# Patient Record
Sex: MALE | Race: WHITE | NOT HISPANIC OR LATINO | Employment: OTHER | URBAN - NONMETROPOLITAN AREA
[De-identification: names, ages, dates, MRNs, and addresses within clinical notes are randomized per-mention and may not be internally consistent; named-entity substitution may affect disease eponyms.]

---

## 2017-01-31 ENCOUNTER — OFFICE VISIT - GICH (OUTPATIENT)
Dept: FAMILY MEDICINE | Facility: OTHER | Age: 47
End: 2017-01-31

## 2017-01-31 DIAGNOSIS — J02.9 ACUTE PHARYNGITIS: ICD-10-CM

## 2017-01-31 LAB
INFLUENZA ANTIGEN - HISTORICAL: NORMAL
STREP A ANTIGEN - HISTORICAL: NEGATIVE

## 2017-02-17 ENCOUNTER — COMMUNICATION - GICH (OUTPATIENT)
Dept: FAMILY MEDICINE | Facility: OTHER | Age: 47
End: 2017-02-17

## 2017-02-17 DIAGNOSIS — M54.16 RADICULOPATHY OF LUMBAR REGION: ICD-10-CM

## 2017-03-22 ENCOUNTER — OFFICE VISIT - GICH (OUTPATIENT)
Dept: FAMILY MEDICINE | Facility: OTHER | Age: 47
End: 2017-03-22

## 2017-03-22 ENCOUNTER — HISTORY (OUTPATIENT)
Dept: FAMILY MEDICINE | Facility: OTHER | Age: 47
End: 2017-03-22

## 2017-03-22 DIAGNOSIS — J02.9 ACUTE PHARYNGITIS: ICD-10-CM

## 2017-03-22 DIAGNOSIS — R68.89 OTHER GENERAL SYMPTOMS AND SIGNS: ICD-10-CM

## 2017-03-22 DIAGNOSIS — R50.9 FEVER: ICD-10-CM

## 2017-03-22 DIAGNOSIS — R69 ILLNESS: ICD-10-CM

## 2017-03-22 LAB — STREP A ANTIGEN - HISTORICAL: NEGATIVE

## 2017-07-17 ENCOUNTER — COMMUNICATION - GICH (OUTPATIENT)
Dept: FAMILY MEDICINE | Facility: OTHER | Age: 47
End: 2017-07-17

## 2017-07-17 DIAGNOSIS — J30.2 OTHER SEASONAL ALLERGIC RHINITIS: ICD-10-CM

## 2017-07-17 DIAGNOSIS — M54.16 RADICULOPATHY OF LUMBAR REGION: ICD-10-CM

## 2017-08-10 ENCOUNTER — COMMUNICATION - GICH (OUTPATIENT)
Dept: FAMILY MEDICINE | Facility: OTHER | Age: 47
End: 2017-08-10

## 2017-08-10 DIAGNOSIS — R06.2 WHEEZING: ICD-10-CM

## 2017-08-21 ENCOUNTER — OFFICE VISIT - GICH (OUTPATIENT)
Dept: FAMILY MEDICINE | Facility: OTHER | Age: 47
End: 2017-08-21

## 2017-08-21 DIAGNOSIS — R06.2 WHEEZING: ICD-10-CM

## 2017-08-21 DIAGNOSIS — J30.2 OTHER SEASONAL ALLERGIC RHINITIS: ICD-10-CM

## 2017-08-21 DIAGNOSIS — M54.16 RADICULOPATHY OF LUMBAR REGION: ICD-10-CM

## 2017-08-21 DIAGNOSIS — M1A.9XX0 CHRONIC GOUT WITHOUT TOPHUS: ICD-10-CM

## 2017-08-21 ASSESSMENT — PATIENT HEALTH QUESTIONNAIRE - PHQ9: SUM OF ALL RESPONSES TO PHQ QUESTIONS 1-9: 7

## 2017-08-25 ENCOUNTER — COMMUNICATION - GICH (OUTPATIENT)
Dept: FAMILY MEDICINE | Facility: OTHER | Age: 47
End: 2017-08-25

## 2017-08-25 DIAGNOSIS — M1A.9XX0 CHRONIC GOUT WITHOUT TOPHUS: ICD-10-CM

## 2017-09-17 ENCOUNTER — COMMUNICATION - GICH (OUTPATIENT)
Dept: FAMILY MEDICINE | Facility: OTHER | Age: 47
End: 2017-09-17

## 2017-09-17 DIAGNOSIS — R06.2 WHEEZING: ICD-10-CM

## 2017-10-18 ENCOUNTER — HISTORY (OUTPATIENT)
Dept: FAMILY MEDICINE | Facility: OTHER | Age: 47
End: 2017-10-18

## 2017-10-18 ENCOUNTER — OFFICE VISIT - GICH (OUTPATIENT)
Dept: FAMILY MEDICINE | Facility: OTHER | Age: 47
End: 2017-10-18

## 2017-10-18 DIAGNOSIS — B97.89 OTHER VIRAL AGENTS AS THE CAUSE OF DISEASES CLASSIFIED ELSEWHERE: ICD-10-CM

## 2017-10-18 DIAGNOSIS — J02.9 ACUTE PHARYNGITIS: ICD-10-CM

## 2017-10-18 DIAGNOSIS — Z01.89 ENCOUNTER FOR OTHER SPECIFIED SPECIAL EXAMINATIONS (CODE): ICD-10-CM

## 2017-10-18 DIAGNOSIS — J06.9 ACUTE UPPER RESPIRATORY INFECTION: ICD-10-CM

## 2017-10-18 LAB — STREP A ANTIGEN - HISTORICAL: NEGATIVE

## 2017-12-27 NOTE — PROGRESS NOTES
Patient Information     Patient Name MRN Sex Christiano Mccarthy [id number] Male 1970      Progress Notes by Gino Frankel MD at 2017  3:15 PM     Author:  Gino Frankel MD Service:  (none) Author Type:  Physician     Filed:  2017  4:01 PM Encounter Date:  2017 Status:  Signed     :  Gino Frankel MD (Physician)            SUBJECTIVE:  Christiano Metcalf is a 47 y.o. male here for follow-up. Patient has a history of gout. He currently is only using indomethacin for flareups per reports of these had numerous flareups of the last few months and he would like to go back on allopurinol. He. She was using 600 mg daily. He also has a history of hernia repair and feels that he has recovered completely. He is albuterol as needed for wheezing and is quite happy with how it is working. He thinks that he uses this 1-2 times a week. Finally, he continues using Zyrtec for seasonal allergies.      Patient Active Problem List      Diagnosis Date Noted     Major depression 2014     Hypomagnesemia      LEARNING DISABILITY 2011     GOUT        Past Medical History:     Diagnosis  Date     Gout      History of torn meniscus of right knee     repair      Hypomagnesemia      Knee pain, left      Thumb injury        Past Surgical History:      Procedure  Laterality Date     HAND FINGER SURGERY      Right thumb ORIF        HERNIA REPAIR Left 7/15/14    LIH with mesh       HERNIA REPAIR Right 5/10/16    RIH with plug/patch       KNEE ARTHROSCOPY      Right knee meniscal repair, arthroscopic ally       KNEE ARTHROSCOPY  2010    Left knee scope          Current Outpatient Prescriptions       Medication  Sig Dispense Refill     albuterol HFA (PROAIR HFA) 90 mcg/actuation inhaler Inhale 2 Puffs by mouth 4 times daily if needed. 1 Inhaler 3     allopurinol (ZYLOPRIM) 300 mg tablet Take 2 tablets by mouth once daily. 180 tablet 3     cetirizine (ZYRTEC) 10 mg tablet Take 1 tablet by mouth  once daily. 90 tablet 3     indomethacin (INDOCIN) 50 mg capsule Take 1 capsule by mouth 3 times daily with meals. 90 capsule 3     naproxen (NAPROSYN) 500 mg tablet Take 1 tablet by mouth 2 times daily with meals. 180 tablet 3     No current facility-administered medications for this visit.      Medications have been reviewed by me and are current to the best of my knowledge and ability.      Allergies:  Allergies      Allergen   Reactions     Other [Unlisted Allergen (Include Detail In Comments)]  Nausea And Vomiting     Peas        Family History       Problem   Relation Age of Onset     Diabetes  Mother      Other  Father      episode of gout.        Cancer-colon  Father      diagnosed in early 60s       Good Health  Son      1997       Good Health  Daughter      2009       Good Health  Son      2011         Social History       Substance Use Topics         Smoking status:   Former Smoker     Types:  Cigarettes     Quit date:  2/17/2010     Smokeless tobacco:   Never Used     Alcohol use   No      Comment: seldom        ROS:    As above otherwise ROS is unremarkable.      OBJECTIVE:  /97  Pulse 69  Wt 98 kg (216 lb)  BMI 32.36 kg/m2    EXAM:  General Appearance: Pleasant, alert, appropriate appearance for age. No acute distress  Head: Normal. Normocephalic, atraumatic.  Eyes: PERRL, EOMI  Ears: Normal TM's bilaterally. Normal auditory canals and external ears.   OroPharynx: Dental hygiene adequate. Normal buccal mucosa. Normal pharynx.  Neck: Supple, no masses or nodes, no lymphadenopathy.  No thyromegaly.  Lungs: Normal chest wall and respirations. Clear to auscultation, no wheezes or crackles.  Cardiovascular: Regular rate and rhythm. S1, S2, no murmurs.  Gastrointestinal: Soft, nontender, no abnormal masses or organomegaly. BS normal.  Musculoskeletal: No edema.  Skin: no concerning or new rashes.  Neurologic Exam: CN 2-12 grossly intact.  Normal gait.  Symmetric DTRs, No focal motor or sensory  deficits. No tremor.  Psychiatric Exam: Alert and oriented, appropriate affect.    ASSESSEMENT AND PLAN:    Christiano was seen today for medication management.    Diagnoses and all orders for this visit:    Wheezing  -     albuterol HFA (PROAIR HFA) 90 mcg/actuation inhaler; Inhale 2 Puffs by mouth 4 times daily if needed.    Lumbar radiculopathy  -     naproxen (NAPROSYN) 500 mg tablet; Take 1 tablet by mouth 2 times daily with meals.    Chronic gout without tophus, unspecified cause, unspecified site  -     indomethacin (INDOCIN) 50 mg capsule; Take 1 capsule by mouth 3 times daily with meals.  -     allopurinol (ZYLOPRIM) 300 mg tablet; Take 2 tablets by mouth once daily.    Seasonal allergic rhinitis, unspecified allergic rhinitis trigger  -     cetirizine (ZYRTEC) 10 mg tablet; Take 1 tablet by mouth once daily.    Overall he seems to be doing well. We'll refill albuterol and he will follow-up if he is using this more than twice a week. We'll restart allopurinol once he has completed his most recent flareup. He can continue to use indomethacin as needed for flareups but he should not mix this with naproxen and this was reviewed once again with him. Finally, continue Zyrtec as needed for seasonal allergies.      Otto Frankel MD

## 2017-12-28 NOTE — TELEPHONE ENCOUNTER
Patient Information     Patient Name MRN Sex Christiano Mccarthy [id number] Male 1970      Telephone Encounter by Suman Gu RN at 2017 10:47 AM     Author:  Suman Gu RN Service:  (none) Author Type:  NURS- Registered Nurse     Filed:  2017 11:14 AM Encounter Date:  2017 Status:  Signed     :  Suman Gu RN (NURS- Registered Nurse)            Nsaids    Office visit in the past 12 months or per provider note.    Last visit with PITER KATE was on: 2016 in [facility]  Next visit with PITER KATE is on: No future appointment listed with this provider  Next visit with Family Practice is on: No future appointment listed in this department    Max refill for 12 months from last office visit or per provider note.    Antihistamines:    Office visit in the past 12 months.    Last visit with PITER KATE was on: 2016 in [facility]  Next visit with PITER KATE is on: No future appointment listed with this provider  Next visit with Family Practice is on: No future appointment listed in this department    Max refills 12 months from last office visit.    Chart review shows that patient was last seen by PCP for diagnosis of lumbar radiculopathy and Seasonal allergies on 16. Zyrtec was renewed at that office visit as per chart review. No changes noted to naproxen in office visit notes on that date. Patient is due for annual office visit to support continued use of both requested rxs. Will refill rxs as requested for a limited supply and send patient a reminder letter.    Prescription refilled per RN Medication Refill Policy.................... Suman Gu RN ....................  2017   11:12 AM

## 2017-12-28 NOTE — TELEPHONE ENCOUNTER
Patient Information     Patient Name MRN Sex Christiano Mccarthy [id number] Male 1970      Telephone Encounter by Suman Gu RN at 2017  3:11 PM     Author:  Suman Gu RN Service:  (none) Author Type:  NURS- Registered Nurse     Filed:  2017  3:17 PM Encounter Date:  2017 Status:  Signed     :  Suman Gu RN (NURS- Registered Nurse)            Redundant Refill Request for Proair refused;    Prescribing Provider: Gino Frankel MD                   Order Date: 2017  Ordered by: GINO FRANKEL  Medication:albuterol HFA (PROAIR HFA) 90 mcg/actuation inhaler    Qty:1 Inhaler   Ref:3  Start:2017   End:              Route:Inhalation            ARACELI:No   Class:eRx    Sig:Inhale 2 Puffs by mouth 4 times daily if needed.    Pharmacy:[facility] [address] [place] [address] AT SEC              OF [facility] [address] & [facility] - [phone]    Unable to complete prescription refill per RN Medication Refill Policy.................... Suman Gu RN ....................  2017   3:16 PM

## 2017-12-28 NOTE — TELEPHONE ENCOUNTER
Patient Information     Patient Name MRN Sex Christiano Mccarthy [id number] Male 1970      Telephone Encounter by Todd Pierson LPN at 2017  2:00 PM     Author:  Todd Pierson LPN Service:  (none) Author Type:  NURS- Licensed Practical Nurse     Filed:  2017  2:01 PM Encounter Date:  2017 Status:  Signed     :  Todd Pierson LPN (NURS- Licensed Practical Nurse)            Please advise.  Todd Pierson LPN ..............2017 2:01 PM

## 2017-12-28 NOTE — TELEPHONE ENCOUNTER
Patient Information     Patient Name MRN Sex Christiano Mccarthy [id number] Male 1970      Telephone Encounter by Gino Frankel MD at 2017  2:28 PM     Author:  Gino Frankel MD Service:  (none) Author Type:  Physician     Filed:  2017  2:34 PM Encounter Date:  2017 Status:  Signed     :  Gino Frankel MD (Physician)            Our chart had 600mg for some reason.  If he was previously only using 300mg then I would recommend starting back at 300mg.  Will update med list.

## 2017-12-28 NOTE — PROGRESS NOTES
Patient Information     Patient Name MRN Sex Christiano Mccarthy [id number] Male 1970      Progress Notes by Jenna Kothari NP at 10/18/2017  4:15 PM     Author:  Jenna Kothari NP Service:  (none) Author Type:  PHYS- Nurse Practitioner     Filed:  10/18/2017  5:19 PM Encounter Date:  10/18/2017 Status:  Signed     :  Jenna Kothari NP (PHYS- Nurse Practitioner)            HPI:    Christiano Metcalf is a 47 y.o. male who presents to clinic today for URI.   Symptoms x 3 days.   Symptoms include cough, chest congestion, runny and stuffy nose, sinus pressure, sore throat, sneezing, chills, sweats, generalized body aches, and headaches.  Cough and sore throat worsening since last night.  Non productive cough.  Some shortness of breath with coughing and exertion.  No documented fevers.  Appetite decreased, fair, painful to swallow.  Throat is burning.  Energy decreased, fair.  Requesting strep testing today, states he had it multiple times last year.  Taking Tylenol sinus and Ibuprofen.  Using Albuterol inhaler about 5 times in the past 3 days.            Past Medical History:     Diagnosis  Date     Gout      History of torn meniscus of right knee     repair      Hypomagnesemia      Knee pain, left      Thumb injury      Past Surgical History:      Procedure  Laterality Date     HAND FINGER SURGERY      Right thumb ORIF        HERNIA REPAIR Left 7/15/14    LIH with mesh       HERNIA REPAIR Right 5/10/16    RIH with plug/patch       KNEE ARTHROSCOPY      Right knee meniscal repair, arthroscopic ally       KNEE ARTHROSCOPY  2010    Left knee scope        Social History       Substance Use Topics         Smoking status:   Former Smoker     Types:  Cigarettes     Quit date:  2010     Smokeless tobacco:   Never Used     Alcohol use   No      Comment: seldom      Current Outpatient Prescriptions       Medication  Sig Dispense Refill     albuterol HFA (PROAIR HFA) 90 mcg/actuation  "inhaler Inhale 2 Puffs by mouth 4 times daily if needed. 1 Inhaler 3     allopurinol (ZYLOPRIM) 300 mg tablet Take 1 tablet by mouth once daily. 180 tablet 3     cetirizine (ZYRTEC) 10 mg tablet Take 1 tablet by mouth once daily. 90 tablet 3     indomethacin (INDOCIN) 50 mg capsule Take 1 capsule by mouth 3 times daily with meals. 90 capsule 3     naproxen (NAPROSYN) 500 mg tablet Take 1 tablet by mouth 2 times daily with meals. 180 tablet 3     No current facility-administered medications for this visit.      Medications have been reviewed by me and are current to the best of my knowledge and ability.    Allergies      Allergen   Reactions     Other [Unlisted Allergen (Include Detail In Comments)]  Nausea And Vomiting     Peas        Past medical history, past surgical history, current medications and allergies reviewed and accurate to the best of my knowledge.        ROS:  Refer to HPI    /80  Pulse 72  Temp 98  F (36.7  C) (Tympanic)   Resp 20  Ht 1.727 m (5' 8\")  Wt 99.8 kg (220 lb)  BMI 33.45 kg/m2    EXAM:  General Appearance: miserable appearing adult male, non toxic appearance, appropriate appearance for age. No acute distress  Head: normocephalic, atraumatic  Ears: Left TM with bony landmarks appreciated, no erythema, no effusion, no bulging, no purulence.  Right TM with bony landmarks appreciated, no erythema, no effusion, no bulging, no purulence.   Left auditory canal clear.  Right auditory canal clear.  Normal external ears, non tender.  Eyes: conjunctivae normal without erythema or irritation, no drainage or crusting, no eyelid swelling, pupils equal   Orophayrnx: moist mucous membranes, posterior pharynx with mild erythema, tonsils without hypertrophy, no erythema, no exudates or petechiae, no post nasal drip seen, no oral lesions.    Neck: supple without adenopathy  Respiratory: normal chest wall and respirations.  Normal effort.  Clear to auscultation bilaterally, no wheezing, crackles " or rhonchi.  No increased work of breathing.  No cough appreciated.  Cardiac: RRR with no murmurs  Musculoskeletal:  Normal gait.  Equal movement of bilateral upper extremities.  Equal movement of bilateral lower extremities.    Dermatological: no rashes noted of exposed skin  Psychological: normal affect, alert and pleasant      Labs:  Results for orders placed or performed in visit on 10/18/17      RAPID STREP WITH REFLEX CULTURE      Result  Value Ref Range    STREP A ANTIGEN           Negative Negative             ASSESSMENT/PLAN:    ICD-10-CM    1. Viral URI with cough J06.9 codeine-guaiFENesin (ROBITUSSIN AC)  mg/5 mL liquid     B97.89    2. Sore throat J02.9 RAPID STREP WITH REFLEX CULTURE      RAPID STREP WITH REFLEX CULTURE   3. Patient request for diagnostic testing Z01.89 RAPID STREP WITH REFLEX CULTURE      RAPID STREP WITH REFLEX CULTURE         Negative rapid strep test  Symptoms and exam consistent with viral illness.  No antibiotics indicated at this time.  Robitussin with codeine 5 ml Q 6 hours PRN  Encouraged fluids  Symptomatic treatment - salt water gargles, honey, elevation, humidifier, sinus rinse/netti pot, lozenges, etc   Tylenol or ibuprofen PRN  Follow up if symptoms persist or worsen or concerns          Patient Instructions   Negative rapid strep test    Symptoms likely due to virus. No antibiotic is needed at this time.       Most coughs are caused by a viral infection.   Usually coughs can last 2 to 3 weeks. Sometimes the cough becomes loose (wet) for a few days, and your child coughs up a lot of phlegm (mucus). This is usually a sign that the end of the illness is near.    Most sore throats are caused by viruses and are part of a cold. About 10% of sore throats are caused by strep bacteria.    Encouraged fluids and rest.    May use symptomatic care with tylenol or ibuprofen.     Using a humidifier works well to break up the congestion.     Elevate the mattress to 15 degrees in  order to help with the congestion.    Frequent swallows of cool liquid.      Oatmeal or honey coats the throat and some patients find it soothes the pain.     Salt water gargles as needed    Return to clinic with change/worsening of symptoms or concerns.

## 2017-12-28 NOTE — TELEPHONE ENCOUNTER
Patient Information     Patient Name MRN Sex Christiano Mccarthy [id number] Male 1970      Telephone Encounter by Lenora Farrell RN at 2017  2:17 PM     Author:  Lenora Farrell RN Service:  (none) Author Type:  NURS- Registered Nurse     Filed:  2017  2:21 PM Encounter Date:  8/10/2017 Status:  Signed     :  Lenora Farrell RN (NURS- Registered Nurse)            Bronchodilator Inhalers     Office visit in the past 12 months.    Last visit with [facility] was on: 2016 in [email or web address]  Next visit with [facility] is on: 2017 in [facility]  Next visit with Family Practice is on: 2017 in [facility]    Max refills 12 months from last office visit.  Limited refill   Prescription refilled per RN Medication Refill Policy.................... LENORA FARRELL, JACOB ....................  2017   2:19 PM

## 2017-12-28 NOTE — TELEPHONE ENCOUNTER
Patient Information     Patient Name MRN Sex Christiano Mccarthy [id number] Male 1970      Telephone Encounter by Katty Montes De Oca at 2017  3:45 PM     Author:  Katty Montes De Oca Service:  (none) Author Type:  (none)     Filed:  2017  3:48 PM Encounter Date:  2017 Status:  Signed     :  Katty Montes De Oca            After last name and birthday was verified, patient was notified of information below.  Katty Montes De Oca LPN ................ 2017 3:46 PM

## 2017-12-29 ENCOUNTER — HISTORY (OUTPATIENT)
Dept: FAMILY MEDICINE | Facility: OTHER | Age: 47
End: 2017-12-29

## 2017-12-29 ENCOUNTER — OFFICE VISIT - GICH (OUTPATIENT)
Dept: FAMILY MEDICINE | Facility: OTHER | Age: 47
End: 2017-12-29

## 2017-12-29 DIAGNOSIS — M1A.9XX0 CHRONIC GOUT WITHOUT TOPHUS: ICD-10-CM

## 2017-12-29 DIAGNOSIS — F32.1 MAJOR DEPRESSIVE DISORDER, SINGLE EPISODE, MODERATE (H): ICD-10-CM

## 2017-12-29 NOTE — PATIENT INSTRUCTIONS
Patient Information     Patient Name MRN Christiano Grimes [id number] Male 1970      Patient Instructions by Jenna Kothari NP at 10/18/2017  4:15 PM     Author:  Jenna Kothari NP Service:  (none) Author Type:  PHYS- Nurse Practitioner     Filed:  10/18/2017  5:11 PM Encounter Date:  10/18/2017 Status:  Signed     :  Jenna Kothari NP (PHYS- Nurse Practitioner)            Negative rapid strep test    Symptoms likely due to virus. No antibiotic is needed at this time.       Most coughs are caused by a viral infection.   Usually coughs can last 2 to 3 weeks. Sometimes the cough becomes loose (wet) for a few days, and your child coughs up a lot of phlegm (mucus). This is usually a sign that the end of the illness is near.    Most sore throats are caused by viruses and are part of a cold. About 10% of sore throats are caused by strep bacteria.    Encouraged fluids and rest.    May use symptomatic care with tylenol or ibuprofen.     Using a humidifier works well to break up the congestion.     Elevate the mattress to 15 degrees in order to help with the congestion.    Frequent swallows of cool liquid.      Oatmeal or honey coats the throat and some patients find it soothes the pain.     Salt water gargles as needed    Return to clinic with change/worsening of symptoms or concerns.

## 2017-12-30 NOTE — NURSING NOTE
Patient Information     Patient Name MRN Sex Christiano Mccarthy [id number] Male 1970      Nursing Note by Jossy Church at 10/18/2017  4:15 PM     Author:  Jossy Church Service:  (none) Author Type:  NURS- Student Practical Nurse     Filed:  10/18/2017  4:48 PM Encounter Date:  10/18/2017 Status:  Signed     :  Jossy Church (NURS- Student Practical Nurse)            Patient presents to the clinic for URI/sinus that started on . S/sx include sinus pressure, sinus headache, extreme cough with sore throat, chest & nasal congestion, loss of voice and generalized muscle aches. Has been taking OTC Tylenol sinus.   Jossy Church LPN............................ 10/18/2017 4:35 PM

## 2017-12-30 NOTE — NURSING NOTE
Patient Information     Patient Name MRN Sex Christiano Mccarthy [id number] Male 1970      Nursing Note by Anali Smith at 2017  3:15 PM     Author:  Anali Smith Service:  (none) Author Type:  (none)     Filed:  2017  3:53 PM Encounter Date:  2017 Status:  Signed     :  Anali Smith            Patient is here for Medication management.   Anali Smith LPN .............2017  3:19 PM

## 2018-01-03 NOTE — PROGRESS NOTES
"Patient Information     Patient Name MRN Christiano Grimes [id number] Male 1970      Progress Notes by Huong Barlow MD at 2017  2:15 PM     Author:  Huong Barlow MD Service:  (none) Author Type:  Physician     Filed:  2017  2:50 PM Encounter Date:  2017 Status:  Signed     :  Huong Barlow MD (Physician)            SUBJECTIVE:  46 y.o. male presents for acute sore throat. 1.5-2 days ago he developed a scratchy throat, then headache, upset stomach, and diarrhea. The diarrhea has improved; no vomiting. No fever but feels chilly. Mild cough. \"Whole body\" aches.   Many people ill at work, doesn't know of any strep or influenza. No sick exposures at home.  Nonsmoker; quit  after smoking 2 ppd for 25 years. Recent Dx of emphysema. No colored sputum or LOU.  No flu shot.      Current Outpatient Prescriptions       Medication  Sig Dispense Refill     albuterol HFA (PROAIR HFA) 90 mcg/actuation inhaler Inhale 2 Puffs by mouth 4 times daily if needed. 1 Inhaler 11     allopurinol (ZYLOPRIM) 300 mg tablet Take 2 tablets by mouth once daily. 180 tablet 3     cetirizine (ZYRTEC) 10 mg tablet Take 1 tablet by mouth once daily. 90 tablet 3     ibuprofen (ADVIL; MOTRIN) 200 mg tablet Take 200 mg by mouth 4 times daily if needed (took 3 pills at a time).       indomethacin (INDOCIN) 50 mg capsule Take 1 capsule by mouth 3 times daily with meals. 30 capsule 3     naproxen (NAPROSYN) 500 mg tablet Take 1 tablet by mouth 2 times daily with meals. 60 tablet 3     polyethylene glycol (MIRALAX) 17 g powder for solution Take 17 g by mouth once daily. 30 Packet 1     polyethylene glycol-electrolyte (NULYTELY) 420 gram solution Take 240 mL by mouth every 10 minutes. 4000 mL 0     No current facility-administered medications for this visit.      Medications have been reviewed by me and are current to the best of my knowledge and ability.      Allergies as of 2017 - [place] as Reviewed " 01/31/2017      Allergen  Reaction Noted     Other [unlisted allergen (include detail in comments)] Nausea And Vomiting 05/10/2016        OBJECTIVE:  Visit Vitals       /80     Pulse 80     Temp 98.2  F (36.8  C) (Temporal)     Wt 95.7 kg (211 lb)     BMI 31.62 kg/m2       General Appearance: No distress - comfortable, no resp distress  Ear Exam: Normal TM's bilaterally. Normal auditory canals.  OroPharynx Exam: minimal posterior erythema without exudate  Neck Exam: Supple, no masses or nodes.  Chest/Respiratory Exam: Normal chest wall and respirations. Clear to auscultation.  Cardiovascular Exam: RRR without murmur.    LAB:  RSV negative  Influenza negative      ASSESSMENT/PLAN:    ICD-10-CM   1. Sore throat J02.9       Labs and clinical picture consistent with viral illness. Reviewed home care and signs/Sx that should prompt re-evaluation. Work note given.

## 2018-01-03 NOTE — TELEPHONE ENCOUNTER
Patient Information     Patient Name MRN Sex Christiano Mccarthy [id number] Male 1970      Telephone Encounter by Dana Trinh RN at 2017 10:02 AM     Author:  Dana Trinh RN Service:  (none) Author Type:  NURS- Registered Nurse     Filed:  2017 10:08 AM Encounter Date:  2017 Status:  Signed     :  Dana Trinh RN (NURS- Registered Nurse)            Nsaids  Office visit in the past 12 months or per provider note.  Last visit with PITER KATE was on: 2016 in [facility]  Next visit with PITER KATE is on: No future appointment listed with this provider  Next visit with Family Practice is on: No future appointment listed in this department  Max refill for 12 months from last office visit or per provider note.  Prescription refilled per RN Medication Refill Policy.................... Dana Trinh RN ....................  2017   10:05 AM

## 2018-01-03 NOTE — PATIENT INSTRUCTIONS
Patient Information     Patient Name MRN Sex Christiano Mccarthy [id number] Male 1970      Patient Instructions by Huong Barlow MD at 2017  2:15 PM     Author:  Huong Barlow MD  Service:  (none) Author Type:  Physician     Filed:  2017  2:50 PM  Encounter Date:  2017 Status:  Addendum     :  Huong Barlow MD (Physician)        Related Notes: Original Note by Huong Barlow MD (Physician) filed at 2017  2:44 PM            You may use your albuterol inhaler for either cough or wheezing.  Use Tylenol or ibuprofen for muscle aches.  Rest, drink fluids.  Follow up if you should develop high fever, increased cough, or dark-colored sputum.

## 2018-01-03 NOTE — NURSING NOTE
Patient Information     Patient Name MRN Sex Christiano Mccarthy [id number] Male 1970      Nursing Note by Judy Faust at 2017  2:15 PM     Author:  Judy Faust Service:  (none) Author Type:  (none)     Filed:  2017  2:22 PM Encounter Date:  2017 Status:  Signed     :  Judy Faust            Patient presents with sore throat, headache, stomach ache, chills and cough.   Judy Faust LPN........................2017  2:09 PM

## 2018-01-04 NOTE — PATIENT INSTRUCTIONS
Patient Information     Patient Name MRN Christiano Grimes [id number] Male 1970      Patient Instructions by Jenna Kothari NP at 3/22/2017  5:03 PM     Author:  Jenna Kothari NP  Service:  (none) Author Type:  PHYS- Nurse Practitioner     Filed:  3/22/2017  5:03 PM  Encounter Date:  3/22/2017 Status:  Addendum     :  Jenna Kothari NP (PHYS- Nurse Practitioner)        Related Notes: Original Note by Jenna Kothari NP (PHYS- Nurse Practitioner) filed at 3/22/2017  5:03 PM            Tamiflu twice daily x 5 days for influenza    Negative strep test         Index Turkish All languages Related topics   Flu (Influenza)   ________________________________________________________________________  KEY POINTS    Flu is caused by a virus, and can be spread by coughing or sneezing, or by touching something with the virus on it.    The flu vaccine is the best way to help prevent the flu. Washing your hands often is also important. Flu can be treated rest, drinking plenty of liquids, and sometimes with medicine.    If you have another medical problem and get the flu, it s best to see your healthcare provider.  ________________________________________________________________________  What is flu?   Influenza, also called the flu, is an infection caused by a virus. The flu affects your whole body, especially your air passages, and causes symptoms that are similar to cold symptoms. Flu symptoms tend to be worse than cold symptoms, but it can sometimes be hard to tell the difference between the flu and a cold unless you get a test.  Infection with the flu virus sometimes leads to other infections, such as ear, sinus, and chest infections. Pneumonia can also occur as a result of the flu. It can be caused by the flu virus itself or by bacteria infecting lung tissues that have been damaged by the virus. Older adults; people whose immune systems are weak; and people with chronic medical problems,  such as heart or lung disease or diabetes, are at risk for more severe symptoms or problems. This is why it s important to try to prevent flu by getting flu shots every year.  What is the cause?  Flu is caused by a virus. When you have the flu, the virus is in your mucus and saliva and can spread to others when you cough or sneeze. People can also get the flu if they touch something with the flu virus on it (like cups, doorknobs, and hands) and then touch their mouth, nose, or eyes.  Outbreaks of flu occur every year, usually in late fall and winter.  What are the symptoms?  Flu tends to start suddenly. You may feel fine one hour and feel sick the next. Flu symptoms may be different from person to person. Some of the common symptoms include:    Chills, sweating, and fever    Cough    Runny or stuffy nose    Headache    Muscle or body aches    Sore throat    Tiredness  Flu symptoms usually last 3 to 7 days. You may start feeling better after the first 2 days or so.  How is it diagnosed?  Your healthcare provider will ask about your symptoms and may examine you. The diagnosis is usually based on your symptoms. There are lab tests for flu, but in most cases there is no need to do a test, especially when many others in your community are sick with the flu.  How is it treated?  Usually you can treat your symptoms at home.    Get plenty of rest.    Drink a lot of clear liquids. Water, broth, juice, electrolyte solutions, and noncaffeinated drinks are best. When you have a high fever, your body needs more liquid because you lose more water in your breath and from your skin. Having enough fluids also helps the mucus in your sinuses and lungs stay thin and easy to clear from the body. When the mucus is thin, it is less likely to cause a sinus or chest infection.    Consider taking acetaminophen or ibuprofen to relieve headaches and muscle aches and to lower a fever. Read the label and take as directed. Unless recommended by  your healthcare provider, you should not take these medicines for more than 10 days.    Nonsteroidal anti-inflammatory medicines (NSAIDs), such as ibuprofen, naproxen, and aspirin, may cause stomach bleeding and other problems. These risks increase with age.    Acetaminophen may cause liver damage or other problems. Unless recommended by your provider, don't take more than 3000 milligrams (mg) in 24 hours. To make sure you don t take too much, check other medicines you take to see if they also contain acetaminophen. Ask your provider if you need to avoid drinking alcohol while taking this medicine.    If your nose or sinuses get congested, a decongestant medicine may help you feel better. Taking a decongestant may help prevent ear or sinus infections.    Cough medicine or cough drops may temporarily help control a cough.  Antiviral medicine is medicine your healthcare provider can prescribe that may make flu symptoms less severe. It may also help you feel better a little sooner. The medicine can be taken as a tablet or nasal spray. It helps only if you start taking it within the first 2 days of illness. Usually it is taken for only a few days. Even if you are taking antiviral medicine, you can pass the flu virus to other people. It is still important to wash your hands often and cover your mouth and nose when you cough or sneeze.  Your healthcare provider may prescribe antiviral medicine if you aren t sick yet but have been exposed to the flu and have not had the flu vaccine.  Talk to your healthcare provider if you have symptoms of the flu and:    You have heart disease, asthma, chronic bronchitis, kidney disease, diabetes, or another chronic medical problem.    Your immune system does not work normally (for example, because you are taking steroid medicine for a medical problem).    Your symptoms get more severe, you have a painful cough, you are coughing up mucus, or you are having trouble breathing. These  symptoms can be signs of pneumonia.  Ask your healthcare provider:    How and when you will get your test results    How long it will take to recover from this illness    If there are activities you should avoid, and when you can return to your normal activities    How to take care of yourself at home    What symptoms or problems you should watch for and what to do if you have them  Make sure you know when you should come back for a checkup. Keep all appointments for provider visits or tests.  How can I help prevent flu?  The flu vaccine is the best way to help prevent the flu. If you do get the flu, the vaccine may help keep you from getting really sick. The flu vaccine is recommended for adults and children 6 months and older. It s especially important for those with a chronic illness.  The flu vaccine can be given as a nasal spray or as a shot in the arm. The nasal spray is not recommended for the 9460-4691 flu season because it has not prevented the disease for the last 3 years.    The shot contains killed virus and is safe for everyone age 6 months and older.  You should get a new flu shot every year because the vaccine wears off over time and because it is changed each year to protect against the current year s most likely flu strains. It s best to get the new vaccine as soon as it s available each year, before the start of flu season. However, if the vaccine is still available, it can be helpful to get it anytime during the flu season. Flu season usually starts in October and can last through May.  Flu seasons can vary from region to region. If you are at high risk for infection and plan to travel to an area where you might be exposed to the flu, make sure you have an up-to-date flu shot before you go on your trip.  Other things you can do to help avoid getting the flu are:    Wash your hands often with soap and water. Wash for 20 seconds (long enough to sing the whole  Happy Birthday  song) or use an  alcohol-based hand .    Avoid touching your eyes, nose, or mouth when you are out in public.    Stay at least 6 feet away from people who are sick, if you can.    Try to take good care of yourself: Get plenty of sleep, be physically active, manage your stress, drink plenty of fluids, and eat healthy food. Stop smoking.    Keep surfaces clean--especially bedside tables, surfaces in the bathroom, and toys for children. Some viruses and bacteria can live 2 hours or more on surfaces like cafeteria tables, doorknobs, and desks. Wipe them down with a household disinfectant according to directions on the label.  If you are sick, you can help protect others if you:    Don t go to work or school. Avoid contact with other people except to get medical care.    Cover your nose and mouth with a tissue when you cough or sneeze. Throw the tissue in the trash after you use it, and then wash your hands. If you don t have a tissue, cough or sneeze into your upper sleeve instead of your hands.    Clean your hands often with soap and water or an alcohol-based hand , especially after using tissues or coughing or sneezing into your hands.  Developed by [employer].  Adult Advisor 2016.3 published by [employer].  Last modified: 2016-06-29  Last reviewed: 2014-10-30  This content is reviewed periodically and is subject to change as new health information becomes available. The information is intended to inform and educate and is not a replacement for medical evaluation, advice, diagnosis or treatment by a healthcare professional.  References   Adult Advisor 2016.3 Index    Copyright   2016 [employer], a division of [employer] All rights reserved.

## 2018-01-04 NOTE — NURSING NOTE
Patient Information     Patient Name MRN Sex Christiano Mccarthy [id number] Male 1970      Nursing Note by Georgina Sandoval at 3/22/2017  5:00 PM     Author:  Georgina Sandoval Service:  (none) Author Type:  NURS- Student Practical Nurse     Filed:  3/22/2017  4:58 PM Encounter Date:  3/22/2017 Status:  Signed     :  Georgina Sandoval (NURS- Student Practical Nurse)            Patient presents with sore throat, nasal congestion, congestion in chest, cough nonproductive, sinus congestion/pain starting this morning. Georgina Sandoval LPN .............3/22/2017  4:37 PM

## 2018-01-25 VITALS
HEART RATE: 80 BPM | HEIGHT: 68 IN | DIASTOLIC BLOOD PRESSURE: 80 MMHG | BODY MASS INDEX: 31.62 KG/M2 | SYSTOLIC BLOOD PRESSURE: 132 MMHG | TEMPERATURE: 98.2 F | WEIGHT: 211 LBS | RESPIRATION RATE: 20 BRPM | WEIGHT: 220 LBS | HEART RATE: 72 BPM | DIASTOLIC BLOOD PRESSURE: 80 MMHG | TEMPERATURE: 98 F | BODY MASS INDEX: 33.34 KG/M2 | SYSTOLIC BLOOD PRESSURE: 132 MMHG

## 2018-01-25 VITALS
HEART RATE: 69 BPM | TEMPERATURE: 99.8 F | WEIGHT: 216 LBS | BODY MASS INDEX: 31.25 KG/M2 | BODY MASS INDEX: 32.36 KG/M2 | HEIGHT: 69 IN | SYSTOLIC BLOOD PRESSURE: 132 MMHG | DIASTOLIC BLOOD PRESSURE: 80 MMHG | WEIGHT: 211 LBS | SYSTOLIC BLOOD PRESSURE: 145 MMHG | DIASTOLIC BLOOD PRESSURE: 97 MMHG | HEART RATE: 94 BPM

## 2018-02-01 ENCOUNTER — DOCUMENTATION ONLY (OUTPATIENT)
Dept: FAMILY MEDICINE | Facility: OTHER | Age: 48
End: 2018-02-01

## 2018-02-01 PROBLEM — E83.42 HYPOMAGNESEMIA: Status: ACTIVE | Noted: 2018-02-01

## 2018-02-01 PROBLEM — M10.9 GOUT: Status: ACTIVE | Noted: 2018-02-01

## 2018-02-01 RX ORDER — ALBUTEROL SULFATE 90 UG/1
2 AEROSOL, METERED RESPIRATORY (INHALATION) 4 TIMES DAILY PRN
COMMUNITY
Start: 2017-08-21 | End: 2018-08-31

## 2018-02-01 RX ORDER — INDOMETHACIN 50 MG/1
50 CAPSULE ORAL
COMMUNITY
Start: 2017-08-21 | End: 2018-08-31

## 2018-02-01 RX ORDER — NAPROXEN 500 MG/1
500 TABLET ORAL 2 TIMES DAILY WITH MEALS
COMMUNITY
Start: 2017-08-21 | End: 2018-08-31

## 2018-02-01 RX ORDER — CETIRIZINE HYDROCHLORIDE 10 MG/1
10 TABLET ORAL DAILY
COMMUNITY
Start: 2017-08-21 | End: 2018-08-31

## 2018-02-01 RX ORDER — CODEINE PHOSPHATE/GUAIFENESIN 10-100MG/5
5 LIQUID (ML) ORAL EVERY 6 HOURS PRN
COMMUNITY
Start: 2017-10-18 | End: 2018-08-31

## 2018-02-01 RX ORDER — ALLOPURINOL 300 MG/1
300 TABLET ORAL DAILY
COMMUNITY
Start: 2017-08-25 | End: 2018-08-31

## 2018-02-01 ASSESSMENT — PATIENT HEALTH QUESTIONNAIRE - PHQ9: SUM OF ALL RESPONSES TO PHQ QUESTIONS 1-9: 7

## 2018-02-09 ENCOUNTER — HISTORY (OUTPATIENT)
Dept: FAMILY MEDICINE | Facility: OTHER | Age: 48
End: 2018-02-09

## 2018-02-09 ENCOUNTER — OFFICE VISIT - GICH (OUTPATIENT)
Dept: FAMILY MEDICINE | Facility: OTHER | Age: 48
End: 2018-02-09

## 2018-02-09 VITALS
WEIGHT: 223 LBS | BODY MASS INDEX: 33.91 KG/M2 | DIASTOLIC BLOOD PRESSURE: 86 MMHG | HEART RATE: 78 BPM | SYSTOLIC BLOOD PRESSURE: 142 MMHG

## 2018-02-09 DIAGNOSIS — J02.0 STREPTOCOCCAL PHARYNGITIS: ICD-10-CM

## 2018-02-09 DIAGNOSIS — J02.9 ACUTE PHARYNGITIS: ICD-10-CM

## 2018-02-09 LAB — STREP A ANTIGEN - HISTORICAL: POSITIVE

## 2018-02-12 VITALS
BODY MASS INDEX: 33.66 KG/M2 | HEART RATE: 96 BPM | WEIGHT: 221.4 LBS | SYSTOLIC BLOOD PRESSURE: 138 MMHG | TEMPERATURE: 97.7 F | DIASTOLIC BLOOD PRESSURE: 88 MMHG

## 2018-02-12 NOTE — PROGRESS NOTES
Patient Information     Patient Name MRN Sex Christiano Mccarthy [id number] Male 1970      Progress Notes by Gino Frankel MD at 2017  3:30 PM     Author:  Gino Frankel MD Service:  (none) Author Type:  Physician     Filed:  2017  3:35 PM Encounter Date:  2017 Status:  Signed     :  Gino Frankel MD (Physician)            SUBJECTIVE:  Christiano Metcalf is a 47 y.o. male here for follow-up. He has a history of depression. He was previously on Zoloft which is helping quite a bit. Over the last 2 months he has not used any Zoloft as he had difficulty refilling it. He reports that his depressive symptoms worsened at that time. He would like to restart Zoloft.    He also has a history of gout and continues on allopurinol. He rarely has to use his indomethacin.    In regards to his asthma he uses albuterol 1 or 2 times a week.    Allergies:  Allergies      Allergen   Reactions     Other [Unlisted Allergen (Include Detail In Comments)]  Nausea And Vomiting     Peas        ROS:    As above otherwise ROS is unremarkable.    OBJECTIVE:  /86 (Cuff Site: Right Arm, Position: Sitting, Cuff Size: Adult Regular)  Pulse 78  Wt 101.2 kg (223 lb)  BMI 33.91 kg/m2    EXAM:  General Appearance: Pleasant, alert, appropriate appearance for age. No acute distress  Lungs: Normal chest wall and respirations. Clear to auscultation, no wheezes or crackles.  Cardiovascular: Regular rate and rhythm. S1, S2, no murmurs.  Neurologic Exam: CN 2-12 grossly intact.  Normal gait.  Symmetric DTRs, No focal motor or sensory deficits. No tremor.  Psychiatric Exam: Alert and oriented, appropriate affect.    ASSESSEMENT AND PLAN:    Christiano was seen today for medication management.    Diagnoses and all orders for this visit:    Moderate single current episode of major depressive disorder (HC)  -     sertraline (ZOLOFT) 50 mg tablet; Take 1 tablet by mouth once daily.    Chronic gout without tophus,  unspecified cause, unspecified site     we'll restart Zoloft 50 mg daily. Potential side effects were again discussed. He'll follow-up as needed.    Continue allopurinol daily and indomethacin as needed.    He'll follow up in August for yearly fasting labs and refills.    He declined flu shot today.      Otto Frankel MD    This document was prepared using voice generated software.  While every attempt was made for accuracy, grammatical errors may exist.

## 2018-02-12 NOTE — NURSING NOTE
Patient Information     Patient Name MRN Christiano Grimes [id number] Male 1970      Nursing Note by Katty Montes De Oca at 2017  3:30 PM     Author:  Katty Montes De Oca Service:  (none) Author Type:  (none)     Filed:  2017  3:23 PM Encounter Date:  2017 Status:  Signed     :  Katty Montes De Oca            Patient presents today for a medication check.  Katty Montes De Oca LPN .............2017  3:17 PM

## 2018-02-13 NOTE — PROGRESS NOTES
Patient Information     Patient Name MRN Sex Christiano Mccarthy [id number] Male 1970      Progress Notes by Alissa Kruger NP at 2018  9:00 AM     Author:  Alissa Kruger NP Service:  (none) Author Type:  PHYS- Nurse Practitioner     Filed:  2018  9:25 AM Encounter Date:  2018 Status:  Signed     :  Alissa Kruger NP (PHYS- Nurse Practitioner)            HPI:    Christiano Metcalf is a 47 y.o. male who presents to clinic today for sore throat and headache. Has had sx for 5-6 days. Feels sore throat is getting worse. Does report mild cough. No fevers. No other sx. He is eating/drinking but has pain with swallowing. Cold water feels good on throat. He has taken tylenol sinus for sx. Others at work with URI sx.     Past Medical History:     Diagnosis  Date     Gout      History of torn meniscus of right knee     repair      Hypomagnesemia      Knee pain, left      Thumb injury      Past Surgical History:      Procedure  Laterality Date     HAND FINGER SURGERY      Right thumb ORIF        HERNIA REPAIR Left 7/15/14    LIH with mesh       HERNIA REPAIR Right 5/10/16    RIH with plug/patch       KNEE ARTHROSCOPY      Right knee meniscal repair, arthroscopic ally       KNEE ARTHROSCOPY  2010    Left knee scope        Social History       Substance Use Topics         Smoking status:   Former Smoker     Types:  Cigarettes     Quit date:  2010     Smokeless tobacco:   Never Used     Alcohol use   No      Comment: seldom      Current Outpatient Prescriptions       Medication  Sig Dispense Refill     albuterol HFA (PROAIR HFA) 90 mcg/actuation inhaler Inhale 2 Puffs by mouth 4 times daily if needed. 1 Inhaler 3     allopurinol (ZYLOPRIM) 300 mg tablet Take 1 tablet by mouth once daily. 180 tablet 3     cetirizine (ZYRTEC) 10 mg tablet Take 1 tablet by mouth once daily. 90 tablet 3     indomethacin (INDOCIN) 50 mg capsule Take 1 capsule by mouth 3 times daily with meals. 90 capsule 3      naproxen (NAPROSYN) 500 mg tablet Take 1 tablet by mouth 2 times daily with meals. 180 tablet 3     sertraline (ZOLOFT) 50 mg tablet Take 1 tablet by mouth once daily. 90 tablet 3     No current facility-administered medications for this visit.      Medications have been reviewed by me and are current to the best of my knowledge and ability.    Allergies      Allergen   Reactions     Other [Unlisted Allergen (Include Detail In Comments)]  Nausea And Vomiting     Peas        ROS:  Pertinent positives and negatives are noted in HPI.    EXAM:  General appearance: well appearing male, in no acute distress  Head: normocephalic, atraumatic  Ears: TM's with cone of light, no erythema, canals clear bilaterally  Eyes: conjunctivae normal  Orophayrnx: moist mucous membranes, enlarged tonsils with erythema, no exudates or petechiae, no post nasal drip seen  Neck: supple without adenopathy  Respiratory: clear to auscultation bilaterally  Cardiac: RRR with no murmurs  Psychological: normal affect, alert and pleasant  Lab:   Results for orders placed or performed in visit on 02/09/18      RAPID STREP WITH REFLEX CULTURE      Result  Value Ref Range    STREP A ANTIGEN           Positive (A) Negative         ASSESSMENT/PLAN:    ICD-10-CM    1. Strep throat J02.0 amoxicillin (AMOXIL) 875 mg tablet   2. Sore throat J02.9 RAPID STREP WITH REFLEX CULTURE      RAPID STREP WITH REFLEX CULTURE   RST positive. Tx with amoxicillin. Reviewed need to complete all antibiotics. Discussed typical course of illness, symptomatic treatment and when to return to clinic. Patient in agreement with plan and all questions were answered.     Patient Instructions   Strep test is positive  Amoxicillin twice daily for 10 days  New toothbrush in 2-3 days   No work or school until on antibiotics for 24 hours  Tylenol or ibuprofen for throat pain

## 2018-02-13 NOTE — PATIENT INSTRUCTIONS
Patient Information     Patient Name MRN Christiano Grimes [id number] Male 1970      Patient Instructions by Alissa Kruger NP at 2018  9:00 AM     Author:  Alissa Kruger NP Service:  (none) Author Type:  PHYS- Nurse Practitioner     Filed:  2018  9:22 AM Encounter Date:  2018 Status:  Signed     :  Alissa Kruger NP (PHYS- Nurse Practitioner)            Strep test is positive  Amoxicillin twice daily for 10 days  New toothbrush in 2-3 days   No work or school until on antibiotics for 24 hours  Tylenol or ibuprofen for throat pain

## 2018-02-20 ENCOUNTER — OFFICE VISIT (OUTPATIENT)
Dept: FAMILY MEDICINE | Facility: OTHER | Age: 48
End: 2018-02-20
Attending: FAMILY MEDICINE
Payer: COMMERCIAL

## 2018-02-20 ENCOUNTER — HOSPITAL ENCOUNTER (OUTPATIENT)
Dept: GENERAL RADIOLOGY | Facility: OTHER | Age: 48
Discharge: HOME | End: 2018-02-20
Attending: FAMILY MEDICINE | Admitting: FAMILY MEDICINE
Payer: COMMERCIAL

## 2018-02-20 VITALS
DIASTOLIC BLOOD PRESSURE: 80 MMHG | OXYGEN SATURATION: 99 % | TEMPERATURE: 98.1 F | HEART RATE: 74 BPM | HEIGHT: 69 IN | BODY MASS INDEX: 33.03 KG/M2 | SYSTOLIC BLOOD PRESSURE: 110 MMHG | WEIGHT: 223 LBS

## 2018-02-20 DIAGNOSIS — R05.9 COUGH: Primary | ICD-10-CM

## 2018-02-20 DIAGNOSIS — R05.9 COUGH: ICD-10-CM

## 2018-02-20 PROCEDURE — 71046 X-RAY EXAM CHEST 2 VIEWS: CPT

## 2018-02-20 PROCEDURE — G0463 HOSPITAL OUTPT CLINIC VISIT: HCPCS | Mod: 25

## 2018-02-20 PROCEDURE — 99214 OFFICE O/P EST MOD 30 MIN: CPT | Performed by: FAMILY MEDICINE

## 2018-02-20 RX ORDER — CODEINE PHOSPHATE AND GUAIFENESIN 10; 100 MG/5ML; MG/5ML
1 SOLUTION ORAL
Qty: 120 ML | Refills: 0 | Status: SHIPPED | OUTPATIENT
Start: 2018-02-20 | End: 2018-08-31

## 2018-02-20 ASSESSMENT — PAIN SCALES - GENERAL: PAINLEVEL: EXTREME PAIN (8)

## 2018-02-20 NOTE — NURSING NOTE
Patient presents in the clinic with a non productive cough that began over two weeks ago. Patient states he was seen last week, and was diagnosed with strep and has completed the treatment. Patient states he coughs so much he gets lightheaded at times.  Yesica Wyman LPN 2/20/2018 1:31 PM

## 2018-02-20 NOTE — MR AVS SNAPSHOT
"              After Visit Summary   2018    Christiano Metcalf    MRN: [id number]           Patient Information     Date Of Birth          1970        Visit Information        Provider Department      2018 1:30 PM Sherwin Johnson MD [facility]        Today's Diagnoses     Cough    -  1       Follow-ups after your visit        Future tests that were ordered for you today     Open Future Orders        Priority Expected Expires Ordered    XR Chest 2 Views Routine 2018            Who to contact     If you have questions or need follow up information about today's clinic visit or your schedule please contact [facility] directly at [phone].  Normal or non-critical lab and imaging results will be communicated to you by I[employer]umhart, letter or phone within 4 business days after the clinic has received the results. If you do not hear from us within 7 days, please contact the clinic through Ipsumhart or phone. If you have a critical or abnormal lab result, we will notify you by phone as soon as possible.  Submit refill requests through [employer] or call your pharmacy and they will forward the refill request to us. Please allow 3 business days for your refill to be completed.          Additional Information About Your Visit        MyChart Information     [employer] lets you send messages to your doctor, view your test results, renew your prescriptions, schedule appointments and more. To sign up, go to [email or web address] . Click on \"Log in\" on the left side of the screen, which will take you to the Welcome page. Then click on \"Sign up Now\" on the right side of the page.     You will be asked to enter the access code listed below, as well as some personal information. Please follow the directions to create your username and password.     Your access code is: [email or web address]  Expires: 2018  2:33 PM     Your access code will  in 90 days. If " "you need help or a new code, please call your [facility] clinic or [phone].        Care EveryWhere ID     This is your Care EveryWhere ID. This could be used by other organizations to access your [facility] medical records  [id number]        Your Vitals Were     Pulse Temperature Height Pulse Oximetry BMI (Body Mass Index)       74 98.1  F (36.7  C) (Temporal) 1.753 m (5' 9\") 99% 32.93 kg/m2        Blood Pressure from Last 3 Encounters:   02/20/18 110/80   02/09/18 138/88   12/29/17 142/86    Weight from Last 3 Encounters:   02/20/18 101.2 kg (223 lb)   02/09/18 100.4 kg (221 lb 6.4 oz)   12/29/17 101.2 kg (223 lb)                 Today's Medication Changes          These changes are accurate as of 2/20/18  2:35 PM.  If you have any questions, ask your nurse or doctor.               These medicines have changed or have updated prescriptions.        Dose/Directions    * guaiFENesin-codeine 100-10 MG/5ML Syrp syrup   Commonly known as:  guaiFENesin AC   This may have changed:  Another medication with the same name was added. Make sure you understand how and when to take each.   Changed by:  Sherwin Johnson MD        Dose:  5 mL   Take 5 mLs by mouth every 6 hours as needed   Refills:  0       * guaiFENesin-codeine 100-10 MG/5ML Soln solution   Commonly known as:  ROBITUSSIN AC   This may have changed:  You were already taking a medication with the same name, and this prescription was added. Make sure you understand how and when to take each.   Used for:  Cough   Changed by:  Sherwin Johnson MD        Dose:  1 tsp.   Take 5 mLs by mouth nightly as needed for cough   Quantity:  120 mL   Refills:  0       * Notice:  This list has 2 medication(s) that are the same as other medications prescribed for you. Read the directions carefully, and ask your doctor or other care provider to review them with you.         Where to get your medicines      Some of these will need a paper prescription and others can be bought over " the counter.  Ask your nurse if you have questions.     Bring a paper prescription for each of these medications     guaiFENesin-codeine 100-10 MG/5ML Soln solution                Primary Care Provider Office Phone # Fax #    Gino Frankel [phone] [email or web address]   [facility] [address]        Equal Access to Services     [facility]: Hadii aad ku hadasho Soomaali, waaxda luqadaha, qaybta kaalmada adeegyada, waxay idiin hayaan adeeg dionnadaynaarsalan mccloud . So [facility] [phone].    ATENCIÓN: Si bijanla español, tiene a watkins disposición servicios gratuitos de asistencia lingüística. Llame al [phone].    We comply with applicable federal civil rights laws and [place] laws. We do not discriminate on the basis of race, color, national origin, age, disability, sex, sexual orientation, or gender identity.            Thank you!     Thank you for choosing [facility] AND [facility]  for your care. Our goal is always to provide you with excellent care. Hearing back from our patients is one way we can continue to improve our services. Please take a few minutes to complete the written survey that you may receive in the mail after your visit with us. Thank you!             Your Updated Medication List - Protect others around you: Learn how to safely use, store and throw away your medicines at www.disposemymeds.org.          This list is accurate as of 2/20/18  2:35 PM.  Always use your most recent med list.                   Brand Name Dispense Instructions for use Diagnosis    albuterol 108 (90 BASE) MCG/ACT Inhaler    PROAIR HFA/PROVENTIL HFA/VENTOLIN HFA     Inhale 2 puffs into the lungs 4 times daily as needed        allopurinol 300 MG tablet    ZYLOPRIM     Take 300 mg by mouth daily        cetirizine 10 MG tablet    zyrTEC     Take 10 mg by mouth daily        * guaiFENesin-codeine 100-10 MG/5ML Syrp syrup    guaiFENesin AC     Take 5 mLs by mouth every 6 hours as needed        *  guaiFENesin-codeine 100-10 MG/5ML Soln solution    ROBITUSSIN AC    120 mL    Take 5 mLs by mouth nightly as needed for cough    Cough       indomethacin 50 MG capsule    INDOCIN     Take 50 mg by mouth 3 times daily (with meals)        naproxen 500 MG tablet    NAPROSYN     Take 500 mg by mouth 2 times daily (with meals)        sertraline 50 MG tablet    ZOLOFT     Take 50 mg by mouth daily        * Notice:  This list has 2 medication(s) that are the same as other medications prescribed for you. Read the directions carefully, and ask your doctor or other care provider to review them with you.

## 2018-02-20 NOTE — PROGRESS NOTES
"  SUBJECTIVE:   Christiano Metcalf is a 47 year old male who presents to clinic today for the following health issues:comes in w a cough that has persisted for 2 weeks/ he was checked for strep as he had a ST 12 days ago and was found to have strep and thus treated w amox for 10 days. No known exposure to pertussis but has been around friends at work w influenza, bronchitis and mono. He has had no fever, sweats nor chills. Heha shx of asthma and is wheezing  Some. He has been using pro air w some help. He smoked for 20-25 yr, 1-2 ppd, but quit in 2010              Problem list and histories reviewed & adjusted, as indicated.  Additional history: as documented        Reviewed and updated as needed this visit by clinical staff  Tobacco  Allergies  Meds  Med Hx  Surg Hx  Fam Hx  Soc Hx      Reviewed and updated as needed this visit by Provider               OBJECTIVE:     /80 (BP Location: Right arm, Patient Position: Sitting, Cuff Size: Adult Regular)  Pulse 74  Temp 98.1  F (36.7  C) (Temporal)  Ht 5' 9\" (1.753 m)  Wt 223 lb (101.2 kg)  SpO2 99%  BMI 32.93 kg/m2  Body mass index is 32.93 kg/(m^2).  GENERAL: healthy, alert and no distress  NECK: no adenopathy, no asymmetry, masses, or scars and thyroid normal to palpation  RESP: lungs clear to auscultation - no rales, rhonchi or wheezes- CXR also clear  CV: regular rate and rhythm, normal S1 S2, no S3 or S4, no murmur, click or rub, no peripheral edema and peripheral pulses strong  ABDOMEN: soft, nontender, no hepatosplenomegaly, no masses and bowel sounds normal  MS: no gross musculoskeletal defects noted, no edema        ASSESSMENT/PLAN:           1. Cough  Unlikely to be bacterial as hehas just got off antibiotic and hehas no fever... He is on anti histamine and albuterol and lungs are clear at this time; will do HS codiene  - XR Chest 2 Views; Future    See Patient Instructions    GAYATRI MICHELLE MD  [facility] [place]  "

## 2018-02-21 ENCOUNTER — HOSPITAL ENCOUNTER (EMERGENCY)
Facility: OTHER | Age: 48
Discharge: HOME | End: 2018-02-21
Attending: PHYSICIAN ASSISTANT | Admitting: PHYSICIAN ASSISTANT
Payer: COMMERCIAL

## 2018-02-21 VITALS
SYSTOLIC BLOOD PRESSURE: 129 MMHG | TEMPERATURE: 100.4 F | OXYGEN SATURATION: 94 % | RESPIRATION RATE: 20 BRPM | HEIGHT: 69 IN | DIASTOLIC BLOOD PRESSURE: 88 MMHG

## 2018-02-21 DIAGNOSIS — R05.9 COUGH: ICD-10-CM

## 2018-02-21 DIAGNOSIS — J10.1 INFLUENZA B: ICD-10-CM

## 2018-02-21 DIAGNOSIS — J45.901 MODERATE ASTHMA WITH EXACERBATION, UNSPECIFIED WHETHER PERSISTENT: ICD-10-CM

## 2018-02-21 DIAGNOSIS — J01.10 ACUTE FRONTAL SINUSITIS, RECURRENCE NOT SPECIFIED: ICD-10-CM

## 2018-02-21 DIAGNOSIS — R50.9 FEVER, UNSPECIFIED FEVER CAUSE: ICD-10-CM

## 2018-02-21 LAB
BASOPHILS # BLD AUTO: 0 10E9/L (ref 0–0.2)
BASOPHILS NFR BLD AUTO: 0.2 %
DEPRECATED S PYO AG THROAT QL EIA: NORMAL
DIFFERENTIAL METHOD BLD: ABNORMAL
EOSINOPHIL # BLD AUTO: 0 10E9/L (ref 0–0.7)
EOSINOPHIL NFR BLD AUTO: 0.6 %
ERYTHROCYTE [DISTWIDTH] IN BLOOD BY AUTOMATED COUNT: 13.2 % (ref 10–15)
FLUAV+FLUBV RNA SPEC QL NAA+PROBE: NEGATIVE
FLUAV+FLUBV RNA SPEC QL NAA+PROBE: POSITIVE
HCT VFR BLD AUTO: 40.2 % (ref 40–53)
HGB BLD-MCNC: 14.1 G/DL (ref 13.3–17.7)
IMM GRANULOCYTES # BLD: 0 10E9/L (ref 0–0.4)
IMM GRANULOCYTES NFR BLD: 0.4 %
LYMPHOCYTES # BLD AUTO: 0.5 10E9/L (ref 0.8–5.3)
LYMPHOCYTES NFR BLD AUTO: 9.6 %
MCH RBC QN AUTO: 30.4 PG (ref 26.5–33)
MCHC RBC AUTO-ENTMCNC: 35.1 G/DL (ref 31.5–36.5)
MCV RBC AUTO: 87 FL (ref 78–100)
MONOCYTES # BLD AUTO: 0.3 10E9/L (ref 0–1.3)
MONOCYTES NFR BLD AUTO: 5.9 %
NEUTROPHILS # BLD AUTO: 4.1 10E9/L (ref 1.6–8.3)
NEUTROPHILS NFR BLD AUTO: 83.3 %
PLATELET # BLD AUTO: 148 10E9/L (ref 150–450)
RBC # BLD AUTO: 4.64 10E12/L (ref 4.4–5.9)
RSV RNA SPEC NAA+PROBE: NEGATIVE
SPECIMEN SOURCE: ABNORMAL
SPECIMEN SOURCE: NORMAL
WBC # BLD AUTO: 4.9 10E9/L (ref 4–11)

## 2018-02-21 PROCEDURE — 94640 AIRWAY INHALATION TREATMENT: CPT

## 2018-02-21 PROCEDURE — 87631 RESP VIRUS 3-5 TARGETS: CPT | Performed by: PHYSICIAN ASSISTANT

## 2018-02-21 PROCEDURE — 85025 COMPLETE CBC W/AUTO DIFF WBC: CPT | Performed by: PHYSICIAN ASSISTANT

## 2018-02-21 PROCEDURE — 40000275 ZZH STATISTIC RCP TIME EA 10 MIN

## 2018-02-21 PROCEDURE — 87880 STREP A ASSAY W/OPTIC: CPT | Performed by: PHYSICIAN ASSISTANT

## 2018-02-21 PROCEDURE — 87801 DETECT AGNT MULT DNA AMPLI: CPT | Performed by: PHYSICIAN ASSISTANT

## 2018-02-21 PROCEDURE — 25000132 ZZH RX MED GY IP 250 OP 250 PS 637: Performed by: PHYSICIAN ASSISTANT

## 2018-02-21 PROCEDURE — 99283 EMERGENCY DEPT VISIT LOW MDM: CPT | Performed by: PHYSICIAN ASSISTANT

## 2018-02-21 PROCEDURE — 36415 COLL VENOUS BLD VENIPUNCTURE: CPT | Performed by: PHYSICIAN ASSISTANT

## 2018-02-21 PROCEDURE — 99283 EMERGENCY DEPT VISIT LOW MDM: CPT | Mod: Z6 | Performed by: PHYSICIAN ASSISTANT

## 2018-02-21 PROCEDURE — 25000125 ZZHC RX 250: Performed by: PHYSICIAN ASSISTANT

## 2018-02-21 RX ORDER — IPRATROPIUM BROMIDE AND ALBUTEROL SULFATE 2.5; .5 MG/3ML; MG/3ML
3 SOLUTION RESPIRATORY (INHALATION) ONCE
Status: COMPLETED | OUTPATIENT
Start: 2018-02-21 | End: 2018-02-21

## 2018-02-21 RX ORDER — PREDNISONE 20 MG/1
TABLET ORAL
Qty: 9 TABLET | Refills: 0 | Status: SHIPPED | OUTPATIENT
Start: 2018-02-21 | End: 2018-03-03

## 2018-02-21 RX ORDER — LEVOFLOXACIN 500 MG/1
500 TABLET, FILM COATED ORAL DAILY
Qty: 7 TABLET | Refills: 0 | Status: SHIPPED | OUTPATIENT
Start: 2018-02-21 | End: 2018-02-28

## 2018-02-21 RX ORDER — OSELTAMIVIR PHOSPHATE 75 MG/1
75 CAPSULE ORAL 2 TIMES DAILY
Qty: 10 CAPSULE | Refills: 0 | Status: SHIPPED | OUTPATIENT
Start: 2018-02-21 | End: 2018-02-26

## 2018-02-21 RX ORDER — BUDESONIDE 0.5 MG/2ML
0.5 INHALANT ORAL ONCE
Status: COMPLETED | OUTPATIENT
Start: 2018-02-21 | End: 2018-02-21

## 2018-02-21 RX ORDER — IPRATROPIUM BROMIDE AND ALBUTEROL SULFATE 2.5; .5 MG/3ML; MG/3ML
1 SOLUTION RESPIRATORY (INHALATION) EVERY 6 HOURS PRN
Qty: 90 VIAL | Refills: 0 | Status: SHIPPED | OUTPATIENT
Start: 2018-02-21 | End: 2018-02-26

## 2018-02-21 RX ORDER — ACETAMINOPHEN 500 MG
500 TABLET ORAL ONCE
Status: COMPLETED | OUTPATIENT
Start: 2018-02-21 | End: 2018-02-21

## 2018-02-21 RX ORDER — PREDNISONE 20 MG/1
60 TABLET ORAL ONCE
Status: COMPLETED | OUTPATIENT
Start: 2018-02-21 | End: 2018-02-21

## 2018-02-21 RX ADMIN — PREDNISONE 60 MG: 20 TABLET ORAL at 19:02

## 2018-02-21 RX ADMIN — IPRATROPIUM BROMIDE AND ALBUTEROL SULFATE 3 ML: .5; 3 SOLUTION RESPIRATORY (INHALATION) at 19:07

## 2018-02-21 RX ADMIN — BUDESONIDE 0.5 MG: 0.5 SUSPENSION RESPIRATORY (INHALATION) at 19:07

## 2018-02-21 RX ADMIN — ACETAMINOPHEN 500 MG: 500 TABLET, FILM COATED ORAL at 19:12

## 2018-02-21 ASSESSMENT — ENCOUNTER SYMPTOMS
DIFFICULTY URINATING: 0
NECK STIFFNESS: 0
SINUS PRESSURE: 1
COLOR CHANGE: 0
FEVER: 1
HEADACHES: 0
ARTHRALGIAS: 0
FATIGUE: 1
ABDOMINAL PAIN: 0
EYE REDNESS: 0
CHILLS: 1
SINUS PAIN: 1
COUGH: 1
SHORTNESS OF BREATH: 1
SORE THROAT: 1
CONFUSION: 0

## 2018-02-21 NOTE — LETTER
[facility]  [address]  [place] [address]  Phone: [phone]  Fax: [phone]    February 21, 2018        Christiano Metcalf  [address]   [facility] [address]          To whom it may concern:    RE: Christiano Metcalf    Patient was seen and treated today at our clinic and missed work.  He will not be able to return to work until intil his symptoms improve this could take until 2/26/18    Please contact me for questions or concerns.      Sincerely,            Quinton Abbasi MPA, PADiorC

## 2018-02-21 NOTE — ED AVS SNAPSHOT
[facility]    [address]    [place] [address]    Phone:  [phone]    Fax:  [phone]                                       Christiano Metcalf   MRN: [id number]    Department:  [facility]   Date of Visit:  2/21/2018           Patient Information     Date Of Birth          1970        Your diagnoses for this visit were:     Influenza B     Fever, unspecified fever cause     Cough     Moderate asthma with exacerbation, unspecified whether persistent     Acute frontal sinusitis, recurrence not specified        You were seen by Quinton Abbasi PA-C.      Follow-up Information     Schedule an appointment as soon as possible for a visit with Gino Frankel MD.    Specialty:  Family Practice    Why:  As needed, If symptoms worsen    Contact information:    [address]  [place] [address]  [phone]          Discharge Instructions         Influenza (Adult)    Influenza is also called the flu. It is a viral illness that affects the air passages of your lungs. It is different from the common cold. The flu can easily be passed from one to person to another. It may be spread through the air by coughing and sneezing. Or it can be spread by touching the sick person and then touching your own eyes, nose, or mouth.  The flu starts 1 to 3 days after you are exposed to the flu virus. It may last for 1 to 2 weeks but many people feel tired or fatigued for many weeks afterward. You usually don t need to take antibiotics unless you have a complication. This might be an ear or sinus infection or pneumonia.  Symptoms of the flu may be mild or severe. They can include extreme tiredness (wanting to stay in bed all day), chills, fevers, muscle aches, soreness with eye movement, headache, and a dry, hacking cough.  Home care  Follow these guidelines when caring for yourself at home:    Avoid being around cigarette smoke, whether yours or other  people s.    Acetaminophen or ibuprofen will help ease your fever, muscle aches, and headache. Don t give aspirin to anyone younger than 18 who has the flu. Aspirin can harm the liver.    Nausea and loss of appetite are common with the flu. Eat light meals. Drink 6 to 8 glasses of liquids every day. Good choices are water, sport drinks, soft drinks without caffeine, juices, tea, and soup. Extra fluids will also help loosen secretions in your nose and lungs.    Over-the-counter cold medicines will not make the flu go away faster. But the medicines may help with coughing, sore throat, and congestion in your nose and sinuses. Don t use a decongestant if you have high blood pressure.    Stay home until your fever has been gone for at least 24 hours without using medicine to reduce fever.  Follow-up care  Follow up with your healthcare provider, or as advised, if you are not getting better over the next week.  If you are age 65 or older, talk with your provider about getting a pneumococcal vaccine every 5 years. You should also get this vaccine if you have chronic asthma or COPD. All adults should get a flu vaccine every fall. Ask your provider about this.  When to seek medical advice  Call your healthcare provider right away if any of these occur:    Cough with lots of colored mucus (sputum) or blood in your mucus    Chest pain, shortness of breath, wheezing, or trouble breathing    Severe headache, or face, neck, or ear pain    New rash with fever    Fever of 100.4 F (38 C) or higher, or as directed by your healthcare provider    Confusion, behavior change, or seizure    Severe weakness or dizziness    You get a new fever or cough after getting better for a few days  Date Last Reviewed: 1/1/2017 2000-2017 [employer]. [address] [place] [address]. All rights reserved. This information is not intended as a substitute for professional medical care. Always follow your healthcare  professional's instructions.          Discharge References/Attachments     ASTHMA, CONTROLLING YOUR (ENGLISH)    INHALER USE (ENGLISH)    SINUSITIS, ACUTE (ENGLISH)    (ADULT), INFLUENZA (ENGLISH)      24 Hour Appointment Hotline       To make an appointment at any [facility], call [phone] ([phone]). If you don't have a family doctor or clinic, we will help you find one. [facility] clinics are conveniently located to serve the needs of you and your family.             Review of your medicines      START taking        Dose / Directions Last dose taken    ipratropium - albuterol 0.5 mg/2.5 mg/3 mL 0.5-2.5 (3) MG/3ML neb solution   Commonly known as:  DUONEB   Dose:  1 vial   Quantity:  90 vial        Take 1 vial (3 mLs) by nebulization every 6 hours as needed for shortness of breath / dyspnea or wheezing   Refills:  0        levofloxacin 500 MG tablet   Commonly known as:  LEVAQUIN   Dose:  500 mg   Quantity:  7 tablet        Take 1 tablet (500 mg) by mouth daily for 7 days   Refills:  0        nebulizer Aneta   Dose:  1 Device   Quantity:  1 each        Take 1 Device by mouth 4 times daily as needed   Refills:  0        oseltamivir 75 MG capsule   Commonly known as:  TAMIFLU   Dose:  75 mg   Quantity:  10 capsule        Take 1 capsule (75 mg) by mouth 2 times daily for 5 days   Refills:  0        predniSONE 20 MG tablet   Commonly known as:  DELTASONE   Quantity:  9 tablet        2 tabs day 1-2, then 1 tab days 3-4, then 1/2 tab daily for 6 days   Refills:  0          Our records show that you are taking the medicines listed below. If these are incorrect, please call your family doctor or clinic.        Dose / Directions Last dose taken    albuterol 108 (90 BASE) MCG/ACT Inhaler   Commonly known as:  PROAIR HFA/PROVENTIL HFA/VENTOLIN HFA   Dose:  2 puff        Inhale 2 puffs into the lungs 4 times daily as needed   Refills:  0        allopurinol 300 MG tablet   Commonly known as:  ZYLOPRIM   Dose:   300 mg        Take 300 mg by mouth daily   Refills:  0        cetirizine 10 MG tablet   Commonly known as:  zyrTEC   Dose:  10 mg        Take 10 mg by mouth daily   Refills:  0        * guaiFENesin-codeine 100-10 MG/5ML Syrp syrup   Commonly known as:  guaiFENesin AC   Dose:  5 mL        Take 5 mLs by mouth every 6 hours as needed   Refills:  0        * guaiFENesin-codeine 100-10 MG/5ML Soln solution   Commonly known as:  ROBITUSSIN AC   Dose:  1 tsp.   Quantity:  120 mL        Take 5 mLs by mouth nightly as needed for cough   Refills:  0        indomethacin 50 MG capsule   Commonly known as:  INDOCIN   Dose:  50 mg        Take 50 mg by mouth 3 times daily (with meals)   Refills:  0        naproxen 500 MG tablet   Commonly known as:  NAPROSYN   Dose:  500 mg        Take 500 mg by mouth 2 times daily (with meals)   Refills:  0        sertraline 50 MG tablet   Commonly known as:  ZOLOFT   Dose:  50 mg        Take 50 mg by mouth daily   Refills:  0        * Notice:  This list has 2 medication(s) that are the same as other medications prescribed for you. Read the directions carefully, and ask your doctor or other care provider to review them with you.            Prescriptions were sent or printed at these locations (5 Prescriptions)                   Other Prescriptions                Printed at Department/Unit printer (5 of 5)         Respiratory Therapy Supplies (NEBULIZER) [facility]               ipratropium - albuterol 0.5 mg/2.5 mg/3 mL (DUONEB) 0.5-2.5 (3) MG/3ML neb solution               predniSONE (DELTASONE) 20 MG tablet               oseltamivir (TAMIFLU) 75 MG capsule               levofloxacin (LEVAQUIN) 500 MG tablet                Procedures and tests performed during your visit     Bordetella pert parapert DNA pcr    CBC with platelets differential    Influenza A and B and RSV PCR    Rapid strep screen      Orders Needing Specimen Collection     None      Pending Results     Date and Time Order Name Status  "Description    2018 1845 Kerry wright parapert DNA pcr In process             Pending Culture Results     Date and Time Order Name Status Description    2018 184Tab wright parapert DNA pcr In process             Thank you for choosing [facility]       Thank you for choosing [facility] for your care. Our goal is always to provide you with excellent care. Hearing back from our patients is one way we can continue to improve our services. Please take a few minutes to complete the written survey that you may receive in the mail after you visit with us. Thank you!        [employer] Information     [employer] lets you send messages to your doctor, view your test results, renew your prescriptions, schedule appointments and more. To sign up, go to [email or web address] . Click on \"Log in\" on the left side of the screen, which will take you to the Welcome page. Then click on \"Sign up Now\" on the right side of the page.     You will be asked to enter the access code listed below, as well as some personal information. Please follow the directions to create your username and password.     Your access code is: [email or web address]  Expires: 2018  2:33 PM     Your access code will  in 90 days. If you need help or a new code, please call your [facility] clinic or [phone].        Care EveryWhere ID     This is your Care EveryWhere ID. This could be used by other organizations to access your [facility] medical records  [id number]        Equal Access to Services     [facility]: Hadii sung lubino Socintia, waaxda luqadaha, qaybta kaalmada adewilliamsyada, dottie bergman. So [facility] [phone].    ATENCIÓN: Si habla español, tiene a watkins disposición servicios gratuitos de asistencia lingüística. Llame al [phone].    We comply with applicable federal civil rights laws and [place] laws. We do not discriminate on the basis of race, color, national origin, age, disability, sex, sexual " orientation, or gender identity.            After Visit Summary       This is your record. Keep this with you and show to your community pharmacist(s) and doctor(s) at your next visit.

## 2018-02-21 NOTE — ED AVS SNAPSHOT
[facility]    [address]    [place] [address]    Phone:  [phone]    Fax:  [phone]                                       Christiano Metcalf   MRN: [id number]    Department:  [facility]   Date of Visit:  2/21/2018           After Visit Summary Signature Page     I have received my discharge instructions, and my questions have been answered. I have discussed any challenges I see with this plan with the nurse or doctor.    ..........................................................................................................................................  Patient/Patient Representative Signature      ..........................................................................................................................................  Patient Representative Print Name and Relationship to Patient    ..................................................               ................................................  Date                                            Time    ..........................................................................................................................................  Reviewed by Signature/Title    ...................................................              ..............................................  Date                                                            Time

## 2018-02-22 NOTE — ED NOTES
Pt comes to the ER reporting a recent dx of strep throat that isn't improving. Pt has a hard time breathing, vomited last night because he coughed so hard. Last dose amoxicillin Sunday. Fevers at home.

## 2018-02-22 NOTE — DISCHARGE INSTRUCTIONS
Influenza (Adult)    Influenza is also called the flu. It is a viral illness that affects the air passages of your lungs. It is different from the common cold. The flu can easily be passed from one to person to another. It may be spread through the air by coughing and sneezing. Or it can be spread by touching the sick person and then touching your own eyes, nose, or mouth.  The flu starts 1 to 3 days after you are exposed to the flu virus. It may last for 1 to 2 weeks but many people feel tired or fatigued for many weeks afterward. You usually don t need to take antibiotics unless you have a complication. This might be an ear or sinus infection or pneumonia.  Symptoms of the flu may be mild or severe. They can include extreme tiredness (wanting to stay in bed all day), chills, fevers, muscle aches, soreness with eye movement, headache, and a dry, hacking cough.  Home care  Follow these guidelines when caring for yourself at home:    Avoid being around cigarette smoke, whether yours or other people s.    Acetaminophen or ibuprofen will help ease your fever, muscle aches, and headache. Don t give aspirin to anyone younger than 18 who has the flu. Aspirin can harm the liver.    Nausea and loss of appetite are common with the flu. Eat light meals. Drink 6 to 8 glasses of liquids every day. Good choices are water, sport drinks, soft drinks without caffeine, juices, tea, and soup. Extra fluids will also help loosen secretions in your nose and lungs.    Over-the-counter cold medicines will not make the flu go away faster. But the medicines may help with coughing, sore throat, and congestion in your nose and sinuses. Don t use a decongestant if you have high blood pressure.    Stay home until your fever has been gone for at least 24 hours without using medicine to reduce fever.  Follow-up care  Follow up with your healthcare provider, or as advised, if you are not getting better over the next week.  If you are age 65 or  older, talk with your provider about getting a pneumococcal vaccine every 5 years. You should also get this vaccine if you have chronic asthma or COPD. All adults should get a flu vaccine every fall. Ask your provider about this.  When to seek medical advice  Call your healthcare provider right away if any of these occur:    Cough with lots of colored mucus (sputum) or blood in your mucus    Chest pain, shortness of breath, wheezing, or trouble breathing    Severe headache, or face, neck, or ear pain    New rash with fever    Fever of 100.4 F (38 C) or higher, or as directed by your healthcare provider    Confusion, behavior change, or seizure    Severe weakness or dizziness    You get a new fever or cough after getting better for a few days  Date Last Reviewed: 1/1/2017 2000-2017 [employer]. [address] [place] [address], [place] [address]. All rights reserved. This information is not intended as a substitute for professional medical care. Always follow your healthcare professional's instructions.

## 2018-02-22 NOTE — ED PROVIDER NOTES
History     Chief Complaint   Patient presents with     Pharyngitis     recent strep DX, last dose amoxicillin sunday. Coughing, getting worse.      HPI Comments: This is a 48 yo male who was seen on 2/9/18 and diagnosed with strep pharyngitis.  He has had a 10 day course of amoxicillin.  He was seen yesterday as well with a continued cough.  He was started on robitussin AC as well as a ventolin inhaler.  His CXR appeared normal.  He reports sinus pressure and pain with continued cough and sore throat.  He started getting a fever today as well as body aches.  Denies any nasal drainage, but congestion and cough.  He is here for further evaluation at this time.  He has not been on steroids  But reports he does have asthma.     The history is provided by the patient and the spouse.     Problem List:    Patient Active Problem List    Diagnosis Date Noted     Gout 02/01/2018     Priority: Medium     Hypomagnesemia 02/01/2018     Priority: Medium     Major depression 04/08/2014     Priority: Medium     Learning disability 06/14/2011     Priority: Medium        Past Medical History:    Past Medical History:   Diagnosis Date     Gout      Hypomagnesemia      Pain in left knee      Personal history of other (healed) physical injury and trauma      Unspecified injury of unspecified wrist, hand and finger(s), initial encounter        Past Surgical History:    Past Surgical History:   Procedure Laterality Date     ARTHROSCOPY KNEE      Right knee meniscal repair, arthroscopic ally     ARTHROSCOPY KNEE      2010,Left knee scope     FINGER SURGERY      Right thumb ORIF     OTHER SURGICAL HISTORY      7/15/14,,HERNIA REPAIR,Left,LIH with mesh     OTHER SURGICAL HISTORY      5/10/16,,HERNIA REPAIR,Right,RIH with plug/patch       Family History:    Family History   Problem Relation Age of Onset     DIABETES Mother      Diabetes     Other - See Comments Father      episode of gout.     Colon Cancer Father       "Cancer-colon,diagnosed in early 60s     Family History Negative Son      Good Health,1997     Family History Negative Daughter      Good Health,2009     Family History Negative Son      Good Health,2011       Social History:  Marital Status:  Legally  [3]  Social History   Substance Use Topics     Smoking status: Former Smoker     Types: Cigarettes     Quit date: 2/17/2010     Smokeless tobacco: Never Used     Alcohol use No      Comment: Alcoholic Drinks/day: seldom        Medications:      guaiFENesin-codeine (ROBITUSSIN AC) 100-10 MG/5ML SOLN solution   albuterol (PROAIR HFA/PROVENTIL HFA/VENTOLIN HFA) 108 (90 BASE) MCG/ACT Inhaler   allopurinol (ZYLOPRIM) 300 MG tablet   cetirizine (ZYRTEC) 10 MG tablet   guaiFENesin-codeine (GUAIFENESIN AC) 100-10 MG/5ML SYRP syrup   indomethacin (INDOCIN) 50 MG capsule   naproxen (NAPROSYN) 500 MG tablet   sertraline (ZOLOFT) 50 MG tablet         Review of Systems   Constitutional: Positive for chills, fatigue and fever.   HENT: Positive for congestion, sinus pain, sinus pressure and sore throat.    Eyes: Negative for redness.   Respiratory: Positive for cough and shortness of breath.    Cardiovascular: Negative for chest pain.   Gastrointestinal: Negative for abdominal pain.   Genitourinary: Negative for difficulty urinating.   Musculoskeletal: Negative for arthralgias and neck stiffness.   Skin: Negative for color change.   Neurological: Negative for headaches.   Psychiatric/Behavioral: Negative for confusion.       Physical Exam   BP: (!) 149/96  Heart Rate: 101  Temp: 100.4  F (38  C)  Resp: 20  Height: 175.3 cm (5' 9\")  SpO2: 97 %  Vitals:    02/21/18 1945 02/21/18 1952 02/21/18 2000 02/21/18 2030   BP:   (!) 117/96 129/88   Resp:       Temp:       TempSrc:       SpO2: 94% 94% (!) 86% 93%   Height:             Physical Exam   Constitutional: No distress.   HENT:   Head: Atraumatic.   Right Ear: No drainage. Tympanic membrane is bulging. Tympanic membrane is not " injected and not perforated. No middle ear effusion.   Left Ear: No drainage. Tympanic membrane is bulging. Tympanic membrane is not injected and not perforated.  No middle ear effusion. No hemotympanum.   Nose: Mucosal edema and sinus tenderness present. No rhinorrhea. Right sinus exhibits maxillary sinus tenderness and frontal sinus tenderness. Left sinus exhibits maxillary sinus tenderness and frontal sinus tenderness.   Mouth/Throat: Posterior oropharyngeal erythema present. No oropharyngeal exudate, posterior oropharyngeal edema or tonsillar abscesses.   Eyes: Pupils are equal, round, and reactive to light. No scleral icterus.   Cardiovascular: Normal heart sounds and intact distal pulses.    Pulmonary/Chest: Breath sounds normal. No respiratory distress.   Abdominal: Soft. Bowel sounds are normal. There is no tenderness.   Musculoskeletal: He exhibits no edema or tenderness.   Skin: Skin is warm. No rash noted. He is not diaphoretic.       ED Course     ED Course     Procedures          Results for orders placed or performed during the hospital encounter of 02/21/18   CBC with platelets differential   Result Value Ref Range    WBC 4.9 4.0 - 11.0 10e9/L    RBC Count 4.64 4.4 - 5.9 10e12/L    Hemoglobin 14.1 13.3 - 17.7 g/dL    Hematocrit 40.2 40.0 - 53.0 %    MCV 87 78 - 100 fl    MCH 30.4 26.5 - 33.0 pg    MCHC 35.1 31.5 - 36.5 g/dL    RDW 13.2 10.0 - 15.0 %    Platelet Count 148 (L) 150 - 450 10e9/L    Diff Method Automated Method     % Neutrophils 83.3 %    % Lymphocytes 9.6 %    % Monocytes 5.9 %    % Eosinophils 0.6 %    % Basophils 0.2 %    % Immature Granulocytes 0.4 %    Absolute Neutrophil 4.1 1.6 - 8.3 10e9/L    Absolute Lymphocytes 0.5 (L) 0.8 - 5.3 10e9/L    Absolute Monocytes 0.3 0.0 - 1.3 10e9/L    Absolute Eosinophils 0.0 0.0 - 0.7 10e9/L    Absolute Basophils 0.0 0.0 - 0.2 10e9/L    Abs Immature Granulocytes 0.0 0 - 0.4 10e9/L   Influenza A and B and RSV PCR   Result Value Ref Range    Specimen  Description Nasopharyngeal     Influenza A PCR Negative NEG^Negative    Influenza B PCR Positive (A) NEG^Negative    Resp Syncytial Virus Negative NEG^Negative   Rapid strep screen   Result Value Ref Range    Specimen Description Throat     Rapid Strep A Screen       Negative presumptive for Group A Beta Streptococcus              Labs Ordered and Resulted from Time of ED Arrival Up to the Time of Departure from the ED   CBC WITH PLATELETS DIFFERENTIAL - Abnormal; Notable for the following:        Result Value    Platelet Count 148 (*)     Absolute Lymphocytes 0.5 (*)     All other components within normal limits   INFLUENZA A AND B AND RSV PCR   RAPID STREP SCREEN   BORDETELLA PER/PARAPER PCR     Medications   ipratropium - albuterol 0.5 mg/2.5 mg/3 mL (DUONEB) neb solution 3 mL (3 mLs Nebulization Given 2/21/18 1907)   budesonide (PULMICORT) neb solution 0.5 mg (0.5 mg Nebulization Given 2/21/18 1907)   predniSONE (DELTASONE) tablet 60 mg (60 mg Oral Given 2/21/18 1902)   acetaminophen (TYLENOL) tablet 500 mg (500 mg Oral Given 2/21/18 1912)       Assessments & Plan (with Medical Decision Making)     I have reviewed the nursing notes.    I have reviewed the findings, diagnosis, plan and need for follow up with the patient.      New Prescriptions    IPRATROPIUM - ALBUTEROL 0.5 MG/2.5 MG/3 ML (DUONEB) 0.5-2.5 (3) MG/3ML NEB SOLUTION    Take 1 vial (3 mLs) by nebulization every 6 hours as needed for shortness of breath / dyspnea or wheezing    LEVOFLOXACIN (LEVAQUIN) 500 MG TABLET    Take 1 tablet (500 mg) by mouth daily for 7 days    OSELTAMIVIR (TAMIFLU) 75 MG CAPSULE    Take 1 capsule (75 mg) by mouth 2 times daily for 5 days    PREDNISONE (DELTASONE) 20 MG TABLET    2 tabs day 1-2, then 1 tab days 3-4, then 1/2 tab daily for 6 days    RESPIRATORY THERAPY SUPPLIES (NEBULIZER) JEFFERSON    Take 1 Device by mouth 4 times daily as needed       Final diagnoses:   Influenza B   Fever, unspecified fever cause   Cough    Moderate asthma with exacerbation, unspecified whether persistent   Acute frontal sinusitis, recurrence not specified     Febrile. Temp 100.4  He was given tylenol orally for fever reduction.   VSS worsening cough and fever.  Hx of asthma.  Sinus pressure and nasal congestion.  Frontal sinusitis.  He was given a duoneb with pulmicort  As well as prednisone.  He feels this helped. CBC shows normal WBC's and left left shift.  Strep is negative.  Influenza is positive for type B.  Given recent increased fever and SOB will treat as new onset.  Rx for nebulizer , pulmicort and prednisone taper.  He has robitussin AC  At home.  Concerns for sinusitis  And pneumonitis will treat with levaquin x 7 days as well.  Work note written.  Follow up with his PCP if symptoms persist for further evaluation      2/21/2018   [facility]     Quinton Abbasi PA-C  02/21/18 [id number]

## 2018-02-23 LAB
B PERT+PARAPERT DNA PNL SPEC NAA+PROBE: NEGATIVE
SPECIMEN SOURCE: NORMAL

## 2018-02-26 DIAGNOSIS — J10.1 INFLUENZA B: Primary | ICD-10-CM

## 2018-02-26 DIAGNOSIS — J45.901 MODERATE ASTHMA WITH ACUTE EXACERBATION, UNSPECIFIED WHETHER PERSISTENT: ICD-10-CM

## 2018-03-02 RX ORDER — IPRATROPIUM BROMIDE AND ALBUTEROL SULFATE 2.5; .5 MG/3ML; MG/3ML
SOLUTION RESPIRATORY (INHALATION)
Qty: 360 ML | Refills: 0 | Status: SHIPPED | OUTPATIENT
Start: 2018-03-02 | End: 2019-07-15

## 2018-03-02 NOTE — TELEPHONE ENCOUNTER
Chart review shows that rx as requested was ordered by a provider in the ED. Rx as requested was not previously ordered by PCP. Writer is unable to fill rx as requested as per RN refill protocol. PCP is out of the office until 3/5/18. Writer will route this rx request to PCP's teamlet for her consideration/approval in the absence of patient's PCP.    Unable to complete prescription refill per RN Medication Refill Policy. Suman Gu 3/2/2018 1:18 PM

## 2018-07-18 ENCOUNTER — HOSPITAL ENCOUNTER (EMERGENCY)
Facility: OTHER | Age: 48
Discharge: HOME | End: 2018-07-18
Attending: FAMILY MEDICINE | Admitting: FAMILY MEDICINE
Payer: COMMERCIAL

## 2018-07-18 ENCOUNTER — APPOINTMENT (OUTPATIENT)
Dept: GENERAL RADIOLOGY | Facility: OTHER | Age: 48
End: 2018-07-18
Attending: FAMILY MEDICINE
Payer: COMMERCIAL

## 2018-07-18 VITALS
BODY MASS INDEX: 32.58 KG/M2 | WEIGHT: 220 LBS | DIASTOLIC BLOOD PRESSURE: 102 MMHG | HEIGHT: 69 IN | TEMPERATURE: 97.6 F | OXYGEN SATURATION: 95 % | HEART RATE: 65 BPM | RESPIRATION RATE: 16 BRPM | SYSTOLIC BLOOD PRESSURE: 150 MMHG

## 2018-07-18 DIAGNOSIS — R07.81 RIB PAIN ON RIGHT SIDE: ICD-10-CM

## 2018-07-18 PROCEDURE — 71101 X-RAY EXAM UNILAT RIBS/CHEST: CPT | Mod: RT

## 2018-07-18 PROCEDURE — 99284 EMERGENCY DEPT VISIT MOD MDM: CPT | Mod: 25 | Performed by: FAMILY MEDICINE

## 2018-07-18 PROCEDURE — 25000128 H RX IP 250 OP 636: Performed by: FAMILY MEDICINE

## 2018-07-18 PROCEDURE — 99283 EMERGENCY DEPT VISIT LOW MDM: CPT | Mod: Z6 | Performed by: FAMILY MEDICINE

## 2018-07-18 PROCEDURE — 96374 THER/PROPH/DIAG INJ IV PUSH: CPT | Performed by: FAMILY MEDICINE

## 2018-07-18 RX ORDER — TRAMADOL HYDROCHLORIDE 50 MG/1
50 TABLET ORAL EVERY 6 HOURS PRN
Qty: 10 TABLET | Refills: 0 | Status: SHIPPED | OUTPATIENT
Start: 2018-07-18 | End: 2018-08-31

## 2018-07-18 RX ORDER — HYDROMORPHONE HYDROCHLORIDE 1 MG/ML
0.5 INJECTION, SOLUTION INTRAMUSCULAR; INTRAVENOUS; SUBCUTANEOUS
Status: COMPLETED | OUTPATIENT
Start: 2018-07-18 | End: 2018-07-18

## 2018-07-18 RX ADMIN — HYDROMORPHONE HYDROCHLORIDE 0.5 MG: 1 INJECTION, SOLUTION INTRAMUSCULAR; INTRAVENOUS; SUBCUTANEOUS at 22:16

## 2018-07-18 NOTE — ED AVS SNAPSHOT
[facility]    [address]    [place] [address]    Phone:  [phone]    Fax:  [phone]                                       Christiano Metcalf   MRN: [id number]    Department:  [facility]   Date of Visit:  7/18/2018           Patient Information     Date Of Birth          1970        Your diagnoses for this visit were:     Rib pain on right side        You were seen by Amari Edwards MD.      Follow-up Information     Follow up with [facility].    Specialty:  EMERGENCY MEDICINE    Why:  As needed    Contact information:    [address]  [place] [address] [phone]        Discharge Instructions       Why We Don't Prescribe Opioids and Benzodiazepines Together  Helping You Take Your Medicines Safely  What are the dangers of mixing opioids and benzodiazepines (BZDs)?  It's very risky to mix opioids and benzodiazepines. Both medicines can make your brain work slower. The risks of taking these medicines together include:    Feeling overly sleepy or drowsy    Getting hurt by accident    Slowed breathing or trouble breathing    Coma    Accidental overdose    Death  If you are taking both these medicines, your provider will safely taper you off one of them. We will help you find other, safer medicines.  How can I make taking these drugs safer?    Don't drink alcohol while taking these medicines. Wine, beer and liquor can raise your risk of deadly side effects.    Don't drive or use heavy machinery until you know how these medicines affect you.    Tell your healthcare providers about all the medicines you're taking. Include over-the-counter (OTC) medicines, like cold and allergy pills.    Keep a list of all your medicines where you can find it quickly.  What are opioids?  Opioids (IV-pgz-agkml) are powerful medicines for pain. They can cause problems even when you use them right. Using opioids also  increases your risk of injury, addiction, overdose and death.   Examples of opioids    Hydrocodone (Vicodin, Norco, Lortab)    Oxycodone (Percocet)    Morphine    Fentanyl    Methadone    Tramadol    Buprenorphine  Common side effects of opioids    Feeling drowsy or dizzy    Upset stomach (nausea)    Throwing up (vomiting)    Hard stools (constipation)    Mood problems    Slowed breathing or trouble breathing  What are benzodiazepines?  Benzodiazepines (santosh-zoh-die-AZ-uh-peenz), or BZDs, are used to treat seizures, muscle spasm, anxiety and sleeplessness (insomnia). BZDs can cause many problems, including drug abuse.   Examples of benzodiazepines    Alprazolam (Xanax)    Clonazepam (Klonopin)    Diazepam (Valium)    Lorazepam (Ativan)    Temazepam (Restoril)    Zolpidem (Ambien)  Common side effects of benzodiazepines    Feeling drowsy or dizzy    Weakness    Trouble thinking    Mood problems  Signs of an overdose  Call 911 right away for any of these symptoms:    Face is very pale or feels clammy    Body is limp    Fingernails or lips look blue or purple    Throwing up or making gurgling noises    Won't wake up    Can't talk    Breathing or heartbeat is slow or stopped   What can I do to prevent an overdose?  1. Only take medicine prescribed to you by your doctor.  2. Take your medicine exactly as prescribed. Don't take more medicine, and don't take it more often than your doctor said to.  3. NEVER mix pain medicines with alcohol, sleeping pills or other drugs.  4. Store medicines in a safe place where children and pets can't reach them.  5. Ask your pharmacist the right way to get rid of unused medicines.  6. Learn about the signs and symptoms of overdose. Overdose is a life-or-death emergency.  7. Ask your provider about using naloxone.  What is naloxone and how can it help me?  Naloxone (nuh-LOX-ohn) is a very important medicine that can make it safer to use opioids.   Naloxone can reverse the effects of an  "opioid overdose. But you need to take it the right way at the right time.  Naloxone won't treat benzodiazepine overdose. But naloxone can help if opioids were taken together with BZDs, sleeping pills, or stimulants (\"uppers\").  Ask your provider about naloxone if you are taking opioids.     For informational purposes only. Not to replace the advice of your health care provider. Copyright   2017 [facility]. All rights reserved. [employer] 097233 - 03/17.      24 Hour Appointment Hotline     To schedule an appointment at [facility], please call [phone]. If you don't have a family doctor or clinic, we will help you find one. [facility] clinics are conveniently located to serve the needs of you and your family.           Review of your medicines      START taking        Dose / Directions Last dose taken    traMADol 50 MG tablet   Commonly known as:  ULTRAM   Dose:  50 mg   Quantity:  10 tablet        Take 1 tablet (50 mg) by mouth every 6 hours as needed for severe pain   Refills:  0          Our records show that you are taking the medicines listed below. If these are incorrect, please call your family doctor or clinic.        Dose / Directions Last dose taken    albuterol 108 (90 Base) MCG/ACT Inhaler   Commonly known as:  PROAIR HFA/PROVENTIL HFA/VENTOLIN HFA   Dose:  2 puff        Inhale 2 puffs into the lungs 4 times daily as needed   Refills:  0        allopurinol 300 MG tablet   Commonly known as:  ZYLOPRIM   Dose:  300 mg        Take 300 mg by mouth daily   Refills:  0        cetirizine 10 MG tablet   Commonly known as:  zyrTEC   Dose:  10 mg        Take 10 mg by mouth daily   Refills:  0        * guaiFENesin-codeine 100-10 MG/5ML Syrp syrup   Commonly known as:  guaiFENesin AC   Dose:  5 mL        Take 5 mLs by mouth every 6 hours as needed   Refills:  0        * guaiFENesin-codeine 100-10 MG/5ML Soln solution   Commonly known as:  ROBITUSSIN AC   Dose:  1 tsp.   Quantity:  120 mL "        Take 5 mLs by mouth nightly as needed for cough   Refills:  0        indomethacin 50 MG capsule   Commonly known as:  INDOCIN   Dose:  50 mg        Take 50 mg by mouth 3 times daily (with meals)   Refills:  0        ipratropium - albuterol 0.5 mg/2.5 mg/3 mL 0.5-2.5 (3) MG/3ML neb solution   Commonly known as:  DUONEB   Quantity:  360 mL        USE 1 VIAL VIA NEBULIZER EVERY 6 HOURS AS NEEDED FOR SHORTNESS OF BREATH/DYSPNEA OR WHEEZING   Refills:  0        naproxen 500 MG tablet   Commonly known as:  NAPROSYN   Dose:  500 mg        Take 500 mg by mouth 2 times daily (with meals)   Refills:  0        sertraline 50 MG tablet   Commonly known as:  ZOLOFT   Dose:  50 mg        Take 50 mg by mouth daily   Refills:  0        * Notice:  This list has 2 medication(s) that are the same as other medications prescribed for you. Read the directions carefully, and ask your doctor or other care provider to review them with you.            Information about OPIOIDS     PRESCRIPTION OPIOIDS: WHAT YOU NEED TO KNOW   We gave you an opioid (narcotic) pain medicine. It is important to manage your pain, but opioids are not always the best choice. You should first try all the other options your care team gave you. Take this medicine for as short a time (and as few doses) as possible.     These medicines have risks:    DO NOT drive when on new or higher doses of pain medicine. These medicines can affect your alertness and reaction times, and you could be arrested for driving under the influence (DUI). If you need to use opioids long-term, talk to your care team about driving.    DO NOT operate heave machinery    DO NOT do any other dangerous activities while taking these medicines.     DO NOT drink any alcohol while taking these medicines.      If the opioid prescribed includes acetaminophen, DO NOT take with any other medicines that contain acetaminophen. Read all labels carefully. Look for the word  acetaminophen  or  Tylenol.   Ask your pharmacist if you have questions or are unsure.    You can get addicted to pain medicines, especially if you have a history of addiction (chemical, alcohol or substance dependence). Talk to your care team about ways to reduce this risk.    Store your pills in a secure place, locked if possible. We will not replace any lost or stolen medicine. If you don t finish your medicine, please throw away (dispose) as directed by your pharmacist. The [place] [employer]ution [employer] has more information about safe disposal: https://www.pca.[facility].[place].us/living-green/managing-unwanted-medications.     All opioids tend to cause constipation. Drink plenty of water and eat foods that have a lot of fiber, such as fruits, vegetables, prune juice, apple juice and high-fiber cereal. Take a laxative (Miralax, milk of magnesia, Colace, Senna) if you don t move your bowels at least every other day.         Prescriptions were sent or printed at these locations (1 Prescription)                   [facility] - [place], [place] - [address]   [address], [facility] [address]    Telephone:  [phone]   Fax:  [phone]   Hours:                  Printed at Department/Unit printer (1 of 1)         traMADol (ULTRAM) 50 MG tablet                Procedures and tests performed during your visit     XR Ribs & Chest Rt 3vw      Orders Needing Specimen Collection     None      Pending Results     Date and Time Order Name Status Description    7/18/2018 2149 XR Ribs & Chest Rt 3vw In process             Pending Culture Results     No orders found from 7/16/2018 to 7/19/2018.            Pending Results Instructions     If you had any lab results that were not finalized at the time of your Discharge, you can call the ED Lab Result RN at [phone]. You will be contacted by this team for any positive Lab results or changes in treatment. The nurses are available 7 days a week from 10A to  "6:30P.  You can leave a message 24 hours per day and they will return your call.        Thank you for choosing [facility]       Thank you for choosing [facility] for your care. Our goal is always to provide you with excellent care. Hearing back from our patients is one way we can continue to improve our services. Please take a few minutes to complete the written survey that you may receive in the mail after you visit with us. Thank you!        [employer] Information     [employer]ap lets you send messages to your doctor, view your test results, renew your prescriptions, schedule appointments and more. To sign up, go to [email or web address] . Click on \"Log in\" on the left side of the screen, which will take you to the Welcome page. Then click on \"Sign up Now\" on the right side of the page.     You will be asked to enter the access code listed below, as well as some personal information. Please follow the directions to create your username and password.     Your access code is: [email or web address]  Expires: 10/16/2018 10:56 PM     Your access code will  in 90 days. If you need help or a new code, please call your [facility] clinic or [phone].        Care EveryWhere ID     This is your Care EveryWhere ID. This could be used by other organizations to access your [facility] medical records  [id number]        Equal Access to Services     LUKE SOTELO : Hadii sung lubino Soayoali, waaxda luqadaha, qaybta kaalmada stephen, dottie bergman. So [facility] [phone].    ATENCIÓN: Si habla español, tiene a watkins disposición servicios gratuitos de asistencia lingüística. Lucinda al [phone].    We comply with applicable federal civil rights laws and [place] laws. We do not discriminate on the basis of race, color, national origin, age, disability, sex, sexual orientation, or gender identity.            After Visit Summary       This is your record. Keep this with you and show to your community pharmacist(s) " and doctor(s) at your next visit.

## 2018-07-18 NOTE — LETTER
[facility]  [address]  [place] [address]  Phone: [phone]  Fax: [phone]    July 18, 2018        Christiano CORLEY Dixon  [address]  [place] [facility] [place] [address]          To whom it may concern:    RE: Christiano BARNEY Dixon seen at [facility] emergency room and should be excuse for work tomorrow        Sincerely,        Kehinde Cui MD

## 2018-07-18 NOTE — ED AVS SNAPSHOT
[facility]    [address]    [place] [address]    Phone:  [phone]    Fax:  [phone]                                       Christiano Metcalf   MRN: [id number]    Department:  [facility]   Date of Visit:  7/18/2018           After Visit Summary Signature Page     I have received my discharge instructions, and my questions have been answered. I have discussed any challenges I see with this plan with the nurse or doctor.    ..........................................................................................................................................  Patient/Patient Representative Signature      ..........................................................................................................................................  Patient Representative Print Name and Relationship to Patient    ..................................................               ................................................  Date                                            Time    ..........................................................................................................................................  Reviewed by Signature/Title    ...................................................              ..............................................  Date                                                            Time

## 2018-07-18 NOTE — ED TRIAGE NOTES
COLUMBIA-SUICIDE SEVERITY RATING SCALE   Screen with Triage Points for Emergency Department      Ask questions that are bolded and underlined.   Past  month   Ask Questions 1 and 2 YES NO   1)  Have you wished you were dead or wished you could go to sleep and not wake up?   no   2)  Have you actually had any thoughts of killing yourself?   no   If YES to 2, ask questions 3, 4, 5, and 6.  If NO to 2, go directly to question 6.   3)  Have you been thinking about how you might do this?   E.g.  I thought about taking an overdose but I never made a specific plan as to when where or how I would actually do it .and I would never go through with it.       4)  Have you had these thoughts and had some intention of acting on them?   As opposed to  I have the thoughts but I definitely will not do anything about them.       5)  Have you started to work out or worked out the details of how to kill yourself? Do you intend to carry out this plan?      6)  Have you ever done anything, started to do anything, or prepared to do anything to end your life?  Examples: Collected pills, obtained a gun, gave away valuables, wrote a will or suicide note, took out pills but didn t swallow any, held a gun but changed your mind or it was grabbed from your hand, went to the roof but didn t jump; or actually took pills, tried to shoot yourself, cut yourself, tried to hang yourself, etc.    If YES, ask: Was this within the past three months?  Lifetime     No    Past 3 Months        Item 1:  Behavioral Health Referral at Discharge  Item 2:  Behavioral Health Referral at Discharge   Item 3:  Behavioral Health Consult (Psychiatric Nurse/) and consider Patient Safety Precautions  Item 4:  Immediate Notification of Physician and/or Behavioral Health and Patient Safety Precautions   Item 5:  Immediate Notification of Physician and/or Behavioral Health and Patient Safety Precautions  Item 6:  Over 3 months ago: Behavioral Health Consult  (Psychiatric Nurse/) and consider Patient Safety Precautions  OR  Item 6:  3 months ago or less: Immediate Notification of Physician and/or Behavioral Health and Patient Safety Precautions

## 2018-07-18 NOTE — ED TRIAGE NOTES
"Patient reporting right side rib pain after \"extreme coughing\" attack. Pain is getting \"so severe I can't do anything. I want to make sure I didn't pop a rib out.\" Patient reporting taking 800mg ibuprofen for pain 1230.   "

## 2018-07-19 NOTE — ED PROVIDER NOTES
History   No chief complaint on file.    HPI  Christiano Metcalf is a 48 year old male who coughed really hard today, prior to arrival this afternoon, and he immediately felt pain in his right ribcage area.  He has not been having fevers or rigors suggesting pneumonia.  He came to the ER and has been waiting for 6 hrs on this incredibly busy day.    He describes pain in one specific area.  No abdominal concerns.  No SOB.    Problem List:    Patient Active Problem List    Diagnosis Date Noted     Gout 02/01/2018     Priority: Medium     Hypomagnesemia 02/01/2018     Priority: Medium     Major depression 04/08/2014     Priority: Medium     Learning disability 06/14/2011     Priority: Medium        Past Medical History:    Past Medical History:   Diagnosis Date     Gout      Hypomagnesemia      Pain in left knee      Personal history of other (healed) physical injury and trauma      Unspecified injury of unspecified wrist, hand and finger(s), initial encounter        Past Surgical History:    Past Surgical History:   Procedure Laterality Date     ARTHROSCOPY KNEE      Right knee meniscal repair, arthroscopic ally     ARTHROSCOPY KNEE      2010,Left knee scope     FINGER SURGERY      Right thumb ORIF     OTHER SURGICAL HISTORY      7/15/14,,HERNIA REPAIR,Left,LIH with mesh     OTHER SURGICAL HISTORY      5/10/16,,HERNIA REPAIR,Right,RIH with plug/patch       Family History:    Family History   Problem Relation Age of Onset     Diabetes Mother      Diabetes     Other - See Comments Father      episode of gout.     Colon Cancer Father      Cancer-colon,diagnosed in early 60s     Family History Negative Son      Good Health,1997     Family History Negative Daughter      Good Health,2009     Family History Negative Son      Good Health,2011       Social History:  Marital Status:  Legally  [3]  Social History   Substance Use Topics     Smoking status: Former Smoker     Types: Cigarettes     Quit date:  "2/17/2010     Smokeless tobacco: Never Used     Alcohol use No      Comment: Alcoholic Drinks/day: seldom        Medications:      allopurinol (ZYLOPRIM) 300 MG tablet   cetirizine (ZYRTEC) 10 MG tablet   naproxen (NAPROSYN) 500 MG tablet   sertraline (ZOLOFT) 50 MG tablet   traMADol (ULTRAM) 50 MG tablet   albuterol (PROAIR HFA/PROVENTIL HFA/VENTOLIN HFA) 108 (90 BASE) MCG/ACT Inhaler   guaiFENesin-codeine (GUAIFENESIN AC) 100-10 MG/5ML SYRP syrup   guaiFENesin-codeine (ROBITUSSIN AC) 100-10 MG/5ML SOLN solution   indomethacin (INDOCIN) 50 MG capsule   ipratropium - albuterol 0.5 mg/2.5 mg/3 mL (DUONEB) 0.5-2.5 (3) MG/3ML neb solution         Review of Systems  He has not been otherwise unwell  Physical Exam   BP: 150/76  Pulse: 65  Temp: 97.6  F (36.4  C)  Resp: 16  Height: 174 cm (5' 8.5\")  Weight: 99.8 kg (220 lb)  SpO2: 93 %      Physical Examwell man.  Looks to be in genuine pain.  Heart reg.  Lungs clear.  No decreased breath sounds suggesting pneumothorax.  abd soft, NT, ND.  He has specific tenderness at the midaxillary line, right, at lower ribcage.      ED Course     ED Course     Procedures  CXR shows no pneumothorax on my read, and no rib fractures, and no pneumonia.             Critical Care time:  none               No results found for this or any previous visit (from the past 24 hour(s)).    Medications   HYDROmorphone (PF) (DILAUDID) injection 0.5 mg (not administered)       Assessments & Plan (with Medical Decision Making)     I have reviewed the nursing notes.    I have reviewed the findings, diagnosis, plan and need for follow up with the patient.   IV started, given dilaudid 0.5mg IV which helped.  No acute findings at this time.  Would recommend tramadol for pain, #10 given.  Follow up if not improving in a timely fashion, or developing SOB.  No evidence of pneumothorax at this time, and pulse is 60.      New Prescriptions    TRAMADOL (ULTRAM) 50 MG TABLET    Take 1 tablet (50 mg) by mouth " every 6 hours as needed for severe pain     He is likely hypertensive from pain.    I did not do a diagnostic workup on this tonight.    He is aware of the high numbers.      Final diagnoses:   Rib pain on right side       7/18/2018   [facility]     Amari Edwards MD  07/18/18 [id number]       Amari Edwards MD  07/18/18 3853

## 2018-07-19 NOTE — DISCHARGE INSTRUCTIONS
Why We Don't Prescribe Opioids and Benzodiazepines Together  Helping You Take Your Medicines Safely  What are the dangers of mixing opioids and benzodiazepines (BZDs)?  It's very risky to mix opioids and benzodiazepines. Both medicines can make your brain work slower. The risks of taking these medicines together include:    Feeling overly sleepy or drowsy    Getting hurt by accident    Slowed breathing or trouble breathing    Coma    Accidental overdose    Death  If you are taking both these medicines, your provider will safely taper you off one of them. We will help you find other, safer medicines.  How can I make taking these drugs safer?    Don't drink alcohol while taking these medicines. Wine, beer and liquor can raise your risk of deadly side effects.    Don't drive or use heavy machinery until you know how these medicines affect you.    Tell your healthcare providers about all the medicines you're taking. Include over-the-counter (OTC) medicines, like cold and allergy pills.    Keep a list of all your medicines where you can find it quickly.  What are opioids?  Opioids (SQ-itf-uzdpt) are powerful medicines for pain. They can cause problems even when you use them right. Using opioids also increases your risk of injury, addiction, overdose and death.   Examples of opioids    Hydrocodone (Vicodin, Norco, Lortab)    Oxycodone (Percocet)    Morphine    Fentanyl    Methadone    Tramadol    Buprenorphine  Common side effects of opioids    Feeling drowsy or dizzy    Upset stomach (nausea)    Throwing up (vomiting)    Hard stools (constipation)    Mood problems    Slowed breathing or trouble breathing  What are benzodiazepines?  Benzodiazepines (santosh-zoh-die-AZ-uh-peenz), or BZDs, are used to treat seizures, muscle spasm, anxiety and sleeplessness (insomnia). BZDs can cause many problems, including drug abuse.   Examples of benzodiazepines    Alprazolam (Xanax)    Clonazepam (Klonopin)    Diazepam (Valium)    Lorazepam  "(Ativan)    Temazepam (Restoril)    Zolpidem (Ambien)  Common side effects of benzodiazepines    Feeling drowsy or dizzy    Weakness    Trouble thinking    Mood problems  Signs of an overdose  Call 911 right away for any of these symptoms:    Face is very pale or feels clammy    Body is limp    Fingernails or lips look blue or purple    Throwing up or making gurgling noises    Won't wake up    Can't talk    Breathing or heartbeat is slow or stopped   What can I do to prevent an overdose?  1. Only take medicine prescribed to you by your doctor.  2. Take your medicine exactly as prescribed. Don't take more medicine, and don't take it more often than your doctor said to.  3. NEVER mix pain medicines with alcohol, sleeping pills or other drugs.  4. Store medicines in a safe place where children and pets can't reach them.  5. Ask your pharmacist the right way to get rid of unused medicines.  6. Learn about the signs and symptoms of overdose. Overdose is a life-or-death emergency.  7. Ask your provider about using naloxone.  What is naloxone and how can it help me?  Naloxone (nuh-LOX-ohn) is a very important medicine that can make it safer to use opioids.   Naloxone can reverse the effects of an opioid overdose. But you need to take it the right way at the right time.  Naloxone won't treat benzodiazepine overdose. But naloxone can help if opioids were taken together with BZDs, sleeping pills, or stimulants (\"uppers\").  Ask your provider about naloxone if you are taking opioids.     For informational purposes only. Not to replace the advice of your health care provider. Copyright   2017 [facility]. All rights reserved. [employer] 021408 - 03/17.    "

## 2018-07-23 NOTE — PROGRESS NOTES
Patient Information     Patient Name  Christiano Metcalf MRN  [id number] Sex  Male   1970      Letter by Gino Frankel MD at      Author:  Gino Frankel MD Service:  (none) Author Type:  (none)    Filed:   Encounter Date:  2017 Status:  (Other)           Christiano Metcalf  [address]  [address] [facility] [address]          2017    Dear Mr. Metcalf:    This is to remind you that you are due for your annual medication management appointment with Gino Frankel MD to review continued use of zyrtec and naproxen.  Your last visit was on 16. Additional refills of your medication require you to complete this visit.    Please call [phone] to schedule your appointment.    Thank you for choosing [facility] for your health care needs.    Sincerely,      Refill RN  [facility]

## 2018-07-23 NOTE — PROGRESS NOTES
Patient Information     Patient Name  Christiano Metcalf MRN  [id number] Sex  Male   1970      Letter by Huong Barlow MD at      Author:  Huong Barlow MD Service:  (none) Author Type:  (none)    Filed:   Encounter Date:  2017 Status:  (Other)         Christiano Metcalf  [address] [facility] [address]        2017      CERTIFICATE TO RETURN TO WORK OR SCHOOL      Christiano Metcalf was seen in clinic 2017 and is able to return to work / school on 2017.      Remarks: 17 and 17 excused for illness.        Sincerely,      Huong Barlow MD

## 2018-07-24 NOTE — PROGRESS NOTES
Patient Information     Patient Name  Christiano Metcalf MRN  [id number] Sex  Male   1970      Letter by Puneet Ochoa NP at      Author:  Puneet Ochoa NP Service:  (none) Author Type:  (none)    Filed:   Encounter Date:  3/22/2017 Status:  (Other)             Work/School Excuse and Restrictions                    Discharged:                                                              5:03 PM  3/22/2017        Christiano Metcalf  was seen today in the [facility] for influenza.    Please excuse Christiano from work due to illness on 3/23/17 and 3/24/17.    Christiano is unable to return to work until Monday 3/27/17.            If you have any questions regarding the validity of this note please call the number above.       *As an acute care facility, we do not provide follow-up care and are therefore unable to complete paperwork for injuries requiring more than 72 hours away from work. Patients need to follow up with a primary care or occupational health provider.    **If you have questions regarding the validity of this note, please call the number above.          PUNEET OCHOA NP ....................  3/22/2017   5:04 PM

## 2018-08-28 NOTE — TELEPHONE ENCOUNTER
DWS-Patient called to verify appointment and it was last week. He states he is out of medication and shouldn't be off his zoloft. Patient wondering if he can get refill on he medications until he can get in with DWS. Please call pt to advise if there is a possible work in on Friday and if he can get a refill to last until he can get in.  Michael Norris on 8/28/2018 at 12:40 PM

## 2018-08-28 NOTE — TELEPHONE ENCOUNTER
"Patient requesting refill of Zoloft and states they are now out.  Noted \"no show\" 8/24/2018 for medication management with PCP.    Attempted to contact patient.  Left message    Patient received 12 months of medication 12/29/2017 for 90 X 3.    Contacted pharmacy and they verified that patient should not be out until 12/29/2018.    Patient has not returned call 8/29/2018    Medication too soon to fill   Refill request refused at this time.    Requested Prescriptions   Pending Prescriptions Disp Refills     sertraline (ZOLOFT) 50 MG tablet 30 tablet      Sig: Take 1 tablet (50 mg) by mouth daily    SSRIs Protocol Passed    8/28/2018  1:47 PM       Passed - Recent (12 mo) or future (30 days) visit within the authorizing provider's specialty    Patient had office visit in the last 12 months or has a visit in the next 30 days with authorizing provider or within the authorizing provider's specialty.  See \"Patient Info\" tab in inbasket, or \"Choose Columns\" in Meds & Orders section of the refill encounter.           Passed - Patient is age 18 or older            Medication Detail      Disp Refills Start End ARACELI   sertraline (ZOLOFT) 50 mg tablet 90 tablet 3 12/29/2017  --   Sig: Take 1 tablet by mouth once daily.   Class: eRx   Route: Oral   E-Prescribing Status: Receipt confirmed by pharmacy (12/29/2017  3:24 PM CST)   Associated Diagnoses     Moderate single current episode of major depressive disorder (HC)  - Primary       Unable to complete prescription refill per RN Medication Refill Policy.................... Amira Bond ....................  8/29/2018   1:14 PM          "

## 2018-08-29 ENCOUNTER — TELEPHONE (OUTPATIENT)
Dept: FAMILY MEDICINE | Facility: OTHER | Age: 48
End: 2018-08-29

## 2018-08-29 NOTE — TELEPHONE ENCOUNTER
DWS - Patient called and stated that he never received his text reminder for his appointment on the 24th.  He called yesterday to see when his appointment was and found out he missed it.  He called today to schedule his med management appointment as he is out of his medication.  We can't get him in for a couple weeks.  He is requesting a work in appointment if possible.  Please call patient with any questions.      Millie Graham

## 2018-08-30 NOTE — TELEPHONE ENCOUNTER
Patient is out of his medication and missed his appointment last week. Left message for patient to call back. He can be worked in at the end of day.    Faviola Hernandez LPN on 8/30/2018 at 4:59 PM

## 2018-08-31 ENCOUNTER — OFFICE VISIT (OUTPATIENT)
Dept: FAMILY MEDICINE | Facility: OTHER | Age: 48
End: 2018-08-31
Attending: FAMILY MEDICINE
Payer: COMMERCIAL

## 2018-08-31 VITALS
SYSTOLIC BLOOD PRESSURE: 132 MMHG | DIASTOLIC BLOOD PRESSURE: 88 MMHG | HEART RATE: 72 BPM | OXYGEN SATURATION: 96 % | WEIGHT: 213 LBS | BODY MASS INDEX: 31.92 KG/M2

## 2018-08-31 DIAGNOSIS — J30.2 SEASONAL ALLERGIC RHINITIS, UNSPECIFIED CHRONICITY, UNSPECIFIED TRIGGER: ICD-10-CM

## 2018-08-31 DIAGNOSIS — M1A.00X0 IDIOPATHIC CHRONIC GOUT WITHOUT TOPHUS, UNSPECIFIED SITE: Primary | ICD-10-CM

## 2018-08-31 DIAGNOSIS — Z13.220 SCREENING CHOLESTEROL LEVEL: ICD-10-CM

## 2018-08-31 DIAGNOSIS — J45.20 MILD INTERMITTENT ASTHMA WITHOUT COMPLICATION: ICD-10-CM

## 2018-08-31 DIAGNOSIS — F33.1 MODERATE EPISODE OF RECURRENT MAJOR DEPRESSIVE DISORDER (H): ICD-10-CM

## 2018-08-31 PROBLEM — E83.42 HYPOMAGNESEMIA: Status: RESOLVED | Noted: 2018-02-01 | Resolved: 2018-08-31

## 2018-08-31 PROCEDURE — G0463 HOSPITAL OUTPT CLINIC VISIT: HCPCS

## 2018-08-31 PROCEDURE — 99214 OFFICE O/P EST MOD 30 MIN: CPT | Performed by: FAMILY MEDICINE

## 2018-08-31 RX ORDER — CETIRIZINE HYDROCHLORIDE 10 MG/1
10 TABLET ORAL DAILY
Qty: 90 TABLET | Refills: 3 | Status: SHIPPED | OUTPATIENT
Start: 2018-08-31 | End: 2019-07-15

## 2018-08-31 RX ORDER — ALBUTEROL SULFATE 90 UG/1
2 AEROSOL, METERED RESPIRATORY (INHALATION) 4 TIMES DAILY PRN
Qty: 2 INHALER | Refills: 11 | Status: SHIPPED | OUTPATIENT
Start: 2018-08-31 | End: 2019-07-15

## 2018-08-31 RX ORDER — INDOMETHACIN 50 MG/1
50 CAPSULE ORAL
Qty: 60 CAPSULE | Refills: 1 | Status: SHIPPED | OUTPATIENT
Start: 2018-08-31 | End: 2019-07-15

## 2018-08-31 RX ORDER — NAPROXEN 500 MG/1
500 TABLET ORAL 2 TIMES DAILY WITH MEALS
Qty: 180 TABLET | Refills: 3 | Status: SHIPPED | OUTPATIENT
Start: 2018-08-31 | End: 2019-07-15

## 2018-08-31 RX ORDER — ALLOPURINOL 300 MG/1
300 TABLET ORAL DAILY
Qty: 90 TABLET | Refills: 3 | Status: SHIPPED | OUTPATIENT
Start: 2018-08-31 | End: 2019-07-15

## 2018-08-31 ASSESSMENT — PAIN SCALES - GENERAL: PAINLEVEL: MILD PAIN (2)

## 2018-08-31 ASSESSMENT — PATIENT HEALTH QUESTIONNAIRE - PHQ9: 5. POOR APPETITE OR OVEREATING: NOT AT ALL

## 2018-08-31 ASSESSMENT — ANXIETY QUESTIONNAIRES
IF YOU CHECKED OFF ANY PROBLEMS ON THIS QUESTIONNAIRE, HOW DIFFICULT HAVE THESE PROBLEMS MADE IT FOR YOU TO DO YOUR WORK, TAKE CARE OF THINGS AT HOME, OR GET ALONG WITH OTHER PEOPLE: NOT DIFFICULT AT ALL
3. WORRYING TOO MUCH ABOUT DIFFERENT THINGS: SEVERAL DAYS
1. FEELING NERVOUS, ANXIOUS, OR ON EDGE: NOT AT ALL
GAD7 TOTAL SCORE: 3
6. BECOMING EASILY ANNOYED OR IRRITABLE: SEVERAL DAYS
5. BEING SO RESTLESS THAT IT IS HARD TO SIT STILL: NOT AT ALL
7. FEELING AFRAID AS IF SOMETHING AWFUL MIGHT HAPPEN: NOT AT ALL
2. NOT BEING ABLE TO STOP OR CONTROL WORRYING: SEVERAL DAYS

## 2018-08-31 NOTE — TELEPHONE ENCOUNTER
Patient called back and is able to come in today after 3:00. Appointment given.  Sharyn Moreno CMA([facility])..................8/31/2018   9:37 AM

## 2018-08-31 NOTE — NURSING NOTE
Patient presents today for medication management.    Faviola Hernandez LPN on 8/31/2018 at 3:14 PM

## 2018-08-31 NOTE — MR AVS SNAPSHOT
After Visit Summary   8/31/2018    Christiano Metcalf    MRN: [id number]           Patient Information     Date Of Birth          1970        Visit Information        Provider Department      8/31/2018 3:00 PM Gino Frankel MD [facility]        Today's Diagnoses     Idiopathic chronic gout without tophus, unspecified site    -  1    Mild intermittent asthma without complication        Seasonal allergic rhinitis, unspecified chronicity, unspecified trigger        Moderate episode of recurrent major depressive disorder (H)        Screening cholesterol level           Follow-ups after your visit        Your next 10 appointments already scheduled     Sep 04, 2018  3:10 PM CDT   LAB with GH LAB   [facility] ([facility])    [address]  [place] [id number] [phone]           Please do not eat 10-12 hours before your appointment if you are coming in fasting for labs on lipids, cholesterol, or glucose (sugar). This does not apply to pregnant women. Water, hot tea and black coffee (with nothing added) are okay. Do not drink other fluids, diet soda or chew gum.              Future tests that were ordered for you today     Open Future Orders        Priority Expected Expires Ordered    Uric acid Routine  8/31/2019 8/31/2018    Lipid Profile Routine 8/31/2018 8/31/2019 8/31/2018    Comprehensive metabolic panel Routine  9/1/2019 8/31/2018    Pulmonary Function Test Routine  8/31/2019 8/31/2018            Who to contact     If you have questions or need follow up information about today's clinic visit or your schedule please contact [facility] directly at [phone].  Normal or non-critical lab and imaging results will be communicated to you by MyChart, letter or phone within 4 business days after the clinic has received the results. If you do not hear from us within 7 days, please contact the clinic through [email or web address]  "or phone. If you have a critical or abnormal lab result, we will notify you by phone as soon as possible.  Submit refill requests through [employer] or call your pharmacy and they will forward the refill request to us. Please allow 3 business days for your refill to be completed.          Additional Information About Your Visit        [employer] Information     [employer] lets you send messages to your doctor, view your test results, renew your prescriptions, schedule appointments and more. To sign up, go to [email or web address] . Click on \"Log in\" on the left side of the screen, which will take you to the Welcome page. Then click on \"Sign up Now\" on the right side of the page.     You will be asked to enter the access code listed below, as well as some personal information. Please follow the directions to create your username and password.     Your access code is: [email or web address]  Expires: 10/16/2018 10:56 PM     Your access code will  in 90 days. If you need help or a new code, please call your [place] clinic or [phone].        Care EveryWhere ID     This is your Care EveryWhere ID. This could be used by other organizations to access your [place] medical records  [id number]        Your Vitals Were     Pulse Pulse Oximetry BMI (Body Mass Index)             72 96% 31.92 kg/m2          Blood Pressure from Last 3 Encounters:   18 132/88   18 (!) 150/102   18 129/88    Weight from Last 3 Encounters:   18 213 lb (96.6 kg)   18 220 lb (99.8 kg)   18 223 lb (101.2 kg)                 Where to get your medicines      These medications were sent to [id number] - [place], [place] -  AT [address] [facility] [address]     Phone:  [phone]     albuterol 108 (90 Base) MCG/ACT inhaler    allopurinol 300 MG tablet    cetirizine 10 MG tablet    indomethacin 50 MG capsule    naproxen 500 MG tablet    sertraline 50 MG tablet    "       Primary Care Provider Office Phone # Fax #    Gino Frankel [phone] [address]  [facility] [address]        Equal Access to Services     CHALINOOTIS ASHLEIGH : Hadii aad ku hadconnerkindra Emileali, wajuanpabloda luqjerri, qaybta kamaryda stephen, dottie zuniga williamwilliams woodyarsalan bergman. So [facility] [phone].    ATENCIÓN: Si habla español, tiene a watkins disposición servicios gratuitos de asistencia lingüística. Llame al [phone].    We comply with applicable federal civil rights laws and [place] laws. We do not discriminate on the basis of race, color, national origin, age, disability, sex, sexual orientation, or gender identity.            Thank you!     Thank you for choosing [facility]  for your care. Our goal is always to provide you with excellent care. Hearing back from our patients is one way we can continue to improve our services. Please take a few minutes to complete the written survey that you may receive in the mail after your visit with us. Thank you!             Your Updated Medication List - Protect others around you: Learn how to safely use, store and throw away your medicines at www.disposemymeds.org.          This list is accurate as of 8/31/18  3:46 PM.  Always use your most recent med list.                   Brand Name Dispense Instructions for use Diagnosis    albuterol 108 (90 Base) MCG/ACT inhaler    PROAIR HFA/PROVENTIL HFA/VENTOLIN HFA    2 Inhaler    Inhale 2 puffs into the lungs 4 times daily as needed    Mild intermittent asthma without complication       allopurinol 300 MG tablet    ZYLOPRIM    90 tablet    Take 1 tablet (300 mg) by mouth daily    Idiopathic chronic gout without tophus, unspecified site       cetirizine 10 MG tablet    zyrTEC    90 tablet    Take 1 tablet (10 mg) by mouth daily    Seasonal allergic rhinitis, unspecified chronicity, unspecified trigger       indomethacin 50 MG capsule    INDOCIN    60 capsule    Take 1 capsule  (50 mg) by mouth 3 times daily (with meals)    Idiopathic chronic gout without tophus, unspecified site       ipratropium - albuterol 0.5 mg/2.5 mg/3 mL 0.5-2.5 (3) MG/3ML neb solution    DUONEB    360 mL    USE 1 VIAL VIA NEBULIZER EVERY 6 HOURS AS NEEDED FOR SHORTNESS OF BREATH/DYSPNEA OR WHEEZING    Influenza B, Moderate asthma with acute exacerbation, unspecified whether persistent       naproxen 500 MG tablet    NAPROSYN    180 tablet    Take 1 tablet (500 mg) by mouth 2 times daily (with meals)    Idiopathic chronic gout without tophus, unspecified site       sertraline 50 MG tablet    ZOLOFT    90 tablet    Take 1 tablet (50 mg) by mouth daily    Moderate episode of recurrent major depressive disorder (H)

## 2018-08-31 NOTE — PROGRESS NOTES
SUBJECTIVE:  Christiano Metcalf is a 48 year old male here for follow-up.  He has a history of gout.  He states that he generally gets for flareups a year and when he does he uses indomethacin.  His gout flareups generally last 1-2 days at the maximum.    He has a history of asthma and continues to use his albuterol inhaler 2-3 times a week.  He was seen in the emergency room in February for exacerbation.    He has a history of chronic back pain for which he uses naproxen twice daily on a regular basis.  He continues to use Zoloft 50 mg daily for depression.  He continues working with a counselor and he states that that is going well.      Patient Active Problem List    Diagnosis Date Noted     Mild intermittent asthma without complication 08/31/2018     Priority: Medium     Gout 02/01/2018     Priority: Medium     Learning disability 06/14/2011     Priority: Medium       Past Medical History:   Diagnosis Date     Gout     No Comments Provided     Hypomagnesemia     No Comments Provided     Pain in left knee     No Comments Provided     Personal history of other (healed) physical injury and trauma     2000,repair     Unspecified injury of unspecified wrist, hand and finger(s), initial encounter     1999       Past Surgical History:   Procedure Laterality Date     ARTHROSCOPY KNEE      Right knee meniscal repair, arthroscopic ally     ARTHROSCOPY KNEE      2010,Left knee scope     FINGER SURGERY      Right thumb ORIF     OTHER SURGICAL HISTORY      7/15/14,,HERNIA REPAIR,Left,LIH with mesh     OTHER SURGICAL HISTORY      5/10/16,,HERNIA REPAIR,Right,RIH with plug/patch       Current Outpatient Prescriptions   Medication Sig Dispense Refill     albuterol (PROAIR HFA/PROVENTIL HFA/VENTOLIN HFA) 108 (90 Base) MCG/ACT inhaler Inhale 2 puffs into the lungs 4 times daily as needed 2 Inhaler 11     allopurinol (ZYLOPRIM) 300 MG tablet Take 1 tablet (300 mg) by mouth daily 90 tablet 3     cetirizine (ZYRTEC) 10 MG  tablet Take 1 tablet (10 mg) by mouth daily 90 tablet 3     indomethacin (INDOCIN) 50 MG capsule Take 1 capsule (50 mg) by mouth 3 times daily (with meals) 60 capsule 1     ipratropium - albuterol 0.5 mg/2.5 mg/3 mL (DUONEB) 0.5-2.5 (3) MG/3ML neb solution USE 1 VIAL VIA NEBULIZER EVERY 6 HOURS AS NEEDED FOR SHORTNESS OF BREATH/DYSPNEA OR WHEEZING 360 mL 0     naproxen (NAPROSYN) 500 MG tablet Take 1 tablet (500 mg) by mouth 2 times daily (with meals) 180 tablet 3     sertraline (ZOLOFT) 50 MG tablet Take 1 tablet (50 mg) by mouth daily 90 tablet 3     [DISCONTINUED] albuterol (PROAIR HFA/PROVENTIL HFA/VENTOLIN HFA) 108 (90 BASE) MCG/ACT Inhaler Inhale 2 puffs into the lungs 4 times daily as needed       [DISCONTINUED] indomethacin (INDOCIN) 50 MG capsule Take 50 mg by mouth 3 times daily (with meals)       [DISCONTINUED] sertraline (ZOLOFT) 50 MG tablet Take 50 mg by mouth daily         Allergies:  Allergies   Allergen Reactions     Seasonal      Other reaction(s): Sneezing     No Clinical Screening - See Comments Nausea and Vomiting     Peas       Family History   Problem Relation Age of Onset     Diabetes Mother      Diabetes     Other - See Comments Father      episode of gout.     Colon Cancer Father      Cancer-colon,diagnosed in early 60s     Family History Negative Son      Good Health,1997     Family History Negative Daughter      Good Health,2009     Family History Negative Son      Good Health,2011       Social History   Substance Use Topics     Smoking status: Former Smoker     Types: Cigarettes     Quit date: 2/17/2010     Smokeless tobacco: Never Used     Alcohol use No      Comment: Alcoholic Drinks/day: seldom       ROS:    As above otherwise ROS is unremarkable.      OBJECTIVE:  /88  Pulse 72  Wt 213 lb (96.6 kg)  SpO2 96%  BMI 31.92 kg/m2    EXAM:  General Appearance: Pleasant, alert, appropriate appearance for age. No acute distress  Head: Normal. Normocephalic, atraumatic.  Lungs:  Normal chest wall and respirations. Clear to auscultation, no wheezes or crackles.  Cardiovascular: Regular rate and rhythm. S1, S2, no murmurs.  Musculoskeletal: No edema.  Skin: no concerning or new rashes.  Neurologic Exam: CN 2-12 grossly intact.  Normal gait.  Symmetric DTRs, No focal motor or sensory deficits. No tremor.  Psychiatric Exam: Alert and oriented, appropriate affect.    ASSESSEMENT AND PLAN:    1. Idiopathic chronic gout without tophus, unspecified site    2. Mild intermittent asthma without complication    3. Seasonal allergic rhinitis, unspecified chronicity, unspecified trigger    4. Moderate episode of recurrent major depressive disorder (H)    5. Screening cholesterol level      He will return for fasting labs in 2 including uric acid level.    In regard to his asthma we will set him up for PFTs to evaluate if she would benefit from a controller inhaler.    His medications refilled again for another year.    Otto Frankel MD  Family Medicine      This document was prepared using voice generated software.  While every attempt was made for accuracy, grammatical errors may exist.

## 2018-09-01 ASSESSMENT — ASTHMA QUESTIONNAIRES: ACT_TOTALSCORE: 15

## 2018-09-01 ASSESSMENT — PATIENT HEALTH QUESTIONNAIRE - PHQ9: SUM OF ALL RESPONSES TO PHQ QUESTIONS 1-9: 6

## 2018-09-01 ASSESSMENT — ANXIETY QUESTIONNAIRES: GAD7 TOTAL SCORE: 3

## 2018-09-04 DIAGNOSIS — M1A.00X0 IDIOPATHIC CHRONIC GOUT WITHOUT TOPHUS, UNSPECIFIED SITE: ICD-10-CM

## 2018-09-04 DIAGNOSIS — Z13.220 SCREENING CHOLESTEROL LEVEL: ICD-10-CM

## 2018-09-04 LAB
ALBUMIN SERPL-MCNC: 4.2 G/DL (ref 3.5–5.7)
ALP SERPL-CCNC: 74 U/L (ref 34–104)
ALT SERPL W P-5'-P-CCNC: 122 U/L (ref 7–52)
ANION GAP SERPL CALCULATED.3IONS-SCNC: 9 MMOL/L (ref 3–14)
AST SERPL W P-5'-P-CCNC: 110 U/L (ref 13–39)
BILIRUB SERPL-MCNC: 1 MG/DL (ref 0.3–1)
BUN SERPL-MCNC: 21 MG/DL (ref 7–25)
CALCIUM SERPL-MCNC: 9.9 MG/DL (ref 8.6–10.3)
CHLORIDE SERPL-SCNC: 104 MMOL/L (ref 98–107)
CHOLEST SERPL-MCNC: 199 MG/DL
CO2 SERPL-SCNC: 26 MMOL/L (ref 21–31)
CREAT SERPL-MCNC: 0.89 MG/DL (ref 0.7–1.3)
GFR SERPL CREATININE-BSD FRML MDRD: >90 ML/MIN/1.7M2
GLUCOSE SERPL-MCNC: 74 MG/DL (ref 70–105)
HDLC SERPL-MCNC: 39 MG/DL (ref 23–92)
LDLC SERPL CALC-MCNC: 126 MG/DL
NONHDLC SERPL-MCNC: 160 MG/DL
POTASSIUM SERPL-SCNC: 3.4 MMOL/L (ref 3.5–5.1)
PROT SERPL-MCNC: 8 G/DL (ref 6.4–8.9)
SODIUM SERPL-SCNC: 139 MMOL/L (ref 134–144)
TRIGL SERPL-MCNC: 170 MG/DL
URATE SERPL-MCNC: 6.1 MG/DL (ref 4.4–7.6)

## 2018-09-04 PROCEDURE — 84550 ASSAY OF BLOOD/URIC ACID: CPT | Performed by: FAMILY MEDICINE

## 2018-09-04 PROCEDURE — 80053 COMPREHEN METABOLIC PANEL: CPT | Performed by: FAMILY MEDICINE

## 2018-09-04 PROCEDURE — 80061 LIPID PANEL: CPT | Performed by: FAMILY MEDICINE

## 2018-09-04 PROCEDURE — 36415 COLL VENOUS BLD VENIPUNCTURE: CPT | Performed by: FAMILY MEDICINE

## 2018-09-24 ENCOUNTER — HOSPITAL ENCOUNTER (OUTPATIENT)
Dept: RESPIRATORY THERAPY | Facility: OTHER | Age: 48
Discharge: HOME | End: 2018-09-24
Attending: FAMILY MEDICINE | Admitting: FAMILY MEDICINE
Payer: COMMERCIAL

## 2018-09-24 DIAGNOSIS — J45.20 MILD INTERMITTENT ASTHMA WITHOUT COMPLICATION: ICD-10-CM

## 2018-09-24 PROCEDURE — 94726 PLETHYSMOGRAPHY LUNG VOLUMES: CPT

## 2018-09-24 PROCEDURE — 94729 DIFFUSING CAPACITY: CPT | Mod: 26 | Performed by: INTERNAL MEDICINE

## 2018-09-24 PROCEDURE — 94726 PLETHYSMOGRAPHY LUNG VOLUMES: CPT | Mod: 26 | Performed by: INTERNAL MEDICINE

## 2018-09-24 PROCEDURE — 40000275 ZZH STATISTIC RCP TIME EA 10 MIN

## 2018-09-24 PROCEDURE — 94010 BREATHING CAPACITY TEST: CPT | Mod: 26 | Performed by: INTERNAL MEDICINE

## 2018-09-24 PROCEDURE — 94010 BREATHING CAPACITY TEST: CPT

## 2018-09-24 PROCEDURE — 94729 DIFFUSING CAPACITY: CPT

## 2018-11-02 RX ORDER — CETIRIZINE HYDROCHLORIDE 10 MG/1
10 TABLET ORAL DAILY
Qty: 90 TABLET | Refills: 3 | OUTPATIENT
Start: 2018-11-02

## 2018-11-02 NOTE — TELEPHONE ENCOUNTER
[facility] pharmacy and pt request a Rx refill for cetirizine (Zyrtec).  Antihistamines protocol passed.  Pt's last exam with PCP Dr. TAMMY Frankel was on 8-31-18.  This RN declined / refused Rx refill request due to Rx refill request too soon.  Rx was ordered on 8-31-18 Qty 90 Refill 3.  Unable to complete prescription refill per RN Medication Refill Policy.................... Franchesca Lae ....................  11/2/2018   3:29 PM    Antihistamines Protocol Passed

## 2018-12-11 NOTE — TELEPHONE ENCOUNTER
Refill request from [facility] for:  Cetirizine (Zyrtec) 10 mg tablets    Noted medication filled for 12 month supply 8/31/2018 for 90 X 3  Receipt confirmed by pharmacy    Prescription refused.  This is a duplicate request.  Amira Bond.......12/11/2018 8:34 AM

## 2019-01-26 ENCOUNTER — HOSPITAL ENCOUNTER (EMERGENCY)
Facility: OTHER | Age: 49
Discharge: HOME | End: 2019-01-26
Attending: PHYSICIAN ASSISTANT | Admitting: PHYSICIAN ASSISTANT
Payer: COMMERCIAL

## 2019-01-26 VITALS
BODY MASS INDEX: 31.83 KG/M2 | SYSTOLIC BLOOD PRESSURE: 144 MMHG | RESPIRATION RATE: 16 BRPM | HEART RATE: 63 BPM | DIASTOLIC BLOOD PRESSURE: 107 MMHG | OXYGEN SATURATION: 93 % | TEMPERATURE: 97.7 F | WEIGHT: 210 LBS | HEIGHT: 68 IN

## 2019-01-26 DIAGNOSIS — S61.237A PUNCTURE WOUND OF LEFT LITTLE FINGER: ICD-10-CM
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
## 2019-01-26 DIAGNOSIS — S61.259A DOG BITE OF FINGER, INITIAL ENCOUNTER: ICD-10-CM

## 2019-01-26 PROCEDURE — 25000132 ZZH RX MED GY IP 250 OP 250 PS 637: Performed by: PHYSICIAN ASSISTANT

## 2019-01-26 PROCEDURE — 99283 EMERGENCY DEPT VISIT LOW MDM: CPT | Mod: Z6 | Performed by: PHYSICIAN ASSISTANT

## 2019-01-26 PROCEDURE — 25000128 H RX IP 250 OP 636: Performed by: PHYSICIAN ASSISTANT

## 2019-01-26 PROCEDURE — 90471 IMMUNIZATION ADMIN: CPT | Performed by: PHYSICIAN ASSISTANT

## 2019-01-26 PROCEDURE — 99283 EMERGENCY DEPT VISIT LOW MDM: CPT | Mod: 25 | Performed by: PHYSICIAN ASSISTANT

## 2019-01-26 PROCEDURE — 25000125 ZZHC RX 250: Performed by: PHYSICIAN ASSISTANT

## 2019-01-26 PROCEDURE — 90715 TDAP VACCINE 7 YRS/> IM: CPT | Performed by: PHYSICIAN ASSISTANT

## 2019-01-26 RX ORDER — ACETAMINOPHEN 650 MG
TABLET, EXTENDED RELEASE ORAL ONCE
Status: COMPLETED | OUTPATIENT
Start: 2019-01-26 | End: 2019-01-26

## 2019-01-26 RX ADMIN — TETANUS TOXOID, REDUCED DIPHTHERIA TOXOID AND ACELLULAR PERTUSSIS VACCINE, ADSORBED 0.5 ML: 5; 2.5; 8; 8; 2.5 SUSPENSION INTRAMUSCULAR at 17:11

## 2019-01-26 RX ADMIN — AMOXICILLIN AND CLAVULANATE POTASSIUM 1 TABLET: 875; 125 TABLET, FILM COATED ORAL at 17:11

## 2019-01-26 RX ADMIN — POVIDONE-IODINE: 10 SOLUTION TOPICAL at 17:19

## 2019-01-26 ASSESSMENT — ENCOUNTER SYMPTOMS
BRUISES/BLEEDS EASILY: 0
ADENOPATHY: 0
CONFUSION: 0
WOUND: 0
SHORTNESS OF BREATH: 0
BACK PAIN: 0
ABDOMINAL PAIN: 0
HEMATURIA: 0
CHEST TIGHTNESS: 0
FEVER: 0
CHILLS: 0

## 2019-01-26 ASSESSMENT — MIFFLIN-ST. JEOR: SCORE: 1797.05

## 2019-01-26 NOTE — ED TRIAGE NOTES
Patient presents to the ED after sustaining a dog bite to his R) pinkkristen.  3 puncture sites noted to the REUBEN christianson.  Patient states he is unaware if the dog is up to date on vaccines.

## 2019-01-26 NOTE — ED AVS SNAPSHOT
[facility]  [address]  [place] [address]  Phone:  [phone]  Fax:  [phone]                                    Christiano Metcalf   MRN: [id number]    Department:  [facility]   Date of Visit:  1/26/2019           After Visit Summary Signature Page    I have received my discharge instructions, and my questions have been answered. I have discussed any challenges I see with this plan with the nurse or doctor.    ..........................................................................................................................................  Patient/Patient Representative Signature      ..........................................................................................................................................  Patient Representative Print Name and Relationship to Patient    ..................................................               ................................................  Date                                   Time    ..........................................................................................................................................  Reviewed by Signature/Title    ...................................................              ..............................................  Date                                               Time          22EPIC Rev 08/18

## 2019-01-26 NOTE — ED NOTES
Mandated report given to Dana, dispatcher, at [facility].  Patient notified that a report must be made for his safety as well as other individuals as he is unaware of the dogs vaccination status and states he does not have a way to obtain this information.  Per dispatch, an officer will be coming to the ER to further obtain a detailed report.  Antibiotics and Tdap booster have been given per MD order.  Bite site has been cleaned and band-aide x2 have been applied.  Will continue to monitor patient and report any changes in condition.

## 2019-01-26 NOTE — ED PROVIDER NOTES
History     Chief Complaint   Patient presents with     Dog Bite     This is a 48-year-old male who was assisting a friend by bringing him back and forth to [facility].  Patient had been feeding his dog and taking care of it today however the dog turned on him and bit him in his left small finger.  He sustained a 3 puncture wounds to the finger.  He has full flexion extension.  He is not sure if the dog has been vaccinated for rabies.  He is unsure when his last tetanus was.  Denies any other issues.              Allergies:  Allergies   Allergen Reactions     Seasonal      Other reaction(s): Sneezing     No Clinical Screening - See Comments Nausea and Vomiting     Peas       Problem List:    Patient Active Problem List    Diagnosis Date Noted     Mild intermittent asthma without complication 08/31/2018     Priority: Medium     Gout 02/01/2018     Priority: Medium     Learning disability 06/14/2011     Priority: Medium        Past Medical History:    Past Medical History:   Diagnosis Date     Gout      Hypomagnesemia      Pain in left knee      Personal history of other (healed) physical injury and trauma      Unspecified injury of unspecified wrist, hand and finger(s), initial encounter        Past Surgical History:    Past Surgical History:   Procedure Laterality Date     ARTHROSCOPY KNEE      Right knee meniscal repair, arthroscopic ally     ARTHROSCOPY KNEE      2010,Left knee scope     FINGER SURGERY      Right thumb ORIF     OTHER SURGICAL HISTORY      7/15/14,,HERNIA REPAIR,Left,LIH with mesh     OTHER SURGICAL HISTORY      5/10/16,,HERNIA REPAIR,Right,RIH with plug/patch       Family History:    Family History   Problem Relation Age of Onset     Diabetes Mother         Diabetes     Other - See Comments Father         episode of gout.     Colon Cancer Father         Cancer-colon,diagnosed in early 60s     Family History Negative Son         Good Health,1997     Family History Negative Daughter          "Good Health,     Family History Negative Son         Good Health,       Social History:  Marital Status:  Legally  [3]  Social History     Tobacco Use     Smoking status: Former Smoker     Types: Cigarettes     Last attempt to quit: 2010     Years since quittin.9     Smokeless tobacco: Never Used   Substance Use Topics     Alcohol use: No     Alcohol/week: 0.0 oz     Comment: Alcoholic Drinks/day: seldom     Drug use: No        Medications:      albuterol (PROAIR HFA/PROVENTIL HFA/VENTOLIN HFA) 108 (90 Base) MCG/ACT inhaler   allopurinol (ZYLOPRIM) 300 MG tablet   amoxicillin-clavulanate (AUGMENTIN) 875-125 MG tablet   cetirizine (ZYRTEC) 10 MG tablet   ipratropium - albuterol 0.5 mg/2.5 mg/3 mL (DUONEB) 0.5-2.5 (3) MG/3ML neb solution   naproxen (NAPROSYN) 500 MG tablet   sertraline (ZOLOFT) 50 MG tablet   indomethacin (INDOCIN) 50 MG capsule         Review of Systems   Constitutional: Negative for chills and fever.   HENT: Negative for congestion.    Eyes: Negative for visual disturbance.   Respiratory: Negative for chest tightness and shortness of breath.    Cardiovascular: Negative for chest pain.   Gastrointestinal: Negative for abdominal pain.   Genitourinary: Negative for hematuria.   Musculoskeletal: Negative for back pain.        Bite marks and puncture wounds to the left small finger   Skin: Negative for rash and wound.   Neurological: Negative for syncope.   Hematological: Negative for adenopathy. Does not bruise/bleed easily.   Psychiatric/Behavioral: Negative for confusion.       Physical Exam   BP: (!) 144/107  Pulse: 63  Heart Rate: 63  Temp: 97.7  F (36.5  C)  Resp: 16  Height: 172.7 cm (5' 8\")  Weight: 95.3 kg (210 lb)  SpO2: 93 %      Physical Exam   Constitutional: He is oriented to person, place, and time. He appears well-developed and well-nourished. No distress.   HENT:   Head: Normocephalic and atraumatic.   Eyes: Conjunctivae are normal. No scleral icterus.   Neck: " Neck supple.   Cardiovascular: Normal rate and regular rhythm.   Pulmonary/Chest: Effort normal.   Abdominal: Soft. There is no tenderness.   Musculoskeletal: Normal range of motion. He exhibits no deformity.   Left small finger multiple tiny puncture wounds 3 total.  No bleeding at this time.  He has full flexion extension of this finger.  CMS x4.   Lymphadenopathy:     He has no cervical adenopathy.   Neurological: He is alert and oriented to person, place, and time.   Skin: Skin is warm and dry. No rash noted. He is not diaphoretic.   Psychiatric: He has a normal mood and affect.       ED Course        Procedures                   No results found for this or any previous visit (from the past 24 hour(s)).    Medications   povidone-iodine (BETADINE) 10 % topical solution (not administered)   Tdap (tetanus-diptheria-acell pertussis) (BOOSTRIX) injection 0.5 mL (0.5 mLs Intramuscular Given 1/26/19 1711)   amoxicillin-clavulanate (AUGMENTIN) 875-125 MG per tablet 1 tablet (1 tablet Oral Given 1/26/19 1711)       Assessments & Plan (with Medical Decision Making)     I have reviewed the nursing notes.    I have reviewed the findings, diagnosis, plan and need for follow up with the patient.         Medication List      Started    amoxicillin-clavulanate 875-125 MG tablet  Commonly known as:  AUGMENTIN  1 tablet, Oral, 2 TIMES DAILY            Final diagnoses:   Dog bite of finger, initial encounter - multiple bites to left small finger   Puncture wound of left little finger     Afebrile.  Vital signs stable.  Dog bite to his left small finger with multiple puncture wounds.  He was given a tetanus booster.  His left small finger was soaked in a solution of warm soapy water as well as povidone for 15 minutes.  Afterwards this was bandaged.  He was given Augmentin orally in the ER since the pharmacies are closed at this time.  Rx for 10-day course of Augmentin as well.  I discussed with the patient the importance of  finding out whether or not this dog was in fact vaccinated for rabies.  May need to elicit law enforcement for this.  I discussed the most often the dog would be quarantined at a veterinary clinic for further evaluation.  Follow-up up with his primary care provider in 1 week for further evaluation sooner if there is any other concerns problems or questions.  Follow-up immediately if any concerns for rabies.  1/26/2019   [facility]     Quinton Abbasi PA-C  01/26/19 [id number]

## 2019-03-07 NOTE — TELEPHONE ENCOUNTER
"Refill request from [facility] for:  cetirizine (ZYRTEC) 10 MG tablet    Noted Med filled 8/31/2018 for 90 X 3-12 month supply  \"Receipt confirmed by pharmacy\"    Contacted [facility] and pharmacist states that last rx on file was 8/2017.  They were unable to locate 8/31/2018 refill.    VORB given to pharmacist.    Amira Bond RN  ....................  3/7/2019   1:52 PM      "

## 2019-04-05 ENCOUNTER — OFFICE VISIT (OUTPATIENT)
Dept: FAMILY MEDICINE | Facility: OTHER | Age: 49
End: 2019-04-05
Attending: NURSE PRACTITIONER
Payer: COMMERCIAL

## 2019-04-05 VITALS
WEIGHT: 228.6 LBS | BODY MASS INDEX: 34.65 KG/M2 | HEIGHT: 68 IN | TEMPERATURE: 98.2 F | HEART RATE: 64 BPM | SYSTOLIC BLOOD PRESSURE: 130 MMHG | DIASTOLIC BLOOD PRESSURE: 80 MMHG | RESPIRATION RATE: 18 BRPM

## 2019-04-05 DIAGNOSIS — E55.9 VITAMIN D DEFICIENCY: ICD-10-CM

## 2019-04-05 DIAGNOSIS — R53.83 FATIGUE, UNSPECIFIED TYPE: ICD-10-CM

## 2019-04-05 DIAGNOSIS — M25.50 MULTIPLE JOINT PAIN: ICD-10-CM

## 2019-04-05 DIAGNOSIS — L40.9 PSORIASIS: Primary | ICD-10-CM

## 2019-04-05 LAB
ALBUMIN SERPL-MCNC: 4.1 G/DL (ref 3.5–5.7)
ALP SERPL-CCNC: 79 U/L (ref 34–104)
ALT SERPL W P-5'-P-CCNC: 149 U/L (ref 7–52)
ANION GAP SERPL CALCULATED.3IONS-SCNC: 10 MMOL/L (ref 3–14)
AST SERPL W P-5'-P-CCNC: 138 U/L (ref 13–39)
BASOPHILS # BLD AUTO: 0 10E9/L (ref 0–0.2)
BASOPHILS NFR BLD AUTO: 0.4 %
BILIRUB SERPL-MCNC: 0.9 MG/DL (ref 0.3–1)
BUN SERPL-MCNC: 15 MG/DL (ref 7–25)
CALCIUM SERPL-MCNC: 9.3 MG/DL (ref 8.6–10.3)
CHLORIDE SERPL-SCNC: 105 MMOL/L (ref 98–107)
CO2 SERPL-SCNC: 26 MMOL/L (ref 21–31)
CREAT SERPL-MCNC: 0.97 MG/DL (ref 0.7–1.3)
CRP SERPL-MCNC: 0.6 MG/L
DEPRECATED CALCIDIOL+CALCIFEROL SERPL-MC: 14.4 NG/ML
DIFFERENTIAL METHOD BLD: NORMAL
EOSINOPHIL # BLD AUTO: 0.1 10E9/L (ref 0–0.7)
EOSINOPHIL NFR BLD AUTO: 2.4 %
ERYTHROCYTE [DISTWIDTH] IN BLOOD BY AUTOMATED COUNT: 13.4 % (ref 10–15)
GFR SERPL CREATININE-BSD FRML MDRD: 82 ML/MIN/{1.73_M2}
GLUCOSE SERPL-MCNC: 109 MG/DL (ref 70–105)
HCT VFR BLD AUTO: 43.6 % (ref 40–53)
HGB BLD-MCNC: 14.5 G/DL (ref 13.3–17.7)
IMM GRANULOCYTES # BLD: 0 10E9/L (ref 0–0.4)
IMM GRANULOCYTES NFR BLD: 0.2 %
LYMPHOCYTES # BLD AUTO: 1.5 10E9/L (ref 0.8–5.3)
LYMPHOCYTES NFR BLD AUTO: 27.7 %
MCH RBC QN AUTO: 29.5 PG (ref 26.5–33)
MCHC RBC AUTO-ENTMCNC: 33.3 G/DL (ref 31.5–36.5)
MCV RBC AUTO: 89 FL (ref 78–100)
MONOCYTES # BLD AUTO: 0.3 10E9/L (ref 0–1.3)
MONOCYTES NFR BLD AUTO: 5.3 %
NEUTROPHILS # BLD AUTO: 3.4 10E9/L (ref 1.6–8.3)
NEUTROPHILS NFR BLD AUTO: 64 %
PLATELET # BLD AUTO: 189 10E9/L (ref 150–450)
POTASSIUM SERPL-SCNC: 3.3 MMOL/L (ref 3.5–5.1)
PROT SERPL-MCNC: 7.9 G/DL (ref 6.4–8.9)
RBC # BLD AUTO: 4.91 10E12/L (ref 4.4–5.9)
RHEUMATOID FACT SER NEPH-ACNC: <14 IU/ML (ref 0–20)
SODIUM SERPL-SCNC: 141 MMOL/L (ref 134–144)
TSH SERPL DL<=0.05 MIU/L-ACNC: 2.24 IU/ML (ref 0.34–5.6)
WBC # BLD AUTO: 5.3 10E9/L (ref 4–11)

## 2019-04-05 PROCEDURE — 82306 VITAMIN D 25 HYDROXY: CPT | Performed by: NURSE PRACTITIONER

## 2019-04-05 PROCEDURE — 80053 COMPREHEN METABOLIC PANEL: CPT | Performed by: NURSE PRACTITIONER

## 2019-04-05 PROCEDURE — G0463 HOSPITAL OUTPT CLINIC VISIT: HCPCS

## 2019-04-05 PROCEDURE — 86200 CCP ANTIBODY: CPT | Performed by: NURSE PRACTITIONER

## 2019-04-05 PROCEDURE — 86038 ANTINUCLEAR ANTIBODIES: CPT | Performed by: NURSE PRACTITIONER

## 2019-04-05 PROCEDURE — 86431 RHEUMATOID FACTOR QUANT: CPT | Performed by: NURSE PRACTITIONER

## 2019-04-05 PROCEDURE — 99214 OFFICE O/P EST MOD 30 MIN: CPT | Performed by: NURSE PRACTITIONER

## 2019-04-05 PROCEDURE — 84443 ASSAY THYROID STIM HORMONE: CPT | Performed by: NURSE PRACTITIONER

## 2019-04-05 PROCEDURE — 86039 ANTINUCLEAR ANTIBODIES (ANA): CPT | Performed by: NURSE PRACTITIONER

## 2019-04-05 PROCEDURE — 36415 COLL VENOUS BLD VENIPUNCTURE: CPT | Performed by: NURSE PRACTITIONER

## 2019-04-05 PROCEDURE — 85025 COMPLETE CBC W/AUTO DIFF WBC: CPT | Performed by: NURSE PRACTITIONER

## 2019-04-05 PROCEDURE — 86140 C-REACTIVE PROTEIN: CPT | Performed by: NURSE PRACTITIONER

## 2019-04-05 RX ORDER — PREDNISONE 20 MG/1
TABLET ORAL
Qty: 20 TABLET | Refills: 0 | Status: SHIPPED | OUTPATIENT
Start: 2019-04-05 | End: 2019-07-15

## 2019-04-05 RX ORDER — INDOMETHACIN 25 MG/1
50 CAPSULE ORAL
COMMUNITY
Start: 2013-10-31 | End: 2019-07-15

## 2019-04-05 RX ORDER — ORPHENADRINE CITRATE 100 MG/1
100 TABLET, EXTENDED RELEASE ORAL
COMMUNITY
Start: 2015-12-25 | End: 2019-12-20

## 2019-04-05 RX ORDER — ALBUTEROL SULFATE 90 UG/1
2 AEROSOL, METERED RESPIRATORY (INHALATION)
COMMUNITY
Start: 2017-08-21 | End: 2019-07-15

## 2019-04-05 RX ORDER — TRIAMCINOLONE ACETONIDE 1 MG/G
CREAM TOPICAL 2 TIMES DAILY
Qty: 453.6 G | Refills: 1 | Status: ON HOLD | OUTPATIENT
Start: 2019-04-05 | End: 2021-09-04

## 2019-04-05 RX ORDER — BETAMETHASONE DIPROPIONATE 0.5 MG/G
CREAM TOPICAL AT BEDTIME
Qty: 45 G | Refills: 3 | Status: ON HOLD | OUTPATIENT
Start: 2019-04-05 | End: 2021-09-04

## 2019-04-05 ASSESSMENT — PAIN SCALES - GENERAL: PAINLEVEL: EXTREME PAIN (9)

## 2019-04-05 ASSESSMENT — ANXIETY QUESTIONNAIRES
IF YOU CHECKED OFF ANY PROBLEMS ON THIS QUESTIONNAIRE, HOW DIFFICULT HAVE THESE PROBLEMS MADE IT FOR YOU TO DO YOUR WORK, TAKE CARE OF THINGS AT HOME, OR GET ALONG WITH OTHER PEOPLE: NOT DIFFICULT AT ALL
7. FEELING AFRAID AS IF SOMETHING AWFUL MIGHT HAPPEN: NOT AT ALL
5. BEING SO RESTLESS THAT IT IS HARD TO SIT STILL: NOT AT ALL
1. FEELING NERVOUS, ANXIOUS, OR ON EDGE: NOT AT ALL
4. TROUBLE RELAXING: NOT AT ALL
6. BECOMING EASILY ANNOYED OR IRRITABLE: NOT AT ALL
3. WORRYING TOO MUCH ABOUT DIFFERENT THINGS: NOT AT ALL
2. NOT BEING ABLE TO STOP OR CONTROL WORRYING: NOT AT ALL
GAD7 TOTAL SCORE: 0

## 2019-04-05 ASSESSMENT — PATIENT HEALTH QUESTIONNAIRE - PHQ9: SUM OF ALL RESPONSES TO PHQ QUESTIONS 1-9: 0

## 2019-04-05 ASSESSMENT — MIFFLIN-ST. JEOR: SCORE: 1876.42

## 2019-04-05 NOTE — LETTER
April 11, 2019      Christiano Metcalf  [address]  [place] [address]        Dear ,    We are writing to inform you of your test results.    Your vitamin D level is low. I would recommend Vitamin D 2,000 IUs daily. Unfortunately, we live in an area without much sunlight to help us synthesize vitamin D so supplementing can be helpful.     Your thyroid functioning was normal. Your kidney functioning was normal. Your liver enzymes are a little elevated again. I think we should re-check in about 6 months to see if they remain the same or continue to trend up warranting further evaluation.     Your blood count was normal. Potassium is slightly low- we can re-check this when we re-check your liver enzymes.     Your rheumatoid factor and anti-CCP were both normal. Your JULISSA was borderline positive. This can indicate a possible underlying rheumatologic condition; however, since your skin symptoms are are more prevalent than joint symptoms I think seeing dermatology versus rheumatology is best. Should you get new or worsening joint symptoms then I think a referral to rheumatology would be helpful.     Resulted Orders   Anti Nuclear Mary Alice IgG by IFA with Reflex   Result Value Ref Range    JULISSA interpretation Borderline Positive (A) NEG^Negative      Comment:                                         Reference range:  <1:40  NEGATIVE  1:40 - 1:80  BORDERLINE POSITIVE  >1:80 POSITIVE      JULISSA pattern 1 SPECKLED     JULISSA titer 1 1:40    TSH Reflex GH   Result Value Ref Range    TSH Reflex 2.24 0.34 - 5.60 IU/mL   Vitamin D Total   Result Value Ref Range    Vitamin D Total 14.4 ng/mL      Comment:      Comments:  Deficiency:             <10 ng/mL  Insufficiency:        10-29 ng/mL  Sufficiency:          ng/mL  Possible Toxicity:     >100 ng/mL     Cyclic Citrullinated Peptide Antibody IgG   Result Value Ref Range    Cyclic Citrullinated Peptide Antibody, IgG 1 <7 U/mL      Comment:       Negative   Rheumatoid factor   Result Value Ref Range    Rheumatoid Factor <14 <14 IU/mL   CRP inflammation   Result Value Ref Range    CRP Inflammation 0.6 (H) <0.5 mg/L   Comprehensive Metabolic Panel   Result Value Ref Range    Sodium 141 134 - 144 mmol/L    Potassium 3.3 (L) 3.5 - 5.1 mmol/L    Chloride 105 98 - 107 mmol/L    Carbon Dioxide 26 21 - 31 mmol/L    Anion Gap 10 3 - 14 mmol/L    Glucose 109 (H) 70 - 105 mg/dL    Urea Nitrogen 15 7 - 25 mg/dL    Creatinine 0.97 0.70 - 1.30 mg/dL    GFR Estimate 82 >60 mL/min/[1.73_m2]    GFR Estimate If Black >90 >60 mL/min/[1.73_m2]    Calcium 9.3 8.6 - 10.3 mg/dL    Bilirubin Total 0.9 0.3 - 1.0 mg/dL    Albumin 4.1 3.5 - 5.7 g/dL    Protein Total 7.9 6.4 - 8.9 g/dL    Alkaline Phosphatase 79 34 - 104 U/L     (H) 7 - 52 U/L     (H) 13 - 39 U/L   CBC and Differential   Result Value Ref Range    WBC 5.3 4.0 - 11.0 10e9/L    RBC Count 4.91 4.4 - 5.9 10e12/L    Hemoglobin 14.5 13.3 - 17.7 g/dL    Hematocrit 43.6 40.0 - 53.0 %    MCV 89 78 - 100 fl    MCH 29.5 26.5 - 33.0 pg    MCHC 33.3 31.5 - 36.5 g/dL    RDW 13.4 10.0 - 15.0 %    Platelet Count 189 150 - 450 10e9/L    Diff Method Automated Method     % Neutrophils 64.0 %    % Lymphocytes 27.7 %    % Monocytes 5.3 %    % Eosinophils 2.4 %    % Basophils 0.4 %    % Immature Granulocytes 0.2 %    Absolute Neutrophil 3.4 1.6 - 8.3 10e9/L    Absolute Lymphocytes 1.5 0.8 - 5.3 10e9/L    Absolute Monocytes 0.3 0.0 - 1.3 10e9/L    Absolute Eosinophils 0.1 0.0 - 0.7 10e9/L    Absolute Basophils 0.0 0.0 - 0.2 10e9/L    Abs Immature Granulocytes 0.0 0 - 0.4 10e9/L       If you have any questions or concerns, please call the clinic at the number listed above.       Sincerely,        Gloria Vivas, CNP

## 2019-04-05 NOTE — PROGRESS NOTES
"If jered  SUBJECTIVE:   Christiano Metcalf is a 49 year old male who presents to clinic today for the following health issues:      Rash  Onset: 2 months    Description:   Location: arms, legs, lower abdomen (1 spot), scalp, fingers/nails, heels  Character: red, round, sacbby  Itching (Pruritis): YES    Progression of Symptoms:  worsening    Accompanying Signs & Symptoms:  Fever: no   Body aches or joint pain: YES- has had chronic joint pain but feels like it is worse, body aches  Sore throat symptoms: no   Recent cold symptoms: no   Fatigue: Yes    History:   Previous similar rash: no     Precipitating factors:   Exposure to similar rash: no   New exposures: None   Recent travel: no     Alleviating factors: nothing    Therapies Tried and outcome: cream from [employer] that is for dry skin- did not help    Worsening rash x 2 months  History of gout- last flare about 2-3 years ago  History of chronic large joint pain with recent worsening since onset of rash  Ankles and knees have been feeling stiff and painful  Denies red, warm, swollen joints including in hands, wrists, ankles, feet.  Has chronic dry eyes, denies xerostomia.   Denies paresthesias, weakness. Does get headaches.  Has been feeling more fatigued. \"I have never had to take naps but now I just feel drained all the time.\"     Denies known family history of psoriasis  Uncertain of any family history of autoimmune disorders.        Problem list and histories reviewed & adjusted, as indicated.  Additional history: as documented    Patient Active Problem List   Diagnosis     Gout     Learning disability     Mild intermittent asthma without complication     Past Surgical History:   Procedure Laterality Date     ARTHROSCOPY KNEE      Right knee meniscal repair, arthroscopic ally     ARTHROSCOPY KNEE      2010,Left knee scope     FINGER SURGERY      Right thumb ORIF     OTHER SURGICAL HISTORY      7/15/14,,HERNIA REPAIR,Left,LIH with mesh     OTHER SURGICAL " HISTORY      5/10/16,,HERNIA REPAIR,Right,RIH with plug/patch       Social History     Tobacco Use     Smoking status: Former Smoker     Types: Cigarettes     Last attempt to quit: 2010     Years since quittin.1     Smokeless tobacco: Never Used   Substance Use Topics     Alcohol use: No     Alcohol/week: 0.0 oz     Comment: Alcoholic Drinks/day: seldom     Family History   Problem Relation Age of Onset     Diabetes Mother         Diabetes     Other - See Comments Father         episode of gout.     Colon Cancer Father         Cancer-colon,diagnosed in early 60s     Family History Negative Son         Good Health,     Family History Negative Daughter         Good Health,     Family History Negative Son         Good Health,         Current Outpatient Medications   Medication Sig Dispense Refill     betamethasone dipropionate (DIPROSONE) 0.05 % external cream Apply topically At Bedtime To scalp, wash out in the AM 45 g 3     predniSONE (DELTASONE) 20 MG tablet Take 60 mg by mouth daily for 3 days, THEN 40 mg daily for 3 days, THEN 20 mg daily for 3 days, THEN 10 mg daily for 3 days. 20 tablet 0     triamcinolone (KENALOG) 0.1 % external cream Apply topically 2 times daily 453.6 g 1     albuterol (PROAIR HFA) 108 (90 Base) MCG/ACT inhaler Inhale 2 puffs into the lungs       albuterol (PROAIR HFA/PROVENTIL HFA/VENTOLIN HFA) 108 (90 Base) MCG/ACT inhaler Inhale 2 puffs into the lungs 4 times daily as needed 2 Inhaler 11     allopurinol (ZYLOPRIM) 300 MG tablet Take 1 tablet (300 mg) by mouth daily 90 tablet 3     cetirizine (ZYRTEC) 10 MG tablet Take 1 tablet (10 mg) by mouth daily 90 tablet 3     indomethacin (INDOCIN) 25 MG capsule Take 50 mg by mouth       indomethacin (INDOCIN) 50 MG capsule Take 1 capsule (50 mg) by mouth 3 times daily (with meals) 60 capsule 1     ipratropium - albuterol 0.5 mg/2.5 mg/3 mL (DUONEB) 0.5-2.5 (3) MG/3ML neb solution USE 1 VIAL VIA NEBULIZER EVERY 6 HOURS AS  "NEEDED FOR SHORTNESS OF BREATH/DYSPNEA OR WHEEZING 360 mL 0     naproxen (NAPROSYN) 500 MG tablet Take 1 tablet (500 mg) by mouth 2 times daily (with meals) 180 tablet 3     orphenadrine ER (NORFLEX) 100 MG 12 hr tablet Take 100 mg by mouth       sertraline (ZOLOFT) 50 MG tablet Take 1 tablet (50 mg) by mouth daily 90 tablet 3     Allergies   Allergen Reactions     Seasonal      Other reaction(s): Sneezing  Other reaction(s): Sneezing     No Clinical Screening - See Comments Nausea and Vomiting     Peas  Peas     Recent Labs   Lab Test 09/04/18  1539 07/29/16  1049   *  --    HDL 39  --    TRIG 170*  --    *  --    CR 0.89 0.99   GFRESTIMATED >90  --    GFRESTBLACK >90 >60   POTASSIUM 3.4* 4.1      BP Readings from Last 3 Encounters:   01/26/19 (!) 144/107   08/31/18 132/88   07/18/18 (!) 150/102    Wt Readings from Last 3 Encounters:   04/05/19 103.7 kg (228 lb 9.6 oz)   01/26/19 95.3 kg (210 lb)   08/31/18 96.6 kg (213 lb)                    Reviewed and updated as needed this visit by clinical staff  Tobacco  Allergies  Meds  Problems  Med Hx  Surg Hx  Fam Hx  Soc Hx        Reviewed and updated as needed this visit by Provider         ROS:  Constitutional, HEENT, cardiovascular, pulmonary, gi and gu systems are negative, except as otherwise noted.    OBJECTIVE:     Temp 98.2  F (36.8  C)   Resp 18   Ht 1.727 m (5' 8\")   Wt 103.7 kg (228 lb 9.6 oz)   BMI 34.76 kg/m    Body mass index is 34.76 kg/m .     GENERAL: alert and no distress, nontoxic  EYES: Glasses, Eyes grossly normal to inspection, conjunctivae and sclerae normal  HENT: normocephalic, atraumatic. ear canals are dry, flaky, no drainage  NECK: no adenopathy, no asymmetry, masses, or scars  RESP: lungs clear to auscultation - no rales, rhonchi or wheezes  CV: regular rate and rhythm, normal S1 S2, no S3 or S4, no murmur, click or rub, no peripheral edema and peripheral pulses strong   MS: no gross musculoskeletal defects noted, no " edema  SKIN: Scattered plaques on erythematous base on bilateral upper extremities, lower extremities, and scalp. heels are tender, erythematous with flaky, few nails are loose, cuticles erythematous and flaky- see photos below  NEURO: Normal strength and tone, mentation intact and speech normal  PSYCH: mentation appears normal, affect normal          Scalp        Left thumb        Hands      Right upper extremity        Left upper extremity        Right heel        Right lower extremity        Left heel        Left lower extremity      Diagnostic Test Results:  Results for orders placed or performed in visit on 04/05/19 (from the past 24 hour(s))   TSH Reflex GH   Result Value Ref Range    TSH Reflex 2.24 0.34 - 5.60 IU/mL   Vitamin D Total   Result Value Ref Range    Vitamin D Total 14.4 ng/mL   Rheumatoid factor   Result Value Ref Range    Rheumatoid Factor <14 <14 IU/mL   CRP inflammation   Result Value Ref Range    CRP Inflammation 0.6 (H) <0.5 mg/L   Comprehensive Metabolic Panel   Result Value Ref Range    Sodium 141 134 - 144 mmol/L    Potassium 3.3 (L) 3.5 - 5.1 mmol/L    Chloride 105 98 - 107 mmol/L    Carbon Dioxide 26 21 - 31 mmol/L    Anion Gap 10 3 - 14 mmol/L    Glucose 109 (H) 70 - 105 mg/dL    Urea Nitrogen 15 7 - 25 mg/dL    Creatinine 0.97 0.70 - 1.30 mg/dL    GFR Estimate 82 >60 mL/min/[1.73_m2]    GFR Estimate If Black >90 >60 mL/min/[1.73_m2]    Calcium 9.3 8.6 - 10.3 mg/dL    Bilirubin Total 0.9 0.3 - 1.0 mg/dL    Albumin 4.1 3.5 - 5.7 g/dL    Protein Total 7.9 6.4 - 8.9 g/dL    Alkaline Phosphatase 79 34 - 104 U/L     (H) 7 - 52 U/L     (H) 13 - 39 U/L   CBC and Differential   Result Value Ref Range    WBC 5.3 4.0 - 11.0 10e9/L    RBC Count 4.91 4.4 - 5.9 10e12/L    Hemoglobin 14.5 13.3 - 17.7 g/dL    Hematocrit 43.6 40.0 - 53.0 %    MCV 89 78 - 100 fl    MCH 29.5 26.5 - 33.0 pg    MCHC 33.3 31.5 - 36.5 g/dL    RDW 13.4 10.0 - 15.0 %    Platelet Count 189 150 - 450 10e9/L     Diff Method Automated Method     % Neutrophils 64.0 %    % Lymphocytes 27.7 %    % Monocytes 5.3 %    % Eosinophils 2.4 %    % Basophils 0.4 %    % Immature Granulocytes 0.2 %    Absolute Neutrophil 3.4 1.6 - 8.3 10e9/L    Absolute Lymphocytes 1.5 0.8 - 5.3 10e9/L    Absolute Monocytes 0.3 0.0 - 1.3 10e9/L    Absolute Eosinophils 0.1 0.0 - 0.7 10e9/L    Absolute Basophils 0.0 0.0 - 0.2 10e9/L    Abs Immature Granulocytes 0.0 0 - 0.4 10e9/L       ASSESSMENT/PLAN:     1. Psoriasis  New diagnosis. Given systemic symptoms lab today and referral to either rheumatology and/or dermatology after lab results received.   - CBC and Differential; Future  - Comprehensive Metabolic Panel; Future  - CRP inflammation; Future  - Rheumatoid factor; Future  - Cyclic Citrullinated Peptide Antibody IgG; Future  - Vitamin D Total; Future  - TSH Reflex GH; Future  - betamethasone dipropionate (DIPROSONE) 0.05 % external cream; Apply topically At Bedtime To scalp, wash out in the AM  Dispense: 45 g; Refill: 3 Apply to scalp at bedtime, wash out in the AM. Once area is resolved you can discontinue use.  - triamcinolone (KENALOG) 0.1 % external cream; Apply topically 2 times daily  Dispense: 453.6 g; Refill: 1.  Apply twice daily to affected areas. Let cream sit for about 5 minutes. Then apply a heavy emollient such as vaseline, eucerin, aquaphor (generic is okay) to areas. After areas are healed continued for 3-5 more days and then discontinue use of steroid cream but continue with use of a heavy moisturizer such as eucerin, cetaphil, cerave (generic is okay)  - predniSONE (DELTASONE) 20 MG tablet; Take 60 mg by mouth daily for 3 days, THEN 40 mg daily for 3 days, THEN 20 mg daily for 3 days, THEN 10 mg daily for 3 days.  Dispense: 20 tablet; Refill: 0 Take in the morning versus evening as this medication can interfere with sleep. Make sure you take with food.     2. Multiple joint pain  Chronic with acute worsening.  - CRP inflammation;  Future  - Rheumatoid factor; Future  - Cyclic Citrullinated Peptide Antibody IgG; Future  - Vitamin D Total; Future    3. Fatigue, unspecified type  New since onset of rash.  - Vitamin D Total; Future  - TSH Reflex GH; Future    4. Vitamin D deficiency  New diagnosis  Recommend vitamin D 2,000 IUs daily        FUTURE APPOINTMENTS:       - Follow-up visit: You should be called sometime next week for appointment with dermatology and/or rheumatology.    Gloria Vivas CNP  [facility]

## 2019-04-05 NOTE — NURSING NOTE
Patient presents to the clinic for an all over body rash that started 3-4 months ago. Patient states the rash is all over his body but denies anything in the groin or buttocks area. Patient tried a cream for dry skin but states the rash is just getting worse.  Medication Reconciliation Completed.    Todd Pierson LPN  4/5/2019 10:27 AM

## 2019-04-05 NOTE — PATIENT INSTRUCTIONS
ASSESSMENT/PLAN:     1. Psoriasis  New diagnosis. Given systemic symptoms lab today and referral to either rheumatology and/or dermatology after lab results received.   - CBC and Differential; Future  - Comprehensive Metabolic Panel; Future  - CRP inflammation; Future  - Rheumatoid factor; Future  - Cyclic Citrullinated Peptide Antibody IgG; Future  - Vitamin D Total; Future  - TSH Reflex GH; Future  - betamethasone dipropionate (DIPROSONE) 0.05 % external cream; Apply topically At Bedtime To scalp, wash out in the AM  Dispense: 45 g; Refill: 3 Apply to scalp at bedtime, wash out in the AM. Once area is resolved you can discontinue use.  - triamcinolone (KENALOG) 0.1 % external cream; Apply topically 2 times daily  Dispense: 453.6 g; Refill: 1.  Apply twice daily to affected areas. Let cream sit for about 5 minutes. Then apply a heavy emollient such as vaseline, eucerin, aquaphor (generic is okay) to areas. After areas are healed continued for 3-5 more days and then discontinue use of steroid cream but continue with use of a heavy moisturizer such as eucerin, cetaphil, cerave (generic is okay)  - predniSONE (DELTASONE) 20 MG tablet; Take 60 mg by mouth daily for 3 days, THEN 40 mg daily for 3 days, THEN 20 mg daily for 3 days, THEN 10 mg daily for 3 days.  Dispense: 20 tablet; Refill: 0 Take in the morning versus evening as this medication can interfere with sleep. Make sure you take with food.     2. Multiple joint pain  Chronic with acute worsening.  - CRP inflammation; Future  - Rheumatoid factor; Future  - Cyclic Citrullinated Peptide Antibody IgG; Future  - Vitamin D Total; Future    3. Fatigue, unspecified type  New since onset of rash.  - Vitamin D Total; Future  - TSH Reflex GH; Future    FUTURE APPOINTMENTS:       - Follow-up visit: You should be called sometime next week for appointment with dermatology and/or rheumatology.    Gloria Vivas CNP  [facility]

## 2019-04-06 ASSESSMENT — ANXIETY QUESTIONNAIRES: GAD7 TOTAL SCORE: 0

## 2019-04-08 LAB
ANA PAT SER IF-IMP: ABNORMAL
ANA SER QL IF: ABNORMAL
ANA TITR SER IF: ABNORMAL {TITER}
CCP AB SER IA-ACNC: 1 U/ML

## 2019-05-03 ENCOUNTER — TRANSFERRED RECORDS (OUTPATIENT)
Dept: HEALTH INFORMATION MANAGEMENT | Facility: OTHER | Age: 49
End: 2019-05-03

## 2019-05-13 ENCOUNTER — MEDICAL CORRESPONDENCE (OUTPATIENT)
Dept: HEALTH INFORMATION MANAGEMENT | Facility: OTHER | Age: 49
End: 2019-05-13

## 2019-05-13 DIAGNOSIS — L40.0 PLAQUE PSORIASIS: Primary | ICD-10-CM

## 2019-05-13 PROCEDURE — 87340 HEPATITIS B SURFACE AG IA: CPT | Performed by: NURSE PRACTITIONER

## 2019-05-13 PROCEDURE — 86803 HEPATITIS C AB TEST: CPT | Performed by: NURSE PRACTITIONER

## 2019-05-13 PROCEDURE — 86481 TB AG RESPONSE T-CELL SUSP: CPT | Performed by: NURSE PRACTITIONER

## 2019-05-13 PROCEDURE — 86709 HEPATITIS A IGM ANTIBODY: CPT | Performed by: NURSE PRACTITIONER

## 2019-05-13 PROCEDURE — 86705 HEP B CORE ANTIBODY IGM: CPT | Performed by: NURSE PRACTITIONER

## 2019-05-13 PROCEDURE — 36415 COLL VENOUS BLD VENIPUNCTURE: CPT | Performed by: NURSE PRACTITIONER

## 2019-05-15 LAB
GAMMA INTERFERON BACKGROUND BLD IA-ACNC: 0.02 IU/ML
HAV IGM SERPL QL IA: NONREACTIVE
HBV CORE IGM SERPL QL IA: NONREACTIVE
HBV SURFACE AG SERPL QL IA: NONREACTIVE
HCV AB SERPL QL IA: NONREACTIVE
M TB IFN-G BLD-IMP: NEGATIVE
M TB IFN-G CD4+ BCKGRND COR BLD-ACNC: >10 IU/ML
MITOGEN IGNF BCKGRD COR BLD-ACNC: 0.02 IU/ML
MITOGEN IGNF BCKGRD COR BLD-ACNC: 0.03 IU/ML

## 2019-07-15 ENCOUNTER — OFFICE VISIT (OUTPATIENT)
Dept: FAMILY MEDICINE | Facility: OTHER | Age: 49
End: 2019-07-15
Attending: FAMILY MEDICINE
Payer: COMMERCIAL

## 2019-07-15 VITALS
BODY MASS INDEX: 33.01 KG/M2 | SYSTOLIC BLOOD PRESSURE: 124 MMHG | DIASTOLIC BLOOD PRESSURE: 84 MMHG | RESPIRATION RATE: 16 BRPM | TEMPERATURE: 98 F | WEIGHT: 217.8 LBS | HEIGHT: 68 IN | HEART RATE: 76 BPM

## 2019-07-15 DIAGNOSIS — M1A.00X0 IDIOPATHIC CHRONIC GOUT WITHOUT TOPHUS, UNSPECIFIED SITE: ICD-10-CM

## 2019-07-15 DIAGNOSIS — F33.1 MODERATE EPISODE OF RECURRENT MAJOR DEPRESSIVE DISORDER (H): ICD-10-CM

## 2019-07-15 DIAGNOSIS — J45.20 MILD INTERMITTENT ASTHMA WITHOUT COMPLICATION: ICD-10-CM

## 2019-07-15 DIAGNOSIS — J30.2 SEASONAL ALLERGIC RHINITIS, UNSPECIFIED TRIGGER: ICD-10-CM

## 2019-07-15 PROCEDURE — G0463 HOSPITAL OUTPT CLINIC VISIT: HCPCS

## 2019-07-15 PROCEDURE — 99214 OFFICE O/P EST MOD 30 MIN: CPT | Performed by: FAMILY MEDICINE

## 2019-07-15 RX ORDER — INDOMETHACIN 50 MG/1
50 CAPSULE ORAL
Qty: 60 CAPSULE | Refills: 1 | Status: SHIPPED | OUTPATIENT
Start: 2019-07-15 | End: 2020-09-03

## 2019-07-15 RX ORDER — SERTRALINE HYDROCHLORIDE 100 MG/1
100 TABLET, FILM COATED ORAL DAILY
Qty: 90 TABLET | Refills: 3 | Status: SHIPPED | OUTPATIENT
Start: 2019-07-15 | End: 2020-08-11

## 2019-07-15 RX ORDER — ALBUTEROL SULFATE 90 UG/1
2 AEROSOL, METERED RESPIRATORY (INHALATION) 4 TIMES DAILY PRN
Qty: 2 INHALER | Refills: 11 | Status: SHIPPED | OUTPATIENT
Start: 2019-07-15 | End: 2020-09-03

## 2019-07-15 RX ORDER — CETIRIZINE HYDROCHLORIDE 10 MG/1
10 TABLET ORAL DAILY
Qty: 90 TABLET | Refills: 3 | Status: SHIPPED | OUTPATIENT
Start: 2019-07-15 | End: 2019-08-12

## 2019-07-15 RX ORDER — ALLOPURINOL 300 MG/1
300 TABLET ORAL DAILY
Qty: 90 TABLET | Refills: 3 | Status: SHIPPED | OUTPATIENT
Start: 2019-07-15 | End: 2020-07-17

## 2019-07-15 RX ORDER — NAPROXEN 500 MG/1
500 TABLET ORAL 2 TIMES DAILY WITH MEALS
Qty: 180 TABLET | Refills: 3 | Status: SHIPPED | OUTPATIENT
Start: 2019-07-15 | End: 2020-08-24

## 2019-07-15 ASSESSMENT — ANXIETY QUESTIONNAIRES
3. WORRYING TOO MUCH ABOUT DIFFERENT THINGS: MORE THAN HALF THE DAYS
GAD7 TOTAL SCORE: 5
6. BECOMING EASILY ANNOYED OR IRRITABLE: NOT AT ALL
1. FEELING NERVOUS, ANXIOUS, OR ON EDGE: SEVERAL DAYS
7. FEELING AFRAID AS IF SOMETHING AWFUL MIGHT HAPPEN: NOT AT ALL
5. BEING SO RESTLESS THAT IT IS HARD TO SIT STILL: NOT AT ALL
IF YOU CHECKED OFF ANY PROBLEMS ON THIS QUESTIONNAIRE, HOW DIFFICULT HAVE THESE PROBLEMS MADE IT FOR YOU TO DO YOUR WORK, TAKE CARE OF THINGS AT HOME, OR GET ALONG WITH OTHER PEOPLE: SOMEWHAT DIFFICULT
2. NOT BEING ABLE TO STOP OR CONTROL WORRYING: SEVERAL DAYS

## 2019-07-15 ASSESSMENT — PAIN SCALES - GENERAL: PAINLEVEL: NO PAIN (0)

## 2019-07-15 ASSESSMENT — PATIENT HEALTH QUESTIONNAIRE - PHQ9
5. POOR APPETITE OR OVEREATING: SEVERAL DAYS
SUM OF ALL RESPONSES TO PHQ QUESTIONS 1-9: 7

## 2019-07-15 ASSESSMENT — MIFFLIN-ST. JEOR: SCORE: 1827.43

## 2019-07-15 NOTE — NURSING NOTE
Patient presents today for medication management.  Medication Reconciliation Complete    Faviola Hernandez LPN  7/15/2019 3:23 PM

## 2019-07-15 NOTE — PROGRESS NOTES
SUBJECTIVE:  Christiano Metcalf is a 49 year old male here for follow-up.  He has a history of asthma.  He continues to use albuterol 3 to 4 days a week.  He has never been on a controller inhaler.    He has a history of depression.  He continues on sertraline 50 mg daily without any side effects.  He does feel like his depression has been worsening recently.  Is interested in trying a different dose.    He has a history of gout which has been well controlled.  He does not recall the last flareup, it has been more than 1 year ago.    He has started Humira for psoriasis and has had good results.      Patient Active Problem List    Diagnosis Date Noted     Mild intermittent asthma without complication 08/31/2018     Priority: Medium     Gout 02/01/2018     Priority: Medium     Learning disability 06/14/2011     Priority: Medium       Past Medical History:   Diagnosis Date     Gout     No Comments Provided     Hypomagnesemia     No Comments Provided     Pain in left knee     No Comments Provided     Personal history of other (healed) physical injury and trauma     2000,repair     Unspecified injury of unspecified wrist, hand and finger(s), initial encounter     1999       Past Surgical History:   Procedure Laterality Date     ARTHROSCOPY KNEE      Right knee meniscal repair, arthroscopic ally     ARTHROSCOPY KNEE      2010,Left knee scope     FINGER SURGERY      Right thumb ORIF     OTHER SURGICAL HISTORY      7/15/14,,HERNIA REPAIR,Left,LIH with mesh     OTHER SURGICAL HISTORY      5/10/16,,HERNIA REPAIR,Right,RIH with plug/patch       Current Outpatient Medications   Medication Sig Dispense Refill     albuterol (PROAIR HFA/PROVENTIL HFA/VENTOLIN HFA) 108 (90 Base) MCG/ACT inhaler Inhale 2 puffs into the lungs 4 times daily as needed 2 Inhaler 11     allopurinol (ZYLOPRIM) 300 MG tablet Take 1 tablet (300 mg) by mouth daily 90 tablet 3     betamethasone dipropionate (DIPROSONE) 0.05 % external cream Apply  "topically At Bedtime To scalp, wash out in the AM 45 g 3     cetirizine (ZYRTEC) 10 MG tablet Take 1 tablet (10 mg) by mouth daily 90 tablet 3     HUMIRA PEN 40 MG/0.8ML pen kit Inject 40 mg as directed every 14 days       indomethacin (INDOCIN) 50 MG capsule Take 1 capsule (50 mg) by mouth 3 times daily (with meals) 60 capsule 1     mometasone-formoterol (DULERA) 200-5 MCG/ACT inhaler Inhale 2 puffs into the lungs 2 times daily 1 Inhaler 3     naproxen (NAPROSYN) 500 MG tablet Take 1 tablet (500 mg) by mouth 2 times daily (with meals) 180 tablet 3     orphenadrine ER (NORFLEX) 100 MG 12 hr tablet Take 100 mg by mouth       sertraline (ZOLOFT) 100 MG tablet Take 1 tablet (100 mg) by mouth daily 90 tablet 3     triamcinolone (KENALOG) 0.1 % external cream Apply topically 2 times daily 453.6 g 1       Allergies:  Allergies   Allergen Reactions     Seasonal      Other reaction(s): Sneezing  Other reaction(s): Sneezing     No Clinical Screening - See Comments Nausea and Vomiting     Peas  Peas       Family History   Problem Relation Age of Onset     Diabetes Mother         Diabetes     Other - See Comments Father         episode of gout.     Colon Cancer Father         Cancer-colon,diagnosed in early 60s     Family History Negative Son         Good Health,     Family History Negative Daughter         Good Health,     Family History Negative Son         Good Health,       Social History     Tobacco Use     Smoking status: Former Smoker     Types: Cigarettes     Last attempt to quit: 2010     Years since quittin.4     Smokeless tobacco: Never Used   Substance Use Topics     Alcohol use: No     Alcohol/week: 0.0 oz     Comment: Alcoholic Drinks/day: seldom     Drug use: No       ROS:    As above otherwise ROS is unremarkable.      OBJECTIVE:  /84   Pulse 76   Temp 98  F (36.7  C)   Resp 16   Ht 1.727 m (5' 8\")   Wt 98.8 kg (217 lb 12.8 oz)   BMI 33.12 kg/m      EXAM:  General Appearance: " Pleasant, alert, appropriate appearance for age. No acute distress  Head: Normal. Normocephalic, atraumatic.  Eyes: PERRL, EOMI  Ears: Normal TM's bilaterally. Normal auditory canals and external ears.   OroPharynx: Dental hygiene adequate. Normal buccal mucosa. Normal pharynx.  Neck: Supple, no masses or nodes, no lymphadenopathy.  No thyromegaly.  Lungs: Normal chest wall and respirations. Clear to auscultation, no wheezes or crackles.  Cardiovascular: Regular rate and rhythm. S1, S2, no murmurs.  Gastrointestinal: Soft, nontender, no abnormal masses or organomegaly. BS normal.  Musculoskeletal: No edema.  Skin: Plaque psoriasis seems to be improving on his elbows, shins and feet.  Neurologic Exam: CN 2-12 grossly intact.  Normal gait.  Symmetric DTRs, No focal motor or sensory deficits. No tremor.  Psychiatric Exam: Alert and oriented, appropriate affect.    ASSESSEMENT AND PLAN:    1. Mild intermittent asthma without complication    2. Moderate episode of recurrent major depressive disorder (H)    3. Idiopathic chronic gout without tophus, unspecified site    4. Seasonal allergic rhinitis, unspecified trigger      Will start Dulera twice daily in addition to albuterol.  Follow-up in 1 month for reassessment.    In regards to his depression we will increase sertraline to 100 mg daily.  Potential side effects were discussed.  He will follow-up in 1 month for reassessment.    The rest of his chronic medications refilled again for another year.    Otto Frankel MD  Family Medicine      This document was prepared using voice generated software.  While every attempt was made for accuracy, grammatical errors may exist.

## 2019-07-16 ASSESSMENT — ASTHMA QUESTIONNAIRES: ACT_TOTALSCORE: 15

## 2019-07-16 ASSESSMENT — ANXIETY QUESTIONNAIRES: GAD7 TOTAL SCORE: 5

## 2019-07-29 ENCOUNTER — MEDICAL CORRESPONDENCE (OUTPATIENT)
Dept: HEALTH INFORMATION MANAGEMENT | Facility: OTHER | Age: 49
End: 2019-07-29

## 2019-07-29 DIAGNOSIS — L40.0 PLAQUE PSORIASIS: Primary | ICD-10-CM

## 2019-07-29 DIAGNOSIS — Z79.899 HIGH RISK MEDICATION USE: ICD-10-CM

## 2019-07-29 LAB
ALT SERPL W P-5'-P-CCNC: 100 U/L (ref 7–52)
AST SERPL W P-5'-P-CCNC: 107 U/L (ref 13–39)
BASOPHILS # BLD AUTO: 0 10E9/L (ref 0–0.2)
BASOPHILS NFR BLD AUTO: 0.4 %
DIFFERENTIAL METHOD BLD: NORMAL
EOSINOPHIL # BLD AUTO: 0.1 10E9/L (ref 0–0.7)
EOSINOPHIL NFR BLD AUTO: 1.1 %
ERYTHROCYTE [DISTWIDTH] IN BLOOD BY AUTOMATED COUNT: 12.7 % (ref 10–15)
HCT VFR BLD AUTO: 44.2 % (ref 40–53)
HGB BLD-MCNC: 15.3 G/DL (ref 13.3–17.7)
IMM GRANULOCYTES # BLD: 0 10E9/L (ref 0–0.4)
IMM GRANULOCYTES NFR BLD: 0.1 %
LYMPHOCYTES # BLD AUTO: 2.7 10E9/L (ref 0.8–5.3)
LYMPHOCYTES NFR BLD AUTO: 35.3 %
MCH RBC QN AUTO: 30.1 PG (ref 26.5–33)
MCHC RBC AUTO-ENTMCNC: 34.6 G/DL (ref 31.5–36.5)
MCV RBC AUTO: 87 FL (ref 78–100)
MONOCYTES # BLD AUTO: 0.3 10E9/L (ref 0–1.3)
MONOCYTES NFR BLD AUTO: 4.5 %
NEUTROPHILS # BLD AUTO: 4.4 10E9/L (ref 1.6–8.3)
NEUTROPHILS NFR BLD AUTO: 58.6 %
PLATELET # BLD AUTO: 199 10E9/L (ref 150–450)
RBC # BLD AUTO: 5.08 10E12/L (ref 4.4–5.9)
WBC # BLD AUTO: 7.6 10E9/L (ref 4–11)

## 2019-07-29 PROCEDURE — 85025 COMPLETE CBC W/AUTO DIFF WBC: CPT

## 2019-07-29 PROCEDURE — 84450 TRANSFERASE (AST) (SGOT): CPT

## 2019-07-29 PROCEDURE — 84460 ALANINE AMINO (ALT) (SGPT): CPT

## 2019-07-29 PROCEDURE — 36415 COLL VENOUS BLD VENIPUNCTURE: CPT

## 2019-08-12 ENCOUNTER — OFFICE VISIT (OUTPATIENT)
Dept: FAMILY MEDICINE | Facility: OTHER | Age: 49
End: 2019-08-12
Attending: FAMILY MEDICINE
Payer: COMMERCIAL

## 2019-08-12 VITALS
SYSTOLIC BLOOD PRESSURE: 132 MMHG | HEART RATE: 76 BPM | HEIGHT: 68 IN | BODY MASS INDEX: 32.61 KG/M2 | OXYGEN SATURATION: 97 % | WEIGHT: 215.2 LBS | RESPIRATION RATE: 18 BRPM | TEMPERATURE: 98.1 F | DIASTOLIC BLOOD PRESSURE: 88 MMHG

## 2019-08-12 DIAGNOSIS — M1A.00X0 IDIOPATHIC CHRONIC GOUT WITHOUT TOPHUS, UNSPECIFIED SITE: Primary | ICD-10-CM

## 2019-08-12 DIAGNOSIS — J30.2 SEASONAL ALLERGIC RHINITIS, UNSPECIFIED TRIGGER: ICD-10-CM

## 2019-08-12 DIAGNOSIS — J45.20 MILD INTERMITTENT ASTHMA WITHOUT COMPLICATION: ICD-10-CM

## 2019-08-12 PROCEDURE — G0463 HOSPITAL OUTPT CLINIC VISIT: HCPCS

## 2019-08-12 PROCEDURE — 99213 OFFICE O/P EST LOW 20 MIN: CPT | Performed by: FAMILY MEDICINE

## 2019-08-12 RX ORDER — CETIRIZINE HYDROCHLORIDE 10 MG/1
10 TABLET ORAL DAILY
Qty: 90 TABLET | Refills: 3 | Status: ON HOLD | OUTPATIENT
Start: 2019-08-12 | End: 2021-09-04

## 2019-08-12 ASSESSMENT — PAIN SCALES - GENERAL: PAINLEVEL: NO PAIN (0)

## 2019-08-12 ASSESSMENT — PATIENT HEALTH QUESTIONNAIRE - PHQ9: SUM OF ALL RESPONSES TO PHQ QUESTIONS 1-9: 3

## 2019-08-12 ASSESSMENT — MIFFLIN-ST. JEOR: SCORE: 1815.64

## 2019-08-12 NOTE — PROGRESS NOTES
"SUBJECTIVE:  Christiano Metcalf is a 49 year old male here for follow-up.  At his last visit we started Dulera twice daily in addition to albuterol as he was using his albuterol 4-5 times a week.  He reports that since then he has had some difficulty getting used to using Dulera but he uses albuterol once or twice a week.  He is otherwise been doing well.    We also increased sertraline to 100 mg daily.  He reports that his mood and depression is significantly improved and is overall pleased with the improvement.  He is having no side effects and is otherwise tolerating it well.    Allergies:  Allergies   Allergen Reactions     Seasonal      Other reaction(s): Sneezing  Other reaction(s): Sneezing     No Clinical Screening - See Comments Nausea and Vomiting     Peas  Peas       ROS:    As above otherwise ROS is unremarkable.    OBJECTIVE:  /88   Pulse 76   Temp 98.1  F (36.7  C)   Resp 18   Ht 1.727 m (5' 8\")   Wt 97.6 kg (215 lb 3.2 oz)   SpO2 97%   BMI 32.72 kg/m      EXAM:  General Appearance: Pleasant, alert, appropriate appearance for age. No acute distress  Psychiatric Exam: Alert and oriented, appropriate affect.  No psychomotor agitation or retardation.  No thoughts of harming himself or others.  No hallucinations.    PHQ-9 SCORE 4/5/2019 7/15/2019 8/12/2019   PHQ-9 Total Score 0 7 3         ASSESSEMENT AND PLAN:    1. Idiopathic chronic gout without tophus, unspecified site    2. Mild intermittent asthma without complication      Will continue current regimen with both his Dulera and increased dose of sertraline.  He will follow-up in 1 year or sooner as needed.    Otto Frankel MD    This document was prepared using voice generated software.  While every attempt was made for accuracy, grammatical errors may exist.  "

## 2019-08-12 NOTE — NURSING NOTE
Patient presents today for follow up on medication.  Medication Reconciliation Complete    Faviola Hernandez LPN  8/12/2019 3:27 PM

## 2019-08-13 ASSESSMENT — ASTHMA QUESTIONNAIRES: ACT_TOTALSCORE: 21

## 2019-09-15 ENCOUNTER — HOSPITAL ENCOUNTER (EMERGENCY)
Facility: OTHER | Age: 49
Discharge: HOME | End: 2019-09-15
Attending: EMERGENCY MEDICINE | Admitting: EMERGENCY MEDICINE
Payer: COMMERCIAL

## 2019-09-15 ENCOUNTER — APPOINTMENT (OUTPATIENT)
Dept: GENERAL RADIOLOGY | Facility: OTHER | Age: 49
End: 2019-09-15
Attending: EMERGENCY MEDICINE
Payer: COMMERCIAL

## 2019-09-15 VITALS
HEIGHT: 68 IN | TEMPERATURE: 98 F | OXYGEN SATURATION: 94 % | BODY MASS INDEX: 32.58 KG/M2 | SYSTOLIC BLOOD PRESSURE: 140 MMHG | HEART RATE: 76 BPM | WEIGHT: 215 LBS | DIASTOLIC BLOOD PRESSURE: 92 MMHG | RESPIRATION RATE: 13 BRPM

## 2019-09-15 DIAGNOSIS — M94.0 COSTOCHONDRITIS: ICD-10-CM

## 2019-09-15 LAB
ALBUMIN SERPL-MCNC: 3.9 G/DL (ref 3.5–5.7)
ALP SERPL-CCNC: 108 U/L (ref 34–104)
ALT SERPL W P-5'-P-CCNC: 59 U/L (ref 7–52)
ANION GAP SERPL CALCULATED.3IONS-SCNC: 9 MMOL/L (ref 3–14)
AST SERPL W P-5'-P-CCNC: 63 U/L (ref 13–39)
BASOPHILS # BLD AUTO: 0 10E9/L (ref 0–0.2)
BASOPHILS NFR BLD AUTO: 0.3 %
BILIRUB SERPL-MCNC: 0.6 MG/DL (ref 0.3–1)
BUN SERPL-MCNC: 10 MG/DL (ref 7–25)
CALCIUM SERPL-MCNC: 9 MG/DL (ref 8.6–10.3)
CHLORIDE SERPL-SCNC: 104 MMOL/L (ref 98–107)
CO2 SERPL-SCNC: 25 MMOL/L (ref 21–31)
CREAT SERPL-MCNC: 0.91 MG/DL (ref 0.7–1.3)
D DIMER PPP DDU-MCNC: <200 NG/ML D-DU (ref 0–230)
DIFFERENTIAL METHOD BLD: ABNORMAL
EOSINOPHIL # BLD AUTO: 0.1 10E9/L (ref 0–0.7)
EOSINOPHIL NFR BLD AUTO: 1.3 %
ERYTHROCYTE [DISTWIDTH] IN BLOOD BY AUTOMATED COUNT: 13.2 % (ref 10–15)
GFR SERPL CREATININE-BSD FRML MDRD: 89 ML/MIN/{1.73_M2}
GLUCOSE SERPL-MCNC: 92 MG/DL (ref 70–105)
HCT VFR BLD AUTO: 37.3 % (ref 40–53)
HGB BLD-MCNC: 12.8 G/DL (ref 13.3–17.7)
IMM GRANULOCYTES # BLD: 0 10E9/L (ref 0–0.4)
IMM GRANULOCYTES NFR BLD: 0.3 %
LYMPHOCYTES # BLD AUTO: 1.9 10E9/L (ref 0.8–5.3)
LYMPHOCYTES NFR BLD AUTO: 30.2 %
MCH RBC QN AUTO: 29.9 PG (ref 26.5–33)
MCHC RBC AUTO-ENTMCNC: 34.3 G/DL (ref 31.5–36.5)
MCV RBC AUTO: 87 FL (ref 78–100)
MONOCYTES # BLD AUTO: 0.5 10E9/L (ref 0–1.3)
MONOCYTES NFR BLD AUTO: 8.3 %
NEUTROPHILS # BLD AUTO: 3.7 10E9/L (ref 1.6–8.3)
NEUTROPHILS NFR BLD AUTO: 59.6 %
PLATELET # BLD AUTO: 177 10E9/L (ref 150–450)
POTASSIUM SERPL-SCNC: 3.1 MMOL/L (ref 3.5–5.1)
PROT SERPL-MCNC: 7.1 G/DL (ref 6.4–8.9)
RBC # BLD AUTO: 4.28 10E12/L (ref 4.4–5.9)
SODIUM SERPL-SCNC: 138 MMOL/L (ref 134–144)
TROPONIN I SERPL-MCNC: <0.03 UG/L (ref 0–0.03)
TROPONIN I SERPL-MCNC: <0.03 UG/L (ref 0–0.03)
WBC # BLD AUTO: 6.2 10E9/L (ref 4–11)

## 2019-09-15 PROCEDURE — 84484 ASSAY OF TROPONIN QUANT: CPT | Performed by: EMERGENCY MEDICINE

## 2019-09-15 PROCEDURE — 25000125 ZZHC RX 250: Performed by: EMERGENCY MEDICINE

## 2019-09-15 PROCEDURE — 99285 EMERGENCY DEPT VISIT HI MDM: CPT | Mod: 25 | Performed by: EMERGENCY MEDICINE

## 2019-09-15 PROCEDURE — 93005 ELECTROCARDIOGRAM TRACING: CPT | Performed by: EMERGENCY MEDICINE

## 2019-09-15 PROCEDURE — 99283 EMERGENCY DEPT VISIT LOW MDM: CPT | Mod: Z6 | Performed by: EMERGENCY MEDICINE

## 2019-09-15 PROCEDURE — 96375 TX/PRO/DX INJ NEW DRUG ADDON: CPT | Performed by: EMERGENCY MEDICINE

## 2019-09-15 PROCEDURE — 71045 X-RAY EXAM CHEST 1 VIEW: CPT

## 2019-09-15 PROCEDURE — 85379 FIBRIN DEGRADATION QUANT: CPT | Performed by: EMERGENCY MEDICINE

## 2019-09-15 PROCEDURE — 80053 COMPREHEN METABOLIC PANEL: CPT | Performed by: EMERGENCY MEDICINE

## 2019-09-15 PROCEDURE — 25000128 H RX IP 250 OP 636: Performed by: EMERGENCY MEDICINE

## 2019-09-15 PROCEDURE — 36415 COLL VENOUS BLD VENIPUNCTURE: CPT | Performed by: EMERGENCY MEDICINE

## 2019-09-15 PROCEDURE — 85025 COMPLETE CBC W/AUTO DIFF WBC: CPT | Performed by: EMERGENCY MEDICINE

## 2019-09-15 PROCEDURE — 96374 THER/PROPH/DIAG INJ IV PUSH: CPT | Performed by: EMERGENCY MEDICINE

## 2019-09-15 PROCEDURE — 93010 ELECTROCARDIOGRAM REPORT: CPT | Performed by: INTERNAL MEDICINE

## 2019-09-15 PROCEDURE — 25000132 ZZH RX MED GY IP 250 OP 250 PS 637: Performed by: EMERGENCY MEDICINE

## 2019-09-15 PROCEDURE — 96376 TX/PRO/DX INJ SAME DRUG ADON: CPT | Performed by: EMERGENCY MEDICINE

## 2019-09-15 RX ORDER — KETOROLAC TROMETHAMINE 30 MG/ML
30 INJECTION, SOLUTION INTRAMUSCULAR; INTRAVENOUS ONCE
Status: COMPLETED | OUTPATIENT
Start: 2019-09-15 | End: 2019-09-15

## 2019-09-15 RX ORDER — NITROGLYCERIN 0.4 MG/1
0.4 TABLET SUBLINGUAL EVERY 5 MIN PRN
Status: DISCONTINUED | OUTPATIENT
Start: 2019-09-15 | End: 2019-09-15 | Stop reason: HOSPADM

## 2019-09-15 RX ORDER — MORPHINE SULFATE 4 MG/ML
4 INJECTION, SOLUTION INTRAMUSCULAR; INTRAVENOUS
Status: DISCONTINUED | OUTPATIENT
Start: 2019-09-15 | End: 2019-09-15 | Stop reason: HOSPADM

## 2019-09-15 RX ORDER — ONDANSETRON 2 MG/ML
4 INJECTION INTRAMUSCULAR; INTRAVENOUS
Status: COMPLETED | OUTPATIENT
Start: 2019-09-15 | End: 2019-09-15

## 2019-09-15 RX ORDER — ASPIRIN 81 MG/1
324 TABLET, CHEWABLE ORAL ONCE
Status: COMPLETED | OUTPATIENT
Start: 2019-09-15 | End: 2019-09-15

## 2019-09-15 RX ORDER — LIDOCAINE HYDROCHLORIDE 20 MG/ML
15 SOLUTION OROPHARYNGEAL ONCE
Status: COMPLETED | OUTPATIENT
Start: 2019-09-15 | End: 2019-09-15

## 2019-09-15 RX ORDER — ALUMINA, MAGNESIA, AND SIMETHICONE 2400; 2400; 240 MG/30ML; MG/30ML; MG/30ML
15 SUSPENSION ORAL ONCE
Status: COMPLETED | OUTPATIENT
Start: 2019-09-15 | End: 2019-09-15

## 2019-09-15 RX ADMIN — ONDANSETRON HYDROCHLORIDE 4 MG: 2 SOLUTION INTRAMUSCULAR; INTRAVENOUS at 14:22

## 2019-09-15 RX ADMIN — NITROGLYCERIN 0.4 MG: 0.4 TABLET SUBLINGUAL at 14:10

## 2019-09-15 RX ADMIN — ASPIRIN 81 MG 324 MG: 81 TABLET ORAL at 14:06

## 2019-09-15 RX ADMIN — LIDOCAINE HYDROCHLORIDE 15 ML: 20 SOLUTION ORAL; TOPICAL at 15:19

## 2019-09-15 RX ADMIN — ALUMINUM HYDROXIDE, MAGNESIUM HYDROXIDE, AND DIMETHICONE 15 ML: 400; 400; 40 SUSPENSION ORAL at 15:19

## 2019-09-15 RX ADMIN — MORPHINE SULFATE 4 MG: 4 INJECTION, SOLUTION INTRAMUSCULAR; INTRAVENOUS at 15:32

## 2019-09-15 RX ADMIN — KETOROLAC TROMETHAMINE 30 MG: 30 INJECTION, SOLUTION INTRAMUSCULAR at 17:29

## 2019-09-15 RX ADMIN — MORPHINE SULFATE 4 MG: 4 INJECTION, SOLUTION INTRAMUSCULAR; INTRAVENOUS at 14:34

## 2019-09-15 ASSESSMENT — ENCOUNTER SYMPTOMS
DIAPHORESIS: 0
CHILLS: 0
NAUSEA: 1
WEAKNESS: 1
SHORTNESS OF BREATH: 1
ARTHRALGIAS: 0
CHEST TIGHTNESS: 0
AGITATION: 0
DYSURIA: 0
FEVER: 0
VOMITING: 0

## 2019-09-15 ASSESSMENT — MIFFLIN-ST. JEOR: SCORE: 1814.73

## 2019-09-15 NOTE — ED PROVIDER NOTES
History     Chief Complaint   Patient presents with     Chest Pain     Shortness of Breath     HPI  Christiano Metcalf is a 49 year old male who said he felt his normal self this morning but short while prior to coming in, after he and his girlfriend had intercourse, he had an asthma attack and some midsternal chest pain.  He took his inhaler and his breathing is better.  Since the onset of his symptoms however he is felt extremely tired like he has not slept in a week.  He  Also has absolute no energy.  Some nausea but no vomiting.  Dizzy and lightheaded when he sits up.  No palpitations.  No diaphoresis.  Nothing makes it better or worse.  Has not been ill recently.    Allergies:  Allergies   Allergen Reactions     Seasonal      Other reaction(s): Sneezing  Other reaction(s): Sneezing     No Clinical Screening - See Comments Nausea and Vomiting     Peas  Peas       Problem List:    Patient Active Problem List    Diagnosis Date Noted     Mild intermittent asthma without complication 08/31/2018     Priority: Medium     Gout 02/01/2018     Priority: Medium     Learning disability 06/14/2011     Priority: Medium        Past Medical History:    Past Medical History:   Diagnosis Date     Gout      Hypomagnesemia      Pain in left knee      Personal history of other (healed) physical injury and trauma      Unspecified injury of unspecified wrist, hand and finger(s), initial encounter        Past Surgical History:    Past Surgical History:   Procedure Laterality Date     ARTHROSCOPY KNEE      Right knee meniscal repair, arthroscopic ally     ARTHROSCOPY KNEE      2010,Left knee scope     FINGER SURGERY      Right thumb ORIF     OTHER SURGICAL HISTORY      7/15/14,,HERNIA REPAIR,Left,LIH with mesh     OTHER SURGICAL HISTORY      5/10/16,,HERNIA REPAIR,Right,RIH with plug/patch       Family History:    Family History   Problem Relation Age of Onset     Diabetes Mother         Diabetes     Other - See Comments  "Father         episode of gout.     Colon Cancer Father         Cancer-colon,diagnosed in early 60s     Family History Negative Son         Good Health,     Family History Negative Daughter         Good Health,     Family History Negative Son         Good Health,       Social History:  Marital Status:  Legally  [3]  Social History     Tobacco Use     Smoking status: Former Smoker     Types: Cigarettes     Last attempt to quit: 2010     Years since quittin.5     Smokeless tobacco: Never Used   Substance Use Topics     Alcohol use: No     Alcohol/week: 0.0 oz     Comment: Alcoholic Drinks/day: seldom     Drug use: No        Medications:      albuterol (PROAIR HFA/PROVENTIL HFA/VENTOLIN HFA) 108 (90 Base) MCG/ACT inhaler   allopurinol (ZYLOPRIM) 300 MG tablet   cetirizine (ZYRTEC) 10 MG tablet   HUMIRA PEN 40 MG/0.8ML pen kit   mometasone-formoterol (DULERA) 200-5 MCG/ACT inhaler   naproxen (NAPROSYN) 500 MG tablet   sertraline (ZOLOFT) 100 MG tablet   betamethasone dipropionate (DIPROSONE) 0.05 % external cream   indomethacin (INDOCIN) 50 MG capsule   orphenadrine ER (NORFLEX) 100 MG 12 hr tablet   triamcinolone (KENALOG) 0.1 % external cream         Review of Systems   Constitutional: Negative for chills, diaphoresis and fever.   HENT: Negative for congestion.    Eyes: Negative for visual disturbance.   Respiratory: Positive for shortness of breath. Negative for chest tightness.    Cardiovascular: Positive for chest pain.   Gastrointestinal: Positive for nausea. Negative for vomiting.   Genitourinary: Negative for dysuria.   Musculoskeletal: Negative for arthralgias.   Skin: Negative for rash.   Neurological: Positive for weakness.   Psychiatric/Behavioral: Negative for agitation.       Physical Exam   BP: (!) 145/96  Pulse: 69  Heart Rate: 71  Temp: 98  F (36.7  C)  Resp: 17  Height: 172.7 cm (5' 8\")  Weight: 97.5 kg (215 lb)  SpO2: 94 %      Physical Exam   Constitutional: He is " oriented to person, place, and time. He appears well-developed and well-nourished.   HENT:   Head: Normocephalic and atraumatic.   Eyes: Conjunctivae are normal.   Neck: Normal range of motion.   Cardiovascular: Normal rate and regular rhythm. Exam reveals no gallop and no friction rub.   No murmur heard.  Pulmonary/Chest: Effort normal and breath sounds normal.   Abdominal: Soft. Bowel sounds are normal.   Neurological: He is alert and oriented to person, place, and time.   Skin: Skin is warm and dry.   Psychiatric: He is slowed.   Nursing note and vitals reviewed.      ED Course        Procedures               Results for orders placed or performed during the hospital encounter of 09/15/19 (from the past 24 hour(s))   CBC with platelets differential   Result Value Ref Range    WBC 6.2 4.0 - 11.0 10e9/L    RBC Count 4.28 (L) 4.4 - 5.9 10e12/L    Hemoglobin 12.8 (L) 13.3 - 17.7 g/dL    Hematocrit 37.3 (L) 40.0 - 53.0 %    MCV 87 78 - 100 fl    MCH 29.9 26.5 - 33.0 pg    MCHC 34.3 31.5 - 36.5 g/dL    RDW 13.2 10.0 - 15.0 %    Platelet Count 177 150 - 450 10e9/L    Diff Method Automated Method     % Neutrophils 59.6 %    % Lymphocytes 30.2 %    % Monocytes 8.3 %    % Eosinophils 1.3 %    % Basophils 0.3 %    % Immature Granulocytes 0.3 %    Absolute Neutrophil 3.7 1.6 - 8.3 10e9/L    Absolute Lymphocytes 1.9 0.8 - 5.3 10e9/L    Absolute Monocytes 0.5 0.0 - 1.3 10e9/L    Absolute Eosinophils 0.1 0.0 - 0.7 10e9/L    Absolute Basophils 0.0 0.0 - 0.2 10e9/L    Abs Immature Granulocytes 0.0 0 - 0.4 10e9/L   Comprehensive metabolic panel   Result Value Ref Range    Sodium 138 134 - 144 mmol/L    Potassium 3.1 (L) 3.5 - 5.1 mmol/L    Chloride 104 98 - 107 mmol/L    Carbon Dioxide 25 21 - 31 mmol/L    Anion Gap 9 3 - 14 mmol/L    Glucose 92 70 - 105 mg/dL    Urea Nitrogen 10 7 - 25 mg/dL    Creatinine 0.91 0.70 - 1.30 mg/dL    GFR Estimate 89 >60 mL/min/[1.73_m2]    GFR Estimate If Black >90 >60 mL/min/[1.73_m2]    Calcium  9.0 8.6 - 10.3 mg/dL    Bilirubin Total 0.6 0.3 - 1.0 mg/dL    Albumin 3.9 3.5 - 5.7 g/dL    Protein Total 7.1 6.4 - 8.9 g/dL    Alkaline Phosphatase 108 (H) 34 - 104 U/L    ALT 59 (H) 7 - 52 U/L    AST 63 (H) 13 - 39 U/L   Troponin I (now)   Result Value Ref Range    Troponin I ES <0.030 0.000 - 0.034 ug/L   D-Dimer (HI,GH)   Result Value Ref Range    D-Dimer ng/mL <200 0 - 230 ng/ml D-DU   XR Chest Port 1 View    Narrative    Procedure:XR CHEST PORT 1 VW    Clinical history:Male, 49 years, chest pain    Technique: Single view was obtained.    Comparison: 7/18/2018    Findings: The cardiac silhouette is mildly prominent and unchanged.  The pulmonary vasculature is normal.    The lungs are clear. Bony structures are unremarkable. Biapical  pleural thickening is similar. No pneumothorax.      Impression    Impression:   No acute abnormality. No evidence of acute or active cardiopulmonary  disease.    MARCIANO ESTES MD   Troponin I (second draw)   Result Value Ref Range    Troponin I ES <0.030 0.000 - 0.034 ug/L       Medications   nitroGLYcerin (NITROSTAT) sublingual tablet 0.4 mg (0.4 mg Sublingual Given 9/15/19 1410)   morphine (PF) injection 4 mg (4 mg Intravenous Given 9/15/19 1532)   aspirin (ASA) chewable tablet 324 mg (324 mg Oral Given 9/15/19 1406)   ondansetron (ZOFRAN) injection 4 mg (4 mg Intravenous Given 9/15/19 1422)   alum & mag hydroxide-simethicone (MYLANTA ES/MAALOX  ES) suspension 15 mL (15 mLs Oral Given 9/15/19 1519)     And   lidocaine (XYLOCAINE) 2 % solution 15 mL (15 mLs Mouth/Throat Given 9/15/19 1519)   ketorolac (TORADOL) injection 30 mg (30 mg Intravenous Given 9/15/19 1729)       Assessments & Plan (with Medical Decision Making)     I have reviewed the nursing notes.    I have reviewed the findings, diagnosis, plan and need for follow up with the patient.  Patient feeling better with above interventions.  The Toradol seemed to help better than anything else.  Serial troponins were  negative.  D-dimer negative he does have mildly chronically elevated transaminases and these continue to be elevated.  Otherwise he has a mildly low potassium which she states is not unusual for him either.  He wonders if perhaps he could have hurt his chest wall from coughing so hard during his asthma attack I think that that explanation is is good is anything else.  He is definitely tender over the lower costochondral area.  Again the Toradol did seem to help better than anything else, and this would fit with that as well.  He will be discharged home at this time.  Return if worse.    New Prescriptions    No medications on file       Final diagnoses:   Costochondritis       9/15/2019   [facility]     Yuriy Landry MD  09/15/19 [id number]

## 2019-09-15 NOTE — ED NOTES
Patient has girlfriend Vince and son Ty at bedside. Patient dozing off and on, but talking to family off and on. Dr. Landry in room to see patient. Patient states he is still tired and exhausted feeling.

## 2019-09-15 NOTE — ED NOTES
Spoke to MD Dr. Landry regarding update on patient's chest pain, interventions that were provided, and blood pressures. MD aware and to give the morphine.

## 2019-09-15 NOTE — ED NOTES
Patient provided a GI cocktail and was rating chest pain at 4/10, down from previous 8/10. MD updated and orders to give another dose of 4 mg IV morphine. IV morphine provided and patient sitting up more in bed with family present at bedside. Patient did have some coughing and nausea post GI cocktail, but stated the nausea had gone away and patient resting.

## 2019-09-15 NOTE — ED AVS SNAPSHOT
[facility]  [address]  [place] [address]  Phone:  [phone]  Fax:  [phone]                                    Christiano Metcalf   MRN: [id number]    Department:  [facility]   Date of Visit:  9/15/2019           After Visit Summary Signature Page    I have received my discharge instructions, and my questions have been answered. I have discussed any challenges I see with this plan with the nurse or doctor.    ..........................................................................................................................................  Patient/Patient Representative Signature      ..........................................................................................................................................  Patient Representative Print Name and Relationship to Patient    ..................................................               ................................................  Date                                   Time    ..........................................................................................................................................  Reviewed by Signature/Title    ...................................................              ..............................................  Date                                               Time          22EPIC Rev 08/18

## 2019-09-15 NOTE — LETTER
[facility]  [address]  [place] [address]  Phone: [phone]  Fax: [phone]    September 15, 2019        Christiano Metcalf  [address]  [place] [address]          To whom it may concern:    RE: Christiano Metcalf may need a day off from work.    Please contact me for questions or concerns.      Sincerely,        Yuriy Landry MD  9/15/2019, 6:07 PM     Problem: Self Care Deficits Care Plan (Adult)  Goal: *Acute Goals and Plan of Care (Insert Text)  Description  FUNCTIONAL STATUS PRIOR TO ADMISSION: I with ADLs, IADLs and functional mobility without AD. HOME SUPPORT: Pt lives with fiance, will have support from mom and daughter. Occupational Therapy Goals  Initiated 8/21/2019    1. Patient will perform lower body dressing with supervision/set-up using AE PRN within 7 days. 2.  Patient will perform toilet transfer with modified independence using most appropriate DME within 7 days. 3.  Patient will toileting at supervision/set-up within 7 days. 4.  Patient will don/doff back brace at supervision/set-up within 7 days. 5.  Patient will verbalize/demonstrate 3/3 back precautions during ADL tasks without cues within 7 days. Outcome: Progressing Towards Goal   OCCUPATIONAL THERAPY TREATMENT  Patient: Ernesto Romano (89 y.o. female)  Date: 8/22/2019  Diagnosis: Spinal stenosis of lumbar region at multiple levels [M48.061] S/P cervical spinal fusion  Procedure(s) (LRB):  ANTERIOR CERVICAL DISCECTOMY AND FUSION CERVICAL 5 TO CERVICAL 7 WITH C6 CORPECTOMY (N/A) 2 Days Post-Op  Precautions: Fall  Chart, occupational therapy assessment, plan of care, and goals were reviewed. ASSESSMENT  Based on the objective data described below, pt progressing well towards acute OT goals, demonstrates increased activity tolerance, less post-surgical pain and improved O2 sats today. Pt received seated EOB finishing lunch, on 1 L O2 with sats 95-96%. Educated pt on compensatory techniques for LB dressing and pt donned/doffed socks with supervision using AE. Ambulated to bathroom and performed toilet transfer, hygiene and brief standing grooming ADL with supervision-CGA without AD. [facility] of session. Walk to and from bathroom performed on RA, O2 sats 92% on return. 1 L reapplied and nurse informed.      Current Level of Function Impacting Discharge (ADLs): mod I-min A for ADLs with use of LB AE, CGA for transfers. Other factors to consider for discharge: Pt plans on staying with a friend at discharge, reports she will have assistance from her daughter and mother. O2 sats improving. PLAN :  Patient continues to benefit from skilled intervention to address the above impairments. Continue treatment per established plan of care. to address goals. Recommend with staff: OOB to chair for all meals, up to bathroom for toileting with CGA    Recommendation for discharge: (in order for the patient to meet his/her long term goals)  Occupational therapy at least 2 days/week in the home vs. none    This discharge recommendation:  Has been made in collaboration with the attending provider and/or case management    Equipment recommendations for successful discharge (if) home: LB AE (pt has in room)       SUBJECTIVE:   Patient stated I feel a little better today.     OBJECTIVE DATA SUMMARY:   Cognitive/Behavioral Status:  Neurologic State: Alert  Orientation Level: Oriented X4  Cognition: Follows commands  Perception: Appears intact  Perseveration: No perseveration noted  Safety/Judgement: Awareness of environment; Fall prevention    Functional Mobility and Transfers for ADLs:  Bed Mobility:  Rolling: Contact guard assistance  Supine to Sit: Contact guard assistance  Sit to Supine: Contact guard assistance    Transfers:  Sit to Stand: Contact guard assistance  Functional Transfers  Toilet Transfer : Contact guard assistance  Bed to Chair: Contact guard assistance    Balance:  Sitting: Intact  Standing: Intact    ADL Intervention:       Grooming  Washing Hands: Supervision(standing at sink)      Lower Body Dressing Assistance  Underpants: Compensatory technique training  Pants With Elastic Waist: Compensatory technique training  Socks: Supervision;Set-up; Compensatory technique training  Position Performed: Seated edge of bed  Cues: Don;Doff  Adaptive Equipment Used: Reacher;Sock aid    Toileting  Bladder Hygiene: Modified independent(seated)    Cognitive Retraining  Safety/Judgement: Awareness of environment; Fall prevention    Patient recalled and demonstrated 3/3 cervical spine precautions with without cues. Patient instructed and indicated understanding the benefits of maintaining activity tolerance, functional mobility, and independence with self care tasks during acute stay  to ensure safe return home and to baseline. Encouraged patient to increase frequency and duration OOB, not sitting longer than 30 mins without marching/walking with staff, be out of bed for all meals, perform daily ADLs (as approved by RN/MD regarding bathing etc), and performing functional mobility to/from bathroom. Patient instruction and indicated understanding on body mechanics, ergonomics and gravitational force on the spine during different body positions to plan activities in prep for return home to complete basic ADLs, instrumental ADLs and back to work safely. Dressing brace: Patient instructed and demonstrated while in front of mirror to don/doff velcro on brace using dominant side, keeping non-dominant side intact. Instruction and indicated understanding in removal of fabric pieces, placement of clean pieces, don brace, then can hand wash and allow air dry. Dressing lower body: Patient instructed to don brace first and on the benefits to remain seated to don all clothing to increase independence with precautions and pain management. Patient instructed and demonstrated tailor sitting for lower body dressing. Toileting: Patient instructed on the benefits of using flushable wet wipes and toilet tongs if decreased reach or pain for ritesh care. Also, the benefits of a reacher to aid in clothing management.    Home safety: Patient instructed and indicated understanding on home modifications and safety [raise height of ADL objects (i.e. clothing, sink items, fridge items, items to mouth when grooming), change of floor surfaces, clear pathways] to increase independence and fall prevention. Standing: Patient instructed and indicated understanding to walk up to sink/counter top/surfaces, step into walker, square off while using objects, slide objects along surfaces, to increase adherence to back precautions and fall prevention. Tub transfer: Patient instructed and indicated understanding regarding when it is safe to begin transfer into tub (complete stairs with PT, advance exercises with PT high enough to clear tub height, and while clothes donned practice with another person present). Pain:  5/10    Activity Tolerance:   Good  Please refer to the flowsheet for vital signs taken during this treatment.     After treatment patient left in no apparent distress:   Supine in bed and Call bell within reach    COMMUNICATION/COLLABORATION:   The patients plan of care was discussed with: Physical Therapist and Registered Nurse    Kandi Frankel, OT  Time Calculation: 28 mins

## 2019-09-15 NOTE — ED NOTES
Patient asked if had taken any aspirin prior to arrival, patient denies taking any aspirin prior to arrival. Patient was also assessed and asked if he had taken any Viagra or anything similar to Viagra today or prior to arrival, patient denies taking any Viagra or anything similar.

## 2019-09-15 NOTE — ED TRIAGE NOTES
Arrival to triage via private car with family from home. Patient states no energy, chest pain, some shortness of breath. Has been going on for a couple of hours per patient. History of asthma per son Ronen. States only took his inhaler twice today prior to arrival. Per son patient stated his chest pain was mid sternum and patient had stated some blurred vision earlier today. Patient rates pain 9.5/10 when asked. Pain is still mid sternum per patient and pointing to area of pain. Patient states vision is still blurry.

## 2019-09-15 NOTE — ED NOTES
Morphine given, patient rates pain still at about an 8/10 chest pain, midsternum. MD Dr. Landry in to see patient and provided update to the patient and family present. Awaiting orders and continue to assess.

## 2019-09-15 NOTE — ED NOTES
Pt up ambulating in room and halls, states that he is feeling better, but still weak and tired, MD JUMANA back into talk to pt

## 2019-11-24 ENCOUNTER — OFFICE VISIT (OUTPATIENT)
Dept: FAMILY MEDICINE | Facility: OTHER | Age: 49
End: 2019-11-24
Attending: NURSE PRACTITIONER
Payer: COMMERCIAL

## 2019-11-24 VITALS
TEMPERATURE: 96.9 F | BODY MASS INDEX: 32.87 KG/M2 | SYSTOLIC BLOOD PRESSURE: 134 MMHG | HEIGHT: 69 IN | RESPIRATION RATE: 16 BRPM | WEIGHT: 221.9 LBS | HEART RATE: 72 BPM | DIASTOLIC BLOOD PRESSURE: 90 MMHG

## 2019-11-24 DIAGNOSIS — H66.002 NON-RECURRENT ACUTE SUPPURATIVE OTITIS MEDIA OF LEFT EAR WITHOUT SPONTANEOUS RUPTURE OF TYMPANIC MEMBRANE: Primary | ICD-10-CM

## 2019-11-24 PROCEDURE — 99213 OFFICE O/P EST LOW 20 MIN: CPT | Performed by: FAMILY MEDICINE

## 2019-11-24 PROCEDURE — G0463 HOSPITAL OUTPT CLINIC VISIT: HCPCS

## 2019-11-24 RX ORDER — INFLUENZA A VIRUS A/NEBRASKA/14/2019 (H1N1) ANTIGEN (MDCK CELL DERIVED, PROPIOLACTONE INACTIVATED), INFLUENZA A VIRUS A/DELAWARE/39/2019 (H3N2) ANTIGEN (MDCK CELL DERIVED, PROPIOLACTONE INACTIVATED), INFLUENZA B VIRUS B/SINGAPORE/INFTT-16-0610/2016 ANTIGEN (MDCK CELL DERIVED, PROPIOLACTONE INACTIVATED), INFLUENZA B VIRUS B/DARWIN/7/2019 ANTIGEN (MDCK CELL DERIVED, PROPIOLACTONE INACTIVATED) 15; 15; 15; 15 UG/.5ML; UG/.5ML; UG/.5ML; UG/.5ML
INJECTION, SUSPENSION INTRAMUSCULAR
Refills: 0 | COMMUNITY
Start: 2019-11-05 | End: 2019-12-20

## 2019-11-24 RX ORDER — AZITHROMYCIN 250 MG/1
TABLET, FILM COATED ORAL
Qty: 6 TABLET | Refills: 0 | Status: SHIPPED | OUTPATIENT
Start: 2019-11-24 | End: 2019-12-20

## 2019-11-24 ASSESSMENT — ENCOUNTER SYMPTOMS
WHEEZING: 0
SHORTNESS OF BREATH: 0
CHEST TIGHTNESS: 0
ABDOMINAL PAIN: 0
PALPITATIONS: 0
COUGH: 0
ACTIVITY CHANGE: 0
APPETITE CHANGE: 0

## 2019-11-24 ASSESSMENT — MIFFLIN-ST. JEOR: SCORE: 1853.97

## 2019-11-24 ASSESSMENT — PAIN SCALES - GENERAL: PAINLEVEL: EXTREME PAIN (8)

## 2019-11-24 NOTE — NURSING NOTE
Patient presents to the clinic for bilateral plugged ears that started today.  Medication Reconciliation: complete    Antonia Curry, CMA

## 2019-11-24 NOTE — PROGRESS NOTES
Nursing Notes:   Antonia Curry CMA  11/24/2019 11:29 AM  Signed  Patient presents to the clinic for bilateral plugged ears that started today.  Medication Reconciliation: complete    Antonia Curry CMA      SUBJECTIVE:   Christiano Metcalf is a 49 year old male who presents to clinic today for the following health issues:    HPI  Christiano is here for plugged ear sensation that started suddenly today after showering.  Occurred ~45 minutes prior to appointment.  Has been ill with URI symptoms: congestion, sore throat, achy, fatigued for 3-4 days leading up to this.  Now having difficulty hearing out of L ear.  Has tried Tylenol cold/sinus generic without significant relief.  No fever/chills.  No sick contacts at home; but some co-workers are ill ([employer] in [place], [place]).        Patient Active Problem List    Diagnosis Date Noted     Mild intermittent asthma without complication 08/31/2018     Priority: Medium     Gout 02/01/2018     Priority: Medium     Learning disability 06/14/2011     Priority: Medium     Past Medical History:   Diagnosis Date     Gout     No Comments Provided     Hypomagnesemia     No Comments Provided     Pain in left knee     No Comments Provided     Personal history of other (healed) physical injury and trauma     2000,repair     Unspecified injury of unspecified wrist, hand and finger(s), initial encounter     1999      Past Surgical History:   Procedure Laterality Date     ARTHROSCOPY KNEE      Right knee meniscal repair, arthroscopic ally     ARTHROSCOPY KNEE      2010,Left knee scope     FINGER SURGERY      Right thumb ORIF     OTHER SURGICAL HISTORY      7/15/14,,HERNIA REPAIR,Left,LIH with mesh     OTHER SURGICAL HISTORY      5/10/16,,HERNIA REPAIR,Right,RIH with plug/patch     Family History   Problem Relation Age of Onset     Diabetes Mother         Diabetes     Other - See Comments Father         episode of gout.     Colon Cancer Father         Cancer-colon,diagnosed in early  60s     Family History Negative Son         Good Health,     Family History Negative Daughter         Good Health,     Family History Negative Son         Good Health,     Social History     Tobacco Use     Smoking status: Former Smoker     Types: Cigarettes     Last attempt to quit: 2010     Years since quittin.7     Smokeless tobacco: Never Used   Substance Use Topics     Alcohol use: No     Alcohol/week: 0.0 standard drinks     Comment: Alcoholic Drinks/day: seldom     Social History     Social History Narrative     times one and now .  Works at [employer] in [employer].    Works at [employer] in [employer].    [id number]    3 children:  Carlos, , Chaparrita, ,  George,      Current Outpatient Medications   Medication Sig Dispense Refill     albuterol (PROAIR HFA/PROVENTIL HFA/VENTOLIN HFA) 108 (90 Base) MCG/ACT inhaler Inhale 2 puffs into the lungs 4 times daily as needed 2 Inhaler 11     allopurinol (ZYLOPRIM) 300 MG tablet Take 1 tablet (300 mg) by mouth daily 90 tablet 3     betamethasone dipropionate (DIPROSONE) 0.05 % external cream Apply topically At Bedtime To scalp, wash out in the AM 45 g 3     cetirizine (ZYRTEC) 10 MG tablet Take 1 tablet (10 mg) by mouth daily 90 tablet 3     FLUCELVAX QUADRIVALENT SUSP FLU SHOT CLINIC ADMINISTRATION  0     HUMIRA PEN 40 MG/0.8ML pen kit Inject 40 mg as directed every 14 days       indomethacin (INDOCIN) 50 MG capsule Take 1 capsule (50 mg) by mouth 3 times daily (with meals) 60 capsule 1     mometasone-formoterol (DULERA) 200-5 MCG/ACT inhaler Inhale 2 puffs into the lungs 2 times daily 1 Inhaler 3     naproxen (NAPROSYN) 500 MG tablet Take 1 tablet (500 mg) by mouth 2 times daily (with meals) 180 tablet 3     orphenadrine ER (NORFLEX) 100 MG 12 hr tablet Take 100 mg by mouth       sertraline (ZOLOFT) 100 MG tablet Take 1 tablet (100 mg) by mouth daily 90 tablet 3     triamcinolone (KENALOG) 0.1 % external cream  "Apply topically 2 times daily 453.6 g 1     Allergies   Allergen Reactions     Seasonal      Other reaction(s): Sneezing  Other reaction(s): Sneezing     No Clinical Screening - See Comments Nausea and Vomiting     Peas  Peas     Review of Systems   Constitutional: Negative for activity change and appetite change.   HENT: Positive for congestion, ear pain and hearing loss. Negative for ear discharge, mouth sores and postnasal drip.    Respiratory: Negative for cough, chest tightness, shortness of breath and wheezing.    Cardiovascular: Negative for chest pain and palpitations.   Gastrointestinal: Negative for abdominal pain.        OBJECTIVE:     BP (!) 134/90 (BP Location: Right arm, Patient Position: Sitting, Cuff Size: Adult Regular)   Pulse 72   Temp 96.9  F (36.1  C) (Tympanic)   Resp 16   Ht 1.74 m (5' 8.5\")   Wt 100.7 kg (221 lb 14.4 oz)   BMI 33.25 kg/m    Body mass index is 33.25 kg/m .  Physical Exam  Vitals signs and nursing note reviewed.   Constitutional:       Appearance: Normal appearance.   HENT:      Head: Normocephalic and atraumatic.      Right Ear: Tympanic membrane and ear canal normal.      Left Ear: Tympanic membrane is erythematous and bulging.      Nose: Nose normal.      Mouth/Throat:      Mouth: Mucous membranes are moist.   Eyes:      Extraocular Movements: Extraocular movements intact.      Pupils: Pupils are equal, round, and reactive to light.   Neck:      Musculoskeletal: Normal range of motion. No muscular tenderness.   Cardiovascular:      Rate and Rhythm: Normal rate and regular rhythm.   Pulmonary:      Effort: Pulmonary effort is normal.      Breath sounds: Normal breath sounds.   Lymphadenopathy:      Cervical: No cervical adenopathy.   Skin:     Capillary Refill: Capillary refill takes less than 2 seconds.   Neurological:      Mental Status: He is alert.     Diagnostic Test Results:  none     ASSESSMENT/PLAN:       1. Non-recurrent acute suppurative otitis media of left " ear without spontaneous rupture of tympanic membrane  Due to severity and sudden onset of hearing loss; will treat with azithromycin x 5 days.  Supportive cares with decongestant and nasal spray to help with fluid/pressure.  Follow up if not improving in 3-5 days.  - azithromycin (ZITHROMAX) 250 MG tablet; Take 2 tablets (500 mg) by mouth daily for 1 day, THEN 1 tablet (250 mg) daily for 4 days.  Dispense: 6 tablet; Refill: 0      [facility] [employer]

## 2019-12-20 ENCOUNTER — OFFICE VISIT (OUTPATIENT)
Dept: FAMILY MEDICINE | Facility: OTHER | Age: 49
End: 2019-12-20
Attending: FAMILY MEDICINE
Payer: COMMERCIAL

## 2019-12-20 VITALS
WEIGHT: 227 LBS | RESPIRATION RATE: 16 BRPM | TEMPERATURE: 98.1 F | HEIGHT: 69 IN | DIASTOLIC BLOOD PRESSURE: 112 MMHG | OXYGEN SATURATION: 96 % | BODY MASS INDEX: 33.62 KG/M2 | SYSTOLIC BLOOD PRESSURE: 161 MMHG | HEART RATE: 61 BPM

## 2019-12-20 DIAGNOSIS — K21.9 GASTROESOPHAGEAL REFLUX DISEASE, ESOPHAGITIS PRESENCE NOT SPECIFIED: Primary | ICD-10-CM

## 2019-12-20 PROCEDURE — 99213 OFFICE O/P EST LOW 20 MIN: CPT | Performed by: FAMILY MEDICINE

## 2019-12-20 PROCEDURE — G0463 HOSPITAL OUTPT CLINIC VISIT: HCPCS

## 2019-12-20 RX ORDER — OMEPRAZOLE 40 MG/1
40 CAPSULE, DELAYED RELEASE ORAL DAILY
Qty: 30 CAPSULE | Refills: 1 | Status: ON HOLD | OUTPATIENT
Start: 2019-12-20 | End: 2021-09-04

## 2019-12-20 ASSESSMENT — PAIN SCALES - GENERAL: PAINLEVEL: SEVERE PAIN (7)

## 2019-12-20 ASSESSMENT — MIFFLIN-ST. JEOR: SCORE: 1877.11

## 2019-12-20 ASSESSMENT — PATIENT HEALTH QUESTIONNAIRE - PHQ9: SUM OF ALL RESPONSES TO PHQ QUESTIONS 1-9: 0

## 2019-12-20 NOTE — PATIENT INSTRUCTIONS
Patient Education     Tips to Control Acid Reflux    To control acid reflux, you ll need to make some basic diet and lifestyle changes. The simple steps outlined below may be all you ll need to ease discomfort.  Watch what you eat    Avoid fatty foods and spicy foods.    Eat fewer acidic foods, such as citrus and tomato-based foods. These can increase symptoms.    Limit drinking alcohol, caffeine, and fizzy beverages. All increase acid reflux.    Try limiting chocolate, peppermint, and spearmint. These can worsen acid reflux in some people.  Watch when you eat    Avoid lying down for 3 hours after eating.    Do not snack before going to bed.  Raise your head  Raising your head and upper body by 4 to 6 inches helps limit reflux when you re lying down. Put blocks under the head of your bed frame to raise it.  Other changes    Lose weight, if you need to    Don t exercise near bedtime    Avoid tight-fitting clothes    Limit aspirin and ibuprofen    Stop smoking   Date Last Reviewed: 7/1/2016 2000-2018 [employer]. [address] [place] [address], [place] [address]. All rights reserved. This information is not intended as a substitute for professional medical care. Always follow your healthcare professional's instructions.

## 2019-12-20 NOTE — NURSING NOTE
"Chief Complaint   Patient presents with     Abdominal Pain     x 2 weeks         Initial BP (!) 148/106   Pulse 57   Temp 98.1  F (36.7  C) (Temporal)   Resp 16   Ht 1.74 m (5' 8.5\")   Wt 103 kg (227 lb)   SpO2 96%   BMI 34.01 kg/m   Estimated body mass index is 34.01 kg/m  as calculated from the following:    Height as of this encounter: 1.74 m (5' 8.5\").    Weight as of this encounter: 103 kg (227 lb).    Medication Reconciliation: complete      Norma J. Gosselin, LPN  "

## 2019-12-21 ASSESSMENT — ASTHMA QUESTIONNAIRES: ACT_TOTALSCORE: 17

## 2019-12-22 ASSESSMENT — ENCOUNTER SYMPTOMS
FEVER: 0
CHILLS: 0
VOMITING: 0
NAUSEA: 0
HEARTBURN: 1

## 2020-01-14 ENCOUNTER — OFFICE VISIT (OUTPATIENT)
Dept: FAMILY MEDICINE | Facility: OTHER | Age: 50
End: 2020-01-14
Attending: FAMILY MEDICINE
Payer: COMMERCIAL

## 2020-01-14 VITALS
SYSTOLIC BLOOD PRESSURE: 132 MMHG | BODY MASS INDEX: 33.53 KG/M2 | OXYGEN SATURATION: 97 % | RESPIRATION RATE: 18 BRPM | TEMPERATURE: 97.6 F | WEIGHT: 226.4 LBS | HEIGHT: 69 IN | DIASTOLIC BLOOD PRESSURE: 88 MMHG | HEART RATE: 76 BPM

## 2020-01-14 DIAGNOSIS — M1A.00X0 IDIOPATHIC CHRONIC GOUT WITHOUT TOPHUS, UNSPECIFIED SITE: ICD-10-CM

## 2020-01-14 DIAGNOSIS — H60.63 CHRONIC OTITIS EXTERNA OF BOTH EARS, UNSPECIFIED TYPE: ICD-10-CM

## 2020-01-14 DIAGNOSIS — Z00.00 ROUTINE HISTORY AND PHYSICAL EXAMINATION OF ADULT: Primary | ICD-10-CM

## 2020-01-14 DIAGNOSIS — Z13.220 SCREENING CHOLESTEROL LEVEL: ICD-10-CM

## 2020-01-14 DIAGNOSIS — J45.20 MILD INTERMITTENT ASTHMA WITHOUT COMPLICATION: ICD-10-CM

## 2020-01-14 PROCEDURE — G0463 HOSPITAL OUTPT CLINIC VISIT: HCPCS

## 2020-01-14 PROCEDURE — 99396 PREV VISIT EST AGE 40-64: CPT | Performed by: FAMILY MEDICINE

## 2020-01-14 RX ORDER — ACETIC ACID 20.65 MG/ML
4 SOLUTION AURICULAR (OTIC) 3 TIMES DAILY
Qty: 30 ML | Refills: 3 | Status: ON HOLD | OUTPATIENT
Start: 2020-01-14 | End: 2021-09-04

## 2020-01-14 ASSESSMENT — MIFFLIN-ST. JEOR: SCORE: 1874.38

## 2020-01-14 ASSESSMENT — PAIN SCALES - GENERAL: PAINLEVEL: EXTREME PAIN (8)

## 2020-01-14 NOTE — PROGRESS NOTES
SUBJECTIVE:  Christiano Metcalf is a 49 year old male here for annual exam.  He has a history of asthma.  He continues to use albuterol generally twice a week or less.  He continues on allopurinol daily he does not remember last time he has had to use indomethacin.  He has had cracked skin on his heels that have improved after having steroid injections and steroid cream to dermatology.    He has had bilateral ear pain since November.  He states that it seems like something is full or pulling at his ears especially when he turns his head or chews.  He is had some decreased hearing and some ringing in his ears.  He tried azithromycin without any success.      Patient Active Problem List    Diagnosis Date Noted     Mild intermittent asthma without complication 08/31/2018     Priority: Medium     Gout 02/01/2018     Priority: Medium     Learning disability 06/14/2011     Priority: Medium       Past Medical History:   Diagnosis Date     Gout     No Comments Provided     Hypomagnesemia     No Comments Provided     Pain in left knee     No Comments Provided     Personal history of other (healed) physical injury and trauma     2000,repair     Unspecified injury of unspecified wrist, hand and finger(s), initial encounter     1999       Past Surgical History:   Procedure Laterality Date     ARTHROSCOPY KNEE      Right knee meniscal repair, arthroscopic ally     ARTHROSCOPY KNEE      2010,Left knee scope     FINGER SURGERY      Right thumb ORIF     OTHER SURGICAL HISTORY      7/15/14,,HERNIA REPAIR,Left,LIH with mesh     OTHER SURGICAL HISTORY      5/10/16,,HERNIA REPAIR,Right,RIH with plug/patch       Current Outpatient Medications   Medication Sig Dispense Refill     acetic acid (VOSOL) 2 % otic solution Place 4 drops into both ears 3 times daily 30 mL 3     albuterol (PROAIR HFA/PROVENTIL HFA/VENTOLIN HFA) 108 (90 Base) MCG/ACT inhaler Inhale 2 puffs into the lungs 4 times daily as needed 2 Inhaler 11      "allopurinol (ZYLOPRIM) 300 MG tablet Take 1 tablet (300 mg) by mouth daily 90 tablet 3     betamethasone dipropionate (DIPROSONE) 0.05 % external cream Apply topically At Bedtime To scalp, wash out in the AM 45 g 3     cetirizine (ZYRTEC) 10 MG tablet Take 1 tablet (10 mg) by mouth daily 90 tablet 3     HUMIRA PEN 40 MG/0.8ML pen kit Inject 40 mg as directed every 14 days       indomethacin (INDOCIN) 50 MG capsule Take 1 capsule (50 mg) by mouth 3 times daily (with meals) 60 capsule 1     naproxen (NAPROSYN) 500 MG tablet Take 1 tablet (500 mg) by mouth 2 times daily (with meals) 180 tablet 3     omeprazole (PRILOSEC) 40 MG DR capsule Take 1 capsule (40 mg) by mouth daily 30 capsule 1     sertraline (ZOLOFT) 100 MG tablet Take 1 tablet (100 mg) by mouth daily 90 tablet 3     triamcinolone (KENALOG) 0.1 % external cream Apply topically 2 times daily 453.6 g 1       Allergies:  Allergies   Allergen Reactions     Seasonal      Other reaction(s): Sneezing  Other reaction(s): Sneezing     No Clinical Screening - See Comments Nausea and Vomiting     Peas  Peas       Family History   Problem Relation Age of Onset     Diabetes Mother         Diabetes     Other - See Comments Father         episode of gout.     Colon Cancer Father         Cancer-colon,diagnosed in early 60s     Family History Negative Son         Good Health,     Family History Negative Daughter         Good Health,     Family History Negative Son         Good Health,       Social History     Tobacco Use     Smoking status: Former Smoker     Types: Cigarettes     Last attempt to quit: 2010     Years since quittin.9     Smokeless tobacco: Never Used   Substance Use Topics     Alcohol use: No     Alcohol/week: 0.0 standard drinks     Comment: Alcoholic Drinks/day: seldom     Drug use: No       ROS:    As above otherwise ROS is unremarkable.      OBJECTIVE:  /88   Pulse 76   Temp 97.6  F (36.4  C)   Resp 18   Ht 1.74 m (5' 8.5\")  "  Wt 102.7 kg (226 lb 6.4 oz)   SpO2 97%   BMI 33.92 kg/m      EXAM:  General Appearance: Pleasant, alert, appropriate appearance for age. No acute distress  Head: Normal. Normocephalic, atraumatic.  Eyes: PERRL, EOMI  Ears: Both external canals show irritation and some swelling left greater than right.  He has some tenderness behind his left earlobe.  No lymphadenopathy.  Tympanic membrane's appear unremarkable.  OroPharynx: Dental hygiene adequate. Normal buccal mucosa. Normal pharynx.  Neck: Supple, no masses or nodes, no lymphadenopathy.  No thyromegaly.  Lungs: Normal chest wall and respirations. Clear to auscultation, no wheezes or crackles.  Cardiovascular: Regular rate and rhythm. S1, S2, no murmurs.  Gastrointestinal: Soft, nontender, no abnormal masses or organomegaly. BS normal.  Musculoskeletal: No edema.  Skin: no concerning or new rashes.  Neurologic Exam: CN 2-12 grossly intact.  Normal gait.  Symmetric DTRs, No focal motor or sensory deficits. No tremor.  Psychiatric Exam: Alert and oriented, appropriate affect.    ASSESSEMENT AND PLAN:    1. Routine history and physical examination of adult    2. Screening cholesterol level    3. Mild intermittent asthma without complication    4. Idiopathic chronic gout without tophus, unspecified site    5. Chronic otitis externa of both ears, unspecified type      He will return for fasting labs.    He already got a flu shot this year.    Prostate and colon cancer screening at age 50.    For his ears will trial acetic acid drops for potential otitis externa.  If this not helpful would consider amoxicillin for 10 to 14 days.    Otto Frankel MD  Family Medicine      This document was prepared using voice generated software.  While every attempt was made for accuracy, grammatical errors may exist.

## 2020-01-14 NOTE — NURSING NOTE
Patient presents today for annual physical.  Medication Reconciliation Complete    Faviola Hernandez LPN  1/14/2020 3:22 PM

## 2020-01-15 ASSESSMENT — ASTHMA QUESTIONNAIRES: ACT_TOTALSCORE: 23

## 2020-02-06 ENCOUNTER — OFFICE VISIT (OUTPATIENT)
Dept: FAMILY MEDICINE | Facility: OTHER | Age: 50
End: 2020-02-06
Attending: NURSE PRACTITIONER
Payer: COMMERCIAL

## 2020-02-06 VITALS
RESPIRATION RATE: 18 BRPM | DIASTOLIC BLOOD PRESSURE: 80 MMHG | HEART RATE: 71 BPM | WEIGHT: 229.13 LBS | BODY MASS INDEX: 33.94 KG/M2 | TEMPERATURE: 97.9 F | HEIGHT: 69 IN | OXYGEN SATURATION: 98 % | SYSTOLIC BLOOD PRESSURE: 136 MMHG

## 2020-02-06 DIAGNOSIS — A08.4 VIRAL GASTROENTERITIS: ICD-10-CM

## 2020-02-06 DIAGNOSIS — J11.1 INFLUENZA-LIKE ILLNESS: Primary | ICD-10-CM

## 2020-02-06 PROCEDURE — 99213 OFFICE O/P EST LOW 20 MIN: CPT | Performed by: NURSE PRACTITIONER

## 2020-02-06 PROCEDURE — G0463 HOSPITAL OUTPT CLINIC VISIT: HCPCS

## 2020-02-06 ASSESSMENT — PAIN SCALES - GENERAL: PAINLEVEL: NO PAIN (0)

## 2020-02-06 ASSESSMENT — MIFFLIN-ST. JEOR: SCORE: 1886.74

## 2020-02-06 NOTE — LETTER
February 6, 2020      Christiano Metcalf  [address]  [place] [address]        To Whom It May Concern:    Christiano Metcalf  was seen on 2/6/2020.  Please excuse him  until 2/11/2020 due to illness.        Sincerely,        RACHEL Keller CNP

## 2020-02-06 NOTE — NURSING NOTE
Patient presents to clinic today for general illness starting four days ago. Patient states he has been experiencing diarrhea, vomiting, stomach ache, and stuffy nose.     No LMP for male patient.  Medication Reconciliation: complete    Yahaira Coombs LPN  2/6/2020 8:33 AM

## 2020-02-06 NOTE — PROGRESS NOTES
HPI:    Christiano Metcalf is a 49 year old male who presents to clinic today for upper respiratory and GI concerns.  Monday night he had a sudden onset of headaches, sore throat, cough, sneezing, sinus congestion.  He also has nausea, vomiting and diarrhea.  He reports frequent diarrhea but no vomiting since Tuesday.  Denies any fevers.  He has been able to keep food down last night without difficulties.  He did get a flu shot this year.  There is been a lot of ill contacts at work.  He has been taking Tylenol Sinus and NyQuil for symptomatic management.    Past Medical History:   Diagnosis Date     Gout     No Comments Provided     Hypomagnesemia     No Comments Provided     Pain in left knee     No Comments Provided     Personal history of other (healed) physical injury and trauma     2000,repair     Unspecified injury of unspecified wrist, hand and finger(s), initial encounter     1999         Current Outpatient Medications   Medication Sig Dispense Refill     acetic acid (VOSOL) 2 % otic solution Place 4 drops into both ears 3 times daily 30 mL 3     albuterol (PROAIR HFA/PROVENTIL HFA/VENTOLIN HFA) 108 (90 Base) MCG/ACT inhaler Inhale 2 puffs into the lungs 4 times daily as needed 2 Inhaler 11     allopurinol (ZYLOPRIM) 300 MG tablet Take 1 tablet (300 mg) by mouth daily 90 tablet 3     betamethasone dipropionate (DIPROSONE) 0.05 % external cream Apply topically At Bedtime To scalp, wash out in the AM 45 g 3     cetirizine (ZYRTEC) 10 MG tablet Take 1 tablet (10 mg) by mouth daily 90 tablet 3     HUMIRA PEN 40 MG/0.8ML pen kit Inject 40 mg as directed every 14 days       indomethacin (INDOCIN) 50 MG capsule Take 1 capsule (50 mg) by mouth 3 times daily (with meals) 60 capsule 1     naproxen (NAPROSYN) 500 MG tablet Take 1 tablet (500 mg) by mouth 2 times daily (with meals) 180 tablet 3     omeprazole (PRILOSEC) 40 MG DR capsule Take 1 capsule (40 mg) by mouth daily 30 capsule 1     sertraline (ZOLOFT) 100 MG  "tablet Take 1 tablet (100 mg) by mouth daily 90 tablet 3     triamcinolone (KENALOG) 0.1 % external cream Apply topically 2 times daily 453.6 g 1       Allergies   Allergen Reactions     Seasonal      Other reaction(s): Sneezing  Other reaction(s): Sneezing     No Clinical Screening - See Comments Nausea and Vomiting     Peas  Peas       ROS:  Pertinent positives and negatives are noted in HPI.    EXAM:  /80 (BP Location: Right arm, Patient Position: Sitting, Cuff Size: Adult Regular)   Pulse 71   Temp 97.9  F (36.6  C) (Tympanic)   Resp 18   Ht 1.74 m (5' 8.5\")   Wt 103.9 kg (229 lb 2 oz)   SpO2 98%   BMI 34.33 kg/m    General appearance: Mildly ill but nontoxic male, in no acute distress  Head: normocephalic, atraumatic  Ears: TM's with cone of light, no erythema, canals clear bilaterally  Eyes: conjunctivae normal  Oropharynx: moist mucous membranes, tonsils without erythema, exudates or petechiae, no post nasal drip seen  Neck: supple without adenopathy  Respiratory: clear to auscultation bilaterally  Cardiac: RRR with no murmurs  Abdomen: soft, nontender, no masses or organomegaly, normal bowel sounds  Psychological: normal affect, alert and pleasant      ASSESSMENT AND PLAN:    1. Influenza-like illness    2. Viral gastroenteritis      Influenza-like illness along with viral gastroenteritis based on symptoms and history as well as exam.  Overall exam is reassuring.  Discussed symptomatic management.  Note was completed for him to remain out of work until symptoms resolve, likely Monday or Tuesday.  Follow-up as needed.      RACHEL Keller CNP..................2/6/2020 8:40 AM      This document was prepared using voice generated software.  While every attempt was made for accuracy, grammatical errors may exist.  "

## 2020-02-07 ASSESSMENT — ASTHMA QUESTIONNAIRES: ACT_TOTALSCORE: 18

## 2020-02-21 ENCOUNTER — HOSPITAL ENCOUNTER (EMERGENCY)
Facility: OTHER | Age: 50
Discharge: HOME | End: 2020-02-21
Attending: PHYSICIAN ASSISTANT | Admitting: PHYSICIAN ASSISTANT
Payer: COMMERCIAL

## 2020-02-21 VITALS
SYSTOLIC BLOOD PRESSURE: 147 MMHG | RESPIRATION RATE: 16 BRPM | OXYGEN SATURATION: 98 % | DIASTOLIC BLOOD PRESSURE: 102 MMHG | TEMPERATURE: 97.5 F | HEIGHT: 68 IN | WEIGHT: 229 LBS | BODY MASS INDEX: 34.71 KG/M2

## 2020-02-21 DIAGNOSIS — H60.391 INFECTIVE OTITIS EXTERNA, RIGHT: ICD-10-CM

## 2020-02-21 PROCEDURE — 99282 EMERGENCY DEPT VISIT SF MDM: CPT | Mod: Z6 | Performed by: PHYSICIAN ASSISTANT

## 2020-02-21 PROCEDURE — 99283 EMERGENCY DEPT VISIT LOW MDM: CPT | Performed by: PHYSICIAN ASSISTANT

## 2020-02-21 RX ORDER — AMOXICILLIN 500 MG/1
1000 CAPSULE ORAL 2 TIMES DAILY
Qty: 40 CAPSULE | Refills: 0 | Status: SHIPPED | OUTPATIENT
Start: 2020-02-21 | End: 2020-04-27

## 2020-02-21 RX ORDER — CIPROFLOXACIN AND DEXAMETHASONE 3; 1 MG/ML; MG/ML
4 SUSPENSION/ DROPS AURICULAR (OTIC) 2 TIMES DAILY
Qty: 7.5 ML | Refills: 0 | Status: SHIPPED | OUTPATIENT
Start: 2020-02-21 | End: 2020-04-27

## 2020-02-21 ASSESSMENT — ENCOUNTER SYMPTOMS
FEVER: 0
ABDOMINAL PAIN: 0
SHORTNESS OF BREATH: 0
ADENOPATHY: 0
CHEST TIGHTNESS: 0
WOUND: 0
CHILLS: 0
CONFUSION: 0
HEMATURIA: 0
BACK PAIN: 0
BRUISES/BLEEDS EASILY: 0

## 2020-02-21 ASSESSMENT — MIFFLIN-ST. JEOR: SCORE: 1878.24

## 2020-02-21 NOTE — ED AVS SNAPSHOT
[facility]  [address]  [place] [address]  Phone:  [phone]  Fax:  [phone]                                    Christiano Metcalf   MRN: [id number]    Department:  [facility]   Date of Visit:  2/21/2020           After Visit Summary Signature Page    I have received my discharge instructions, and my questions have been answered. I have discussed any challenges I see with this plan with the nurse or doctor.    ..........................................................................................................................................  Patient/Patient Representative Signature      ..........................................................................................................................................  Patient Representative Print Name and Relationship to Patient    ..................................................               ................................................  Date                                   Time    ..........................................................................................................................................  Reviewed by Signature/Title    ...................................................              ..............................................  Date                                               Time          22EPIC Rev 08/18

## 2020-02-21 NOTE — ED TRIAGE NOTES
"ED Nursing Triage Note (General)   ________________________________    Christiano Metcalf is a 49 year old Male that presents to triage private car  With history of  Ear pain at left ear that has been ongoing for three months and has been seen and told he has a bacterial infection and given drops (x2).  He continues to have pain and now inability to hear from that ear reported by patient who also complains of swelling at his face around his ear   Significant symptoms had onset 3 month(s) ago.  BP (!) 157/114   Temp 97.5  F (36.4  C) (Tympanic)   Resp 16   Ht 1.727 m (5' 8\")   Wt 103.9 kg (229 lb)   SpO2 97%   BMI 34.82 kg/m  t  Patient appears alert , in moderate distress., and cooperative behavior.    GCS Total = 15  Airway: intact  Breathing noted as Normal.  Circulation Normal  Skin normal  Action taken: to waiting room      PRE HOSPITAL PRIOR LIVING SITUATION Alone  "

## 2020-02-22 NOTE — DISCHARGE INSTRUCTIONS
Get plenty of fluids and rest.  Take your medications as directed, you can use the ear yovanny to help facilitate that application of your eardrops.  The referral is been placed for you to follow-up in ear nose and throat due to the duration of your symptoms despite some past treatments.  They should contact you for that appointment, if you not hear from them please call them.  Otherwise follow-up with PCP as needed.

## 2020-02-22 NOTE — ED NOTES
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 LEONID Anderson aware and order was sent for patient to get drops from pharmacy tomorrow. Patient given ear yovanny to assist with drop application. States understanding of application and use of ear yovanny.

## 2020-02-22 NOTE — ED PROVIDER NOTES
History     Chief Complaint   Patient presents with     Otalgia     HPI  Christiano Metcalf is a 49 year old male who presents to the ED with a chief complaint of Otalgia. Symptoms have been present for >3months. Pain to left ear with some surrounding inflammation, has been prescribed PO antibiotics and ear drop aseptic acid. He reports decreased hearing in his ear as well.  He denies fevers, chest pain, shortness of breath, vision changes or difficulty swallowing.  He has no other complaints.    Allergies:  Allergies   Allergen Reactions     Seasonal      Other reaction(s): Sneezing  Other reaction(s): Sneezing     No Clinical Screening - See Comments Nausea and Vomiting     Peas  Peas       Problem List:    Patient Active Problem List    Diagnosis Date Noted     Mild intermittent asthma without complication 08/31/2018     Priority: Medium     Gout 02/01/2018     Priority: Medium     Learning disability 06/14/2011     Priority: Medium        Past Medical History:    Past Medical History:   Diagnosis Date     Gout      Hypomagnesemia      Pain in left knee      Personal history of other (healed) physical injury and trauma      Unspecified injury of unspecified wrist, hand and finger(s), initial encounter        Past Surgical History:    Past Surgical History:   Procedure Laterality Date     ARTHROSCOPY KNEE      Right knee meniscal repair, arthroscopic ally     ARTHROSCOPY KNEE      2010,Left knee scope     FINGER SURGERY      Right thumb ORIF     OTHER SURGICAL HISTORY      7/15/14,,HERNIA REPAIR,Left,LIH with mesh     OTHER SURGICAL HISTORY      5/10/16,,HERNIA REPAIR,Right,RIH with plug/patch       Family History:    Family History   Problem Relation Age of Onset     Diabetes Mother         Diabetes     Other - See Comments Father         episode of gout.     Colon Cancer Father         Cancer-colon,diagnosed in early 60s     Family History Negative Son         Good Health,1997     Family History  "Negative Daughter         Good Health,2009     Family History Negative Son         Good Health,2011       Social History:  Marital Status:  Legally  [3]  Social History     Tobacco Use     Smoking status: Former Smoker     Types: Cigarettes     Last attempt to quit: 2/17/2010     Years since quitting: 10.0     Smokeless tobacco: Never Used   Substance Use Topics     Alcohol use: No     Alcohol/week: 0.0 standard drinks     Comment: Alcoholic Drinks/day: seldom     Drug use: No        Medications:    acetic acid (VOSOL) 2 % otic solution  albuterol (PROAIR HFA/PROVENTIL HFA/VENTOLIN HFA) 108 (90 Base) MCG/ACT inhaler  allopurinol (ZYLOPRIM) 300 MG tablet  amoxicillin (AMOXIL) 500 MG capsule  cetirizine (ZYRTEC) 10 MG tablet  ciprofloxacin-dexamethasone (CIPRODEX) 0.3-0.1 % otic suspension  HUMIRA PEN 40 MG/0.8ML pen kit  indomethacin (INDOCIN) 50 MG capsule  naproxen (NAPROSYN) 500 MG tablet  sertraline (ZOLOFT) 100 MG tablet  betamethasone dipropionate (DIPROSONE) 0.05 % external cream  omeprazole (PRILOSEC) 40 MG DR capsule  triamcinolone (KENALOG) 0.1 % external cream          Review of Systems   Constitutional: Negative for chills and fever.   HENT: Positive for ear pain and hearing loss. Negative for congestion.    Eyes: Negative for visual disturbance.   Respiratory: Negative for chest tightness and shortness of breath.    Cardiovascular: Negative for chest pain.   Gastrointestinal: Negative for abdominal pain.   Genitourinary: Negative for hematuria.   Musculoskeletal: Negative for back pain.   Skin: Negative for rash and wound.   Neurological: Negative for syncope.   Hematological: Negative for adenopathy. Does not bruise/bleed easily.   Psychiatric/Behavioral: Negative for confusion.       Physical Exam   BP: (!) 157/114  Heart Rate: 84  Temp: 97.5  F (36.4  C)  Resp: 16  Height: 172.7 cm (5' 8\")  Weight: 103.9 kg (229 lb)  SpO2: 97 %      Physical Exam  Constitutional:       General: He is not in " acute distress.     Appearance: He is well-developed. He is not diaphoretic.   HENT:      Head: Normocephalic and atraumatic.      Right Ear: Tympanic membrane and ear canal normal.      Ears:      Comments: Swollen left ear canal with purulent material.  Tenderness to tragus with palpation.  Eyes:      General: No scleral icterus.     Conjunctiva/sclera: Conjunctivae normal.   Neck:      Musculoskeletal: Neck supple.   Cardiovascular:      Rate and Rhythm: Normal rate and regular rhythm.   Pulmonary:      Effort: Pulmonary effort is normal.      Breath sounds: Normal breath sounds.   Abdominal:      Palpations: Abdomen is soft.      Tenderness: There is no abdominal tenderness.   Musculoskeletal:         General: No deformity.   Lymphadenopathy:      Cervical: No cervical adenopathy.   Skin:     General: Skin is warm and dry.      Findings: No rash.   Neurological:      Mental Status: He is alert and oriented to person, place, and time. Mental status is at baseline.   Psychiatric:         Mood and Affect: Mood normal.         Behavior: Behavior normal.         ED Course        Procedures               Critical Care time:  none               No results found for this or any previous visit (from the past 24 hour(s)).    Medications   ciprofloxacin-hydrocortisone (CIPRO HC OTIC) 0.2-1 % otic suspension 3 drop (3 drops Left Ear Not Given 2/21/20 2132)       Assessments & Plan (with Medical Decision Making)   Pt nontoxic in NAD. Heart, lung, bowel sounds normal, abd soft, nontender to palpation, nondistended. VSS, afebrile    He does have a Swollen left ear canal with purulent material.  Tenderness to tragus with palpation.    He appears to have otitis externa. No other concerning findings are seen related to this at this time.     He is given ear yovanny and started on ciprodex. He is also given PO amoxicillin as he has failed treatments prior to this with past recommendations of trying amoxicillin if sx continued.      ENT referral placed.     Strict return precautions are given to the pt, they will return if symptoms are worsening or concerning. The pt understands and agrees with the plan and they are discharged.     Saúl Richey PA-C    I have reviewed the nursing notes.    I have reviewed the findings, diagnosis, plan and need for follow up with the patient.       Discharge Medication List as of 2/21/2020  9:07 PM      START taking these medications    Details   amoxicillin (AMOXIL) 500 MG capsule Take 2 capsules (1,000 mg) by mouth 2 times daily for 10 days, Disp-40 capsule, R-0, E-Prescribe      ciprofloxacin-dexamethasone (CIPRODEX) 0.3-0.1 % otic suspension Place 4 drops Into the left ear 2 times daily, Disp-7.5 mL, R-0, E-Prescribe             Final diagnoses:   Infective otitis externa, right       2/21/2020   [facility]     Saúl Richey PA  02/21/20 2505

## 2020-03-06 ENCOUNTER — MEDICAL CORRESPONDENCE (OUTPATIENT)
Dept: HEALTH INFORMATION MANAGEMENT | Facility: OTHER | Age: 50
End: 2020-03-06

## 2020-03-06 DIAGNOSIS — L40.0 PLAQUE PSORIASIS: ICD-10-CM

## 2020-03-06 DIAGNOSIS — Z79.899 ENCOUNTER FOR LONG-TERM (CURRENT) USE OF OTHER MEDICATIONS: Primary | ICD-10-CM

## 2020-03-06 LAB
ALBUMIN SERPL-MCNC: 4.3 G/DL (ref 3.5–5.7)
ALP SERPL-CCNC: 116 U/L (ref 34–104)
ALT SERPL W P-5'-P-CCNC: 88 U/L (ref 7–52)
ANION GAP SERPL CALCULATED.3IONS-SCNC: 9 MMOL/L (ref 3–14)
AST SERPL W P-5'-P-CCNC: 92 U/L (ref 13–39)
BASOPHILS # BLD AUTO: 0 10E9/L (ref 0–0.2)
BASOPHILS NFR BLD AUTO: 0.4 %
BILIRUB SERPL-MCNC: 0.7 MG/DL (ref 0.3–1)
BUN SERPL-MCNC: 15 MG/DL (ref 7–25)
CALCIUM SERPL-MCNC: 9.7 MG/DL (ref 8.6–10.3)
CHLORIDE SERPL-SCNC: 102 MMOL/L (ref 98–107)
CO2 SERPL-SCNC: 26 MMOL/L (ref 21–31)
CREAT SERPL-MCNC: 0.96 MG/DL (ref 0.7–1.3)
DIFFERENTIAL METHOD BLD: ABNORMAL
EOSINOPHIL # BLD AUTO: 0.1 10E9/L (ref 0–0.7)
EOSINOPHIL NFR BLD AUTO: 1.2 %
ERYTHROCYTE [DISTWIDTH] IN BLOOD BY AUTOMATED COUNT: 15.5 % (ref 10–15)
GFR SERPL CREATININE-BSD FRML MDRD: 83 ML/MIN/{1.73_M2}
GLUCOSE SERPL-MCNC: 101 MG/DL (ref 70–105)
HCT VFR BLD AUTO: 46.9 % (ref 40–53)
HGB BLD-MCNC: 15.4 G/DL (ref 13.3–17.7)
IMM GRANULOCYTES # BLD: 0 10E9/L (ref 0–0.4)
IMM GRANULOCYTES NFR BLD: 0.3 %
LYMPHOCYTES # BLD AUTO: 1.8 10E9/L (ref 0.8–5.3)
LYMPHOCYTES NFR BLD AUTO: 26.1 %
MCH RBC QN AUTO: 27.9 PG (ref 26.5–33)
MCHC RBC AUTO-ENTMCNC: 32.8 G/DL (ref 31.5–36.5)
MCV RBC AUTO: 85 FL (ref 78–100)
MONOCYTES # BLD AUTO: 0.5 10E9/L (ref 0–1.3)
MONOCYTES NFR BLD AUTO: 7 %
NEUTROPHILS # BLD AUTO: 4.5 10E9/L (ref 1.6–8.3)
NEUTROPHILS NFR BLD AUTO: 65 %
PLATELET # BLD AUTO: 184 10E9/L (ref 150–450)
POTASSIUM SERPL-SCNC: 3.9 MMOL/L (ref 3.5–5.1)
PROT SERPL-MCNC: 8.2 G/DL (ref 6.4–8.9)
RBC # BLD AUTO: 5.51 10E12/L (ref 4.4–5.9)
SODIUM SERPL-SCNC: 137 MMOL/L (ref 134–144)
WBC # BLD AUTO: 6.9 10E9/L (ref 4–11)

## 2020-03-06 PROCEDURE — 86803 HEPATITIS C AB TEST: CPT | Mod: ZL | Performed by: DERMATOLOGY

## 2020-03-06 PROCEDURE — 87340 HEPATITIS B SURFACE AG IA: CPT | Mod: ZL | Performed by: DERMATOLOGY

## 2020-03-06 PROCEDURE — 86481 TB AG RESPONSE T-CELL SUSP: CPT | Mod: ZL | Performed by: DERMATOLOGY

## 2020-03-06 PROCEDURE — 36415 COLL VENOUS BLD VENIPUNCTURE: CPT | Mod: ZL | Performed by: DERMATOLOGY

## 2020-03-06 PROCEDURE — 85025 COMPLETE CBC W/AUTO DIFF WBC: CPT | Mod: ZL | Performed by: DERMATOLOGY

## 2020-03-06 PROCEDURE — 80053 COMPREHEN METABOLIC PANEL: CPT | Mod: ZL | Performed by: DERMATOLOGY

## 2020-03-09 LAB
HBV SURFACE AG SERPL QL IA: NONREACTIVE
HCV AB SERPL QL IA: NONREACTIVE

## 2020-03-10 LAB
GAMMA INTERFERON BACKGROUND BLD IA-ACNC: 0.03 IU/ML
M TB IFN-G BLD-IMP: NEGATIVE
M TB IFN-G CD4+ BCKGRND COR BLD-ACNC: >10 IU/ML
MITOGEN IGNF BCKGRD COR BLD-ACNC: 0 IU/ML
MITOGEN IGNF BCKGRD COR BLD-ACNC: 0 IU/ML

## 2020-04-27 ENCOUNTER — OFFICE VISIT (OUTPATIENT)
Dept: FAMILY MEDICINE | Facility: OTHER | Age: 50
End: 2020-04-27
Attending: CHIROPRACTOR
Payer: OTHER MISCELLANEOUS

## 2020-04-27 ENCOUNTER — HOSPITAL ENCOUNTER (OUTPATIENT)
Dept: GENERAL RADIOLOGY | Facility: OTHER | Age: 50
Discharge: HOME | End: 2020-04-27
Attending: CHIROPRACTOR | Admitting: CHIROPRACTOR
Payer: OTHER MISCELLANEOUS

## 2020-04-27 VITALS
DIASTOLIC BLOOD PRESSURE: 100 MMHG | OXYGEN SATURATION: 95 % | RESPIRATION RATE: 16 BRPM | SYSTOLIC BLOOD PRESSURE: 160 MMHG | WEIGHT: 235 LBS | TEMPERATURE: 96.5 F | HEART RATE: 63 BPM | BODY MASS INDEX: 35.73 KG/M2

## 2020-04-27 DIAGNOSIS — M75.32 CALCIFIC TENDONITIS OF LEFT SHOULDER REGION: ICD-10-CM
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
## 2020-04-27 DIAGNOSIS — M75.42 IMPINGEMENT SYNDROME OF SHOULDER REGION, LEFT: ICD-10-CM

## 2020-04-27 PROCEDURE — 73030 X-RAY EXAM OF SHOULDER: CPT | Mod: LT

## 2020-04-27 PROCEDURE — 99213 OFFICE O/P EST LOW 20 MIN: CPT | Performed by: CHIROPRACTOR

## 2020-04-27 ASSESSMENT — PAIN SCALES - GENERAL: PAINLEVEL: SEVERE PAIN (7)

## 2020-04-27 NOTE — PROGRESS NOTES
"  Chief Complaint   Patient presents with     Work Comp     left shoulder       HISTORY OF PRESENTING INJURY     Onset and description:  4/23/2020 at approximately 8:00 am.  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  He reportedly inserts a harder piece of cardboard into the bottom of the box.  This stack is apparently across a table and was higher than usual on the DOI.  He describes reaching over the table with his left arm, as always, and grabbing the top most piece on the stack, when he experienced a sharp pain in the lateral shoulder region.  He reported this incident and was placed in a light duty work station for the rest of that day as well as Friday's work shift.  He reports being more sore on Friday.  He also reports the pain is now moving up into the left lower neck region and travelling down his arm to the dorsum hand, describing this as \"numbing\".   He also describes having \"popping\" in the shoulder when moving his left arm.     René denies any trauma or incident prior to this reported injury.  He also denies any previous shoulder instability/popping.      Description of pain: Sharp  Pain scale: 7/10  Radiation of pain: Yes as described.  Pain course: Staying the same  Aggravated by: Movement  Improved by: Unremarkable  Treatment and care provided for this condition: N/A        PAST MEDICAL HISTORY:  Past Medical History:   Diagnosis Date     Gout     No Comments Provided     Hypomagnesemia     No Comments Provided     Pain in left knee     No Comments Provided     Personal history of other (healed) physical injury and trauma     2000,repair     Unspecified injury of unspecified wrist, hand and finger(s), initial encounter     1999       PAST SURGICAL HISTORY:  Past Surgical History:   Procedure Laterality Date     ARTHROSCOPY KNEE      Right knee meniscal repair, arthroscopic ally     ARTHROSCOPY KNEE      2010,Left knee scope     FINGER SURGERY  "     Right thumb ORIF     OTHER SURGICAL HISTORY      7/15/14,,HERNIA REPAIR,Left,LIH with mesh     OTHER SURGICAL HISTORY      5/10/16,,HERNIA REPAIR,Right,RIH with plug/patch       ALLERGIES:  Allergies   Allergen Reactions     Seasonal      Other reaction(s): Sneezing  Other reaction(s): Sneezing     No Clinical Screening - See Comments Nausea and Vomiting     Peas  Peas       CURRENT MEDICATIONS:  Current Outpatient Medications   Medication Sig Dispense Refill     acetic acid (VOSOL) 2 % otic solution Place 4 drops into both ears 3 times daily 30 mL 3     albuterol (PROAIR HFA/PROVENTIL HFA/VENTOLIN HFA) 108 (90 Base) MCG/ACT inhaler Inhale 2 puffs into the lungs 4 times daily as needed 2 Inhaler 11     allopurinol (ZYLOPRIM) 300 MG tablet Take 1 tablet (300 mg) by mouth daily 90 tablet 3     betamethasone dipropionate (DIPROSONE) 0.05 % external cream Apply topically At Bedtime To scalp, wash out in the AM 45 g 3     cetirizine (ZYRTEC) 10 MG tablet Take 1 tablet (10 mg) by mouth daily 90 tablet 3     HUMIRA PEN 40 MG/0.8ML pen kit Inject 40 mg as directed every 14 days       indomethacin (INDOCIN) 50 MG capsule Take 1 capsule (50 mg) by mouth 3 times daily (with meals) 60 capsule 1     naproxen (NAPROSYN) 500 MG tablet Take 1 tablet (500 mg) by mouth 2 times daily (with meals) 180 tablet 3     omeprazole (PRILOSEC) 40 MG DR capsule Take 1 capsule (40 mg) by mouth daily 30 capsule 1     sertraline (ZOLOFT) 100 MG tablet Take 1 tablet (100 mg) by mouth daily 90 tablet 3     triamcinolone (KENALOG) 0.1 % external cream Apply topically 2 times daily 453.6 g 1       SOCIAL HISTORY:  Social History     Socioeconomic History     Marital status: Legally      Spouse name: Not on file     Number of children: Not on file     Years of education: Not on file     Highest education level: Not on file   Occupational History     Not on file   Social Needs     Financial resource strain: Not on file     Food  insecurity     Worry: Not on file     Inability: Not on file     Transportation needs     Medical: Not on file     Non-medical: Not on file   Tobacco Use     Smoking status: Former Smoker     Types: Cigarettes     Last attempt to quit: 2/17/2010     Years since quitting: 10.1     Smokeless tobacco: Never Used   Substance and Sexual Activity     Alcohol use: No     Alcohol/week: 0.0 standard drinks     Comment: Alcoholic Drinks/day: seldom     Drug use: No     Sexual activity: Yes     Partners: Female   Lifestyle     Physical activity     Days per week: Not on file     Minutes per session: Not on file     Stress: Not on file   Relationships     Social connections     Talks on phone: Not on file     Gets together: Not on file     Attends Sikhism service: Not on file     Active member of club or organization: Not on file     Attends meetings of clubs or organizations: Not on file     Relationship status: Not on file     Intimate partner violence     Fear of current or ex partner: Not on file     Emotionally abused: Not on file     Physically abused: Not on file     Forced sexual activity: Not on file   Other Topics Concern     Parent/sibling w/ CABG, MI or angioplasty before 65F 55M? Not Asked   Social History Narrative     times one and now .  Works at [employer] in [place].    Works at [employer] in [place].    [employer]    3 children:  Carlos, 1997, Chaparrita, 2009,  George, 2011       FAMILY HISTORY:  Family History   Problem Relation Age of Onset     Diabetes Mother         Diabetes     Other - See Comments Father         episode of gout.     Colon Cancer Father         Cancer-colon,diagnosed in early 60s     Family History Negative Son         Good Health,1997     Family History Negative Daughter         Good Health,2009     Family History Negative Son         Good Health,2011       REVIEW OF SYSTEMS:    Nursing Notes:   Elenita Roper LPN  4/27/2020  9:34 AM  Signed  Christiano CORLEY  Dixon is a 50 year old male presenting for an initial work comp evaluation for the following injuries: left shoulder  DATE OF INJURY: 4/23/20  Employer: [employer]  Medication Reconciliation: complete    Review Of Systems  Skin: negative  Eyes: negative  Ears/Nose/Throat: negative  Respiratory: No shortness of breath, dyspnea on exertion, cough, or hemoptysis  Cardiovascular: negative  Gastrointestinal: negative  Genitourinary: negative  Musculoskeletal: positive for negative and injury to left shoulder and left sided neck pain  Neurologic: positive for numbness or tingling of hands  Psychiatric: negative  Hematologic/Lymphatic/Immunologic: negative  Endocrine: negative     Elenita Roper LPN  4/27/2020 8:48 AM   Reviewed JWS        PHYSICAL EXAM:   BP (!) 160/100   Pulse 63   Temp 96.5  F (35.8  C) (Tympanic)   Resp 16   Wt 106.6 kg (235 lb)   SpO2 95%   BMI 35.73 kg/m   Body mass index is 35.73 kg/m .    General Appearance: avoids motion with left shoulder and keeps hand at waist level.     Psychiatric Exam: alert, orientated and appropriate affect.      Left Shoulder:  Range of motion testing utilizing goniometer:   Flexion: 132 degrees (180)   Extension: 60 degrees (45-60)   Abduction: 110 degrees (160)   External Rotation: 76 degrees (90)   Internal Rotation: 54 degrees (70-90)      Sensory is: Intact    Hawkin's sign: Positive  Neer's Sign: positive.  Cross body adduction:  negative.  Drop arm test: negative.  Weakness at waist level: negative.  Bicipital testing: negative.  Lift off test:  negative.  Bowie's test:positive.    X-rays obtained of the left shoulder:    Study Result     PROCEDURE: XR SHOULDER LT G/E 3 VW 4/27/2020 9:30 AM     HISTORY: Work related injury; Impingement syndrome of shoulder region,  left     COMPARISONS: None.     TECHNIQUE: 4 views.     FINDINGS: There is soft tissue calcification along the superior  lateral margin of the humerus. This is consistent with  calcific  tendinitis.     No fracture or dislocation is seen. There is mild degenerative change  in the AC joint. There is mild narrowing of the acromiohumeral  distance.                                                                        IMPRESSION: Calcific tendinitis.     DION KHAN MD           IMPRESSION/PLAN:    Condition is chronic with evidence of prior chondrocalcinosis on CR dated 4/13/2014 and evident again on today's x-ray with progressive worsening noted.   Orthopedic referral was made to Dr. Dickey of [facility] and this will be seen outside of Workers Compensation due to objective evidence noted above.  René was understanding of this and started to tell me about a prior work comp claim filed in 2014 while employed at another company.  This too was on the left shoulder and was being managed by Parvez Arias of [facility].  I do not have these records to review, but he informs me that he was diagnosed with rotator cuff tear and tendonitis.      I did issue work restrictions that are in place until he is seen by Dr. Dickey.  Dr. Dickey will assume care in this case due to it being outside of workers compensation.  Any future visits should not be billed to Workers Compensation and he understood this and agreed to this.    Post Encounter: No further questions and all concerns answered to their satisfaction. Greater than 50% of this 43 minute encounter was spent in counseling and coordination of care regarding the above condition.          Thom Jean-Baptiste DC, LUISA  Director - Occupational Medicine Department  [facility]  [address] [place] [id number]  Phone [phone]  Fax [phone]    Disclaimer:  This note consists of words and symbols derived from keyboarding, dictation, or using voice recognition software. As a result, there may be errors in the script that have gone undetected. Please consider this when  interpreting information found in this note.    10:24 AM 4/27/2020

## 2020-04-27 NOTE — NURSING NOTE
Christiano Metcalf is a 50 year old male presenting for an initial work comp evaluation for the following injuries: left shoulder  DATE OF INJURY: 4/23/20  Employer: [employer]  Medication Reconciliation: complete    Review Of Systems  Skin: negative  Eyes: negative  Ears/Nose/Throat: negative  Respiratory: No shortness of breath, dyspnea on exertion, cough, or hemoptysis  Cardiovascular: negative  Gastrointestinal: negative  Genitourinary: negative  Musculoskeletal: positive for negative and injury to left shoulder and left sided neck pain  Neurologic: positive for numbness or tingling of hands  Psychiatric: negative  Hematologic/Lymphatic/Immunologic: negative  Endocrine: negative     Elenita Roper LPN  4/27/2020 8:48 AM

## 2020-06-01 ENCOUNTER — OFFICE VISIT (OUTPATIENT)
Dept: ORTHOPEDICS | Facility: OTHER | Age: 50
End: 2020-06-01
Attending: ORTHOPAEDIC SURGERY
Payer: OTHER MISCELLANEOUS

## 2020-06-01 VITALS
HEIGHT: 68 IN | SYSTOLIC BLOOD PRESSURE: 138 MMHG | HEART RATE: 68 BPM | RESPIRATION RATE: 16 BRPM | TEMPERATURE: 97.4 F | DIASTOLIC BLOOD PRESSURE: 88 MMHG | BODY MASS INDEX: 35.61 KG/M2 | WEIGHT: 235 LBS
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
## 2020-06-01 DIAGNOSIS — M75.32 CALCIFIC TENDONITIS OF LEFT SHOULDER REGION: ICD-10-CM

## 2020-06-01 PROCEDURE — 99202 OFFICE O/P NEW SF 15 MIN: CPT | Performed by: ORTHOPAEDIC SURGERY

## 2020-06-01 ASSESSMENT — MIFFLIN-ST. JEOR: SCORE: 1900.45

## 2020-06-01 ASSESSMENT — PAIN SCALES - GENERAL: PAINLEVEL: SEVERE PAIN (7)

## 2020-06-01 NOTE — NURSING NOTE
"Chief Complaint   Patient presents with     Work Comp     consult left shoulder injury       Initial /88 (BP Location: Right arm, Patient Position: Sitting, Cuff Size: Adult Large)   Pulse 68   Temp 97.4  F (36.3  C) (Tympanic)   Resp 16   Ht 1.727 m (5' 8\")   Wt 106.6 kg (235 lb)   BMI 35.73 kg/m   Estimated body mass index is 35.73 kg/m  as calculated from the following:    Height as of this encounter: 1.727 m (5' 8\").    Weight as of this encounter: 106.6 kg (235 lb).  Medication Reconciliation: complete    Caron Rivera LPN    "

## 2020-06-02 NOTE — PROGRESS NOTES
Visit Date:   06/01/2020      CHIEF COMPLAINT:  Left shoulder pain.      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  He states that he was reaching across with his shoulder reaching out to grab a piece of plastic to assemble a box when he had felt amazing pain right in the front of his shoulder.  This is a sharp, shooting pain and since then he started to develop numbness and tingling down into the arm.  He also has some numbness and tingling and pain up into his neck on the left side as well.  Rates the pain at a 7/10.  It is sharp and shooting in quality.  He basically points to the right at the front of his shoulder as where the pain is and then states that it radiates out to the side.  Original date of injury was 04/23/2020, right around 8:00 a.m.      PAST MEDICAL HISTORY:  Really noncontributory, does have a history of gout and a history of arthritis, ortho surgeries including bilateral knees and the thumb, general surgeries had a hernia repair in the past.      FAMILY MEDICAL HISTORY:  Noncontributory.      SOCIAL HISTORY:  Single.  No alcohol use.  Former tobacco user.      REVIEW OF SYSTEMS:  Negative other than the history of present illness and past medical history.      PHYSICAL EXAMINATION:  Today, this is a 50-year-old gentleman in no acute distress, very pleasant on examination.  He has full range of motion of bilateral shoulders all the way up to about 170 degrees.  Positive Neer.  Impingement sign on the left.  He has internal and external rotation of 45 degrees, internal rotation to L5.  He is tender to palpation at Codman's point, tender to palpation of the AC joint, tender to palpation of the bicipital groove.  Testing of the rotator cuff reveals full strength in all planes.  No evidence for rotator cuff tear.  He is neuro and vascularly intact in bilateral upper extremities.      IMAGING:  X-ray examination of the shoulder shows  significant calcific tendonitis appreciated in the infraspinatus and possibly supraspinatus tendons of the left rotator cuff.  No significant glenohumeral arthritis is appreciated.  Mild to moderate AC joint arthritis and there are changes within the greater tuberosity and the undersurface of the acromion consistent with rotator cuff dysfunction.      IMPRESSION AND PLAN:  This a 50-year-old gentleman with a significant calcific tendinitis of his shoulder.  We are going to go ahead and obtain an MRI of his shoulder to evaluate for this and I will call him back with the results.         DAVE SANFORD MD             D: 2020   T: 2020   MT: CRUZ      Name:     CLAUDIA CONTRERAS   MRN:      [id number]        Account:      [id number]   :      1970           Visit Date:   2020      Document: [id number]

## 2020-06-19 ENCOUNTER — HOSPITAL ENCOUNTER (OUTPATIENT)
Dept: MRI IMAGING | Facility: OTHER | Age: 50
Discharge: HOME | End: 2020-06-19
Attending: ORTHOPAEDIC SURGERY | Admitting: ORTHOPAEDIC SURGERY
Payer: COMMERCIAL

## 2020-06-19 DIAGNOSIS — M75.32 CALCIFIC TENDONITIS OF LEFT SHOULDER REGION: ICD-10-CM
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
## 2020-06-19 PROCEDURE — 73221 MRI JOINT UPR EXTREM W/O DYE: CPT | Mod: LT

## 2020-06-23 ENCOUNTER — TELEPHONE (OUTPATIENT)
Dept: ORTHOPEDICS | Facility: OTHER | Age: 50
End: 2020-06-23

## 2020-06-24 NOTE — TELEPHONE ENCOUNTER
Dr. Dickey called and talked to patient personally today.   Mesha English LPN .....................6/24/2020 3:41 PM

## 2020-06-29 ENCOUNTER — OFFICE VISIT (OUTPATIENT)
Dept: ORTHOPEDICS | Facility: OTHER | Age: 50
End: 2020-06-29
Attending: ORTHOPAEDIC SURGERY
Payer: COMMERCIAL

## 2020-06-29 DIAGNOSIS — M75.32 CALCIFIC TENDONITIS OF LEFT SHOULDER REGION: Primary | ICD-10-CM

## 2020-06-29 PROCEDURE — G0463 HOSPITAL OUTPT CLINIC VISIT: HCPCS

## 2020-06-29 PROCEDURE — 99212 OFFICE O/P EST SF 10 MIN: CPT | Performed by: ORTHOPAEDIC SURGERY

## 2020-06-29 NOTE — NURSING NOTE
"Chief Complaint   Patient presents with     RECHECK     follow up left shoulder pain       Initial There were no vitals taken for this visit. Estimated body mass index is 35.73 kg/m  as calculated from the following:    Height as of 6/1/20: 1.727 m (5' 8\").    Weight as of 6/1/20: 106.6 kg (235 lb).    Caron Rivera LPN    "

## 2020-06-30 NOTE — PROGRESS NOTES
Visit Date:   2020      I called with the MRI results.  He decided he wanted to make an appointment so his [facility] officer could sit on a conversation and discussed with us.  I proposed a left shoulder subacromial decompression, distal clavicle excision for him.  He would like to proceed with that at this point, we discussed this at length with his [facility] provider as well as René and he would like to proceed.      PHYSICAL EXAMINATION:  He has pain.  He has a positive impingement sign.  He is tender to palpation of the AC joint, has a positive cross-body adduction test with loading is otherwise neuro and vascularly intact.  He has intact rotator cuff in all planes.      IMPRESSION AND PLAN:  This is a 50-year-old gentleman with acromioclavicular joint arthritis as well as rotator cuff tendinitis of his left shoulder.  We are going to go ahead and get him lined up with an arthroscopy of his shoulder with a subacromial decompression, distal clavicle excision.  I will see him back at the time of surgery.  All the risks and benefits were discussed with him.  He wishes to proceed.         DAVE SANFORD MD             D: 2020   T: 2020   MT: CRUZ      Name:     CLAUDIA CONTRERAS   MRN:      [id number]        Account:      [id number]   :      1970           Visit Date:   2020      Document: [id number]

## 2020-07-02 DIAGNOSIS — H91.93 DECREASED HEARING OF BOTH EARS: Primary | ICD-10-CM

## 2020-07-15 DIAGNOSIS — M1A.00X0 IDIOPATHIC CHRONIC GOUT WITHOUT TOPHUS, UNSPECIFIED SITE: ICD-10-CM

## 2020-07-17 RX ORDER — ALLOPURINOL 300 MG/1
300 TABLET ORAL DAILY
Qty: 90 TABLET | Refills: 3 | Status: SHIPPED | OUTPATIENT
Start: 2020-07-17 | End: 2020-09-03

## 2020-07-21 ENCOUNTER — OFFICE VISIT (OUTPATIENT)
Dept: AUDIOLOGY | Facility: OTHER | Age: 50
End: 2020-07-21
Attending: AUDIOLOGIST
Payer: COMMERCIAL

## 2020-07-21 ENCOUNTER — OFFICE VISIT (OUTPATIENT)
Dept: OTOLARYNGOLOGY | Facility: OTHER | Age: 50
End: 2020-07-21
Attending: PHYSICIAN ASSISTANT
Payer: COMMERCIAL

## 2020-07-21 VITALS
TEMPERATURE: 96.1 F | WEIGHT: 230 LBS | OXYGEN SATURATION: 98 % | HEART RATE: 69 BPM | HEIGHT: 69 IN | SYSTOLIC BLOOD PRESSURE: 144 MMHG | RESPIRATION RATE: 18 BRPM | BODY MASS INDEX: 34.07 KG/M2 | DIASTOLIC BLOOD PRESSURE: 88 MMHG

## 2020-07-21 DIAGNOSIS — H91.93 DECREASED HEARING OF BOTH EARS: ICD-10-CM

## 2020-07-21 DIAGNOSIS — H90.3 SNHL (SENSORY-NEURAL HEARING LOSS), ASYMMETRICAL: Primary | ICD-10-CM

## 2020-07-21 DIAGNOSIS — H93.13 TINNITUS, BILATERAL: ICD-10-CM

## 2020-07-21 DIAGNOSIS — H90.3 SENSORINEURAL HEARING LOSS (SNHL) OF BOTH EARS: Primary | ICD-10-CM

## 2020-07-21 DIAGNOSIS — L29.9 EAR ITCHING: ICD-10-CM

## 2020-07-21 PROCEDURE — G0463 HOSPITAL OUTPT CLINIC VISIT: HCPCS | Mod: 25

## 2020-07-21 PROCEDURE — 99213 OFFICE O/P EST LOW 20 MIN: CPT | Mod: 25 | Performed by: NURSE PRACTITIONER

## 2020-07-21 PROCEDURE — 92550 TYMPANOMETRY & REFLEX THRESH: CPT | Performed by: AUDIOLOGIST

## 2020-07-21 PROCEDURE — 92504 EAR MICROSCOPY EXAMINATION: CPT | Performed by: NURSE PRACTITIONER

## 2020-07-21 PROCEDURE — 92557 COMPREHENSIVE HEARING TEST: CPT | Performed by: AUDIOLOGIST

## 2020-07-21 RX ORDER — CETIRIZINE HYDROCHLORIDE 10 MG/1
10 TABLET ORAL 2 TIMES DAILY
Qty: 60 TABLET | Refills: 4 | Status: SHIPPED | OUTPATIENT
Start: 2020-07-21 | End: 2020-09-03

## 2020-07-21 ASSESSMENT — PAIN SCALES - GENERAL: PAINLEVEL: NO PAIN (0)

## 2020-07-21 ASSESSMENT — MIFFLIN-ST. JEOR: SCORE: 1893.65

## 2020-07-21 NOTE — NURSING NOTE
"Chief Complaint   Patient presents with     Ent Problem     decreased hearing bilaterally, otitis externa, ringing in ears       Initial BP (!) 144/88   Pulse 69   Temp 96.1  F (35.6  C)   Resp 18   Ht 1.753 m (5' 9\")   Wt 104.3 kg (230 lb)   SpO2 98%   BMI 33.97 kg/m   Estimated body mass index is 33.97 kg/m  as calculated from the following:    Height as of this encounter: 1.753 m (5' 9\").    Weight as of this encounter: 104.3 kg (230 lb).  Medication Reconciliation: complete  Ayala Charles LPN  "

## 2020-07-21 NOTE — LETTER
7/21/2020         RE: Christiano Metcalf  [address]  [place] [address]        Dear Colleague,    Thank you for referring your patient, Christiano Metcalf, to the [facility]. Please see a copy of my visit note below.    Otolaryngology Note         Chief Complaint:     Patient presents with:  Ent Problem: decreased hearing bilaterally, otitis externa, ringing in ears           History of Present Illness:     Christiano Metcalf is a 50 year old male seen today for presents today with a CC of left ear decreased hearing and tinnitus since February 2020.      He denies COM as a child, he has been treated for recurrent OE for the past several months.  He has been using acetic acid, ciprodex and amoxicillin  He is not a smoker, quit in 2010, congratulated  Patient has no history of otological surgeries or procedures  No family hx of congential hearing loss, COM  He has recurrent ear itching, no pain  No otorrhea.    + history of noise exposure, he wears HP  NO vertigo, facial numbness or tingling.      Audiogram 7/21/2020:  Tympanograms are Type A for both ears suggesting normal eardrum mobility.  Acoustic Reflex Thresholds at 1000 Hz are present for both ears.  Thresholds are normal sloping to severe SNHL with 10 dB asymmetry noted in 3K, 6K, 8K frequencies left > right  Speech reception thresholds are in good agreement with pure tone average.  Right word recognition 100% left word recognition 88%         Medications:     Current Outpatient Rx   Medication Sig Dispense Refill     albuterol (PROAIR HFA/PROVENTIL HFA/VENTOLIN HFA) 108 (90 Base) MCG/ACT inhaler Inhale 2 puffs into the lungs 4 times daily as needed 2 Inhaler 11     allopurinol (ZYLOPRIM) 300 MG tablet Take 1 tablet (300 mg) by mouth daily 90 tablet 3     betamethasone dipropionate (DIPROSONE) 0.05 % external cream Apply topically At Bedtime To scalp, wash out in the AM 45 g 3     cetirizine (ZYRTEC) 10 MG  tablet Take 1 tablet (10 mg) by mouth 2 times daily 60 tablet 4     cetirizine (ZYRTEC) 10 MG tablet Take 1 tablet (10 mg) by mouth daily 90 tablet 3     HUMIRA PEN 40 MG/0.8ML pen kit Inject 40 mg as directed every 14 days       indomethacin (INDOCIN) 50 MG capsule Take 1 capsule (50 mg) by mouth 3 times daily (with meals) 60 capsule 1     naproxen (NAPROSYN) 500 MG tablet Take 1 tablet (500 mg) by mouth 2 times daily (with meals) 180 tablet 3     omeprazole (PRILOSEC) 40 MG DR capsule Take 1 capsule (40 mg) by mouth daily 30 capsule 1     sertraline (ZOLOFT) 100 MG tablet Take 1 tablet (100 mg) by mouth daily 90 tablet 3     triamcinolone (KENALOG) 0.1 % external cream Apply topically 2 times daily 453.6 g 1     acetic acid (VOSOL) 2 % otic solution Place 4 drops into both ears 3 times daily (Patient not taking: Reported on 7/21/2020) 30 mL 3            Allergies:     Allergies: Seasonal and No clinical screening - see comments          Past Medical History:     Past Medical History:   Diagnosis Date     Gout     No Comments Provided     Hypomagnesemia     No Comments Provided     Pain in left knee     No Comments Provided     Personal history of other (healed) physical injury and trauma     2000,repair     Unspecified injury of unspecified wrist, hand and finger(s), initial encounter     1999            Past Surgical History:     Past Surgical History:   Procedure Laterality Date     ARTHROSCOPY KNEE      Right knee meniscal repair, arthroscopic ally     ARTHROSCOPY KNEE      2010,Left knee scope     FINGER SURGERY      Right thumb ORIF     OTHER SURGICAL HISTORY      7/15/14,,HERNIA REPAIR,Left,LIH with mesh     OTHER SURGICAL HISTORY      5/10/16,,HERNIA REPAIR,Right,RIH with plug/patch       ENT family history reviewed         Social History:     Social History     Tobacco Use     Smoking status: Former Smoker     Types: Cigarettes     Last attempt to quit: 2/17/2010     Years since quitting: 10.4  "    Smokeless tobacco: Never Used   Substance Use Topics     Alcohol use: No     Alcohol/week: 0.0 standard drinks     Comment: Alcoholic Drinks/day: seldom     Drug use: No            Review of Systems:     ROS: See HPI         Physical Exam:     BP (!) 144/88   Pulse 69   Temp 96.1  F (35.6  C)   Resp 18   Ht 1.753 m (5' 9\")   Wt 104.3 kg (230 lb)   SpO2 98%   BMI 33.97 kg/m       General - The patient is well nourished and well developed, and appears to have good nutritional status.  Alert and oriented to person and place, answers questions and cooperates with examination appropriately.   Head and Face - Normocephalic and atraumatic, with no gross asymmetry noted.  The facial nerve is intact, with strong symmetric movements.  Voice and Breathing - The patient was breathing comfortably without the use of accessory muscles. There was no wheezing, stridor. The patients voice was clear and strong, and had appropriate pitch and quality.  Ears - External ear normal. The ears were examined under binocular microscopy and with otoscope.  The left canal is cerumen occluded.  The right canal is patent. Left canal cleaned with cupped forceps, a large firm cerumen plug was pulled from the left canal with complete resolution of occlusion.  Right tympanic membrane is intact without effusion, retraction or mass. Left tympanic membrane is intact without effusion, retraction or mass.  Left external auditory canal was tender upon placement of speculum in ear.  No tenderness to canal with cerumen removal.   No canal edema or erythema noted. No otorrhea or sporangia noted.    Eyes - Extraocular movements intact, sclera were not icteric or injected, conjunctiva were pink and moist.  Mouth - Examination of the oral cavity showed pink, healthy oral mucosa. Dentition in good condition. No lesions or ulcerations noted. The tongue was mobile and midline.  Left TMJ and superior ramus tenderness with palpation.  Click/pop noted with " opening and closing left jaw.    Throat - The walls of the oropharynx were smooth, pink, moist, symmetric, and had no lesions or ulcerations.  The tonsillar pillars and soft palate were symmetric. The uvula was midline on elevation.    Neck - Normal midline excursion of the laryngotracheal complex during swallowing.  Full range of motion on passive movement.  Palpation of the occipital, submental, submandibular, internal jugular chain, and supraclavicular nodes did not demonstrate any abnormal lymph nodes or masses.  Palpation of the thyroid was soft and smooth, with no nodules or goiter appreciated.  The trachea was mobile and midline.  Nose - External contour is symmetric, no gross deflection or scars.  Nasal mucosa is pink and moist with no abnormal mucus.  The septum and turbinates were evaluated: grossly normal.  No polyps, masses, or purulence noted on examination.         Assessment and Plan:       ICD-10-CM    1. SNHL (sensory-neural hearing loss), asymmetrical  H90.5 MR Internal Auditory Canal wo&w Contrast   2. Ear itching  L29.9 cetirizine (ZYRTEC) 10 MG tablet     MRI of inner ear to be done in [place], once done will clear for hearing aids  No signs of infection, effusion or retraction noted on exam.      Thank you for allowing Marylou TAYLOR and our ENT team to participate in your care.  If your medications are too expensive, please call my nurse at the number listed below.  We can possibly change this medication.    If you have a scheduling or an appointment question please contact our Health Unit Coordinator at their direct line [phone].   ALL nursing questions or concerns can be directed to your ENT nurses at: [phone] or [phone].          Again, thank you for allowing me to participate in the care of your patient.        Sincerely,        Marylou Stanford NP

## 2020-07-21 NOTE — PROGRESS NOTES
Audiology Evaluation Completed. Please refer SCANNED AUDIOGRAM and/or TYMPANOGRAM for BACKGROUND, RESULTS, RECOMMENDATIONS.      Laury DELGADO, [facility]  Audiologist #[id number]

## 2020-07-21 NOTE — PROGRESS NOTES
Otolaryngology Note         Chief Complaint:     Patient presents with:  Ent Problem: decreased hearing bilaterally, otitis externa, ringing in ears           History of Present Illness:     Christiano Metcalf is a 50 year old male seen today for presents today with a CC of left ear decreased hearing and tinnitus since February 2020.      He denies COM as a child, he has been treated for recurrent OE for the past several months.  He has been using acetic acid, ciprodex and amoxicillin  He is not a smoker, quit in 2010, congratulated  Patient has no history of otological surgeries or procedures  No family hx of congential hearing loss, COM  He has recurrent ear itching, no pain  No otorrhea.    + history of noise exposure, he wears HP  NO vertigo, facial numbness or tingling.      Audiogram 7/21/2020:  Tympanograms are Type A for both ears suggesting normal eardrum mobility.  Acoustic Reflex Thresholds at 1000 Hz are present for both ears.  Thresholds are normal sloping to severe SNHL with 10 dB asymmetry noted in 3K, 6K, 8K frequencies left > right  Speech reception thresholds are in good agreement with pure tone average.  Right word recognition 100% left word recognition 88%         Medications:     Current Outpatient Rx   Medication Sig Dispense Refill     albuterol (PROAIR HFA/PROVENTIL HFA/VENTOLIN HFA) 108 (90 Base) MCG/ACT inhaler Inhale 2 puffs into the lungs 4 times daily as needed 2 Inhaler 11     allopurinol (ZYLOPRIM) 300 MG tablet Take 1 tablet (300 mg) by mouth daily 90 tablet 3     betamethasone dipropionate (DIPROSONE) 0.05 % external cream Apply topically At Bedtime To scalp, wash out in the AM 45 g 3     cetirizine (ZYRTEC) 10 MG tablet Take 1 tablet (10 mg) by mouth 2 times daily 60 tablet 4     cetirizine (ZYRTEC) 10 MG tablet Take 1 tablet (10 mg) by mouth daily 90 tablet 3     HUMIRA PEN 40 MG/0.8ML pen kit Inject 40 mg as directed every 14 days       indomethacin (INDOCIN) 50 MG capsule Take 1  capsule (50 mg) by mouth 3 times daily (with meals) 60 capsule 1     naproxen (NAPROSYN) 500 MG tablet Take 1 tablet (500 mg) by mouth 2 times daily (with meals) 180 tablet 3     omeprazole (PRILOSEC) 40 MG DR capsule Take 1 capsule (40 mg) by mouth daily 30 capsule 1     sertraline (ZOLOFT) 100 MG tablet Take 1 tablet (100 mg) by mouth daily 90 tablet 3     triamcinolone (KENALOG) 0.1 % external cream Apply topically 2 times daily 453.6 g 1     acetic acid (VOSOL) 2 % otic solution Place 4 drops into both ears 3 times daily (Patient not taking: Reported on 7/21/2020) 30 mL 3            Allergies:     Allergies: Seasonal and No clinical screening - see comments          Past Medical History:     Past Medical History:   Diagnosis Date     Gout     No Comments Provided     Hypomagnesemia     No Comments Provided     Pain in left knee     No Comments Provided     Personal history of other (healed) physical injury and trauma     2000,repair     Unspecified injury of unspecified wrist, hand and finger(s), initial encounter     1999            Past Surgical History:     Past Surgical History:   Procedure Laterality Date     ARTHROSCOPY KNEE      Right knee meniscal repair, arthroscopic ally     ARTHROSCOPY KNEE      2010,Left knee scope     FINGER SURGERY      Right thumb ORIF     OTHER SURGICAL HISTORY      7/15/14,,HERNIA REPAIR,Left,LIH with mesh     OTHER SURGICAL HISTORY      5/10/16,,HERNIA REPAIR,Right,RIH with plug/patch       ENT family history reviewed         Social History:     Social History     Tobacco Use     Smoking status: Former Smoker     Types: Cigarettes     Last attempt to quit: 2/17/2010     Years since quitting: 10.4     Smokeless tobacco: Never Used   Substance Use Topics     Alcohol use: No     Alcohol/week: 0.0 standard drinks     Comment: Alcoholic Drinks/day: seldom     Drug use: No            Review of Systems:     ROS: See HPI         Physical Exam:     BP (!) 144/88   Pulse 69    "Temp 96.1  F (35.6  C)   Resp 18   Ht 1.753 m (5' 9\")   Wt 104.3 kg (230 lb)   SpO2 98%   BMI 33.97 kg/m       General - The patient is well nourished and well developed, and appears to have good nutritional status.  Alert and oriented to person and place, answers questions and cooperates with examination appropriately.   Head and Face - Normocephalic and atraumatic, with no gross asymmetry noted.  The facial nerve is intact, with strong symmetric movements.  Voice and Breathing - The patient was breathing comfortably without the use of accessory muscles. There was no wheezing, stridor. The patients voice was clear and strong, and had appropriate pitch and quality.  Ears - External ear normal. The ears were examined under binocular microscopy and with otoscope.  The left canal is cerumen occluded.  The right canal is patent. Left canal cleaned with cupped forceps, a large firm cerumen plug was pulled from the left canal with complete resolution of occlusion.  Right tympanic membrane is intact without effusion, retraction or mass. Left tympanic membrane is intact without effusion, retraction or mass.  Left external auditory canal was tender upon placement of speculum in ear.  No tenderness to canal with cerumen removal.   No canal edema or erythema noted. No otorrhea or sporangia noted.    Eyes - Extraocular movements intact, sclera were not icteric or injected, conjunctiva were pink and moist.  Mouth - Examination of the oral cavity showed pink, healthy oral mucosa. Dentition in good condition. No lesions or ulcerations noted. The tongue was mobile and midline.  Left TMJ and superior ramus tenderness with palpation.  Click/pop noted with opening and closing left jaw.    Throat - The walls of the oropharynx were smooth, pink, moist, symmetric, and had no lesions or ulcerations.  The tonsillar pillars and soft palate were symmetric. The uvula was midline on elevation.    Neck - Normal midline excursion of the " laryngotracheal complex during swallowing.  Full range of motion on passive movement.  Palpation of the occipital, submental, submandibular, internal jugular chain, and supraclavicular nodes did not demonstrate any abnormal lymph nodes or masses.  Palpation of the thyroid was soft and smooth, with no nodules or goiter appreciated.  The trachea was mobile and midline.  Nose - External contour is symmetric, no gross deflection or scars.  Nasal mucosa is pink and moist with no abnormal mucus.  The septum and turbinates were evaluated: grossly normal.  No polyps, masses, or purulence noted on examination.         Assessment and Plan:       ICD-10-CM    1. SNHL (sensory-neural hearing loss), asymmetrical  H90.5 MR Internal Auditory Canal wo&w Contrast   2. Ear itching  L29.9 cetirizine (ZYRTEC) 10 MG tablet     MRI of inner ear to be done in [place], once done will clear for hearing aids  No signs of infection, effusion or retraction noted on exam.      Thank you for allowing Marylou TAYLOR and our ENT team to participate in your care.  If your medications are too expensive, please call my nurse at the number listed below.  We can possibly change this medication.    If you have a scheduling or an appointment question please contact our Health Unit Coordinator at their direct line [phone].   ALL nursing questions or concerns can be directed to your ENT nurses at: [phone] or [phone].

## 2020-07-21 NOTE — PATIENT INSTRUCTIONS
Use 1/2 white vinegar, 1/2 distilled water, mix equal parts in a clean or sterile container, apply several drops to both ears let sit for several minutes then let drain out, dry with a hair dryer set on a cool setting or just let them dry    Avoid q tip use    MRI of inner ear to be done in [place], once done will clear for hearing aids        Thank you for allowing Marylou Stanford NP and our ENT team to participate in your care.    If your medications are too expensive, please give the nurse a call.  We can possibly change this medication.  If you have a scheduling or an appointment question please contact our Health Unit Coordinator at their direct line [phone].   ALL nursing questions or concerns can be directed to your ENT nurse at: [phone] Dior Cai

## 2020-07-24 ENCOUNTER — HOSPITAL ENCOUNTER (OUTPATIENT)
Dept: MRI IMAGING | Facility: OTHER | Age: 50
Discharge: HOME | End: 2020-07-24
Attending: NURSE PRACTITIONER | Admitting: FAMILY MEDICINE
Payer: COMMERCIAL

## 2020-07-24 DIAGNOSIS — H90.3 SNHL (SENSORY-NEURAL HEARING LOSS), ASYMMETRICAL: ICD-10-CM

## 2020-07-24 PROCEDURE — 70543 MRI ORBT/FAC/NCK W/O &W/DYE: CPT

## 2020-07-24 PROCEDURE — 25500064 ZZH RX 255 OP 636: Performed by: NURSE PRACTITIONER

## 2020-07-24 PROCEDURE — A9575 INJ GADOTERATE MEGLUMI 0.1ML: HCPCS | Performed by: NURSE PRACTITIONER

## 2020-07-24 RX ADMIN — GADOTERATE MEGLUMINE 20 ML: 376.9 INJECTION INTRAVENOUS at 10:07

## 2020-08-11 DIAGNOSIS — F33.1 MODERATE EPISODE OF RECURRENT MAJOR DEPRESSIVE DISORDER (H): ICD-10-CM

## 2020-08-11 RX ORDER — SERTRALINE HYDROCHLORIDE 100 MG/1
100 TABLET, FILM COATED ORAL DAILY
Qty: 90 TABLET | Refills: 1 | Status: SHIPPED | OUTPATIENT
Start: 2020-08-11 | End: 2020-09-03

## 2020-08-11 NOTE — TELEPHONE ENCOUNTER
[facility] sent Rx request for the following:   sertraline (ZOLOFT) 100 MG tablet  Sig:  Take 1 tablet (100 mg) by mouth daily    Last Prescription Date:   7/15/2019  Last Fill Qty/Refills:         90, R-3    Last Office Visit:              1/14/2020   Future Office visit:           None  Overridden by Gino Frankel MD on Jul 15, 2019 3:50 PM    Drug-Drug    1. SELECTIVE SEROTONIN REUPTAKE INHIBITORS / NSAIDS [Level: Major] [Reason: Tolerated medication/side effects in past]    Other Orders:  indomethacin (INDOCIN) 25 MG capsule       2. SELECTIVE SEROTONIN REUPTAKE INHIBITORS / NSAIDS [Level: Major] [Reason: Tolerated medication/side effects in past]    Other Orders:  indomethacin (INDOCIN) 50 MG capsule       3. SELECTIVE SEROTONIN REUPTAKE INHIBITORS / NSAIDS [Level: Major] [Reason: Tolerated medication/side effects in past]    Other Orders:  indomethacin (INDOCIN) 50 MG capsule       4. SELECTIVE SEROTONIN REUPTAKE INHIBITORS / NSAIDS [Level: Major] [Reason: Tolerated medication/side effects in past]    Other Orders:  naproxen (NAPROSYN) 500 MG tablet       5. SELECTIVE SEROTONIN REUPTAKE INHIBITORS / NSAIDS [Level: Major] [Reason: Tolerated medication/side effects in past]    Other Orders:  naproxen (NAPROSYN) 500 MG tablet             Will fill at this time    Amira Bond RN  ....................  8/11/2020   2:17 PM

## 2020-08-24 DIAGNOSIS — M1A.00X0 IDIOPATHIC CHRONIC GOUT WITHOUT TOPHUS, UNSPECIFIED SITE: ICD-10-CM

## 2020-08-24 RX ORDER — NAPROXEN 500 MG/1
500 TABLET ORAL 2 TIMES DAILY WITH MEALS
Qty: 180 TABLET | Refills: 3 | Status: SHIPPED | OUTPATIENT
Start: 2020-08-24 | End: 2020-09-03

## 2020-08-24 NOTE — TELEPHONE ENCOUNTER
[facility] sent Rx request for the following:   naproxen (NAPROSYN) 500 MG tablet   Sig: Take 1 tablet (500 mg) by mouth 2 times daily (with meals) - Oral     Last Prescription Date:   07/15/2019  Last Fill Qty/Refills:         180, R-3    Last Office Visit:              01/14/2020   Future Office visit:           none  Routing refill request to provider for review/approval because:  Blood pressure out of range   BP Readings from Last 3 Encounters:   07/21/20 (!) 144/88   06/01/20 138/88   04/27/20 (!) 160/100     Labs out of range:  AST,ALT  AST   Date Value Ref Range Status   03/06/2020 92 (H) 13 - 39 U/L Final     Lab Results   Component Value Date    ALT 88 03/06/2020       Unable to complete prescription refill per RN Medication Refill Policy.................... Martita De Leon RN ....................  8/24/2020   8:52 AM

## 2020-09-03 ENCOUNTER — HOSPITAL ENCOUNTER (OUTPATIENT)
Dept: GENERAL RADIOLOGY | Facility: OTHER | Age: 50
End: 2020-09-03
Attending: PHYSICIAN ASSISTANT
Payer: COMMERCIAL

## 2020-09-03 ENCOUNTER — OFFICE VISIT (OUTPATIENT)
Dept: FAMILY MEDICINE | Facility: OTHER | Age: 50
End: 2020-09-03
Attending: PHYSICIAN ASSISTANT
Payer: COMMERCIAL

## 2020-09-03 VITALS
BODY MASS INDEX: 36 KG/M2 | WEIGHT: 224 LBS | HEART RATE: 86 BPM | SYSTOLIC BLOOD PRESSURE: 122 MMHG | HEIGHT: 66 IN | RESPIRATION RATE: 20 BRPM | TEMPERATURE: 97.8 F | DIASTOLIC BLOOD PRESSURE: 80 MMHG | OXYGEN SATURATION: 98 %

## 2020-09-03 DIAGNOSIS — G89.29 CHRONIC HIP PAIN, BILATERAL: ICD-10-CM

## 2020-09-03 DIAGNOSIS — M25.562 CHRONIC PAIN OF BOTH KNEES: Primary | ICD-10-CM

## 2020-09-03 DIAGNOSIS — M25.551 CHRONIC HIP PAIN, BILATERAL: ICD-10-CM

## 2020-09-03 DIAGNOSIS — F33.1 MODERATE EPISODE OF RECURRENT MAJOR DEPRESSIVE DISORDER (H): ICD-10-CM

## 2020-09-03 DIAGNOSIS — M25.561 CHRONIC PAIN OF BOTH KNEES: ICD-10-CM

## 2020-09-03 DIAGNOSIS — M25.562 CHRONIC PAIN OF BOTH KNEES: ICD-10-CM

## 2020-09-03 DIAGNOSIS — J45.20 MILD INTERMITTENT ASTHMA WITHOUT COMPLICATION: ICD-10-CM

## 2020-09-03 DIAGNOSIS — M25.552 CHRONIC HIP PAIN, BILATERAL: ICD-10-CM

## 2020-09-03 DIAGNOSIS — M25.561 CHRONIC PAIN OF BOTH KNEES: Primary | ICD-10-CM

## 2020-09-03 DIAGNOSIS — M1A.00X0 IDIOPATHIC CHRONIC GOUT WITHOUT TOPHUS, UNSPECIFIED SITE: ICD-10-CM

## 2020-09-03 DIAGNOSIS — G89.29 CHRONIC PAIN OF BOTH KNEES: ICD-10-CM

## 2020-09-03 DIAGNOSIS — G89.29 CHRONIC PAIN OF BOTH KNEES: Primary | ICD-10-CM

## 2020-09-03 DIAGNOSIS — L29.9 EAR ITCHING: ICD-10-CM

## 2020-09-03 PROCEDURE — G0463 HOSPITAL OUTPT CLINIC VISIT: HCPCS

## 2020-09-03 PROCEDURE — 99213 OFFICE O/P EST LOW 20 MIN: CPT | Performed by: PHYSICIAN ASSISTANT

## 2020-09-03 PROCEDURE — G0463 HOSPITAL OUTPT CLINIC VISIT: HCPCS | Mod: 25

## 2020-09-03 PROCEDURE — 73560 X-RAY EXAM OF KNEE 1 OR 2: CPT | Mod: RT

## 2020-09-03 PROCEDURE — 73523 X-RAY EXAM HIPS BI 5/> VIEWS: CPT

## 2020-09-03 RX ORDER — INDOMETHACIN 50 MG/1
50 CAPSULE ORAL
Qty: 60 CAPSULE | Refills: 1 | Status: ON HOLD | OUTPATIENT
Start: 2020-09-03 | End: 2021-09-04

## 2020-09-03 RX ORDER — ALLOPURINOL 300 MG/1
300 TABLET ORAL DAILY
Qty: 90 TABLET | Refills: 3 | Status: SHIPPED | OUTPATIENT
Start: 2020-09-03 | End: 2021-08-25

## 2020-09-03 RX ORDER — NAPROXEN 500 MG/1
500 TABLET ORAL 2 TIMES DAILY WITH MEALS
Qty: 180 TABLET | Refills: 3 | Status: ON HOLD | OUTPATIENT
Start: 2020-09-03 | End: 2021-09-05

## 2020-09-03 RX ORDER — ALBUTEROL SULFATE 90 UG/1
2 AEROSOL, METERED RESPIRATORY (INHALATION) 4 TIMES DAILY PRN
Qty: 2 INHALER | Refills: 11 | Status: SHIPPED | OUTPATIENT
Start: 2020-09-03 | End: 2021-09-07

## 2020-09-03 RX ORDER — CETIRIZINE HYDROCHLORIDE 10 MG/1
10 TABLET ORAL 2 TIMES DAILY
Qty: 60 TABLET | Refills: 4 | Status: SHIPPED | OUTPATIENT
Start: 2020-09-03 | End: 2021-09-22

## 2020-09-03 RX ORDER — SERTRALINE HYDROCHLORIDE 100 MG/1
100 TABLET, FILM COATED ORAL DAILY
Qty: 90 TABLET | Refills: 1 | Status: SHIPPED | OUTPATIENT
Start: 2020-09-03 | End: 2021-02-23

## 2020-09-03 ASSESSMENT — PATIENT HEALTH QUESTIONNAIRE - PHQ9: SUM OF ALL RESPONSES TO PHQ QUESTIONS 1-9: 6

## 2020-09-03 ASSESSMENT — MIFFLIN-ST. JEOR: SCORE: 1818.81

## 2020-09-03 ASSESSMENT — PAIN SCALES - GENERAL: PAINLEVEL: EXTREME PAIN (9)

## 2020-09-03 NOTE — PROGRESS NOTES
"SUBJECTIVE:   Christiano Metcalf is a 50 year old male here for the following health issues:    HPI  Patient is here today for a refill his medication as well as for bilateral hip and knee pain.  Bilateral hip and knee pain for many years.  He states this has been getting worse.  He now hobbles and it is very difficult for him to get from his car to the exam room.  He does also have psoriasis of the bottom of the feet and has seen dermatology and gets injections as well as steroid cream.  He states psoriasis is improving however continues because pain when walking.  He states he has had knee injections in the past but not in the last 2 years.  He has not had any imaging this in the last 2 years.  He does think he has a current gout flare. One of the medications he needs refilled is his indomethacin.    GAD7  LUPE-7 SCORE 8/31/2018 4/5/2019 7/15/2019   Total Score 3 0 5       PHQ9  PHQ 8/12/2019 12/20/2019 9/3/2020   PHQ-9 Total Score 3 0 6   Q9: Thoughts of better off dead/self-harm past 2 weeks Not at all Not at all Not at all       Allergies:  Allergies   Allergen Reactions     Seasonal      Other reaction(s): Sneezing  Other reaction(s): Sneezing     No Clinical Screening - See Comments Nausea and Vomiting     Peas  Peas       Review of Systems   As above otherwise ROS is unremarkable.     OBJECTIVE:     Vitals:    09/03/20 1537   BP: 122/80   BP Location: Right arm   Patient Position: Sitting   Cuff Size: Adult Regular   Pulse: 86   Resp: 20   Temp: 97.8  F (36.6  C)   TempSrc: Temporal   SpO2: 98%   Weight: 101.6 kg (224 lb)   Height: 1.676 m (5' 6\")       Physical Exam  General Appearance: Pleasant, alert, appropriate appearance for age and circumstances, no acute distress  Head: Normocephalic, atraumatic  Eyes: PERRL, EOMI  Lungs: Normal chest wall and respirations. Clear to auscultation, no wheezes, rales, rhonchi, or stridor  Cardiovascular: Regular rate and rhythm. S1 and S2 audible, no murmurs, clicks, " rubs, or gallops  Musculoskeletal: Moderate crepitus noted to bilateral knees with open palm over patella during flexion extension.  Negative anterior posterior drawer.  Mild laxity on varus and valgus testing bilaterally, increased pain with Harika right greater than left, mild joint line tenderness medial worse than lateral and right knee worse than left.  Small effusion noted to the right knee medial side.  Wise, no overt swelling, warmth, erythema noted on exam.  Hip exam shows pain with logrolling of legs bilaterally as well as internal and external rotation with the hip at 90 degrees and knee at 90 degrees.   Skin: no concerning or new rashes  Neurologic Exam: Antalgic shuffling gait.  Symmetric DTRs, no focal motor or sensory deficits.   Psychiatric Exam: Alert and oriented, appropriate affect    ASSESSMENT/PLAN:     1. Chronic pain of both knees    2. Moderate episode of recurrent major depressive disorder (H)    3. Ear itching    4. Idiopathic chronic gout without tophus, unspecified site    5. Mild intermittent asthma without complication    6. Chronic hip pain, bilateral        For chronic pain in both hips and knees bilateral x-rays today.  Follow-up as needed.      Suspect he may need repeat injection of the knees.  However, if this is a gout flare I suspect his indomethacin will help.  I just refilled that for him today.    Refilled a number of medications in clinic today including indomethacin, allopurinol, and albuterol,Zoloft, naproxen.    Orders Placed This Encounter   Procedures     XR Knee Standing 2v  Bilateral & 2v Left     XR Pelvis and Hip Bilateral 2 Views       LEONID Champagne  [facility]    This document was prepared using voice generated software.  While every attempt was made for accuracy, grammatical errors may exist.

## 2020-09-03 NOTE — NURSING NOTE
"Chief Complaint   Patient presents with     Recheck Medication   Patient needs some medication refills .     Initial /80 (BP Location: Right arm, Patient Position: Sitting, Cuff Size: Adult Regular)   Pulse 86   Temp 97.8  F (36.6  C) (Temporal)   Resp 20   Ht 1.676 m (5' 6\")   Wt 101.6 kg (224 lb)   SpO2 98%   BMI 36.15 kg/m   Estimated body mass index is 36.15 kg/m  as calculated from the following:    Height as of this encounter: 1.676 m (5' 6\").    Weight as of this encounter: 101.6 kg (224 lb).  Medication Reconciliation: complete    Marnie Blunt LPN  "

## 2020-09-04 ASSESSMENT — ASTHMA QUESTIONNAIRES: ACT_TOTALSCORE: 14

## 2020-10-16 ENCOUNTER — ALLIED HEALTH/NURSE VISIT (OUTPATIENT)
Dept: FAMILY MEDICINE | Facility: OTHER | Age: 50
End: 2020-10-16
Attending: FAMILY MEDICINE
Payer: COMMERCIAL

## 2020-10-16 DIAGNOSIS — R05.9 COUGH: Primary | ICD-10-CM

## 2020-10-16 PROCEDURE — C9803 HOPD COVID-19 SPEC COLLECT: HCPCS | Performed by: FAMILY MEDICINE

## 2020-10-16 PROCEDURE — 99207 PR NO CHARGE NURSE ONLY: CPT

## 2020-10-16 PROCEDURE — U0003 INFECTIOUS AGENT DETECTION BY NUCLEIC ACID (DNA OR RNA); SEVERE ACUTE RESPIRATORY SYNDROME CORONAVIRUS 2 (SARS-COV-2) (CORONAVIRUS DISEASE [COVID-19]), AMPLIFIED PROBE TECHNIQUE, MAKING USE OF HIGH THROUGHPUT TECHNOLOGIES AS DESCRIBED BY CMS-2020-01-R: HCPCS | Mod: ZL | Performed by: FAMILY MEDICINE

## 2020-10-18 LAB
SARS-COV-2 RNA SPEC QL NAA+PROBE: NOT DETECTED
SPECIMEN SOURCE: NORMAL

## 2020-10-19 ENCOUNTER — TELEPHONE (OUTPATIENT)
Dept: FAMILY MEDICINE | Facility: OTHER | Age: 50
End: 2020-10-19

## 2020-10-19 NOTE — TELEPHONE ENCOUNTER
Fax covid results to [phone]. Patient states he talked to nurse and she said she would do this. Faviola Isaac on 10/19/2020 at 2:31 PM

## 2020-10-19 NOTE — TELEPHONE ENCOUNTER
Patient was not able to print his results from [employer]. I printed results letter and faxed to employer per his request.    Faviola Hernandez LPN on 10/19/2020 at 3:41 PM

## 2020-10-19 NOTE — TELEPHONE ENCOUNTER
ANAPt is looking for a letter to go back to work Please call and advise     Thank You   Tiffanie Moran on 10/19/2020 at 12:18 PM

## 2020-10-19 NOTE — TELEPHONE ENCOUNTER
Patient needs results of his Covid test. He was directed to print them off of his [employer]. If he has any issues, he will follow up with a phone call.    Faviola Hernandez LPN on 10/19/2020 at 2:02 PM

## 2020-11-12 ENCOUNTER — TELEPHONE (OUTPATIENT)
Dept: FAMILY MEDICINE | Facility: OTHER | Age: 50
End: 2020-11-12

## 2020-11-12 NOTE — TELEPHONE ENCOUNTER
Patient called states that he has a hard time working with a mask on, it flares up his asthma. His work told him they could get him a shield to wear instead, however, needs a Dr. Note. Please call patient to advise.    Michael Norris on 11/12/2020 at 3:40 PM

## 2020-11-22 ENCOUNTER — OFFICE VISIT (OUTPATIENT)
Dept: FAMILY MEDICINE | Facility: OTHER | Age: 50
End: 2020-11-22
Attending: FAMILY MEDICINE
Payer: COMMERCIAL

## 2020-11-22 VITALS
DIASTOLIC BLOOD PRESSURE: 88 MMHG | WEIGHT: 233.9 LBS | OXYGEN SATURATION: 97 % | RESPIRATION RATE: 24 BRPM | SYSTOLIC BLOOD PRESSURE: 144 MMHG | BODY MASS INDEX: 37.75 KG/M2 | TEMPERATURE: 98.5 F | HEART RATE: 68 BPM

## 2020-11-22 DIAGNOSIS — R06.02 SOB (SHORTNESS OF BREATH): ICD-10-CM

## 2020-11-22 DIAGNOSIS — R07.0 THROAT PAIN: ICD-10-CM

## 2020-11-22 DIAGNOSIS — G44.209 TENSION-TYPE HEADACHE, NOT INTRACTABLE, UNSPECIFIED CHRONICITY PATTERN: ICD-10-CM

## 2020-11-22 DIAGNOSIS — J06.0 SORE THROAT AND LARYNGITIS: Primary | ICD-10-CM

## 2020-11-22 DIAGNOSIS — R05.9 COUGH: ICD-10-CM

## 2020-11-22 LAB
SPECIMEN SOURCE: NORMAL
STREP GROUP A PCR: NOT DETECTED

## 2020-11-22 PROCEDURE — C9803 HOPD COVID-19 SPEC COLLECT: HCPCS

## 2020-11-22 PROCEDURE — 87651 STREP A DNA AMP PROBE: CPT | Mod: ZL | Performed by: FAMILY MEDICINE

## 2020-11-22 PROCEDURE — G0463 HOSPITAL OUTPT CLINIC VISIT: HCPCS

## 2020-11-22 PROCEDURE — U0003 INFECTIOUS AGENT DETECTION BY NUCLEIC ACID (DNA OR RNA); SEVERE ACUTE RESPIRATORY SYNDROME CORONAVIRUS 2 (SARS-COV-2) (CORONAVIRUS DISEASE [COVID-19]), AMPLIFIED PROBE TECHNIQUE, MAKING USE OF HIGH THROUGHPUT TECHNOLOGIES AS DESCRIBED BY CMS-2020-01-R: HCPCS | Mod: ZL | Performed by: FAMILY MEDICINE

## 2020-11-22 PROCEDURE — 99213 OFFICE O/P EST LOW 20 MIN: CPT | Performed by: FAMILY MEDICINE

## 2020-11-22 ASSESSMENT — PAIN SCALES - GENERAL: PAINLEVEL: EXTREME PAIN (8)

## 2020-11-23 NOTE — PROGRESS NOTES
"Nursing Notes:   Ros Leon LPN  11/22/2020  6:10 PM  Signed  Patient presents to the clinic today with throat pain, coughing, headache, and shortness of breath for the last 2 days.  Ros Leon LPN 11/22/2020   6:10 PM    Chief Complaint   Patient presents with     Throat Pain     Initial BP (!) 144/88 (BP Location: Right arm, Patient Position: Sitting, Cuff Size: Adult Regular)   Pulse 68   Temp 98.5  F (36.9  C) (Temporal)   Resp 24   Wt 106.1 kg (233 lb 14.4 oz)   SpO2 97%   BMI 37.75 kg/m   Estimated body mass index is 37.75 kg/m  as calculated from the following:    Height as of 9/3/20: 1.676 m (5' 6\").    Weight as of this encounter: 106.1 kg (233 lb 14.4 oz).  Medication Reconciliation: complete  Ros Leon LPN    SUBJECTIVE:   Christiano Metcalf is a 50 year old male who presents to clinic today for the following health issues:    RAYMUNDO Merritt is here for 2 days of headache and sore throat; coughing also developed throughout the day yesterday.  Dry.  He has had strep throat as an adult a few times (9/2014, 11/2016, 2/2018) and this feel similar.  Feeling hot/sweaty but does not have a temperature that he is aware of, however he uses naproxen 500mg BID consistently due to chronic lumbar back pain.  He has only tried tylenol cold/sinus without significant relief.  Headache is frontal without radiation, vision changes, coordination changes or lightheadedness.  Has also started losing his voice.    He has had Covid testing ~4 weeks ago that was negative.  Is currently working at [employer].  No other known exposures or people sick around him (that he is aware of).    Consistently has a SOB baseline due to mild asthma history.  Used his albuterol inhaler twice the last few days; otherwise a couple times a week is normal for him.  Has been tried on at least Dulera in the past without improvement in his symptoms.  No longer smoking, quit in 2010.  Is on Humira for psoriasis.      Patient Active " Problem List    Diagnosis Date Noted     Mild intermittent asthma without complication 08/31/2018     Priority: Medium     Gout 02/01/2018     Priority: Medium     Learning disability 06/14/2011     Priority: Medium     Past Medical History:   Diagnosis Date     Gout     No Comments Provided     Hypomagnesemia     No Comments Provided     Pain in left knee     No Comments Provided     Personal history of other (healed) physical injury and trauma     2000,repair     Unspecified injury of unspecified wrist, hand and finger(s), initial encounter     1999      Past Surgical History:   Procedure Laterality Date     ARTHROSCOPY KNEE      Right knee meniscal repair, arthroscopic ally     ARTHROSCOPY KNEE      2010,Left knee scope     FINGER SURGERY      Right thumb ORIF     OTHER SURGICAL HISTORY      7/15/14,,HERNIA REPAIR,Left,LIH with mesh     OTHER SURGICAL HISTORY      5/10/16,,HERNIA REPAIR,Right,RIH with plug/patch     Family History   Problem Relation Age of Onset     Diabetes Mother         Diabetes     Other - See Comments Father         episode of gout.     Colon Cancer Father         Cancer-colon,diagnosed in early 60s     Family History Negative Son         Good Health,1997     Family History Negative Daughter         Good Health,2009     Family History Negative Son         Good Health,2011     Social History     Tobacco Use     Smoking status: Former Smoker     Types: Cigarettes     Quit date: 2/17/2010     Years since quitting: 10.7     Smokeless tobacco: Never Used   Substance Use Topics     Alcohol use: No     Alcohol/week: 0.0 standard drinks     Comment: Alcoholic Drinks/day: seldom     Social History     Social History Narrative     times one and now .  Works at [employer] in [place].    Works at [employer] in [place].    [id number]    3 children:  Carlos, 1997, Chaparrita, 2009,  George, 2011     Current Outpatient Medications   Medication Sig Dispense Refill      acetic acid (VOSOL) 2 % otic solution Place 4 drops into both ears 3 times daily 30 mL 3     albuterol (PROAIR HFA/PROVENTIL HFA/VENTOLIN HFA) 108 (90 Base) MCG/ACT inhaler Inhale 2 puffs into the lungs 4 times daily as needed 2 Inhaler 11     allopurinol (ZYLOPRIM) 300 MG tablet Take 1 tablet (300 mg) by mouth daily 90 tablet 3     betamethasone dipropionate (DIPROSONE) 0.05 % external cream Apply topically At Bedtime To scalp, wash out in the AM 45 g 3     cetirizine (ZYRTEC) 10 MG tablet Take 1 tablet (10 mg) by mouth 2 times daily 60 tablet 4     cetirizine (ZYRTEC) 10 MG tablet Take 1 tablet (10 mg) by mouth daily 90 tablet 3     HUMIRA PEN 40 MG/0.8ML pen kit Inject 40 mg as directed every 14 days       indomethacin (INDOCIN) 50 MG capsule Take 1 capsule (50 mg) by mouth 3 times daily (with meals) 60 capsule 1     naproxen (NAPROSYN) 500 MG tablet Take 1 tablet (500 mg) by mouth 2 times daily (with meals) 180 tablet 3     omeprazole (PRILOSEC) 40 MG DR capsule Take 1 capsule (40 mg) by mouth daily 30 capsule 1     sertraline (ZOLOFT) 100 MG tablet Take 1 tablet (100 mg) by mouth daily 90 tablet 1     triamcinolone (KENALOG) 0.1 % external cream Apply topically 2 times daily 453.6 g 1     Allergies   Allergen Reactions     Seasonal      Other reaction(s): Sneezing  Other reaction(s): Sneezing     No Clinical Screening - See Comments Nausea and Vomiting     Peas  Peas       OBJECTIVE:     BP (!) 144/88 (BP Location: Right arm, Patient Position: Sitting, Cuff Size: Adult Regular)   Pulse 68   Temp 98.5  F (36.9  C) (Temporal)   Resp 24   Wt 106.1 kg (233 lb 14.4 oz)   SpO2 97%   BMI 37.75 kg/m    Body mass index is 37.75 kg/m .  Physical Exam  Vitals signs reviewed.   Constitutional:       Appearance: Normal appearance.   HENT:      Right Ear: Tympanic membrane and ear canal normal.      Left Ear: Tympanic membrane and ear canal normal.      Nose: Nose normal.      Mouth/Throat:      Mouth: Mucous membranes  are moist.      Pharynx: Posterior oropharyngeal erythema present. No oropharyngeal exudate.      Comments: 1-2+ tonsils B  Cardiovascular:      Rate and Rhythm: Normal rate and regular rhythm.      Pulses: Normal pulses.      Heart sounds: No murmur.   Pulmonary:      Effort: Pulmonary effort is normal.      Breath sounds: Normal breath sounds.   Skin:     Capillary Refill: Capillary refill takes less than 2 seconds.   Neurological:      General: No focal deficit present.      Mental Status: He is alert.   Psychiatric:         Mood and Affect: Mood normal.     Diagnostic Test Results:  Results for orders placed or performed in visit on 11/22/20   Group A Streptococcus PCR Throat Swab     Status: None    Specimen: Throat   Result Value Ref Range    Specimen Description Throat     Strep Group A PCR Not Detected NDET^Not Detected       ASSESSMENT/PLAN:     1. Sore throat and laryngitis  Viral; unable to rule out covid tonight.  But was swabbed and results pending.  Note provided for work to excuse him until at least test results or symptoms improve; and up to 10 days if positive.  Supportive cares.  - Group A Streptococcus PCR Throat Swab      Jailyn Bliss DO  [facility] AND [facility] [place]

## 2020-11-23 NOTE — NURSING NOTE
"Patient presents to the clinic today with throat pain, coughing, headache, and shortness of breath for the last 2 days.  Ros Leon LPN 11/22/2020   6:10 PM    Chief Complaint   Patient presents with     Throat Pain       Initial BP (!) 144/88 (BP Location: Right arm, Patient Position: Sitting, Cuff Size: Adult Regular)   Pulse 68   Temp 98.5  F (36.9  C) (Temporal)   Resp 24   Wt 106.1 kg (233 lb 14.4 oz)   SpO2 97%   BMI 37.75 kg/m   Estimated body mass index is 37.75 kg/m  as calculated from the following:    Height as of 9/3/20: 1.676 m (5' 6\").    Weight as of this encounter: 106.1 kg (233 lb 14.4 oz).  Medication Reconciliation: complete  Ros Leon LPN    "

## 2020-11-23 NOTE — TELEPHONE ENCOUNTER
The fact that he has asthma is all the more reason he needs to be wearing a mask at work at all times.  Face shield would not be sufficient.

## 2020-11-24 LAB
SARS-COV-2 RNA SPEC QL NAA+PROBE: NOT DETECTED
SPECIMEN SOURCE: NORMAL

## 2020-11-25 ENCOUNTER — TELEPHONE (OUTPATIENT)
Dept: FAMILY MEDICINE | Facility: OTHER | Age: 50
End: 2020-11-25

## 2020-11-25 NOTE — TELEPHONE ENCOUNTER
Patient letter was printed and faxed to his work per his request.    Faviola Hernandez LPN on 11/25/2020 at 9:15 AM

## 2020-11-25 NOTE — TELEPHONE ENCOUNTER
Patient had a COVID test on Sunday in [facility]. He tested negative and now his employer is requesting a letter be faxed over by out facility so the patient can return to work.   SHERLY- Fax: [phone]

## 2020-11-27 ENCOUNTER — TELEPHONE (OUTPATIENT)
Dept: FAMILY MEDICINE | Facility: OTHER | Age: 50
End: 2020-11-27

## 2020-11-27 NOTE — TELEPHONE ENCOUNTER
DWS-patient is looking to have COVID results sent to a fax number [phone]    Thank You     Tiffanie Moran on 11/27/2020 at 9:12 AM

## 2020-11-27 NOTE — TELEPHONE ENCOUNTER
Called and spoke to Patient after verifying last name and date of birth. Pt encouraged Pt to print results from [employer] or they can be printed and he can pick them up. Pt states results of test 3 weeks ago were faxed to his employer, without issue. Pt requesting results be sent to [employer].     Called and spoke with DIOGO Cheung supervisor, and she was informed of the above information. She spoke with HIM and they verbalized plan to fax results as requested by Patient.     Franchesca Abreu RN .............. 11/27/2020  9:30 AM

## 2020-12-27 ENCOUNTER — HEALTH MAINTENANCE LETTER (OUTPATIENT)
Age: 50
End: 2020-12-27

## 2021-01-11 ENCOUNTER — TELEPHONE (OUTPATIENT)
Dept: FAMILY MEDICINE | Facility: OTHER | Age: 51
End: 2021-01-11

## 2021-01-11 NOTE — TELEPHONE ENCOUNTER
Left message for patient to call back. Please complete ACT.    Faviola Hernandez LPN on 1/11/2021 at 1:42 PM

## 2021-01-11 NOTE — LETTER
My Asthma Action Plan    Name: Christiano Metclaf   YOB: 1970  Date: 1/19/2021   My doctor: Gino Frankel   My clinic: [facility]        My Rescue Medicine:   Albuterol inhaler (Proair/Ventolin/Proventil HFA)  2-4 puffs EVERY 4 HOURS as needed. Use a spacer if recommended by your provider.   My Asthma Severity:   Intermittent / Exercise Induced  Know your asthma triggers: Patient is unaware of triggers             GREEN ZONE   Good Control    I feel good    No cough or wheeze    Can work, sleep and play without asthma symptoms       Take your asthma control medicine every day.     1. If exercise triggers your asthma, take your rescue medication    15 minutes before exercise or sports, and    During exercise if you have asthma symptoms  2. Spacer to use with inhaler: If you have a spacer, make sure to use it with your inhaler             YELLOW ZONE Getting Worse  I have ANY of these:    I do not feel good    Cough or wheeze    Chest feels tight    Wake up at night   1. Keep taking your Green Zone medications  2. Start taking your rescue medicine:    every 20 minutes for up to 1 hour. Then every 4 hours for 24-48 hours.  3. If you stay in the Yellow Zone for more than 12-24 hours, contact your doctor.  4. If you do not return to the Green Zone in 12-24 hours or you get worse, start taking your oral steroid medicine if prescribed by your provider.           RED ZONE Medical Alert - Get Help  I have ANY of these:    I feel awful    Medicine is not helping    Breathing getting harder    Trouble walking or talking    Nose opens wide to breathe       1. Take your rescue medicine NOW  2. If your provider has prescribed an oral steroid medicine, start taking it NOW  3. Call your doctor NOW  4. If you are still in the Red Zone after 20 minutes and you have not reached your doctor:    Take your rescue medicine again and    Call 911 or go to the emergency room right away    See your regular  doctor within 2 weeks of an Emergency Room or Urgent Care visit for follow-up treatment.          Annual Reminders:  Meet with Asthma Educator,  Flu Shot in the Fall, consider Pneumonia Vaccination for patients with asthma (aged 19 and older).    Pharmacy: [id number] - [place], [place] - [address]    Electronically signed by Gino Frankel   Date: 01/19/21                    Asthma Triggers  How To Control Things That Make Your Asthma Worse    Triggers are things that make your asthma worse.  Look at the list below to help you find your triggers and   what you can do about them. You can help prevent asthma flare-ups by staying away from your triggers.      Trigger                                                          What you can do   Cigarette Smoke  Tobacco smoke can make asthma worse. Do not allow smoking in your home, car or around you.  Be sure no one smokes at a child s day care or school.  If you smoke, ask your health care provider for ways to help you quit.  Ask family members to quit too.  Ask your health care provider for a referral to Quit Plan to help you quit smoking, or call 1-659-900-PLAN.     Colds, Flu, Bronchitis  These are common triggers of asthma. Wash your hands often.  Don t touch your eyes, nose or mouth.  Get a flu shot every year.     Dust Mites  These are tiny bugs that live in cloth or carpet. They are too small to see. Wash sheets and blankets in hot water every week.   Encase pillows and mattress in dust mite proof covers.  Avoid having carpet if you can. If you have carpet, vacuum weekly.   Use a dust mask and HEPA vacuum.   Pollen and Outdoor Mold  Some people are allergic to trees, grass, or weed pollen, or molds. Try to keep your windows closed.  Limit time out doors when pollen count is high.   Ask you health care provider about taking medicine during allergy season.     Animal Dander  Some people are allergic to skin flakes, urine  or saliva from pets with fur or feathers. Keep pets with fur or feathers out of your home.    If you can t keep the pet outdoors, then keep the pet out of your bedroom.  Keep the bedroom door closed.  Keep pets off cloth furniture and away from stuffed toys.     Mice, Rats, and Cockroaches  Some people are allergic to the waste from these pests.   Cover food and garbage.  Clean up spills and food crumbs.  Store grease in the refrigerator.   Keep food out of the bedroom.   Indoor Mold  This can be a trigger if your home has high moisture. Fix leaking faucets, pipes, or other sources of water.   Clean moldy surfaces.  Dehumidify basement if it is damp and smelly.   Smoke, Strong Odors, and Sprays  These can reduce air quality. Stay away from strong odors and sprays, such as perfume, powder, hair spray, paints, smoke incense, paint, cleaning products, candles and new carpet.   Exercise or Sports  Some people with asthma have this trigger. Be active!  Ask your doctor about taking medicine before sports or exercise to prevent symptoms.    Warm up for 5-10 minutes before and after sports or exercise.     Other Triggers of Asthma  Cold air:  Cover your nose and mouth with a scarf.  Sometimes laughing or crying can be a trigger.  Some medicines and food can trigger asthma.

## 2021-01-19 PROBLEM — M67.922 TENDINOPATHY OF LEFT BICEPS TENDON: Status: ACTIVE | Noted: 2020-10-12

## 2021-01-19 PROBLEM — M25.512 PAIN OF LEFT STERNOCLAVICULAR JOINT: Status: ACTIVE | Noted: 2020-10-12

## 2021-01-19 PROBLEM — M75.42 IMPINGEMENT SYNDROME OF LEFT SHOULDER: Status: ACTIVE | Noted: 2020-10-12

## 2021-01-19 NOTE — TELEPHONE ENCOUNTER
Completed ACT over the phone with patient. He is also due for his annual visit and will schedule that at his convience.    Faviola Hernandez LPN on 1/19/2021 at 1:38 PM

## 2021-01-20 ASSESSMENT — ASTHMA QUESTIONNAIRES: ACT_TOTALSCORE: 21

## 2021-02-11 ENCOUNTER — HOSPITAL ENCOUNTER (OUTPATIENT)
Dept: MRI IMAGING | Facility: OTHER | Age: 51
Discharge: HOME | End: 2021-02-11
Attending: ORTHOPAEDIC SURGERY | Admitting: FAMILY MEDICINE
Payer: OTHER MISCELLANEOUS

## 2021-02-11 DIAGNOSIS — M48.02 SPINAL STENOSIS, CERVICAL REGION: ICD-10-CM

## 2021-02-11 DIAGNOSIS — M54.12 CERVICAL RADICULOPATHY: ICD-10-CM

## 2021-02-11 PROCEDURE — 72141 MRI NECK SPINE W/O DYE: CPT

## 2021-02-22 DIAGNOSIS — F33.1 MODERATE EPISODE OF RECURRENT MAJOR DEPRESSIVE DISORDER (H): ICD-10-CM

## 2021-02-22 DIAGNOSIS — J30.2 SEASONAL ALLERGIC RHINITIS, UNSPECIFIED TRIGGER: ICD-10-CM

## 2021-02-22 RX ORDER — CETIRIZINE HYDROCHLORIDE 10 MG/1
10 TABLET ORAL DAILY
Qty: 90 TABLET | Refills: 3 | OUTPATIENT
Start: 2021-02-22

## 2021-02-22 NOTE — TELEPHONE ENCOUNTER
"Requested Prescriptions   Pending Prescriptions Disp Refills     cetirizine (ZYRTEC) 10 MG tablet 90 tablet 3     Sig: Take 1 tablet (10 mg) by mouth daily       Antihistamines Protocol Passed - 2/22/2021  3:40 PM        Passed - Patient is 3-64 years of age     Apply weight-based dosing for peds patients age 3 - 12 years of age.    Forward request to provider for patients under the age of 3 or over the age of 64.          Passed - Recent (12 mo) or future (30 days) visit within the authorizing provider's specialty     Patient has had an office visit with the authorizing provider or a provider within the authorizing providers department within the previous 12 mos or has a future within next 30 days. See \"Patient Info\" tab in inbasket, or \"Choose Columns\" in Meds & Orders section of the refill encounter.              Passed - Medication is active on med list         LOV -11/22/20 and duplicate on med list. Unable to complete prescription refill per RNMedication Refill Policy.................... Annmarie Mcknight RN ....................  2/22/2021   3:50 PM              "

## 2021-02-22 NOTE — TELEPHONE ENCOUNTER
[facility] sent Rx request for the following:   sertraline (ZOLOFT) 100 MG tablet  Sig:Take 1 tablet (100 mg) by mouth daily    Last Prescription Date:   09/03/2020  Last Fill Qty/Refills:         90, R-1    Last Office Visit:              09/03/2020 ([facility])   Future Office visit:           None noted   SSRIs Protocol Failed - 2/22/2021  3:55 PM        Failed - PHQ-9 score less than 5 in past 6 months     Please review last PHQ-9 score.        Last PHQ-9 date of 09/03/2020 value 6    Routing for PCP review, previously filled via [facility]. Unable to complete prescription refill per RN Medication Refill Policy.................... Cindy Charles RN ....................  2/22/2021   4:21 PM

## 2021-02-23 RX ORDER — SERTRALINE HYDROCHLORIDE 100 MG/1
100 TABLET, FILM COATED ORAL DAILY
Qty: 90 TABLET | Refills: 3 | Status: SHIPPED | OUTPATIENT
Start: 2021-02-23 | End: 2022-01-24

## 2021-03-06 ENCOUNTER — HEALTH MAINTENANCE LETTER (OUTPATIENT)
Age: 51
End: 2021-03-06

## 2021-03-11 ENCOUNTER — TELEPHONE (OUTPATIENT)
Dept: FAMILY MEDICINE | Facility: OTHER | Age: 51
End: 2021-03-11

## 2021-03-11 ENCOUNTER — HOSPITAL ENCOUNTER (OUTPATIENT)
Dept: GENERAL RADIOLOGY | Facility: OTHER | Age: 51
Discharge: HOME | End: 2021-03-11
Attending: ORTHOPAEDIC SURGERY | Admitting: ORTHOPAEDIC SURGERY
Payer: OTHER MISCELLANEOUS

## 2021-03-11 DIAGNOSIS — M50.13 CERVICAL DISC DISORDER WITH RADICULOPATHY OF CERVICOTHORACIC REGION: ICD-10-CM

## 2021-03-11 PROCEDURE — 255N000002 HC RX 255 OP 636: Performed by: RADIOLOGY

## 2021-03-11 PROCEDURE — 62321 NJX INTERLAMINAR CRV/THRC: CPT

## 2021-03-11 PROCEDURE — 250N000011 HC RX IP 250 OP 636: Performed by: RADIOLOGY

## 2021-03-11 PROCEDURE — 250N000009 HC RX 250: Performed by: RADIOLOGY

## 2021-03-11 RX ORDER — LIDOCAINE HYDROCHLORIDE 10 MG/ML
2 INJECTION, SOLUTION INFILTRATION; PERINEURAL ONCE
Status: COMPLETED | OUTPATIENT
Start: 2021-03-11 | End: 2021-03-11

## 2021-03-11 RX ORDER — METHYLPREDNISOLONE ACETATE 80 MG/ML
80 INJECTION, SUSPENSION INTRA-ARTICULAR; INTRALESIONAL; INTRAMUSCULAR; SOFT TISSUE ONCE
Status: COMPLETED | OUTPATIENT
Start: 2021-03-11 | End: 2021-03-11

## 2021-03-11 RX ADMIN — METHYLPREDNISOLONE ACETATE 80 MG: 80 INJECTION, SUSPENSION INTRA-ARTICULAR; INTRALESIONAL; INTRAMUSCULAR; SOFT TISSUE at 12:20

## 2021-03-11 RX ADMIN — IOHEXOL 2 ML: 240 INJECTION, SOLUTION INTRATHECAL; INTRAVASCULAR; INTRAVENOUS; ORAL at 12:20

## 2021-03-11 RX ADMIN — LIDOCAINE HYDROCHLORIDE 2 ML: 10 INJECTION, SOLUTION INFILTRATION; PERINEURAL at 12:20

## 2021-03-11 NOTE — TELEPHONE ENCOUNTER
Patient had injections in his neck today and would like a letter to excuse him from work tomorrow due to the injections. Please call patient when letter is complete at [phone] and he will pick it up.     Thanks, Minnie Stovall on 3/11/2021 at 12:43 PM

## 2021-03-11 NOTE — TELEPHONE ENCOUNTER
Is there any reason he would need to be off from work tomorrow after having injections today?    Faviola Hernandez, LPN on 3/11/2021 at 12:47 PM

## 2021-05-07 ENCOUNTER — HOSPITAL ENCOUNTER (EMERGENCY)
Facility: OTHER | Age: 51
Discharge: HOME | End: 2021-05-07
Attending: EMERGENCY MEDICINE | Admitting: EMERGENCY MEDICINE
Payer: COMMERCIAL

## 2021-05-07 VITALS
SYSTOLIC BLOOD PRESSURE: 120 MMHG | HEIGHT: 68 IN | TEMPERATURE: 96.5 F | DIASTOLIC BLOOD PRESSURE: 97 MMHG | HEART RATE: 91 BPM | BODY MASS INDEX: 35.61 KG/M2 | OXYGEN SATURATION: 96 % | RESPIRATION RATE: 20 BRPM | WEIGHT: 235 LBS

## 2021-05-07 DIAGNOSIS — R19.7 VOMITING AND DIARRHEA: ICD-10-CM

## 2021-05-07 DIAGNOSIS — R11.10 VOMITING AND DIARRHEA: ICD-10-CM

## 2021-05-07 LAB
ALBUMIN SERPL-MCNC: 3.8 G/DL (ref 3.5–5.7)
ALP SERPL-CCNC: 67 U/L (ref 34–104)
ALT SERPL W P-5'-P-CCNC: 123 U/L (ref 7–52)
ANION GAP SERPL CALCULATED.3IONS-SCNC: 12 MMOL/L (ref 3–14)
AST SERPL W P-5'-P-CCNC: 78 U/L (ref 13–39)
BASOPHILS # BLD AUTO: 0 10E9/L (ref 0–0.2)
BASOPHILS NFR BLD AUTO: 0.3 %
BILIRUB SERPL-MCNC: 1.3 MG/DL (ref 0.3–1)
BUN SERPL-MCNC: 34 MG/DL (ref 7–25)
CALCIUM SERPL-MCNC: 8.5 MG/DL (ref 8.6–10.3)
CHLORIDE SERPL-SCNC: 104 MMOL/L (ref 98–107)
CO2 SERPL-SCNC: 25 MMOL/L (ref 21–31)
CREAT SERPL-MCNC: 0.97 MG/DL (ref 0.7–1.3)
DIFFERENTIAL METHOD BLD: NORMAL
EOSINOPHIL # BLD AUTO: 0.1 10E9/L (ref 0–0.7)
EOSINOPHIL NFR BLD AUTO: 0.9 %
ERYTHROCYTE [DISTWIDTH] IN BLOOD BY AUTOMATED COUNT: 13 % (ref 10–15)
GFR SERPL CREATININE-BSD FRML MDRD: 82 ML/MIN/{1.73_M2}
GLUCOSE SERPL-MCNC: 134 MG/DL (ref 70–105)
HCT VFR BLD AUTO: 40.2 % (ref 40–53)
HGB BLD-MCNC: 13.8 G/DL (ref 13.3–17.7)
IMM GRANULOCYTES # BLD: 0 10E9/L (ref 0–0.4)
IMM GRANULOCYTES NFR BLD: 0.1 %
LABORATORY COMMENT REPORT: NORMAL
LACTATE BLD-SCNC: 1.8 MMOL/L (ref 0.7–2)
LIPASE SERPL-CCNC: 33 U/L (ref 11–82)
LYMPHOCYTES # BLD AUTO: 2.2 10E9/L (ref 0.8–5.3)
LYMPHOCYTES NFR BLD AUTO: 28.8 %
MCH RBC QN AUTO: 30.8 PG (ref 26.5–33)
MCHC RBC AUTO-ENTMCNC: 34.3 G/DL (ref 31.5–36.5)
MCV RBC AUTO: 90 FL (ref 78–100)
MONOCYTES # BLD AUTO: 0.6 10E9/L (ref 0–1.3)
MONOCYTES NFR BLD AUTO: 7.9 %
NEUTROPHILS # BLD AUTO: 4.8 10E9/L (ref 1.6–8.3)
NEUTROPHILS NFR BLD AUTO: 62 %
PLATELET # BLD AUTO: 177 10E9/L (ref 150–450)
POTASSIUM SERPL-SCNC: 3.3 MMOL/L (ref 3.5–5.1)
PROT SERPL-MCNC: 7.2 G/DL (ref 6.4–8.9)
RBC # BLD AUTO: 4.48 10E12/L (ref 4.4–5.9)
SARS-COV-2 RNA RESP QL NAA+PROBE: NEGATIVE
SODIUM SERPL-SCNC: 141 MMOL/L (ref 134–144)
SPECIMEN SOURCE: NORMAL
WBC # BLD AUTO: 7.7 10E9/L (ref 4–11)

## 2021-05-07 PROCEDURE — C9803 HOPD COVID-19 SPEC COLLECT: HCPCS | Performed by: EMERGENCY MEDICINE

## 2021-05-07 PROCEDURE — 96360 HYDRATION IV INFUSION INIT: CPT | Performed by: EMERGENCY MEDICINE

## 2021-05-07 PROCEDURE — 99283 EMERGENCY DEPT VISIT LOW MDM: CPT | Performed by: EMERGENCY MEDICINE

## 2021-05-07 PROCEDURE — U0005 INFEC AGEN DETEC AMPLI PROBE: HCPCS | Performed by: EMERGENCY MEDICINE

## 2021-05-07 PROCEDURE — 258N000003 HC RX IP 258 OP 636: Performed by: EMERGENCY MEDICINE

## 2021-05-07 PROCEDURE — 96365 THER/PROPH/DIAG IV INF INIT: CPT | Performed by: EMERGENCY MEDICINE

## 2021-05-07 PROCEDURE — 83605 ASSAY OF LACTIC ACID: CPT | Performed by: EMERGENCY MEDICINE

## 2021-05-07 PROCEDURE — 85025 COMPLETE CBC W/AUTO DIFF WBC: CPT | Performed by: EMERGENCY MEDICINE

## 2021-05-07 PROCEDURE — 80053 COMPREHEN METABOLIC PANEL: CPT | Performed by: EMERGENCY MEDICINE

## 2021-05-07 PROCEDURE — U0003 INFECTIOUS AGENT DETECTION BY NUCLEIC ACID (DNA OR RNA); SEVERE ACUTE RESPIRATORY SYNDROME CORONAVIRUS 2 (SARS-COV-2) (CORONAVIRUS DISEASE [COVID-19]), AMPLIFIED PROBE TECHNIQUE, MAKING USE OF HIGH THROUGHPUT TECHNOLOGIES AS DESCRIBED BY CMS-2020-01-R: HCPCS | Performed by: EMERGENCY MEDICINE

## 2021-05-07 PROCEDURE — 83690 ASSAY OF LIPASE: CPT | Performed by: EMERGENCY MEDICINE

## 2021-05-07 PROCEDURE — 250N000011 HC RX IP 250 OP 636: Performed by: EMERGENCY MEDICINE

## 2021-05-07 PROCEDURE — 99284 EMERGENCY DEPT VISIT MOD MDM: CPT | Mod: 25 | Performed by: EMERGENCY MEDICINE

## 2021-05-07 PROCEDURE — 36415 COLL VENOUS BLD VENIPUNCTURE: CPT | Performed by: EMERGENCY MEDICINE

## 2021-05-07 PROCEDURE — 96375 TX/PRO/DX INJ NEW DRUG ADDON: CPT | Performed by: EMERGENCY MEDICINE

## 2021-05-07 RX ORDER — ONDANSETRON 4 MG/1
4 TABLET, ORALLY DISINTEGRATING ORAL EVERY 8 HOURS PRN
Qty: 10 TABLET | Refills: 0 | Status: ON HOLD | OUTPATIENT
Start: 2021-05-07 | End: 2021-09-04

## 2021-05-07 RX ORDER — PANTOPRAZOLE SODIUM 40 MG/1
40 TABLET, DELAYED RELEASE ORAL DAILY
Qty: 14 TABLET | Refills: 0 | Status: ON HOLD | OUTPATIENT
Start: 2021-05-07 | End: 2021-09-04

## 2021-05-07 RX ORDER — SODIUM CHLORIDE 9 MG/ML
INJECTION, SOLUTION INTRAVENOUS CONTINUOUS
Status: DISCONTINUED | OUTPATIENT
Start: 2021-05-07 | End: 2021-05-07 | Stop reason: HOSPADM

## 2021-05-07 RX ORDER — ONDANSETRON 2 MG/ML
4 INJECTION INTRAMUSCULAR; INTRAVENOUS EVERY 30 MIN PRN
Status: DISCONTINUED | OUTPATIENT
Start: 2021-05-07 | End: 2021-05-07 | Stop reason: HOSPADM

## 2021-05-07 RX ADMIN — FAMOTIDINE 20 MG: 20 INJECTION, SOLUTION INTRAVENOUS at 05:54

## 2021-05-07 RX ADMIN — ONDANSETRON 4 MG: 2 INJECTION INTRAMUSCULAR; INTRAVENOUS at 05:33

## 2021-05-07 RX ADMIN — SODIUM CHLORIDE 1000 ML: 9 INJECTION, SOLUTION INTRAVENOUS at 05:33

## 2021-05-07 ASSESSMENT — MIFFLIN-ST. JEOR: SCORE: 1895.45

## 2021-05-07 NOTE — ED TRIAGE NOTES
"ED Nursing Triage Note (General)   ________________________________    Christiano Metcalf is a 51 year old Male that presents to triage private car  With history of Diarrhea and vomiting reported by patient. Pt states diarrhea was black last night and reports vomit was red in color.   Significant symptoms had onset last night around 2200   BP (!) 142/101   Pulse 105   Temp 96.5  F (35.8  C) (Tympanic)   Resp 20   Ht 1.727 m (5' 8\")   Wt 106.6 kg (235 lb)   SpO2 95%   BMI 35.73 kg/m  t  Patient appears alert , in no acute distress., and cooperative behavior.    GCS Eye Opening = 4=Spontaneous  Airway: intact  Breathing noted as Normal.  Circulation Normal  Skin normal  Action taken:  Triage to critical care immediately      PRE HOSPITAL PRIOR LIVING SITUATION Alone  "

## 2021-05-07 NOTE — Clinical Note
Christiano Metcalf was seen and treated in our emergency department on 5/7/2021.  He may return to work on 05/10/2021.       If you have any questions or concerns, please don't hesitate to call.      Cass Guardado MD

## 2021-05-07 NOTE — ED PROVIDER NOTES
[facility]  Emergency Department Visit Note    Vomiting and Diarrhea      History of Present Illness     HPI:  Christiano Metcalf is a 51 year old male presenting with vomiting and diarrhea. These symptoms started  AT 2200. He estimates that he has had 6-8 episodes of vomiting and 4 episodes of loose stools today. The patient does  have abdominal pain and symptoms are partially alleviated by vomiting or a bowel movement. He had red streaks in his vomitus. No chest pain, shortness of breath, fevers. The patient does not have sick contacts with similar symptoms. He has been eating at restaurants, eating unusual foods, or eating food that may have been spoiled.  He has not used any antibiotics in the past month. He has not traveled outside of the country recently. He has not been camping and/or drinking lake/river water.     Medications:  Prior to Admission medications    Medication Sig Last Dose Taking? Auth Provider   allopurinol (ZYLOPRIM) 300 MG tablet Take 1 tablet (300 mg) by mouth daily 5/6/2021 at pm Yes Walter Xavier PA   indomethacin (INDOCIN) 50 MG capsule Take 1 capsule (50 mg) by mouth 3 times daily (with meals) Past Month at Unknown time Yes Walter Xavier PA   naproxen (NAPROSYN) 500 MG tablet Take 1 tablet (500 mg) by mouth 2 times daily (with meals) 5/6/2021 at pm Yes Walter Xavier PA   ondansetron (ZOFRAN ODT) 4 MG ODT tab Take 1 tablet (4 mg) by mouth every 8 hours as needed for nausea  Yes Cass Guardado MD   pantoprazole (PROTONIX) 40 MG EC tablet Take 1 tablet (40 mg) by mouth daily  Yes Cass Guardado MD   sertraline (ZOLOFT) 100 MG tablet Take 1 tablet (100 mg) by mouth daily 5/6/2021 at am Yes Gino Frankel   acetic acid (VOSOL) 2 % otic solution Place 4 drops into both ears 3 times daily   Gino Frankel   albuterol (PROAIR HFA/PROVENTIL HFA/VENTOLIN HFA) 108 (90 Base) MCG/ACT inhaler Inhale 2 puffs into the lungs 4 times daily as needed   Duc  LEONID Watson   betamethasone dipropionate (DIPROSONE) 0.05 % external cream Apply topically At Bedtime To scalp, wash out in the AM   Gloria Vivas CNP   cetirizine (ZYRTEC) 10 MG tablet Take 1 tablet (10 mg) by mouth 2 times daily More than a month at Unknown time  Walter Xavier PA   cetirizine (ZYRTEC) 10 MG tablet Take 1 tablet (10 mg) by mouth daily More than a month at Unknown time  Gino Frankel PEN 40 MG/0.8ML pen kit Inject 40 mg as directed every 14 days   Reported, Patient   omeprazole (PRILOSEC) 40 MG DR capsule Take 1 capsule (40 mg) by mouth daily More than a month at Unknown time  Mony Sebastian DO   triamcinolone (KENALOG) 0.1 % external cream Apply topically 2 times daily   Gloria Vivas CNP       Allergies:  Allergies   Allergen Reactions     Seasonal      Other reaction(s): Sneezing  Other reaction(s): Sneezing     No Clinical Screening - See Comments Nausea and Vomiting     Peas  Peas       Problem List:  Patient Active Problem List   Diagnosis     Gout     Learning disability     Mild intermittent asthma without complication     Impingement syndrome of left shoulder     Pain of left sternoclavicular joint     Tendinopathy of left biceps tendon       Past Medical History:  Past Medical History:   Diagnosis Date     Gout     No Comments Provided     Hypomagnesemia     No Comments Provided     Pain in left knee     No Comments Provided     Personal history of other (healed) physical injury and trauma     2000,repair     Unspecified injury of unspecified wrist, hand and finger(s), initial encounter     1999       Past Surgical History:  Past Surgical History:   Procedure Laterality Date     ARTHROSCOPY KNEE      Right knee meniscal repair, arthroscopic ally     ARTHROSCOPY KNEE      2010,Left knee scope     FINGER SURGERY      Right thumb ORIF     OTHER SURGICAL HISTORY      7/15/14,,HERNIA REPAIR,Left,LIH with mesh     OTHER SURGICAL HISTORY      5/10/16,,HERNIA  "REPAIR,Right,RIH with plug/patch       Social History:  Social History     Tobacco Use     Smoking status: Former Smoker     Types: Cigarettes     Quit date: 2010     Years since quittin.2     Smokeless tobacco: Never Used   Substance Use Topics     Alcohol use: No     Alcohol/week: 0.0 standard drinks     Comment: Alcoholic Drinks/day: seldom     Drug use: No       Review of Systems:  10 point review of systems obtained and pertinent positive and negative findings noted in HPI. Review of systems otherwise negative.      Physical Exam     Vital signs: BP (!) 120/97   Pulse 91   Temp 96.5  F (35.8  C) (Tympanic)   Resp 20   Ht 1.727 m (5' 8\")   Wt 106.6 kg (235 lb)   SpO2 96%   BMI 35.73 kg/m      Physical Exam:    General: awake and alert, comfortable  HEENT: atraumatic, no scleral injection, no nasal discharge, neck supple  Chest: clear to auscultation bilaterally without wheezes or crackles, non labored respirations, symmetric chest rise  Cardiovascular: regular rate and rhythm, no murmurs or gallops  Abdomen: soft, nontender, no rebound or guarding, nondistended  Extremities: no deformities, edema, or tenderness  Skin: warm, dry, no rashes  Neuro: alert and oriented x 3, moving extremities x 4, ambulates without difficulty      Medical Decision Making & ED Course     Christiano Metcalf is a 51 year old male presenting with vomiting and diarrhea. Differential includes gastroenteritis, gastroparesis, pancreatitis, cholecystitis, diverticulitis, appendicitis, colitis, mesenteric ischemia, inflammatory bowel disease, irritable bowel syndrome. Abdominal exam benign with no focal tenderness or peritoneal signs. Given the lack of blood in the stools or fever, this is less likely to be bacterial cause of gastroenteritis or inflammatory bowel disease. Given the lack of localized/focal abdominal pain, this is less likely to be appendicitis or other surgical process. Symptoms are most consistent with viral " gastroenteritis. Given IVF, zofran, pepcid in the emergency department with improvement in symptoms and patient was able to tolerate oral fluids. Discussed the importance of plenty of oral fluid intake. Patient given instructions on follow-up and warning signs for which to return to emergency department, including inability to tolerate oral intake, blood in stool/emesis, fevers, worsening or focal abdominal pain, or any other concerning symptoms. All questions were answered and the patient is comfortable with plan for discharge. The patient was discharged in stable condition.    I have reviewed the patients ECG, laboratory studies, imaging, and medical records.  .    Diagnosis & Disposition     Diagnosis:  1. Vomiting and diarrhea        Disposition:  Home    MD Geo Mayfield Theresa M, MD  05/07/21 8458

## 2021-06-23 ENCOUNTER — HOSPITAL ENCOUNTER (EMERGENCY)
Facility: OTHER | Age: 51
Discharge: HOME | End: 2021-06-24
Attending: FAMILY MEDICINE | Admitting: FAMILY MEDICINE
Payer: COMMERCIAL

## 2021-06-23 DIAGNOSIS — I10 ESSENTIAL HYPERTENSION: ICD-10-CM

## 2021-06-23 DIAGNOSIS — R07.89 ATYPICAL CHEST PAIN: ICD-10-CM

## 2021-06-23 PROCEDURE — 93005 ELECTROCARDIOGRAM TRACING: CPT | Performed by: FAMILY MEDICINE

## 2021-06-23 PROCEDURE — 99285 EMERGENCY DEPT VISIT HI MDM: CPT | Mod: 25 | Performed by: FAMILY MEDICINE

## 2021-06-23 PROCEDURE — 93010 ELECTROCARDIOGRAM REPORT: CPT | Performed by: INTERNAL MEDICINE

## 2021-06-23 PROCEDURE — 96375 TX/PRO/DX INJ NEW DRUG ADDON: CPT | Performed by: FAMILY MEDICINE

## 2021-06-23 PROCEDURE — 96376 TX/PRO/DX INJ SAME DRUG ADON: CPT | Performed by: FAMILY MEDICINE

## 2021-06-23 PROCEDURE — 99284 EMERGENCY DEPT VISIT MOD MDM: CPT | Performed by: FAMILY MEDICINE

## 2021-06-23 PROCEDURE — 96365 THER/PROPH/DIAG IV INF INIT: CPT | Mod: XU | Performed by: FAMILY MEDICINE

## 2021-06-23 ASSESSMENT — MIFFLIN-ST. JEOR: SCORE: 1918.13

## 2021-06-23 NOTE — Clinical Note
Christiano Metcalf was seen and treated in our emergency department on 6/23/2021.  He may return to work on 06/25/2021.       If you have any questions or concerns, please don't hesitate to call.      Randy Gray MD

## 2021-06-24 ENCOUNTER — APPOINTMENT (OUTPATIENT)
Dept: GENERAL RADIOLOGY | Facility: OTHER | Age: 51
End: 2021-06-24
Attending: FAMILY MEDICINE
Payer: COMMERCIAL

## 2021-06-24 ENCOUNTER — APPOINTMENT (OUTPATIENT)
Dept: CT IMAGING | Facility: OTHER | Age: 51
End: 2021-06-24
Attending: FAMILY MEDICINE
Payer: COMMERCIAL

## 2021-06-24 VITALS
HEIGHT: 68 IN | TEMPERATURE: 97 F | DIASTOLIC BLOOD PRESSURE: 104 MMHG | OXYGEN SATURATION: 93 % | SYSTOLIC BLOOD PRESSURE: 178 MMHG | WEIGHT: 240 LBS | HEART RATE: 69 BPM | BODY MASS INDEX: 36.37 KG/M2 | RESPIRATION RATE: 18 BRPM

## 2021-06-24 LAB
ALBUMIN SERPL-MCNC: 3.5 G/DL (ref 3.5–5.7)
ALP SERPL-CCNC: 68 U/L (ref 34–104)
ALT SERPL W P-5'-P-CCNC: 80 U/L (ref 7–52)
ANION GAP SERPL CALCULATED.3IONS-SCNC: 11 MMOL/L (ref 3–14)
AST SERPL W P-5'-P-CCNC: 87 U/L (ref 13–39)
BASOPHILS # BLD AUTO: 0 10E9/L (ref 0–0.2)
BASOPHILS NFR BLD AUTO: 0.5 %
BILIRUB SERPL-MCNC: 0.7 MG/DL (ref 0.3–1)
BUN SERPL-MCNC: 19 MG/DL (ref 7–25)
CALCIUM SERPL-MCNC: 8.1 MG/DL (ref 8.6–10.3)
CHLORIDE SERPL-SCNC: 104 MMOL/L (ref 98–107)
CO2 SERPL-SCNC: 23 MMOL/L (ref 21–31)
CREAT SERPL-MCNC: 1.15 MG/DL (ref 0.7–1.3)
D DIMER PPP FEU-MCNC: 0.3 UG/ML FEU (ref 0–0.5)
DIFFERENTIAL METHOD BLD: ABNORMAL
EOSINOPHIL # BLD AUTO: 0.1 10E9/L (ref 0–0.7)
EOSINOPHIL NFR BLD AUTO: 1.1 %
ERYTHROCYTE [DISTWIDTH] IN BLOOD BY AUTOMATED COUNT: 13.8 % (ref 10–15)
GFR SERPL CREATININE-BSD FRML MDRD: 67 ML/MIN/{1.73_M2}
GLUCOSE SERPL-MCNC: 113 MG/DL (ref 70–105)
HCT VFR BLD AUTO: 33.3 % (ref 40–53)
HGB BLD-MCNC: 11 G/DL (ref 13.3–17.7)
IMM GRANULOCYTES # BLD: 0 10E9/L (ref 0–0.4)
IMM GRANULOCYTES NFR BLD: 0.2 %
INTERPRETATION ECG - MUSE: NORMAL
LIPASE SERPL-CCNC: 81 U/L (ref 11–82)
LYMPHOCYTES # BLD AUTO: 2.7 10E9/L (ref 0.8–5.3)
LYMPHOCYTES NFR BLD AUTO: 41.5 %
MCH RBC QN AUTO: 27.4 PG (ref 26.5–33)
MCHC RBC AUTO-ENTMCNC: 33 G/DL (ref 31.5–36.5)
MCV RBC AUTO: 83 FL (ref 78–100)
MONOCYTES # BLD AUTO: 0.6 10E9/L (ref 0–1.3)
MONOCYTES NFR BLD AUTO: 8.5 %
NEUTROPHILS # BLD AUTO: 3.2 10E9/L (ref 1.6–8.3)
NEUTROPHILS NFR BLD AUTO: 48.2 %
PLATELET # BLD AUTO: 209 10E9/L (ref 150–450)
POTASSIUM SERPL-SCNC: 2.9 MMOL/L (ref 3.5–5.1)
PROT SERPL-MCNC: 6.9 G/DL (ref 6.4–8.9)
RBC # BLD AUTO: 4.02 10E12/L (ref 4.4–5.9)
SODIUM SERPL-SCNC: 138 MMOL/L (ref 134–144)
TROPONIN I SERPL-MCNC: 24.1 PG/ML
TROPONIN I SERPL-MCNC: 25.3 PG/ML
WBC # BLD AUTO: 6.6 10E9/L (ref 4–11)

## 2021-06-24 PROCEDURE — 258N000003 HC RX IP 258 OP 636: Performed by: FAMILY MEDICINE

## 2021-06-24 PROCEDURE — 250N000011 HC RX IP 250 OP 636: Performed by: FAMILY MEDICINE

## 2021-06-24 PROCEDURE — 36415 COLL VENOUS BLD VENIPUNCTURE: CPT | Performed by: FAMILY MEDICINE

## 2021-06-24 PROCEDURE — 85025 COMPLETE CBC W/AUTO DIFF WBC: CPT | Performed by: FAMILY MEDICINE

## 2021-06-24 PROCEDURE — 71045 X-RAY EXAM CHEST 1 VIEW: CPT

## 2021-06-24 PROCEDURE — 71275 CT ANGIOGRAPHY CHEST: CPT | Mod: TC

## 2021-06-24 PROCEDURE — 84484 ASSAY OF TROPONIN QUANT: CPT | Performed by: FAMILY MEDICINE

## 2021-06-24 PROCEDURE — 250N000009 HC RX 250: Performed by: FAMILY MEDICINE

## 2021-06-24 PROCEDURE — 85379 FIBRIN DEGRADATION QUANT: CPT | Performed by: FAMILY MEDICINE

## 2021-06-24 PROCEDURE — 250N000013 HC RX MED GY IP 250 OP 250 PS 637: Performed by: FAMILY MEDICINE

## 2021-06-24 PROCEDURE — 83690 ASSAY OF LIPASE: CPT | Performed by: FAMILY MEDICINE

## 2021-06-24 PROCEDURE — 80053 COMPREHEN METABOLIC PANEL: CPT | Performed by: FAMILY MEDICINE

## 2021-06-24 RX ORDER — ASPIRIN 81 MG/1
324 TABLET, CHEWABLE ORAL ONCE
Status: COMPLETED | OUTPATIENT
Start: 2021-06-24 | End: 2021-06-24

## 2021-06-24 RX ORDER — FAMOTIDINE 20 MG/1
40 TABLET, FILM COATED ORAL ONCE
Status: COMPLETED | OUTPATIENT
Start: 2021-06-24 | End: 2021-06-24

## 2021-06-24 RX ORDER — MAGNESIUM HYDROXIDE/ALUMINUM HYDROXICE/SIMETHICONE 120; 1200; 1200 MG/30ML; MG/30ML; MG/30ML
15 SUSPENSION ORAL ONCE
Status: COMPLETED | OUTPATIENT
Start: 2021-06-24 | End: 2021-06-24

## 2021-06-24 RX ORDER — NITROGLYCERIN 0.4 MG/1
0.4 TABLET SUBLINGUAL EVERY 5 MIN PRN
Status: DISCONTINUED | OUTPATIENT
Start: 2021-06-23 | End: 2021-06-24 | Stop reason: HOSPADM

## 2021-06-24 RX ORDER — POTASSIUM CHLORIDE 7.45 MG/ML
10 INJECTION INTRAVENOUS ONCE
Status: COMPLETED | OUTPATIENT
Start: 2021-06-24 | End: 2021-06-24

## 2021-06-24 RX ORDER — AMLODIPINE BESYLATE 2.5 MG/1
2.5 TABLET ORAL DAILY
Qty: 30 TABLET | Refills: 0 | Status: SHIPPED | OUTPATIENT
Start: 2021-06-24 | End: 2021-07-22

## 2021-06-24 RX ORDER — ASPIRIN 81 MG/1
162 TABLET, CHEWABLE ORAL DAILY
Qty: 60 TABLET | Refills: 0 | Status: SHIPPED | OUTPATIENT
Start: 2021-06-24 | End: 2021-08-19

## 2021-06-24 RX ORDER — LIDOCAINE HYDROCHLORIDE 20 MG/ML
15 SOLUTION OROPHARYNGEAL ONCE
Status: COMPLETED | OUTPATIENT
Start: 2021-06-24 | End: 2021-06-24

## 2021-06-24 RX ORDER — IOPAMIDOL 755 MG/ML
100 INJECTION, SOLUTION INTRAVASCULAR ONCE
Status: COMPLETED | OUTPATIENT
Start: 2021-06-24 | End: 2021-06-24

## 2021-06-24 RX ORDER — FENTANYL CITRATE 50 UG/ML
50 INJECTION, SOLUTION INTRAMUSCULAR; INTRAVENOUS ONCE
Status: COMPLETED | OUTPATIENT
Start: 2021-06-24 | End: 2021-06-24

## 2021-06-24 RX ORDER — ONDANSETRON 2 MG/ML
4 INJECTION INTRAMUSCULAR; INTRAVENOUS ONCE
Status: COMPLETED | OUTPATIENT
Start: 2021-06-24 | End: 2021-06-24

## 2021-06-24 RX ORDER — FENTANYL CITRATE 50 UG/ML
50 INJECTION, SOLUTION INTRAMUSCULAR; INTRAVENOUS
Status: DISCONTINUED | OUTPATIENT
Start: 2021-06-24 | End: 2021-06-24 | Stop reason: HOSPADM

## 2021-06-24 RX ORDER — SODIUM CHLORIDE 9 MG/ML
INJECTION, SOLUTION INTRAVENOUS CONTINUOUS
Status: DISCONTINUED | OUTPATIENT
Start: 2021-06-24 | End: 2021-06-24 | Stop reason: HOSPADM

## 2021-06-24 RX ADMIN — IOPAMIDOL 100 ML: 755 INJECTION, SOLUTION INTRAVENOUS at 01:45

## 2021-06-24 RX ADMIN — SODIUM CHLORIDE 1000 ML: 9 INJECTION, SOLUTION INTRAVENOUS at 00:06

## 2021-06-24 RX ADMIN — FENTANYL CITRATE 50 MCG: 50 INJECTION, SOLUTION INTRAMUSCULAR; INTRAVENOUS at 02:02

## 2021-06-24 RX ADMIN — NITROGLYCERIN 0.4 MG: 0.4 TABLET SUBLINGUAL at 00:06

## 2021-06-24 RX ADMIN — SODIUM CHLORIDE: 9 INJECTION, SOLUTION INTRAVENOUS at 01:13

## 2021-06-24 RX ADMIN — ASPIRIN 81 MG CHEWABLE TABLET 324 MG: 81 TABLET CHEWABLE at 00:06

## 2021-06-24 RX ADMIN — LIDOCAINE HYDROCHLORIDE 15 ML: 20 SOLUTION ORAL; TOPICAL at 02:35

## 2021-06-24 RX ADMIN — NITROGLYCERIN 0.4 MG: 0.4 TABLET SUBLINGUAL at 00:19

## 2021-06-24 RX ADMIN — MAGNESIUM HYDROXIDE/ALUMINUM HYDROXICE/SIMETHICONE 15 ML: 120; 1200; 1200 SUSPENSION ORAL at 02:35

## 2021-06-24 RX ADMIN — FENTANYL CITRATE 50 MCG: 50 INJECTION, SOLUTION INTRAMUSCULAR; INTRAVENOUS at 00:46

## 2021-06-24 RX ADMIN — POTASSIUM CHLORIDE 10 MEQ: 7.46 INJECTION, SOLUTION INTRAVENOUS at 01:22

## 2021-06-24 RX ADMIN — ONDANSETRON 4 MG: 2 INJECTION INTRAMUSCULAR; INTRAVENOUS at 00:06

## 2021-06-24 RX ADMIN — NITROGLYCERIN 0.4 MG: 0.4 TABLET SUBLINGUAL at 00:13

## 2021-06-24 RX ADMIN — FAMOTIDINE 40 MG: 20 TABLET ORAL at 02:58

## 2021-06-24 ASSESSMENT — ENCOUNTER SYMPTOMS
FEVER: 0
DIFFICULTY URINATING: 0
CONFUSION: 0
ARTHRALGIAS: 0
NECK STIFFNESS: 0
COLOR CHANGE: 0
ABDOMINAL PAIN: 0
EYE REDNESS: 0
HEADACHES: 0
SHORTNESS OF BREATH: 0

## 2021-06-24 NOTE — ED TRIAGE NOTES
Patient presents to ED with significant other for reports of sudden onset of substernal chest pain that woke him up from sleep approx 1 hour PTA, also having nausea and upper abdominal pain.

## 2021-06-24 NOTE — ED PROVIDER NOTES
History     Chief Complaint   Patient presents with     Chest Pain     HPI  Christiano Metcalf is a 51 year old male history of intermittent asthma, tendinopathy, sternoclavicular joint pain who presents to the emergency department with upper abdominal/chest pain woke him up from sleep about an hour prior to ED arrival.  Nauseated.  No radiation of pain.  No recent pain associated with activity.  No recent shortness of breath with exertion or diaphoresis.  No previous heart issues.    Reviewed nurses notes below, similar history is related to me.  Patient presents to ED with significant other for reports of sudden onset of substernal chest pain that woke him up from sleep approx 1 hour PTA, also having nausea and upper abdominal pain  Allergies:  Allergies   Allergen Reactions     Seasonal      Other reaction(s): Sneezing  Other reaction(s): Sneezing     No Clinical Screening - See Comments Nausea and Vomiting     Andrae Abebe       Problem List:    Patient Active Problem List    Diagnosis Date Noted     Impingement syndrome of left shoulder 10/12/2020     Priority: Medium     Pain of left sternoclavicular joint 10/12/2020     Priority: Medium     Tendinopathy of left biceps tendon 10/12/2020     Priority: Medium     Mild intermittent asthma without complication 08/31/2018     Priority: Medium     Gout 02/01/2018     Priority: Medium     Learning disability 06/14/2011     Priority: Medium        Past Medical History:    Past Medical History:   Diagnosis Date     Gout      Hypomagnesemia      Pain in left knee      Personal history of other (healed) physical injury and trauma      Unspecified injury of unspecified wrist, hand and finger(s), initial encounter        Past Surgical History:    Past Surgical History:   Procedure Laterality Date     ARTHROSCOPY KNEE      Right knee meniscal repair, arthroscopic ally     ARTHROSCOPY KNEE      2010,Left knee scope     FINGER SURGERY      Right thumb ORIF     OTHER SURGICAL  HISTORY      7/15/14,,HERNIA REPAIR,Left,LIH with mesh     OTHER SURGICAL HISTORY      5/10/16,,HERNIA REPAIR,Right,RIH with plug/patch       Family History:    Family History   Problem Relation Age of Onset     Diabetes Mother         Diabetes     Other - See Comments Father         episode of gout.     Colon Cancer Father         Cancer-colon,diagnosed in early 60s     Family History Negative Son         Good Health,     Family History Negative Daughter         Good Health,     Family History Negative Son         Good Health,       Social History:  Marital Status:  Legally  [3]  Social History     Tobacco Use     Smoking status: Former Smoker     Types: Cigarettes     Quit date: 2010     Years since quittin.3     Smokeless tobacco: Never Used   Substance Use Topics     Alcohol use: No     Alcohol/week: 0.0 standard drinks     Comment: Alcoholic Drinks/day: seldom     Drug use: No        Medications:    amLODIPine (NORVASC) 2.5 MG tablet  aspirin (ASA) 81 MG chewable tablet  acetic acid (VOSOL) 2 % otic solution  albuterol (PROAIR HFA/PROVENTIL HFA/VENTOLIN HFA) 108 (90 Base) MCG/ACT inhaler  allopurinol (ZYLOPRIM) 300 MG tablet  betamethasone dipropionate (DIPROSONE) 0.05 % external cream  cetirizine (ZYRTEC) 10 MG tablet  cetirizine (ZYRTEC) 10 MG tablet  HUMIRA PEN 40 MG/0.8ML pen kit  indomethacin (INDOCIN) 50 MG capsule  naproxen (NAPROSYN) 500 MG tablet  omeprazole (PRILOSEC) 40 MG DR capsule  ondansetron (ZOFRAN ODT) 4 MG ODT tab  pantoprazole (PROTONIX) 40 MG EC tablet  sertraline (ZOLOFT) 100 MG tablet  triamcinolone (KENALOG) 0.1 % external cream          Review of Systems   Constitutional: Negative for fever.   HENT: Negative for congestion.    Eyes: Negative for redness.   Respiratory: Negative for shortness of breath.    Gastrointestinal: Negative for abdominal pain.   Genitourinary: Negative for difficulty urinating.   Musculoskeletal: Negative for arthralgias  "and neck stiffness.   Skin: Negative for color change.   Neurological: Negative for headaches.   Psychiatric/Behavioral: Negative for confusion.       Physical Exam   BP: (!) 201/123  Pulse: 74  Temp: 97  F (36.1  C)  Resp: 16  Height: 172.7 cm (5' 8\")  Weight: 108.9 kg (240 lb)  SpO2: 95 %      Physical Exam  Vitals signs and nursing note reviewed.   Constitutional:       General: He is not in acute distress.     Appearance: He is not diaphoretic.   HENT:      Head: Atraumatic.   Eyes:      General: No scleral icterus.     Pupils: Pupils are equal, round, and reactive to light.   Cardiovascular:      Heart sounds: Normal heart sounds.   Pulmonary:      Effort: No respiratory distress.      Breath sounds: Normal breath sounds.   Abdominal:      General: Bowel sounds are normal.      Palpations: Abdomen is soft.      Tenderness: There is no abdominal tenderness.   Musculoskeletal:         General: No tenderness.   Skin:     General: Skin is warm.      Findings: No rash.         ED Course   EKG: Normal sinus rhythm, nonspecific ST/T wave flattening lateral leads that is new compared with 2019 in 2016 EKGs.  QTc 461, rate 74     Procedures            Results for orders placed or performed during the hospital encounter of 06/23/21 (from the past 24 hour(s))   CBC with platelets differential   Result Value Ref Range    WBC 6.6 4.0 - 11.0 10e9/L    RBC Count 4.02 (L) 4.4 - 5.9 10e12/L    Hemoglobin 11.0 (L) 13.3 - 17.7 g/dL    Hematocrit 33.3 (L) 40.0 - 53.0 %    MCV 83 78 - 100 fl    MCH 27.4 26.5 - 33.0 pg    MCHC 33.0 31.5 - 36.5 g/dL    RDW 13.8 10.0 - 15.0 %    Platelet Count 209 150 - 450 10e9/L    Diff Method Automated Method     % Neutrophils 48.2 %    % Lymphocytes 41.5 %    % Monocytes 8.5 %    % Eosinophils 1.1 %    % Basophils 0.5 %    % Immature Granulocytes 0.2 %    Absolute Neutrophil 3.2 1.6 - 8.3 10e9/L    Absolute Lymphocytes 2.7 0.8 - 5.3 10e9/L    Absolute Monocytes 0.6 0.0 - 1.3 10e9/L    Absolute " Eosinophils 0.1 0.0 - 0.7 10e9/L    Absolute Basophils 0.0 0.0 - 0.2 10e9/L    Abs Immature Granulocytes 0.0 0 - 0.4 10e9/L   Comprehensive metabolic panel   Result Value Ref Range    Sodium 138 134 - 144 mmol/L    Potassium 2.9 (L) 3.5 - 5.1 mmol/L    Chloride 104 98 - 107 mmol/L    Carbon Dioxide 23 21 - 31 mmol/L    Anion Gap 11 3 - 14 mmol/L    Glucose 113 (H) 70 - 105 mg/dL    Urea Nitrogen 19 7 - 25 mg/dL    Creatinine 1.15 0.70 - 1.30 mg/dL    GFR Estimate 67 >60 mL/min/[1.73_m2]    GFR Estimate If Black 81 >60 mL/min/[1.73_m2]    Calcium 8.1 (L) 8.6 - 10.3 mg/dL    Bilirubin Total 0.7 0.3 - 1.0 mg/dL    Albumin 3.5 3.5 - 5.7 g/dL    Protein Total 6.9 6.4 - 8.9 g/dL    Alkaline Phosphatase 68 34 - 104 U/L    ALT 80 (H) 7 - 52 U/L    AST 87 (H) 13 - 39 U/L   Troponin GH   Result Value Ref Range    Troponin 25.3 <34.0 pg/mL   D dimer quantitative   Result Value Ref Range    D Dimer 0.3 0.0 - 0.50 ug/ml FEU   Lipase   Result Value Ref Range    Lipase 81 11 - 82 U/L   CT Chest (PE) Abdomen Pelvis w Contrast    Narrative    PROCEDURE INFORMATION:   Exam: CTA Chest With Contrast   Exam date and time: 6/24/2021 1:28 AM   Age: 51 years old   Clinical indication: Epigastric; Sternal or substernal pain; Prior surgery;   Surgery date: 6+ months; Surgery type: Hernia repair with mesh; Patient HX:   Patient presents to er with sudden onset of chest and abdominal pain with   nausea.     TECHNIQUE:   Imaging protocol: Computed tomographic angiography of the chest with contrast.   3D rendering (Not supervised by radiologist): MIP and/or 3D reconstructed   images were created by the technologist.   Radiation optimization: All CT scans at this facility use at least one of these   dose optimization techniques: automated exposure control; mA and/or kV   adjustment per patient size (includes targeted exams where dose is matched to   clinical indication); or iterative reconstruction.   Contrast material: ISOVUE 370; Contrast  volume: 100 ml; Contrast route:   INTRAVENOUS (IV);      COMPARISON:   1. [facility] XR CHEST PORT 1 VW 9/15/2019 1:50 PM   2. CT ABDOMEN PELVIS ENTEROGRAPHY W 7/29/2016 1:33 PM     FINDINGS:   Pulmonary arteries: Normal. No pulmonary emboli.   Aorta: Unremarkable. No aortic aneurysm. No aortic dissection.     Lungs:  Patchy foci of atelectasis. No consolidation. No masses.   Pleural spaces: Unremarkable. No pneumothorax. No pleural effusion.   Heart: Unremarkable. No cardiomegaly. No pericardial effusion.   Lymph nodes:  Prior granulomatous infection. No enlarged lymph nodes.     Bones/joints:  Mild DJD and scoliosis. No acute fracture.   Soft tissues: Unremarkable.       Impression    IMPRESSION:   No acute findings.       =========================  PROCEDURE INFORMATION:   Exam: CT Abdomen And Pelvis With Contrast   Exam date and time: 6/24/2021 1:28 AM   Age: 51 years old   Clinical indication: Epigastric; Sternal or substernal pain; Prior surgery;   Surgery date: 6+ months; Surgery type: Hernia repair with mesh; Patient HX:   Patient presents to er with sudden onset of chest and abdominal pain with   nausea.     TECHNIQUE:   Imaging protocol: Computed tomography of the abdomen and pelvis with contrast.   Radiation optimization: All CT scans at this facility use at least one of these   dose optimization techniques: automated exposure control; mA and/or kV   adjustment per patient size (includes targeted exams where dose is matched to   clinical indication); or iterative reconstruction.   Contrast material: ISOVUE 370; Contrast volume: 100 ml; Contrast route:   INTRAVENOUS (IV);      COMPARISON:   1. [facility] X[facility] CHEST PORT 1 VW 9/15/2019 1:50 PM   2. CT ABDOMEN PELVIS ENTEROGRAPHY W 7/29/2016 1:33 PM     FINDINGS:   Liver:  Tiny indeterminate hypodensities.   Gallbladder and bile ducts: No calcified stones.    Pancreas: Unremarkable.   Spleen:  Prior granulomatous infection.  Splenomegaly.  Adrenal glands: Normal. No mass.    Kidneys and ureters: No hydronephrosis.   Stomach and bowel: No obstruction.  Small right renal cyst.  Appendix:  Normal appendix.     Intraperitoneal space: No free air. No abscess. No ascites.   Vasculature: Unremarkable.   Lymph nodes: No significant adenopathy.   Urinary bladder:  Thickening versus under distention of the bladder wall.   Reproductive: Unremarkable.   Bones/joints: No acute fracture.  DJD in the spine and hips.  Soft tissues: Unremarkable.     IMPRESSION:   No evidence of acute intrabdominal pathology.     THIS DOCUMENT HAS BEEN ELECTRONICALLY SIGNED BY TIN JAUREGUI MD   Troponin GH (now)   Result Value Ref Range    Troponin 24.1 <34.0 pg/mL       Medications   nitroGLYcerin (NITROSTAT) sublingual tablet 0.4 mg (0.4 mg Sublingual Given 6/24/21 0019)   0.9% sodium chloride BOLUS (0 mLs Intravenous Stopped 6/24/21 0112)     Followed by   sodium chloride 0.9% infusion ( Intravenous New Bag 6/24/21 0113)   fentaNYL (PF) (SUBLIMAZE) injection 50 mcg (50 mcg Intravenous Given 6/24/21 0202)   aspirin (ASA) chewable tablet 324 mg (324 mg Oral Given 6/24/21 0006)   ondansetron (ZOFRAN) injection 4 mg (4 mg Intravenous Given 6/24/21 0006)   fentaNYL (PF) (SUBLIMAZE) injection 50 mcg (50 mcg Intravenous Given 6/24/21 0046)   potassium chloride 10 mEq in 100 mL sterile water intermittent infusion (premix) (0 mEq Intravenous Stopped 6/24/21 0232)   iopamidol (ISOVUE-370) solution 100 mL (100 mLs Intravenous Given 6/24/21 0145)   alum & mag hydroxide-simethicone (MAALOX) suspension 15 mL (15 mLs Oral Given 6/24/21 0235)     And   lidocaine (XYLOCAINE) 2 % solution 15 mL (15 mLs Mouth/Throat Given 6/24/21 0235)       Assessments & Plan (with Medical Decision Making)     I have reviewed the nursing notes.    I have reviewed the findings, diagnosis, plan and need for follow up with the patient.       HEART Score  Background  Calculates the overall risk of adverse event in patient's presenting with chest pain.   Based on 5 criteria (each assigned 0-2 points) including suspiciousness of history, EKG, age, risk factors and troponin.    Data  51 year old male  has Gout; Learning disability; Mild intermittent asthma without complication; Impingement syndrome of left shoulder; Pain of left sternoclavicular joint; and Tendinopathy of left biceps tendon on their problem list.   reports that he quit smoking about 11 years ago. His smoking use included cigarettes. He has never used smokeless tobacco.  family history includes Colon Cancer in his father; Diabetes in his mother; Family History Negative in his daughter, son, and son; Other - See Comments in his father.  Lab Results   Component Value Date    TROPI <0.030 09/15/2019     Criteria   0-2 points for each of 5 items (maximum of 10 points):  Score 0- History slightly suspicious for coronary syndrome  Score 1- EKG with Non-specific repolarization disturbance  Score 1- Age 45 to 65 years old  Score 1- One to 2 risk factors for atherosclerotic disease  Score 0- Within normal limits for troponin levels  Interpretation  Risk of adverse outcome  Heart Score: 3  Total Score 0-3- Adverse Outcome Risk 2.5% - Supports early discharge with appropriate follow-up        New Prescriptions    AMLODIPINE (NORVASC) 2.5 MG TABLET    Take 1 tablet (2.5 mg) by mouth daily    ASPIRIN (ASA) 81 MG CHEWABLE TABLET    Take 2 tablets (162 mg) by mouth daily     Atypical chest pain: Low risk heart score, differential diagnosis includes but not limited to ACS, aortic emergency, angina, pneumonia, PE.  Heart score consistent with discharge and follow-up.  Cardiology referral placed stress echocardiogram placed trial of Norvasc at home.  Patient may be having some symptoms due to hypertension.  Hypertension has not been diagnosed but given the degree of elevation here in the emergency department I suspect he has underlying undiagnosed hypertension.  Does have T wave flattening that is new on his EKG  nonetheless his heart score is supportive of early discharge with appropriate follow-up thus I ordered cardiology and echo as above.  Trial of baby aspirin daily until follow-up with cardiology.  Return to emergency department with recurrence of pain, change in pattern of pain or fevers chills shortness of breath.  Patient verbalized understanding plans agreement left ED improving condition.  Final diagnoses:   Atypical chest pain   Essential hypertension       6/23/2021   [facility] [place]     Randy Gray MD  06/24/21 0245       Randy Gray MD  06/24/21 0249

## 2021-06-29 ENCOUNTER — TELEPHONE (OUTPATIENT)
Dept: CARDIOLOGY | Facility: OTHER | Age: 51
End: 2021-06-29

## 2021-07-20 ENCOUNTER — TELEPHONE (OUTPATIENT)
Dept: CARDIOLOGY | Facility: OTHER | Age: 51
End: 2021-07-20

## 2021-07-20 NOTE — TELEPHONE ENCOUNTER
Left message for stress test with instructions given. Emphasis on no caffeine.  Patient verbalized understanding.

## 2021-07-21 ENCOUNTER — HOSPITAL ENCOUNTER (OUTPATIENT)
Dept: CARDIOLOGY | Facility: OTHER | Age: 51
Discharge: HOME | End: 2021-07-21
Attending: FAMILY MEDICINE | Admitting: FAMILY MEDICINE
Payer: COMMERCIAL

## 2021-07-21 VITALS — BODY MASS INDEX: 36.37 KG/M2 | WEIGHT: 240 LBS | HEIGHT: 68 IN

## 2021-07-21 DIAGNOSIS — I10 ESSENTIAL HYPERTENSION: Primary | ICD-10-CM

## 2021-07-21 DIAGNOSIS — R07.89 ATYPICAL CHEST PAIN: ICD-10-CM

## 2021-07-21 DIAGNOSIS — R07.9 CHEST PAIN, UNSPECIFIED TYPE: Primary | ICD-10-CM

## 2021-07-21 PROCEDURE — 255N000002 HC RX 255 OP 636: Performed by: FAMILY MEDICINE

## 2021-07-21 PROCEDURE — 93016 CV STRESS TEST SUPVJ ONLY: CPT | Performed by: INTERNAL MEDICINE

## 2021-07-21 PROCEDURE — 93350 STRESS TTE ONLY: CPT | Mod: 26 | Performed by: STUDENT IN AN ORGANIZED HEALTH CARE EDUCATION/TRAINING PROGRAM

## 2021-07-21 PROCEDURE — 93325 DOPPLER ECHO COLOR FLOW MAPG: CPT | Mod: 26 | Performed by: STUDENT IN AN ORGANIZED HEALTH CARE EDUCATION/TRAINING PROGRAM

## 2021-07-21 PROCEDURE — 93017 CV STRESS TEST TRACING ONLY: CPT

## 2021-07-21 PROCEDURE — 93321 DOPPLER ECHO F-UP/LMTD STD: CPT | Mod: 26 | Performed by: STUDENT IN AN ORGANIZED HEALTH CARE EDUCATION/TRAINING PROGRAM

## 2021-07-21 PROCEDURE — 250N000011 HC RX IP 250 OP 636: Performed by: INTERNAL MEDICINE

## 2021-07-21 PROCEDURE — 93018 CV STRESS TEST I&R ONLY: CPT | Performed by: INTERNAL MEDICINE

## 2021-07-21 RX ORDER — DOBUTAMINE HYDROCHLORIDE 200 MG/100ML
10-40 INJECTION INTRAVENOUS CONTINUOUS
Status: DISCONTINUED | OUTPATIENT
Start: 2021-07-21 | End: 2021-07-22 | Stop reason: HOSPADM

## 2021-07-21 RX ADMIN — DOBUTAMINE HYDROCHLORIDE 10 MCG/KG/MIN: 200 INJECTION INTRAVENOUS at 14:36

## 2021-07-21 RX ADMIN — PERFLUTREN 4 ML: 6.52 INJECTION, SUSPENSION INTRAVENOUS at 15:08

## 2021-07-21 ASSESSMENT — MIFFLIN-ST. JEOR: SCORE: 1918.13

## 2021-07-21 NOTE — PROGRESS NOTES
4105 The patient arrived for a Dobutamine stress echo.  The procedure, risks and benefits were discussed and the consent was signed.  The patient was prepped for the stress test, an IV was started,  and the echo sonographer did the initial images with Definity for image enhancement.   Dr. Alcantara arrived, and the Dobutamine protocol was started via multistep infusion.  The patient developed nausea and the test ended.  Stress images were completed,   the IV was removed. The patient was informed that the test results would be given to them by Dr. Frankel.  If you have not received your results within 3 days please call the ordering provider.  The patient was released in stable condition.  Please see the chart for complete test results.

## 2021-07-22 RX ORDER — AMLODIPINE BESYLATE 2.5 MG/1
TABLET ORAL
Qty: 30 TABLET | Refills: 0 | Status: SHIPPED | OUTPATIENT
Start: 2021-07-22 | End: 2021-08-26

## 2021-07-22 NOTE — TELEPHONE ENCOUNTER
"Refill request for Amlodipine 2.5 mg tablet from [facility].  Patient was given this medication #30 with no refills on 6/23/2021 in ED from Dr. Gray for possible \"underlying undiagnosed hypertension\".  Has upcoming cardiology appointment on 8/19/2021.  ED visit was for chest pain.  Routing to PCP to address refills at this time.  Unable to complete prescription refill per RN Medication Refill Policy. Vandana Newberry RN 7/22/2021 11:17 AM  "

## 2021-08-17 DIAGNOSIS — R07.89 ATYPICAL CHEST PAIN: Primary | ICD-10-CM

## 2021-08-18 NOTE — TELEPHONE ENCOUNTER
"Requested Prescriptions   Pending Prescriptions Disp Refills     aspirin (ASA) 81 MG chewable tablet [Pharmacy Med Name: ASPIRIN 81MG CHEWABLE TABLETS] 60 tablet 0     Sig: CHEW AND SWALLOW 2 TABLETS(162 MG) BY MOUTH DAILY       Analgesics (Non-Narcotic Tylenol and ASA Only) Failed - 8/17/2021  8:01 PM        Failed - Recent (12 mo) or future (30 days) visit within the authorizing provider's specialty     Patient has had an office visit with the authorizing provider or a provider within the authorizing providers department within the previous 12 mos or has a future within next 30 days. See \"Patient Info\" tab in inbasket, or \"Choose Columns\" in Meds & Orders section of the refill encounter.              Passed - Patient is age 20 years or older     If ASA is flagged for ages under 20 years old. Forward to provider for confirmation Ryes Syndrome is not a concern.              Passed - Medication is active on med list               Last Written Prescription Date:  06/24/2021  Last Fill Quantity: 60,   # refills: 0  Last Office Visit: 11/22/2020 with Jailyn Bliss MD  Future Office visit:   None noted.  Unable to complete prescription refill per RN medication refill policy. Will route to provider for review and consideration.  Elisabeth Daly RN on 8/18/2021 at 2:03 PM              "

## 2021-08-19 RX ORDER — ASPIRIN 81 MG/1
TABLET, CHEWABLE ORAL
Qty: 60 TABLET | Refills: 0 | Status: ON HOLD | OUTPATIENT
Start: 2021-08-19 | End: 2021-09-05

## 2021-08-24 DIAGNOSIS — M1A.00X0 IDIOPATHIC CHRONIC GOUT WITHOUT TOPHUS, UNSPECIFIED SITE: ICD-10-CM

## 2021-08-24 NOTE — LETTER
August 25, 2021      Christiano Metcalf  [address]  [facility] [address]        Dear Christiano,     This letter is to remind you that you are nearly due for your annual exam with Gino Frankel. Your last comprehensive visit was more than 12 months ago.    Please call the clinic at [phone] to schedule your appointment.    If you are no longer seeing Gino Frankel for primary care, please call to let us know. Doing so will remove you from our call/contact list.    Thank you for choosing [facility] and [facility] for your health care needs.    Sincerely,    Deb JAMES  [facility]

## 2021-08-25 NOTE — TELEPHONE ENCOUNTER
[facility] sent Rx request for the following:      Requested Prescriptions   Pending Prescriptions Disp Refills     allopurinol (ZYLOPRIM) 300 MG tablet [Pharmacy Med Name: ALLOPURINOL 300MG TABLETS] 90 tablet 3     Sig: TAKE 1 TABLET(300 MG) BY MOUTH DAILY   Last Prescription Date:   9/3/20  Last Fill Qty/Refills:         90, R-3    Last Office Visit:              9/3/20   Future Office visit:           None  Routing refill request to provider for review/approval because:    Gout Agents Protocol Failed - 8/25/2021  9:16 AM        Failed - Has Uric Acid on file in past 12 months and value is less than 6     Patient will be due for annual exam around 9/3/21. Reminder letter sent to Pt. Unable to complete prescription refill per RN Medication Refill Policy. Franchesca Abreu RN .............. 8/25/2021  9:28 AM

## 2021-08-26 RX ORDER — ALLOPURINOL 300 MG/1
TABLET ORAL
Qty: 90 TABLET | Refills: 3 | Status: SHIPPED | OUTPATIENT
Start: 2021-08-26 | End: 2022-09-01

## 2021-09-04 ENCOUNTER — NURSE TRIAGE (OUTPATIENT)
Dept: NURSING | Facility: CLINIC | Age: 51
End: 2021-09-04

## 2021-09-04 ENCOUNTER — HOSPITAL ENCOUNTER (INPATIENT)
Facility: OTHER | Age: 51
LOS: 1 days | Discharge: HOME | End: 2021-09-05
Attending: STUDENT IN AN ORGANIZED HEALTH CARE EDUCATION/TRAINING PROGRAM | Admitting: INTERNAL MEDICINE
Payer: COMMERCIAL

## 2021-09-04 DIAGNOSIS — Z79.82 ENCOUNTER FOR LONG-TERM (CURRENT) USE OF ASPIRIN: ICD-10-CM

## 2021-09-04 DIAGNOSIS — Z11.52 ENCOUNTER FOR SCREENING LABORATORY TESTING FOR COVID-19 VIRUS: ICD-10-CM

## 2021-09-04 DIAGNOSIS — K29.01 GASTROINTESTINAL HEMORRHAGE ASSOCIATED WITH ACUTE GASTRITIS: Primary | ICD-10-CM

## 2021-09-04 DIAGNOSIS — K92.2 GASTROINTESTINAL HEMORRHAGE, UNSPECIFIED GASTROINTESTINAL HEMORRHAGE TYPE: ICD-10-CM

## 2021-09-04 DIAGNOSIS — Z79.899 NEED FOR PROPHYLACTIC CHEMOTHERAPY: ICD-10-CM

## 2021-09-04 DIAGNOSIS — Z87.891 PERSONAL HISTORY OF TOBACCO USE, PRESENTING HAZARDS TO HEALTH: ICD-10-CM

## 2021-09-04 PROBLEM — N17.9 ACUTE KIDNEY INJURY (H): Status: ACTIVE | Noted: 2021-09-04

## 2021-09-04 PROBLEM — D50.9 MICROCYTIC ANEMIA: Status: ACTIVE | Noted: 2021-09-04

## 2021-09-04 PROBLEM — D62 ANEMIA DUE TO BLOOD LOSS, ACUTE: Status: ACTIVE | Noted: 2021-09-04

## 2021-09-04 PROBLEM — L40.9 PSORIASIS: Status: ACTIVE | Noted: 2021-09-04

## 2021-09-04 PROBLEM — Z79.1 LONG TERM CURRENT USE OF NON-STEROIDAL ANTI-INFLAMMATORIES (NSAID): Status: ACTIVE | Noted: 2021-09-04

## 2021-09-04 LAB
ABO/RH(D): NORMAL
ALBUMIN SERPL-MCNC: 3.8 G/DL (ref 3.5–5.7)
ALP SERPL-CCNC: 72 U/L (ref 34–104)
ALT SERPL W P-5'-P-CCNC: 68 U/L (ref 7–52)
ANION GAP SERPL CALCULATED.3IONS-SCNC: 10 MMOL/L (ref 3–14)
ANTIBODY SCREEN: NEGATIVE
AST SERPL W P-5'-P-CCNC: 60 U/L (ref 13–39)
BASOPHILS # BLD AUTO: 0 10E3/UL (ref 0–0.2)
BASOPHILS NFR BLD AUTO: 0 %
BILIRUB SERPL-MCNC: 0.7 MG/DL (ref 0.3–1)
BUN SERPL-MCNC: 36 MG/DL (ref 7–25)
CALCIUM SERPL-MCNC: 9.6 MG/DL (ref 8.6–10.3)
CHLORIDE BLD-SCNC: 103 MMOL/L (ref 98–107)
CO2 SERPL-SCNC: 24 MMOL/L (ref 21–31)
CREAT SERPL-MCNC: 1.57 MG/DL (ref 0.7–1.3)
EOSINOPHIL # BLD AUTO: 0 10E3/UL (ref 0–0.7)
EOSINOPHIL NFR BLD AUTO: 0 %
ERYTHROCYTE [DISTWIDTH] IN BLOOD BY AUTOMATED COUNT: 17.1 % (ref 10–15)
GFR SERPL CREATININE-BSD FRML MDRD: 50 ML/MIN/1.73M2
GLUCOSE BLD-MCNC: 135 MG/DL (ref 70–105)
HCT VFR BLD AUTO: 39.6 % (ref 40–53)
HEMOCCULT STL QL: POSITIVE
HGB BLD-MCNC: 11.6 G/DL (ref 13.3–17.7)
HGB BLD-MCNC: 12.7 G/DL (ref 13.3–17.7)
HOLD SPECIMEN: NORMAL
IMM GRANULOCYTES # BLD: 0 10E3/UL
IMM GRANULOCYTES NFR BLD: 0 %
LACTATE SERPL-SCNC: 1.3 MMOL/L (ref 0.7–2)
LIPASE SERPL-CCNC: 31 U/L (ref 11–82)
LYMPHOCYTES # BLD AUTO: 1.7 10E3/UL (ref 0.8–5.3)
LYMPHOCYTES NFR BLD AUTO: 20 %
MCH RBC QN AUTO: 24.6 PG (ref 26.5–33)
MCHC RBC AUTO-ENTMCNC: 32.1 G/DL (ref 31.5–36.5)
MCV RBC AUTO: 77 FL (ref 78–100)
MONOCYTES # BLD AUTO: 0.4 10E3/UL (ref 0–1.3)
MONOCYTES NFR BLD AUTO: 4 %
NEUTROPHILS # BLD AUTO: 6.4 10E3/UL (ref 1.6–8.3)
NEUTROPHILS NFR BLD AUTO: 76 %
NRBC # BLD AUTO: 0 10E3/UL
NRBC BLD AUTO-RTO: 0 /100
PLATELET # BLD AUTO: 221 10E3/UL (ref 150–450)
POTASSIUM BLD-SCNC: 3.8 MMOL/L (ref 3.5–5.1)
PROT SERPL-MCNC: 7.3 G/DL (ref 6.4–8.9)
RBC # BLD AUTO: 5.17 10E6/UL (ref 4.4–5.9)
SARS-COV-2 RNA RESP QL NAA+PROBE: NEGATIVE
SODIUM SERPL-SCNC: 137 MMOL/L (ref 134–144)
SPECIMEN EXPIRATION DATE: NORMAL
TROPONIN I SERPL-MCNC: 16.3 PG/ML (ref 0–34)
WBC # BLD AUTO: 8.6 10E3/UL (ref 4–11)

## 2021-09-04 PROCEDURE — 36415 COLL VENOUS BLD VENIPUNCTURE: CPT | Performed by: STUDENT IN AN ORGANIZED HEALTH CARE EDUCATION/TRAINING PROGRAM

## 2021-09-04 PROCEDURE — 85025 COMPLETE CBC W/AUTO DIFF WBC: CPT | Performed by: STUDENT IN AN ORGANIZED HEALTH CARE EDUCATION/TRAINING PROGRAM

## 2021-09-04 PROCEDURE — 99285 EMERGENCY DEPT VISIT HI MDM: CPT | Mod: 25 | Performed by: STUDENT IN AN ORGANIZED HEALTH CARE EDUCATION/TRAINING PROGRAM

## 2021-09-04 PROCEDURE — C9803 HOPD COVID-19 SPEC COLLECT: HCPCS | Performed by: STUDENT IN AN ORGANIZED HEALTH CARE EDUCATION/TRAINING PROGRAM

## 2021-09-04 PROCEDURE — 250N000011 HC RX IP 250 OP 636: Performed by: STUDENT IN AN ORGANIZED HEALTH CARE EDUCATION/TRAINING PROGRAM

## 2021-09-04 PROCEDURE — C9113 INJ PANTOPRAZOLE SODIUM, VIA: HCPCS | Performed by: STUDENT IN AN ORGANIZED HEALTH CARE EDUCATION/TRAINING PROGRAM

## 2021-09-04 PROCEDURE — 120N000001 HC R&B MED SURG/OB

## 2021-09-04 PROCEDURE — 83690 ASSAY OF LIPASE: CPT | Performed by: STUDENT IN AN ORGANIZED HEALTH CARE EDUCATION/TRAINING PROGRAM

## 2021-09-04 PROCEDURE — 86900 BLOOD TYPING SEROLOGIC ABO: CPT | Performed by: STUDENT IN AN ORGANIZED HEALTH CARE EDUCATION/TRAINING PROGRAM

## 2021-09-04 PROCEDURE — 96375 TX/PRO/DX INJ NEW DRUG ADDON: CPT | Performed by: STUDENT IN AN ORGANIZED HEALTH CARE EDUCATION/TRAINING PROGRAM

## 2021-09-04 PROCEDURE — 99222 1ST HOSP IP/OBS MODERATE 55: CPT | Mod: AI | Performed by: INTERNAL MEDICINE

## 2021-09-04 PROCEDURE — 85018 HEMOGLOBIN: CPT | Performed by: INTERNAL MEDICINE

## 2021-09-04 PROCEDURE — 250N000013 HC RX MED GY IP 250 OP 250 PS 637: Performed by: INTERNAL MEDICINE

## 2021-09-04 PROCEDURE — 93010 ELECTROCARDIOGRAM REPORT: CPT | Performed by: INTERNAL MEDICINE

## 2021-09-04 PROCEDURE — 99285 EMERGENCY DEPT VISIT HI MDM: CPT | Performed by: STUDENT IN AN ORGANIZED HEALTH CARE EDUCATION/TRAINING PROGRAM

## 2021-09-04 PROCEDURE — U0005 INFEC AGEN DETEC AMPLI PROBE: HCPCS | Performed by: STUDENT IN AN ORGANIZED HEALTH CARE EDUCATION/TRAINING PROGRAM

## 2021-09-04 PROCEDURE — 80053 COMPREHEN METABOLIC PANEL: CPT | Performed by: STUDENT IN AN ORGANIZED HEALTH CARE EDUCATION/TRAINING PROGRAM

## 2021-09-04 PROCEDURE — 96374 THER/PROPH/DIAG INJ IV PUSH: CPT | Mod: XU | Performed by: STUDENT IN AN ORGANIZED HEALTH CARE EDUCATION/TRAINING PROGRAM

## 2021-09-04 PROCEDURE — 36415 COLL VENOUS BLD VENIPUNCTURE: CPT | Performed by: INTERNAL MEDICINE

## 2021-09-04 PROCEDURE — 83605 ASSAY OF LACTIC ACID: CPT | Performed by: STUDENT IN AN ORGANIZED HEALTH CARE EDUCATION/TRAINING PROGRAM

## 2021-09-04 PROCEDURE — 84484 ASSAY OF TROPONIN QUANT: CPT | Performed by: STUDENT IN AN ORGANIZED HEALTH CARE EDUCATION/TRAINING PROGRAM

## 2021-09-04 PROCEDURE — 258N000003 HC RX IP 258 OP 636: Performed by: STUDENT IN AN ORGANIZED HEALTH CARE EDUCATION/TRAINING PROGRAM

## 2021-09-04 PROCEDURE — 93005 ELECTROCARDIOGRAM TRACING: CPT | Performed by: STUDENT IN AN ORGANIZED HEALTH CARE EDUCATION/TRAINING PROGRAM

## 2021-09-04 PROCEDURE — 82272 OCCULT BLD FECES 1-3 TESTS: CPT | Performed by: STUDENT IN AN ORGANIZED HEALTH CARE EDUCATION/TRAINING PROGRAM

## 2021-09-04 PROCEDURE — 258N000003 HC RX IP 258 OP 636: Performed by: INTERNAL MEDICINE

## 2021-09-04 RX ORDER — ONDANSETRON 2 MG/ML
4 INJECTION INTRAMUSCULAR; INTRAVENOUS EVERY 6 HOURS PRN
Status: DISCONTINUED | OUTPATIENT
Start: 2021-09-04 | End: 2021-09-05 | Stop reason: HOSPADM

## 2021-09-04 RX ORDER — ALBUTEROL SULFATE 0.83 MG/ML
2.5 SOLUTION RESPIRATORY (INHALATION) 4 TIMES DAILY PRN
Status: DISCONTINUED | OUTPATIENT
Start: 2021-09-04 | End: 2021-09-05 | Stop reason: HOSPADM

## 2021-09-04 RX ORDER — AMLODIPINE BESYLATE 2.5 MG/1
2.5 TABLET ORAL DAILY
Status: DISCONTINUED | OUTPATIENT
Start: 2021-09-04 | End: 2021-09-05 | Stop reason: HOSPADM

## 2021-09-04 RX ORDER — PROCHLORPERAZINE 25 MG
25 SUPPOSITORY, RECTAL RECTAL EVERY 12 HOURS PRN
Status: DISCONTINUED | OUTPATIENT
Start: 2021-09-04 | End: 2021-09-05 | Stop reason: HOSPADM

## 2021-09-04 RX ORDER — SUCRALFATE 1 G/1
1 TABLET ORAL
Status: DISCONTINUED | OUTPATIENT
Start: 2021-09-04 | End: 2021-09-05 | Stop reason: HOSPADM

## 2021-09-04 RX ORDER — INDOMETHACIN 50 MG/1
50 CAPSULE ORAL 3 TIMES DAILY PRN
Status: ON HOLD | COMMUNITY
End: 2021-09-05

## 2021-09-04 RX ORDER — ONDANSETRON 4 MG/1
4 TABLET, ORALLY DISINTEGRATING ORAL EVERY 6 HOURS PRN
Status: DISCONTINUED | OUTPATIENT
Start: 2021-09-04 | End: 2021-09-05 | Stop reason: HOSPADM

## 2021-09-04 RX ORDER — LIDOCAINE 40 MG/G
CREAM TOPICAL
Status: DISCONTINUED | OUTPATIENT
Start: 2021-09-04 | End: 2021-09-05 | Stop reason: HOSPADM

## 2021-09-04 RX ORDER — LORATADINE 10 MG/1
10 TABLET ORAL DAILY
Status: DISCONTINUED | OUTPATIENT
Start: 2021-09-04 | End: 2021-09-05 | Stop reason: HOSPADM

## 2021-09-04 RX ORDER — SODIUM CHLORIDE 9 MG/ML
INJECTION, SOLUTION INTRAVENOUS CONTINUOUS
Status: DISCONTINUED | OUTPATIENT
Start: 2021-09-04 | End: 2021-09-05 | Stop reason: HOSPADM

## 2021-09-04 RX ORDER — ALLOPURINOL 300 MG/1
300 TABLET ORAL DAILY
Status: DISCONTINUED | OUTPATIENT
Start: 2021-09-04 | End: 2021-09-05 | Stop reason: HOSPADM

## 2021-09-04 RX ORDER — HYDROMORPHONE HYDROCHLORIDE 1 MG/ML
0.5 INJECTION, SOLUTION INTRAMUSCULAR; INTRAVENOUS; SUBCUTANEOUS ONCE
Status: COMPLETED | OUTPATIENT
Start: 2021-09-04 | End: 2021-09-04

## 2021-09-04 RX ORDER — PROCHLORPERAZINE MALEATE 10 MG
10 TABLET ORAL EVERY 6 HOURS PRN
Status: DISCONTINUED | OUTPATIENT
Start: 2021-09-04 | End: 2021-09-05 | Stop reason: HOSPADM

## 2021-09-04 RX ORDER — ONDANSETRON 2 MG/ML
4 INJECTION INTRAMUSCULAR; INTRAVENOUS ONCE
Status: COMPLETED | OUTPATIENT
Start: 2021-09-04 | End: 2021-09-04

## 2021-09-04 RX ADMIN — SODIUM CHLORIDE: 9 INJECTION, SOLUTION INTRAVENOUS at 17:21

## 2021-09-04 RX ADMIN — AMLODIPINE BESYLATE 2.5 MG: 2.5 TABLET ORAL at 17:19

## 2021-09-04 RX ADMIN — SUCRALFATE 1 G: 1 TABLET ORAL at 17:19

## 2021-09-04 RX ADMIN — ONDANSETRON 4 MG: 2 INJECTION INTRAMUSCULAR; INTRAVENOUS at 11:04

## 2021-09-04 RX ADMIN — PANTOPRAZOLE SODIUM 80 MG: 40 INJECTION, POWDER, FOR SOLUTION INTRAVENOUS at 10:37

## 2021-09-04 RX ADMIN — SERTRALINE HYDROCHLORIDE 100 MG: 50 TABLET ORAL at 17:19

## 2021-09-04 RX ADMIN — SUCRALFATE 1 G: 1 TABLET ORAL at 22:01

## 2021-09-04 RX ADMIN — ALLOPURINOL 300 MG: 300 TABLET ORAL at 17:19

## 2021-09-04 RX ADMIN — HYDROMORPHONE HYDROCHLORIDE 0.5 MG: 1 INJECTION, SOLUTION INTRAMUSCULAR; INTRAVENOUS; SUBCUTANEOUS at 11:05

## 2021-09-04 RX ADMIN — SODIUM CHLORIDE 1000 ML: 9 INJECTION, SOLUTION INTRAVENOUS at 10:38

## 2021-09-04 ASSESSMENT — ACTIVITIES OF DAILY LIVING (ADL)
ADLS_ACUITY_SCORE: 13
ADLS_ACUITY_SCORE: 13

## 2021-09-04 ASSESSMENT — MIFFLIN-ST. JEOR: SCORE: 1895.45

## 2021-09-04 NOTE — ED TRIAGE NOTES
Pt states last night started vomiting and diarrhea, states stool was bloody, unable to verify if blood was bright red or dark.

## 2021-09-04 NOTE — PHARMACY-ADMISSION MEDICATION HISTORY
Pharmacy -- Admission Medication Reconciliation    Prior to admission (PTA) medications were reviewed and the patient's PTA medication list was updated.    Sources Consulted: [employer]    The reliability of this Medication Reconciliation is: Reliability: Reliable    The following significant changes were made:  1. Removed acetic acid, betamethasone cream, and triamcinolone cream per patient  2. Removed omeprazole and pantoprazole per patient  3. Removed ondansetron per patient  4. Updated indomethacin to PRN gout per patient  5. clarified dosing of cetrizine per patient    In addition, the patient's allergies were reviewed with the patient and updated as follows:   Allergies: No clinical screening - see comments and Seasonal    The pharmacist has reviewed with the patient that all personal medications should be removed from the building or locked in the belongings safe.  Patient shall only take medications ordered by the physician and administered by the nursing staff.       Medication barriers identified: none noted   Medication adherence concerns: none noted    Understanding of emergency medications: NA   Patient would like a new prescription for a PPI at discharge.    Antonia Cisneros [facility], 9/4/2021,  5:10 PM

## 2021-09-04 NOTE — PROGRESS NOTES
Admission Note    Data:  Christiano Metcalf admitted to med/surg from emergency room at 1530.      Action:  Dr. Daniel has been notified of admission. Pt oriented to unit, call light in reach.     Response:  Patient tolerated transfer well .

## 2021-09-04 NOTE — PLAN OF CARE
Pt is A&O x4. Pleasant and calling appropriately. Intermittent nausea and abdominal discomfort noted. States he is comfortable at this time. Denies need for any intervention at this time. IV patent infusing /hr. Up with limited assist x1. LS clear. HR regular. Abdomen rounded, soft, tender. Resting comfortably in bed at this time. Temp: 97.1  F (36.2  C) Temp src: Tympanic BP: 136/79 Pulse: 78   Resp: 12 SpO2: 96 % O2 Device: None (Room air)

## 2021-09-04 NOTE — ED PROVIDER NOTES
History     Chief Complaint   Patient presents with     Rectal Bleeding     Vomiting     Diarrhea     HPI  Christiano Metcalf is a 51 year old man with history of mild intermittent asthma, chronic back pain on naproxen, hypertension, gout, GERD, psoriasis on Humira who presents for evaluation of upper abdominal pain as well as nausea/vomiting and diarrhea.  Upper abdominal pain started yesterday afternoon, into the evening he developed severe nausea and vomiting, describes vomitus as reddish-brown.  Also started having frequent loose stools that he describes as liquid throughout the night, 10-15 episodes at least.  Still having nausea at this time.  Has not been able to keep anything down.  He denies any fevers or chills, chest pain or difficulty breathing.  He does feel somewhat lightheaded, no headache or vision changes.  Pain is in the upper abdomen only, may radiate somewhat to the right side but otherwise no radiation to the back or lower abdomen.  No urinary complaints.  No rash or leg swelling.  He denies any increase in his usual twice daily naproxen.  No prior history of gastric ulcers.  Patient also takes a baby aspirin daily.    Allergies:  Allergies   Allergen Reactions     Seasonal      Other reaction(s): Sneezing  Other reaction(s): Sneezing     No Clinical Screening - See Comments Nausea and Vomiting     Peas  Peas       Problem List:    Patient Active Problem List    Diagnosis Date Noted     Impingement syndrome of left shoulder 10/12/2020     Priority: Medium     Pain of left sternoclavicular joint 10/12/2020     Priority: Medium     Tendinopathy of left biceps tendon 10/12/2020     Priority: Medium     Mild intermittent asthma without complication 08/31/2018     Priority: Medium     Gout 02/01/2018     Priority: Medium     Learning disability 06/14/2011     Priority: Medium        Past Medical History:    Past Medical History:   Diagnosis Date     Gout      Hypomagnesemia      Pain in left knee       Personal history of other (healed) physical injury and trauma      Unspecified injury of unspecified wrist, hand and finger(s), initial encounter        Past Surgical History:    Past Surgical History:   Procedure Laterality Date     ARTHROSCOPY KNEE      Right knee meniscal repair, arthroscopic ally     ARTHROSCOPY KNEE      ,Left knee scope     FINGER SURGERY      Right thumb ORIF     OTHER SURGICAL HISTORY      7/15/14,,HERNIA REPAIR,Left,LIH with mesh     OTHER SURGICAL HISTORY      5/10/16,,HERNIA REPAIR,Right,RIH with plug/patch       Family History:    Family History   Problem Relation Age of Onset     Diabetes Mother         Diabetes     Other - See Comments Father         episode of gout.     Colon Cancer Father         Cancer-colon,diagnosed in early 60s     Family History Negative Son         Good Health,     Family History Negative Daughter         Good Health,     Family History Negative Son         Good Health,       Social History:  Marital Status:  Legally  [3]  Social History     Tobacco Use     Smoking status: Former Smoker     Types: Cigarettes     Quit date: 2010     Years since quittin.5     Smokeless tobacco: Never Used   Substance Use Topics     Alcohol use: No     Alcohol/week: 0.0 standard drinks     Comment: Alcoholic Drinks/day: seldom     Drug use: No        Medications:    acetic acid (VOSOL) 2 % otic solution  albuterol (PROAIR HFA/PROVENTIL HFA/VENTOLIN HFA) 108 (90 Base) MCG/ACT inhaler  allopurinol (ZYLOPRIM) 300 MG tablet  amLODIPine (NORVASC) 2.5 MG tablet  aspirin (ASA) 81 MG chewable tablet  betamethasone dipropionate (DIPROSONE) 0.05 % external cream  cetirizine (ZYRTEC) 10 MG tablet  cetirizine (ZYRTEC) 10 MG tablet  cetirizine (ZYRTEC) 10 MG tablet  HUMIRA PEN 40 MG/0.8ML pen kit  indomethacin (INDOCIN) 50 MG capsule  naproxen (NAPROSYN) 500 MG tablet  omeprazole (PRILOSEC) 40 MG DR capsule  ondansetron (ZOFRAN ODT) 4 MG ODT  "tab  pantoprazole (PROTONIX) 40 MG EC tablet  sertraline (ZOLOFT) 100 MG tablet  triamcinolone (KENALOG) 0.1 % external cream          Review of Systems  Please see HPI above for pertinent positives and negatives.  All other systems reviewed and found to be negative.    Physical Exam   BP: 92/58  Pulse: 99  Temp: 97.4  F (36.3  C)  Resp: 16  Height: 172.7 cm (5' 8\")  Weight: 106.6 kg (235 lb)  SpO2: 97 %      Physical Exam  Gen: Lying in bed, appears tired but no acute distress, alert  HEENT: NC/AT, slightly dry mucous membranes, conjunctivae clear  CV: RRR, appears warm and well-perfused, cap refill <2 seconds  Pulm: CTAB, normal respiratory effort  Abd: Moderate epigastric TTP, no guarding/rebound, soft, non-distended  Skin: No rash or other lesions  Rectal: Melanotic stool on VAMSI  MSK: No gross deformities or swelling  Neuro: A&O x4, no focal deficits    ED Course     ED Course as of Sep 04 2056   Sat Sep 04, 2021   1032 Patient evaluated, reports epigastric abdominal pain that started yesterday afternoon, then developed vomits brownish-red in color followed by black stools with next blood overnight, reports 10-15 episodes of liquid stools at least.  Has not been able to tolerate any thing to eat or drink.  Still having nausea and epigastric pain currently.  He takes naproxen for chronic low back pain and has a history of GERD.  No prior history of GI bleed.  Findings are concerning for probable gastric ulcer bleed, will obtain labs, give fluids, Protonix, no history of varices or cirrhosis, check stool, anticipate likely admission for upper endoscopy      1052 EKG demonstrates normal sinus rhythm with rate of 80 bpm, QRS and QTc are borderline prolonged but within normal limits, no STEMI or current of injury      1053 Lactate is normal.  CBC notable for microcytic anemia but hemoglobin actually improved to 12.7 from 11.0 two months ago, arguing against significant blood loss      1113 Troponin within normal " limits, setting of very atypical features for ACS and nonischemic EKG greater than 6 hours since onset of symptoms, ACS effectively excluded      1123 Lipase normal.  CMP with elevated creatinine 1.57 consistent with acute kidney injury and BUN is markedly elevated to 36, this is concerning for GI bleed versus prerenal azotemia from hypovolemia/dehydration.  AST and ALT also slightly elevated, nonspecific.  Will attempt to visualize stool sample to better elucidate melena vs severe gastroenteritis      1221 Rectal swab performed, will send for analysis      1316 Occult blood positive      1320 I spoke with Dr. Daniel who agrees with plan for admission        Procedures              Critical Care time:  none               Results for orders placed or performed during the hospital encounter of 09/04/21 (from the past 24 hour(s))   CBC with platelets differential    Narrative    The following orders were created for panel order CBC with platelets differential.  Procedure                               Abnormality         Status                     ---------                               -----------         ------                     CBC with platelets and d...[[id number]]  Abnormal            Final result                 Please view results for these tests on the individual orders.   Comprehensive metabolic panel   Result Value Ref Range    Sodium 137 134 - 144 mmol/L    Potassium 3.8 3.5 - 5.1 mmol/L    Chloride 103 98 - 107 mmol/L    Carbon Dioxide (CO2) 24 21 - 31 mmol/L    Anion Gap 10 3 - 14 mmol/L    Urea Nitrogen 36 (H) 7 - 25 mg/dL    Creatinine 1.57 (H) 0.70 - 1.30 mg/dL    Calcium 9.6 8.6 - 10.3 mg/dL    Glucose 135 (H) 70 - 105 mg/dL    Alkaline Phosphatase 72 34 - 104 U/L    AST 60 (H) 13 - 39 U/L    ALT 68 (H) 7 - 52 U/L    Protein Total 7.3 6.4 - 8.9 g/dL    Albumin 3.8 3.5 - 5.7 g/dL    Bilirubin Total 0.7 0.3 - 1.0 mg/dL    GFR Estimate 50 (L) >60 mL/min/1.73m2   Lipase   Result Value Ref Range    Lipase  31 11 - 82 U/L   Lactic acid whole blood   Result Value Ref Range    Lactic Acid 1.3 0.7 - 2.0 mmol/L   Troponin I   Result Value Ref Range    Troponin I 16.3 0.0 - 34.0 pg/mL   ABO/Rh type and screen    Narrative    The following orders were created for panel order ABO/Rh type and screen.  Procedure                               Abnormality         Status                     ---------                               -----------         ------                     Adult Type and Screen[[id number]]                            Edited Result - FINAL        Please view results for these tests on the individual orders.   Extra Tube    Narrative    The following orders were created for panel order Extra Tube.  Procedure                               Abnormality         Status                     ---------                               -----------         ------                     Extra Serum Separator Tu...[[id number]]                      Final result                 Please view results for these tests on the individual orders.   Extra Serum Separator Tube (SST)   Result Value Ref Range    Hold Specimen JIC    Extra Tube    Narrative    The following orders were created for panel order Extra Tube.  Procedure                               Abnormality         Status                     ---------                               -----------         ------                     Extra Blue Top Tube[[id number]]                              Final result                 Please view results for these tests on the individual orders.   Extra Blue Top Tube   Result Value Ref Range    Hold Specimen JIC    CBC with platelets and differential   Result Value Ref Range    WBC Count 8.6 4.0 - 11.0 10e3/uL    RBC Count 5.17 4.40 - 5.90 10e6/uL    Hemoglobin 12.7 (L) 13.3 - 17.7 g/dL    Hematocrit 39.6 (L) 40.0 - 53.0 %    MCV 77 (L) 78 - 100 fL    MCH 24.6 (L) 26.5 - 33.0 pg    MCHC 32.1 31.5 - 36.5 g/dL    RDW 17.1 (H) 10.0 - 15.0 %    Platelet  Count 221 150 - 450 10e3/uL    % Neutrophils 76 %    % Lymphocytes 20 %    % Monocytes 4 %    % Eosinophils 0 %    % Basophils 0 %    % Immature Granulocytes 0 %    NRBCs per 100 WBC 0 <1 /100    Absolute Neutrophils 6.4 1.6 - 8.3 10e3/uL    Absolute Lymphocytes 1.7 0.8 - 5.3 10e3/uL    Absolute Monocytes 0.4 0.0 - 1.3 10e3/uL    Absolute Eosinophils 0.0 0.0 - 0.7 10e3/uL    Absolute Basophils 0.0 0.0 - 0.2 10e3/uL    Absolute Immature Granulocytes 0.0 <=0.0 10e3/uL    Absolute NRBCs 0.0 10e3/uL   Adult Type and Screen   Result Value Ref Range    ABO/RH(D) O POS     Antibody Screen Negative Negative    SPECIMEN EXPIRATION DATE [id number]    Occult blood stool   Result Value Ref Range    Occult Blood Positive (A) Negative   Asymptomatic COVID-19 Virus (Coronavirus) by PCR Nose    Specimen: Nose; Swab   Result Value Ref Range    SARS CoV2 PCR Negative Negative    Narrative    Testing was performed using the [employer] Xpress SARS-CoV-2 Assay on the   [employer]. Additional information about   this Emergency Use Authorization (EUA) assay can be found via the Lab   Guide. This test should be ordered for the detection of SARS-CoV-2 in   individuals who meet SARS-CoV-2 clinical and/or epidemiological   criteria. Test performance is unknown in asymptomatic patients. This   test is for in vitro diagnostic use under the FDA EUA for   laboratories certified under CLIA to perform high complexity testing.   This test has not been FDA cleared or approved. A negative result   does not rule out the presence of PCR inhibitors in the specimen or   target RNA in concentration below the limit of detection for the   assay. The possibility of a false negative should be considered if   the patient's recent exposure or clinical presentation suggests   COVID-19. This test was validated by [facility]. This laboratory is certified under the [facility]  Improvement Amendments (CLIA) as qualified to perform high complex  ity clinical laboratory testing.   Extra Tube    Narrative    The following orders were created for panel order Extra Tube.  Procedure                               Abnormality         Status                     ---------                               -----------         ------                     Extra Serum Separator Tu...[[id number]]                      Final result                 Please view results for these tests on the individual orders.   Extra Serum Separator Tube (SST)   Result Value Ref Range    Hold Specimen [facility]    Hemoglobin   Result Value Ref Range    Hemoglobin 11.6 (L) 13.3 - 17.7 g/dL       Medications   0.9% sodium chloride BOLUS (0 mLs Intravenous Stopped 9/4/21 1152)   pantoprazole (PROTONIX) IV push injection 80 mg (80 mg Intravenous Given 9/4/21 1037)   ondansetron (ZOFRAN) injection 4 mg (4 mg Intravenous Given 9/4/21 1104)   HYDROmorphone (PF) (DILAUDID) injection 0.5 mg (0.5 mg Intravenous Given 9/4/21 1105)       Assessments & Plan (with Medical Decision Making)   51 year old man with history of mild intermittent asthma, chronic back pain on naproxen, hypertension, gout, GERD, psoriasis on Humira who presents for evaluation of upper abdominal pain as well as nausea/vomiting and diarrhea, found to have melena on rectal exam and stool positive for occult blood with hemoglobin actually improved from 2 months prior but elevated BUN of 36 and acute kidney injury with creatinine of 1.5 all in the setting of epigastric abdominal pain while taking naproxen chronically for low back pain, findings consistent with upper GI bleed likely due to gastric ulcer. Vitally stable on arrival but low normal BP's, afebrile, non-toxic appearing. Patient given fluid bolus, dilaudid for pain, protonix 80 mg IV. I spoke with Dr. Daniel with hospitalist service and Dr. Wetzel with surgery, plan for admission for serial hemoglobins, PPI, upper  endoscopy pending clinical stability. Patient admitted to floor in stable condition for further cares.    I have reviewed the nursing notes.    I have reviewed the findings, diagnosis, plan and need for follow up with the patient.       New Prescriptions    No medications on file       Final diagnoses:   Gastrointestinal hemorrhage, unspecified gastrointestinal hemorrhage type       9/4/2021   [facility] Bernardo Marrero MD  09/04/21 0421

## 2021-09-04 NOTE — H&P
[facility]  History and Physical  Hospitalist       Date of Admission:  9/4/2021    Assessment & Plan   Christiano Metcalf is a 51 year old male who presents with epigastric pain.    >  Gastrointestinal hemorrhage associated with acute gastritis  >  Long term current use of non-steroidal anti-inflammatories (NSAID)  >  Anemia due to blood loss, acute  >  Microcytic anemia   Suspect gastric ulcer vs gastritis, however ddx also includes peptic ulcer disease, occult malignancy, H. Pylori disease, others.  Suspect exacerbating features include NSAID use and caffeine.   -- Stop aspirin and NSAIDs   -- Avoid caffeine   -- Serial Hgb   -- IV protonix   -- Sucralfate   -- Consider EGD/colonoscopy as an outpatient, or if acute worsening.    > Acute kidney injury   Baseline kidney function is normal. Likely prerenal from dehydration.   -- IVF   -- Serial BMP    DVT Prophylaxis: Pneumatic Compression Devices  Code Status: FULL    Enoch Daniel    Primary Care Physician   Gino [facility]    Chief Complaint   Upset stomach    History is obtained from the patient and chart review.    History of Present Illness   Christiano Metcalf is a 51 year old male who presents with upset stomach. Starting last night he developed pain in his epigastrum.  Overnight the pain worsened.  Associated with nausea and vomiting. Unable to keep anything down, came to ER.  No alcohol since 2010. Drinks 4 cans of DrLan Pepper a day. No coffee.  Takes naproxen daily for chronic LBP. On Humira for psoriasis. Has not recently used indocin, which he rarely takes for gout attacks.    Past Medical History    I have reviewed this patient's medical history and updated it with pertinent information if needed.   Past Medical History:   Diagnosis Date     Gout     No Comments Provided     Hypomagnesemia     No Comments Provided     Pain in left knee     No Comments Provided     Personal history of other (healed) physical injury and trauma      2000,repair     Unspecified injury of unspecified wrist, hand and finger(s), initial encounter     1999       Past Surgical History   I have reviewed this patient's surgical history and updated it with pertinent information if needed.  Past Surgical History:   Procedure Laterality Date     ARTHROSCOPY KNEE      Right knee meniscal repair, arthroscopic ally     ARTHROSCOPY KNEE      2010,Left knee scope     FINGER SURGERY      Right thumb ORIF     OTHER SURGICAL HISTORY      7/15/14,,HERNIA REPAIR,Left,LIH with mesh     OTHER SURGICAL HISTORY      5/10/16,,HERNIA REPAIR,Right,RIH with plug/patch       Prior to Admission Medications   Prior to Admission Medications   Prescriptions Last Dose Informant Patient Reported? Taking?   HUMIRA PEN 40 MG/0.8ML pen kit   Yes No   Sig: Inject 40 mg as directed every 14 days   acetic acid (VOSOL) 2 % otic solution   No No   Sig: Place 4 drops into both ears 3 times daily   albuterol (PROAIR HFA/PROVENTIL HFA/VENTOLIN HFA) 108 (90 Base) MCG/ACT inhaler   No No   Sig: Inhale 2 puffs into the lungs 4 times daily as needed   allopurinol (ZYLOPRIM) 300 MG tablet   No No   Sig: TAKE 1 TABLET(300 MG) BY MOUTH DAILY   amLODIPine (NORVASC) 2.5 MG tablet   No No   Sig: TAKE 1 TABLET(2.5 MG) BY MOUTH DAILY   aspirin (ASA) 81 MG chewable tablet   No No   Sig: CHEW AND SWALLOW 2 TABLETS(162 MG) BY MOUTH DAILY   betamethasone dipropionate (DIPROSONE) 0.05 % external cream   No No   Sig: Apply topically At Bedtime To scalp, wash out in the AM   cetirizine (ZYRTEC) 10 MG tablet   No No   Sig: Take 1 tablet (10 mg) by mouth daily   cetirizine (ZYRTEC) 10 MG tablet   No No   Sig: Take 1 tablet (10 mg) by mouth 2 times daily   cetirizine (ZYRTEC) 10 MG tablet   No No   Sig: TAKE 1 TABLET(10 MG) BY MOUTH TWICE DAILY   indomethacin (INDOCIN) 50 MG capsule   No No   Sig: Take 1 capsule (50 mg) by mouth 3 times daily (with meals)   naproxen (NAPROSYN) 500 MG tablet   No No   Sig: Take 1  tablet (500 mg) by mouth 2 times daily (with meals)   omeprazole (PRILOSEC) 40 MG DR capsule   No No   Sig: Take 1 capsule (40 mg) by mouth daily   ondansetron (ZOFRAN ODT) 4 MG ODT tab   No No   Sig: Take 1 tablet (4 mg) by mouth every 8 hours as needed for nausea   pantoprazole (PROTONIX) 40 MG EC tablet   No No   Sig: Take 1 tablet (40 mg) by mouth daily   sertraline (ZOLOFT) 100 MG tablet   No No   Sig: Take 1 tablet (100 mg) by mouth daily   triamcinolone (KENALOG) 0.1 % external cream   No No   Sig: Apply topically 2 times daily      Facility-Administered Medications: None     Allergies   Allergies   Allergen Reactions     Seasonal      Other reaction(s): Sneezing  Other reaction(s): Sneezing     No Clinical Screening - See Comments Nausea and Vomiting     Peas  Peas       Social History   I have reviewed this patient's social history and updated it with pertinent information if needed. Christiano Metcalf  reports that he quit smoking about 11 years ago. His smoking use included cigarettes. He has never used smokeless tobacco. He reports that he does not drink alcohol and does not use drugs.    Family History   I have reviewed this patient's family history and updated it with pertinent information if needed.   Family History   Problem Relation Age of Onset     Diabetes Mother         Diabetes     Other - See Comments Father         episode of gout.     Colon Cancer Father         Cancer-colon,diagnosed in early 60s     Family History Negative Son         Good Health,1997     Family History Negative Daughter         Good Health,2009     Family History Negative Son         Good Health,2011       Review of Systems     REVIEW OF SYSTEMS:    Constitutional: normal energy and appetite, no recent sick contacts  Eyes: no changes in vision  Ears, nose, mouth, throat, and face: no mouth sores, dysphagia, or odynophagia  Respiratory: no shortness of breath, cough, or wheezing. No aspiration symptoms.   Cardiovascular: no  chest pain, palpitations, orthopnea, increased lower extremity edema, or syncope.   Gastrointestinal: no constipation, diarrhea, nausea, vomiting or abdominal pain.  Genitourinary: no dysuria, hematuria, urgency or frequency.   Hematologic/lymphatic: no unintentional weight loss or night sweats.  Musculoskeletal: no pain to extremities or falls.   Neurological: no new weakness, tingling, numbness.   Psychiatric: no hallucinations ordelusions.  Endocrine:  not a known diabetic.     Additions to the above include: see hpi    Physical Exam   Temp: 97.4  F (36.3  C) Temp src: Tympanic BP: (!) 155/87 Pulse: 65   Resp: 16 SpO2: 95 % O2 Device: None (Room air)    Vital Signs with Ranges  Temp:  [97.4  F (36.3  C)] 97.4  F (36.3  C)  Pulse:  [65-99] 65  Resp:  [11-17] 16  BP: ()/(58-87) 155/87  SpO2:  [90 %-97 %] 95 %  235 lbs 0 oz    Gen: Alert, NAD.  HEENT: MMM  Neck: Supple  CV: RRR no m/r/g  Pulm: CTAB, no w/r/r. No increased work of breathing  Msk: No LE edema  Abd: Soft, +ttp in epigastrum. No HSM. No masses. +BS  Neuro: Grossly intact  Skin: No concerning lesions.  Psychiatric: Normal affect and insight. Does not appear anxious or depressed.      Data   Data reviewed today:  I personally reviewed see below.  Recent Labs   Lab 09/04/21  1043   WBC 8.6   HGB 12.7*   MCV 77*         POTASSIUM 3.8   CHLORIDE 103   CO2 24   BUN 36*   CR 1.57*   ANIONGAP 10   SANDRO 9.6   *   ALBUMIN 3.8   PROTTOTAL 7.3   BILITOTAL 0.7   ALKPHOS 72   ALT 68*   AST 60*   LIPASE 31       No results found for this or any previous visit (from the past 24 hour(s)).

## 2021-09-04 NOTE — TELEPHONE ENCOUNTER
"\"I had to leave work on Thursday 9/2 because I was having abdominal pian that started at my belly button and when to my chest. Last night I started having vomiting and diarrhea.I vomited 5 times and diarrhea times 9. I noticed blood in both(small amount). I am still having the pain this morning. It's a 9 or 10/10. Temp is 97.0. I didn't lift anything or eat anything different that I know of. I was up all night with it.\"  Denies other sx  Triaged and advised ER  Faviola Siddiqi RN [facility]        Reason for Disposition    [1] SEVERE pain (e.g., excruciating) AND [2] present > 1 hour    Additional Information    Negative: Shock suspected (e.g., cold/pale/clammy skin, too weak to stand, low BP, rapid pulse)    Negative: Difficult to awaken or acting confused (e.g., disoriented, slurred speech)    Negative: Passed out (i.e., lost consciousness, collapsed and was not responding)    Negative: Sounds like a life-threatening emergency to the triager    Protocols used: ABDOMINAL PAIN - MALE-A-AH      "

## 2021-09-05 VITALS
OXYGEN SATURATION: 95 % | BODY MASS INDEX: 34.81 KG/M2 | HEIGHT: 69 IN | HEART RATE: 61 BPM | SYSTOLIC BLOOD PRESSURE: 133 MMHG | RESPIRATION RATE: 18 BRPM | DIASTOLIC BLOOD PRESSURE: 83 MMHG | TEMPERATURE: 97.7 F | WEIGHT: 235.01 LBS

## 2021-09-05 LAB
ANION GAP SERPL CALCULATED.3IONS-SCNC: 7 MMOL/L (ref 3–14)
BUN SERPL-MCNC: 26 MG/DL (ref 7–25)
CALCIUM SERPL-MCNC: 8.7 MG/DL (ref 8.6–10.3)
CHLORIDE BLD-SCNC: 105 MMOL/L (ref 98–107)
CO2 SERPL-SCNC: 27 MMOL/L (ref 21–31)
CREAT SERPL-MCNC: 1.21 MG/DL (ref 0.7–1.3)
ERYTHROCYTE [DISTWIDTH] IN BLOOD BY AUTOMATED COUNT: 17.4 % (ref 10–15)
GFR SERPL CREATININE-BSD FRML MDRD: 69 ML/MIN/1.73M2
GLUCOSE BLD-MCNC: 90 MG/DL (ref 70–105)
HCT VFR BLD AUTO: 35.3 % (ref 40–53)
HGB BLD-MCNC: 11.1 G/DL (ref 13.3–17.7)
MCH RBC QN AUTO: 24.7 PG (ref 26.5–33)
MCHC RBC AUTO-ENTMCNC: 31.4 G/DL (ref 31.5–36.5)
MCV RBC AUTO: 78 FL (ref 78–100)
PLATELET # BLD AUTO: 182 10E3/UL (ref 150–450)
POTASSIUM BLD-SCNC: 3.3 MMOL/L (ref 3.5–5.1)
RBC # BLD AUTO: 4.5 10E6/UL (ref 4.4–5.9)
SODIUM SERPL-SCNC: 139 MMOL/L (ref 134–144)
WBC # BLD AUTO: 7.1 10E3/UL (ref 4–11)

## 2021-09-05 PROCEDURE — 99238 HOSP IP/OBS DSCHRG MGMT 30/<: CPT | Performed by: INTERNAL MEDICINE

## 2021-09-05 PROCEDURE — C9113 INJ PANTOPRAZOLE SODIUM, VIA: HCPCS | Performed by: INTERNAL MEDICINE

## 2021-09-05 PROCEDURE — 80048 BASIC METABOLIC PNL TOTAL CA: CPT | Performed by: INTERNAL MEDICINE

## 2021-09-05 PROCEDURE — 258N000003 HC RX IP 258 OP 636: Performed by: INTERNAL MEDICINE

## 2021-09-05 PROCEDURE — 36415 COLL VENOUS BLD VENIPUNCTURE: CPT | Performed by: INTERNAL MEDICINE

## 2021-09-05 PROCEDURE — 250N000013 HC RX MED GY IP 250 OP 250 PS 637: Performed by: INTERNAL MEDICINE

## 2021-09-05 PROCEDURE — 85027 COMPLETE CBC AUTOMATED: CPT | Performed by: INTERNAL MEDICINE

## 2021-09-05 PROCEDURE — 250N000011 HC RX IP 250 OP 636: Performed by: INTERNAL MEDICINE

## 2021-09-05 RX ORDER — SUCRALFATE 1 G/1
1 TABLET ORAL
Qty: 100 TABLET | Refills: 0 | Status: SHIPPED | OUTPATIENT
Start: 2021-09-05 | End: 2021-10-04

## 2021-09-05 RX ORDER — PANTOPRAZOLE SODIUM 20 MG/1
40 TABLET, DELAYED RELEASE ORAL DAILY
Qty: 30 TABLET | Refills: 0 | Status: SHIPPED | OUTPATIENT
Start: 2021-09-05 | End: 2021-09-21

## 2021-09-05 RX ADMIN — AMLODIPINE BESYLATE 2.5 MG: 2.5 TABLET ORAL at 10:22

## 2021-09-05 RX ADMIN — PANTOPRAZOLE SODIUM 40 MG: 40 INJECTION, POWDER, FOR SOLUTION INTRAVENOUS at 07:56

## 2021-09-05 RX ADMIN — SUCRALFATE 1 G: 1 TABLET ORAL at 07:56

## 2021-09-05 RX ADMIN — SODIUM CHLORIDE: 9 INJECTION, SOLUTION INTRAVENOUS at 01:45

## 2021-09-05 RX ADMIN — LORATADINE 10 MG: 10 TABLET ORAL at 10:22

## 2021-09-05 RX ADMIN — ALLOPURINOL 300 MG: 300 TABLET ORAL at 10:22

## 2021-09-05 RX ADMIN — SERTRALINE HYDROCHLORIDE 100 MG: 50 TABLET ORAL at 10:22

## 2021-09-05 ASSESSMENT — MIFFLIN-ST. JEOR: SCORE: 1903.44

## 2021-09-05 ASSESSMENT — ACTIVITIES OF DAILY LIVING (ADL)
ADLS_ACUITY_SCORE: 13

## 2021-09-05 NOTE — PLAN OF CARE
Patient is pleasant. Vitals are stable. Patient slept majority of the night. No new concerns at this time. Will continue to monitor.

## 2021-09-05 NOTE — PHARMACY - DISCHARGE MEDICATION RECONCILIATION AND EDUCATION
Pharmacy:  Discharge Counseling and Medication Reconciliation    Christiano CORLEY Metcalf  [address]  [facility] [id number] [phone] (home)   51 year old male  PCP: Gino Frankel    Allergies: No clinical screening - see comments and Seasonal    Discharge Counseling:    Pharmacist met with patient today to review the medication portion of the After Visit Summary (with an emphasis on NEW and STOPPED medications) and to address patient's questions/concerns.    Summary of Education: Reviewed pantoprazole and sucralfate verbally and in print  Reviewed stopping ibuprofen, aspirin and indomethacin    Reviewed reviewing OTC products for ingredients prior to starting due to NSAIDs being inside    Materials Provided:  MedCounselor sheets printed from Clinical Pharmacology on: pantoprazole and sucralfate    Discharge Medication Reconciliation:    It has been determined that the patient has an adequate supply of medications available or which can be obtained from the patient's preferred pharmacy, which he has confirmed as: [facility].  \  Thank you for the consult.    Porsha Cowan [facility]........September 5, 2021 11:59 AM

## 2021-09-05 NOTE — DISCHARGE SUMMARY
[facility]  Discharge Summary  Hospitalist  Date of Admission:  9/4/2021  Date of Discharge:  9/5/2021  Discharging Provider: Enoch Daniel  Date of Service (when I saw the patient): 09/05/21    Discharge Diagnoses   Principal Problem:    Acute kidney injury (H) (9/4/2021)  Active Problems:    Gastrointestinal hemorrhage associated with acute gastritis (9/4/2021)    Long term current use of non-steroidal anti-inflammatories (NSAID) (9/4/2021)    Anemia due to blood loss, acute (9/4/2021)    Microcytic anemia (9/4/2021)    Psoriasis (9/4/2021)    Gastrointestinal hemorrhage, unspecified gastrointestinal hemorrhage type (9/4/2021)      History of Present Illness   From my H&P:  Christiano Metcalf is a 51 year old male who presents with upset stomach. Starting last night he developed pain in his epigastrum.  Overnight the pain worsened.  Associated with nausea and vomiting. Unable to keep anything down, came to ER.  No alcohol since 2010. Drinks 4 cans of Dr. Pepper a day. No coffee.  Takes naproxen daily for chronic LBP. On Humira for psoriasis. Has not recently used indocin, which he rarely takes for gout attacks.    Hospital Course   No events overnight. No diarrhea. Epigastric pain has persisted, but is improving. Kidney function improved with IVF.  Plan to discharge with PPI, sucralfate and discontinue NSAIDs.  Plan on outpatient EGD and colonoscopy to rule out h.pylori, ulcer, malignancy, etc.    Enoch Daniel MD    Significant Results and Procedures   no    Pending Results   These results will be followed up by na  Unresulted Labs Ordered in the Past 30 Days of this Admission     No orders found for last 31 day(s).          Code Status   Full Code       Primary Care Physician   Gino [facility]    Physical Exam   Temp: 97.7  F (36.5  C) Temp src: Tympanic BP: 133/83 Pulse: 61   Resp: 18 SpO2: 95 % O2 Device: None (Room air)    Vitals:    09/04/21 1006 09/05/21 0633   Weight:  106.6 kg (235 lb) 106.6 kg (235 lb 0.2 oz)     Vital Signs with Ranges  Temp:  [97.1  F (36.2  C)-98  F (36.7  C)] 97.7  F (36.5  C)  Pulse:  [57-80] 61  Resp:  [12-18] 18  BP: ()/(62-87) 133/83  SpO2:  [90 %-97 %] 95 %  I/O last 3 completed shifts:  In: 1750 [P.O.:750; I.V.:1000]  Out: -     Gen: Alert, NAD.  HEENT: MMM  Neck: Supple  CV: RRR no m/r/g  Pulm: CTAB, no w/r/r. No increased work of breathing  Msk: No LE edema  Abd: Soft, mild TTP in epigastrum  Neuro: Grossly intact  Skin: No concerning lesions.  Psychiatric: Normal affect and insight. Does not appear anxious or depressed.        Discharge Disposition   Discharged to home  Condition at discharge: Stable    Consultations This Hospital Stay   None    Time Spent on this Encounter   I, Enoch Daniel MD, personally saw the patient today and spent less than or equal to 30 minutes discharging this patient.    Discharge Orders      Adult Gastro Ref - Procedure Only      Reason for your hospital stay    Stomach bleeding, kidney injury     Follow-up and recommended labs and tests     1. Schedule colonoscopy and EGD  2. Gino Stark within a few weeks     Activity    Your activity upon discharge: activity as tolerated     Discharge Instructions    Try diclofenac/Voltaren gel OTC for back pain.     Diet    Follow this diet upon discharge: bland diet, advance as tolerated     Discharge Medications   Current Discharge Medication List      START taking these medications    Details   pantoprazole (PROTONIX) 20 MG EC tablet Take 2 tablets (40 mg) by mouth daily  Qty: 30 tablet, Refills: 0    Associated Diagnoses: Gastrointestinal hemorrhage associated with acute gastritis      sucralfate (CARAFATE) 1 GM tablet Take 1 tablet (1 g) by mouth 4 times daily (before meals and nightly)  Qty: 100 tablet, Refills: 0    Associated Diagnoses: Gastrointestinal hemorrhage associated with acute gastritis         CONTINUE these medications which have NOT CHANGED     Details   albuterol (PROAIR HFA/PROVENTIL HFA/VENTOLIN HFA) 108 (90 Base) MCG/ACT inhaler Inhale 2 puffs into the lungs 4 times daily as needed  Qty: 2 Inhaler, Refills: 11    Comments: Pharmacy may dispense brand covered by insurance (Proair, or proventil or ventolin or generic albuterol inhaler)  Associated Diagnoses: Mild intermittent asthma without complication      allopurinol (ZYLOPRIM) 300 MG tablet TAKE 1 TABLET(300 MG) BY MOUTH DAILY  Qty: 90 tablet, Refills: 3    Associated Diagnoses: Idiopathic chronic gout without tophus, unspecified site      amLODIPine (NORVASC) 2.5 MG tablet TAKE 1 TABLET(2.5 MG) BY MOUTH DAILY  Qty: 90 tablet, Refills: 3    Associated Diagnoses: Essential hypertension      cetirizine (ZYRTEC) 10 MG tablet Take 1 tablet (10 mg) by mouth 2 times daily  Qty: 60 tablet, Refills: 4    Associated Diagnoses: Ear itching      sertraline (ZOLOFT) 100 MG tablet Take 1 tablet (100 mg) by mouth daily  Qty: 90 tablet, Refills: 3    Associated Diagnoses: Moderate episode of recurrent major depressive disorder (H)      HUMIRA PEN 40 MG/0.8ML pen kit Inject 40 mg as directed every 14 days         STOP taking these medications       aspirin (ASA) 81 MG chewable tablet Comments:   Reason for Stopping:         indomethacin (INDOCIN) 50 MG capsule Comments:   Reason for Stopping:         naproxen (NAPROSYN) 500 MG tablet Comments:   Reason for Stopping:             Allergies   Allergies   Allergen Reactions     No Clinical Screening - See Comments      Peas - n/v - flushing - skin turns red  Spam - rash     Seasonal      Other reaction(s): Sneezing  Other reaction(s): Sneezing     Data   Most Recent 3 CBC's:  Recent Labs   Lab Test 09/05/21  0556 09/04/21  1657 09/04/21  1043 06/24/21  0020   WBC 7.1  --  8.6 6.6   HGB 11.1* 11.6* 12.7* 11.0*   MCV 78  --  77* 83     --  221 209      Most Recent 3 BMP's:  Recent Labs   Lab Test 09/05/21  0556 09/04/21  1043 06/24/21  0020    137 138    POTASSIUM 3.3* 3.8 2.9*   CHLORIDE 105 103 104   CO2 27 24 23   BUN 26* 36* 19   CR 1.21 1.57* 1.15   ANIONGAP 7 10 11   SANDRO 8.7 9.6 8.1*   GLC 90 135* 113*     Most Recent 2 LFT's:  Recent Labs   Lab Test 09/04/21  1043 06/24/21  0020   AST 60* 87*   ALT 68* 80*   ALKPHOS 72 68   BILITOTAL 0.7 0.7     Most Recent INR's and Anticoagulation Dosing History:  Anticoagulation Dose History    There is no flowsheet data to display.       Most Recent 3 Troponin's:  Recent Labs   Lab Test 09/15/19  1548 09/15/19  1338   TROPI <0.030 <0.030     Most Recent Cholesterol Panel:  Recent Labs   Lab Test 09/04/18  1539   CHOL 199   *   HDL 39   TRIG 170*     Most Recent 6 Bacteria Isolates From Any Culture (See EPIC Reports for Culture Details):No lab results found.  Most Recent TSH, T4 and A1c Labs:No lab results found.  Results for orders placed or performed during the hospital encounter of 07/21/21   Stress Read    Narrative    Procedure: Dobutamine stress echo.    Findings: Resting heart rate 67, maximum heart rate 130.  Resting systolic   blood pressure 148, maximum blood pressure 164.  Dobutamine infusion was   done per protocol starting at 10 mcg/kg/min and increased every 3 minutes   up to a total of 40 mcg/kg/min.  At that dose, the patient started having   dysrhythmia including wide-complex tachycardia as well as junctional   rhythm with significant decline in heart rate.  The patient also developed   significant nausea.  It was elected to terminate the test at that time.    Impression: Submaximal dobutamine stress echo stopped secondary to   dobutamine induced dysrhythmia as described as well as patient   symptomatology, Definity enhanced echo images pending.  Patient will be   scheduled for Lexiscan stress test.    Edward Alcantara MD   Echo Stress Test with Definity    Narrative    [id number]  [id number]  [id number]  [id number]^LAVINIA^PITER     [facility]  [address] [place]  [address]     Name: CLAUDIA CONTRERAS  MRN: [id number]  : 1970  Study Date: 2021 02:09 PM  Age: 51 yrs  Gender: Male  Patient Location: [facility]  Reason For Study: Atypical chest pain  Ordering Physician: PITER KATE  Referring Physician: JAYSON RIVAS  Performed By: Aline Epperson, ANITHA, T     BSA: 2.2 m2  Height: 68 in  Weight: 240 lb  HR: 60  BP: 148/97 mmHg  ______________________________________________________________________________  ______________________________________________________________________________  Interpretation Summary  Non-diagnostic exercise echocardiogram due to failure to achieve the target  heart rate.  The target heart rate was not achieved. Exercise was terminated at maximal  heart rate of 130 bpm (77% age-predicted max) due to nausea.  Blunted heart rate and normal BP response to exercise.  Normal biventricular size, thickness, and global systolic function at  baseline, LVEF=55-60%.  With exercise at a below-diagnostic workload, LVEF increased to >65% and LV  cavity size decreased appropriately.  No regional wall motion abnormalities are present at rest or with exercise at  a below-diagnostic workload.  No angina was elicited at a below-diagnostic workload.  No significant valvular abnormalities are noted on screening Doppler exam.  The aortic root and visualized ascending aorta are normal.  Exercise ECG portion of the test (including functional capacity) is reported  separately.  ______________________________________________________________________________  Stress  The drug infusion was stopped due to nausea.  The maximum dose of dobutamine was 40mcg/kg/min.  Definity (NDC #69841-695-64) given intravenously.  Patient was given 4ml mixture of 1.5ml Definity and 8.5ml saline.  6 ml wasted.  Definity Lot # [id number] .     Stress Results                                       Maximum Predicted HR:   169 bpm             Target HR: 144 bpm        % Maximum Predicted HR: 77 %                              Stage DurationHeart Rate   BP                                 (mm:ss)   (bpm)                        Baseline            59     148/97                           Low    4:00      92    164/101                          Peak    6:48      130    144/82                        Recovery  11:00     81     124/86                         Stress Duration:   21:48 mm:ss                      Maximum Stress HR: 130 bpm     Procedure  Dobutamine stress echo with two dimensional color and spectral Doppler  performed. Contrast Definity.  ______________________________________________________________________________  MMode/2D Measurements & Calculations  IVSd: 1.2 cm  LVIDd: 4.8 cm  LVIDs: 3.6 cm  LVPWd: 1.2 cm  FS: 25.5 %  LV mass(C)dI: 98.4 grams/m2  Ao root diam: 3.8 cm  asc Aorta Diam: 4.1 cm  LVOT diam: 2.2 cm  LVOT area: 3.8 cm2  RWT: 0.50     Doppler Measurements & Calculations  MV E max connie: 95.1 cm/sec  MV A max connie: 77.6 cm/sec  MV E/A: 1.2  MV dec slope: 491.0 cm/sec2  MV dec time: 0.19 sec     Ao V2 max: 121.0 cm/sec  Ao max P.0 mmHg  TR max connie: 212.0 cm/sec  TR max P.0 mmHg     ______________________________________________________________________________  Report approved by: MD Dieudonne Carpenter 2021 03:13 PM

## 2021-09-06 LAB
ATRIAL RATE - MUSE: 80 BPM
DIASTOLIC BLOOD PRESSURE - MUSE: NORMAL MMHG
INTERPRETATION ECG - MUSE: NORMAL
P AXIS - MUSE: 34 DEGREES
PR INTERVAL - MUSE: 164 MS
QRS DURATION - MUSE: 110 MS
QT - MUSE: 400 MS
QTC - MUSE: 461 MS
R AXIS - MUSE: -10 DEGREES
SYSTOLIC BLOOD PRESSURE - MUSE: NORMAL MMHG
T AXIS - MUSE: 54 DEGREES
VENTRICULAR RATE- MUSE: 80 BPM

## 2021-09-07 ENCOUNTER — OFFICE VISIT (OUTPATIENT)
Dept: FAMILY MEDICINE | Facility: OTHER | Age: 51
End: 2021-09-07
Attending: FAMILY MEDICINE
Payer: COMMERCIAL

## 2021-09-07 ENCOUNTER — PATIENT OUTREACH (OUTPATIENT)
Dept: CARE COORDINATION | Facility: CLINIC | Age: 51
End: 2021-09-07

## 2021-09-07 VITALS
SYSTOLIC BLOOD PRESSURE: 138 MMHG | RESPIRATION RATE: 18 BRPM | BODY MASS INDEX: 34.3 KG/M2 | HEIGHT: 69 IN | DIASTOLIC BLOOD PRESSURE: 86 MMHG | WEIGHT: 231.6 LBS | OXYGEN SATURATION: 98 % | TEMPERATURE: 98.4 F | HEART RATE: 56 BPM

## 2021-09-07 DIAGNOSIS — K62.5 BRBPR (BRIGHT RED BLOOD PER RECTUM): Primary | ICD-10-CM

## 2021-09-07 DIAGNOSIS — K92.0 HEMATEMESIS WITHOUT NAUSEA: ICD-10-CM

## 2021-09-07 DIAGNOSIS — M25.562 CHRONIC PAIN OF BOTH KNEES: ICD-10-CM

## 2021-09-07 DIAGNOSIS — M25.561 CHRONIC PAIN OF BOTH KNEES: ICD-10-CM

## 2021-09-07 DIAGNOSIS — J45.20 MILD INTERMITTENT ASTHMA WITHOUT COMPLICATION: ICD-10-CM

## 2021-09-07 DIAGNOSIS — G89.29 CHRONIC PAIN OF BOTH KNEES: ICD-10-CM

## 2021-09-07 LAB — HGB BLD-MCNC: 11 G/DL (ref 13.3–17.7)

## 2021-09-07 PROCEDURE — 85018 HEMOGLOBIN: CPT | Mod: ZL | Performed by: FAMILY MEDICINE

## 2021-09-07 PROCEDURE — 36415 COLL VENOUS BLD VENIPUNCTURE: CPT | Mod: ZL | Performed by: FAMILY MEDICINE

## 2021-09-07 PROCEDURE — 99496 TRANSJ CARE MGMT HIGH F2F 7D: CPT | Performed by: FAMILY MEDICINE

## 2021-09-07 PROCEDURE — G0463 HOSPITAL OUTPT CLINIC VISIT: HCPCS

## 2021-09-07 RX ORDER — ALBUTEROL SULFATE 90 UG/1
2 AEROSOL, METERED RESPIRATORY (INHALATION) 4 TIMES DAILY PRN
Qty: 8.5 G | Refills: 11 | Status: SHIPPED | OUTPATIENT
Start: 2021-09-07 | End: 2022-09-06

## 2021-09-07 ASSESSMENT — PAIN SCALES - GENERAL: PAINLEVEL: NO PAIN (0)

## 2021-09-07 ASSESSMENT — MIFFLIN-ST. JEOR: SCORE: 1887.97

## 2021-09-07 ASSESSMENT — PATIENT HEALTH QUESTIONNAIRE - PHQ9: SUM OF ALL RESPONSES TO PHQ QUESTIONS 1-9: 0

## 2021-09-07 NOTE — H&P (VIEW-ONLY)
Hospital Follow-up Visit:    Hospital/Nursing Home/IP Rehab Facility: [facility]  Date of Admission: 9-4-2021  Date of Discharge: 9-5-2021  Reason(s) for Admission: GI bleed      Was your hospitalization related to COVID-19? No   Problems taking medications regularly:  None  Medication changes since discharge: None  Problems adhering to non-medication therapy: He has not picked up his medications yet.    Summary of hospitalization:  [facility] discharge summary reviewed    He was hospitalized after having hematemesis and bright red blood per rectum.  During his hospitalization his symptoms improved.  He was discharged with PPIs and Carafate however he has been able to pick that up.  His nausea has resolved.  Has had no further vomiting.  He continues to have dark stools.  He has stopped his NSAIDs.    He also has bilateral knee pain.  Previous x-rays showed chondrocalcinosis.  Calcium levels have been normal.    At this time will repeat hemoglobin and refer for endoscopy and colonoscopy.  He will follow-up afterwards.  He will start his PPI and Carafate after he picks up his medications later today.  If his scopes are relatively benign we can then refer him to radiology for consideration of drainage and crystal evaluation for CPPD.    Diagnostic Tests/Treatments reviewed.  Follow up needed: Endoscopy  Other Healthcare Providers Involved in Patient s Care:         None  Update since discharge: stable.     Post Discharge Medication Reconciliation: discharge medications reconciled, continue medications without change.  Plan of care communicated with patient

## 2021-09-07 NOTE — PROGRESS NOTES
Transitional Care Management Phone Call  Patient has follow-up with PCP within 48 hours of discharge. No TCM call required per policy.  Franchesca Carnes RN  9/7/2021 10:37 AM

## 2021-09-07 NOTE — NURSING NOTE
Patient presents today for hospital follow up.    Medication Reconciliation Complete    Faviola Hernandez LPN  9/7/2021 2:30 PM

## 2021-09-07 NOTE — PROGRESS NOTES
Hospital Follow-up Visit:    Hospital/Nursing Home/IP Rehab Facility: [facility]  Date of Admission: 9-4-2021  Date of Discharge: 9-5-2021  Reason(s) for Admission: GI bleed      Was your hospitalization related to COVID-19? No   Problems taking medications regularly:  None  Medication changes since discharge: None  Problems adhering to non-medication therapy: He has not picked up his medications yet.    Summary of hospitalization:  [facility] discharge summary reviewed    He was hospitalized after having hematemesis and bright red blood per rectum.  During his hospitalization his symptoms improved.  He was discharged with PPIs and Carafate however he has been able to pick that up.  His nausea has resolved.  Has had no further vomiting.  He continues to have dark stools.  He has stopped his NSAIDs.    He also has bilateral knee pain.  Previous x-rays showed chondrocalcinosis.  Calcium levels have been normal.    At this time will repeat hemoglobin and refer for endoscopy and colonoscopy.  He will follow-up afterwards.  He will start his PPI and Carafate after he picks up his medications later today.  If his scopes are relatively benign we can then refer him to radiology for consideration of drainage and crystal evaluation for CPPD.    Diagnostic Tests/Treatments reviewed.  Follow up needed: Endoscopy  Other Healthcare Providers Involved in Patient s Care:         None  Update since discharge: stable.     Post Discharge Medication Reconciliation: discharge medications reconciled, continue medications without change.  Plan of care communicated with patient

## 2021-09-08 ASSESSMENT — ASTHMA QUESTIONNAIRES: ACT_TOTALSCORE: 21

## 2021-09-09 DIAGNOSIS — Z12.11 ENCOUNTER FOR SCREENING COLONOSCOPY: Primary | ICD-10-CM

## 2021-09-09 NOTE — TELEPHONE ENCOUNTER
Screening Questions for the Scheduling of Screening Colonoscopies   (If Colonoscopy is diagnostic, Provider should review the chart before scheduling.)  Are you younger than 50 or older than 80?   NO   Do you take aspirin or fish oil?  NO  (if yes, tell patient to stop 1 week prior to Colonoscopy)  Do you take warfarin (Coumadin), clopidogrel (Plavix), apixaban (Eliquis), dabigatram (Pradaxa), rivaroxaban (Xarelto) or any blood thinner? NO  Do you use oxygen at home?  NO   Do you have kidney disease? NO   Are you on dialysis? NO   Have you had a stroke or heart attack in the last year? NO   Have you had a stent in your heart or any blood vessel in the last year? NO   Have you had a transplant of any organ? NO   Have you had a colonoscopy or upper endoscopy (EGD) before? NO          When?    Date of scheduled Colonoscopy/EGD   09/30/2021  Provider [facility]

## 2021-09-13 ENCOUNTER — LAB (OUTPATIENT)
Dept: LAB | Facility: OTHER | Age: 51
End: 2021-09-13
Attending: PHYSICIAN ASSISTANT
Payer: COMMERCIAL

## 2021-09-13 DIAGNOSIS — G90.09 IDIOPATHIC PERIPHERAL AUTONOMIC NEUROPATHY: ICD-10-CM

## 2021-09-13 DIAGNOSIS — R20.8 BURNING SENSATION: ICD-10-CM

## 2021-09-13 DIAGNOSIS — L40.0 PSORIASIS VULGARIS: ICD-10-CM

## 2021-09-13 DIAGNOSIS — L53.9 GENERALIZED ERYTHRODERMA: ICD-10-CM

## 2021-09-13 DIAGNOSIS — Z79.899 NEED FOR PROPHYLACTIC CHEMOTHERAPY: ICD-10-CM

## 2021-09-13 DIAGNOSIS — L29.89 OTHER PRURITUS: ICD-10-CM

## 2021-09-13 LAB
ALT SERPL W P-5'-P-CCNC: 55 U/L (ref 7–52)
AST SERPL W P-5'-P-CCNC: 61 U/L (ref 13–39)

## 2021-09-13 PROCEDURE — 36415 COLL VENOUS BLD VENIPUNCTURE: CPT | Mod: ZL

## 2021-09-13 PROCEDURE — 84450 TRANSFERASE (AST) (SGOT): CPT | Mod: ZL

## 2021-09-13 PROCEDURE — 86481 TB AG RESPONSE T-CELL SUSP: CPT | Mod: ZL

## 2021-09-13 PROCEDURE — 84460 ALANINE AMINO (ALT) (SGPT): CPT | Mod: ZL

## 2021-09-13 RX ORDER — BISACODYL 5 MG/1
TABLET, DELAYED RELEASE ORAL
Qty: 2 TABLET | Refills: 0 | Status: ON HOLD | OUTPATIENT
Start: 2021-09-13 | End: 2021-09-30

## 2021-09-13 RX ORDER — POLYETHYLENE GLYCOL 3350, SODIUM CHLORIDE, SODIUM BICARBONATE, POTASSIUM CHLORIDE 420; 11.2; 5.72; 1.48 G/4L; G/4L; G/4L; G/4L
4000 POWDER, FOR SOLUTION ORAL ONCE
Qty: 4000 ML | Refills: 0 | Status: SHIPPED | OUTPATIENT
Start: 2021-09-13 | End: 2021-09-13

## 2021-09-15 LAB
GAMMA INTERFERON BACKGROUND BLD IA-ACNC: 0.03 IU/ML
M TB IFN-G BLD-IMP: NEGATIVE
M TB IFN-G CD4+ BCKGRND COR BLD-ACNC: 9.97 IU/ML
MITOGEN IGNF BCKGRD COR BLD-ACNC: -0.01 IU/ML
MITOGEN IGNF BCKGRD COR BLD-ACNC: 0 IU/ML
QUANTIFERON MITOGEN: 10 IU/ML
QUANTIFERON NIL TUBE: 0.03 IU/ML
QUANTIFERON TB1 TUBE: 0.03 IU/ML
QUANTIFERON TB2 TUBE: 0.02

## 2021-09-17 DIAGNOSIS — L29.9 EAR ITCHING: ICD-10-CM

## 2021-09-20 NOTE — TELEPHONE ENCOUNTER
Zyrtec       Last Written Prescription Date:  9/3/2020  Last Fill Quantity: 60,   # refills: 4  Last Office Visit: 7/21/2020  Future Office visit:    Next 5 appointments (look out 90 days)    Sep 26, 2021 11:20 AM  Nurse Only with  MARGY  [facility] ) [address]  [place] [facility] [address]  [phone]

## 2021-09-22 RX ORDER — CETIRIZINE HYDROCHLORIDE 10 MG/1
TABLET ORAL
Qty: 30 TABLET | Refills: 0 | Status: SHIPPED | OUTPATIENT
Start: 2021-09-22 | End: 2021-10-18

## 2021-09-26 ENCOUNTER — ALLIED HEALTH/NURSE VISIT (OUTPATIENT)
Dept: FAMILY MEDICINE | Facility: OTHER | Age: 51
End: 2021-09-26
Attending: SURGERY
Payer: COMMERCIAL

## 2021-09-26 DIAGNOSIS — Z20.822 COVID-19 RULED OUT: Primary | ICD-10-CM

## 2021-09-26 PROCEDURE — U0003 INFECTIOUS AGENT DETECTION BY NUCLEIC ACID (DNA OR RNA); SEVERE ACUTE RESPIRATORY SYNDROME CORONAVIRUS 2 (SARS-COV-2) (CORONAVIRUS DISEASE [COVID-19]), AMPLIFIED PROBE TECHNIQUE, MAKING USE OF HIGH THROUGHPUT TECHNOLOGIES AS DESCRIBED BY CMS-2020-01-R: HCPCS | Mod: ZL

## 2021-09-26 PROCEDURE — C9803 HOPD COVID-19 SPEC COLLECT: HCPCS

## 2021-09-27 LAB — SARS-COV-2 RNA RESP QL NAA+PROBE: NEGATIVE

## 2021-09-29 DIAGNOSIS — K29.01 GASTROINTESTINAL HEMORRHAGE ASSOCIATED WITH ACUTE GASTRITIS: ICD-10-CM

## 2021-09-30 ENCOUNTER — ANESTHESIA (OUTPATIENT)
Dept: SURGERY | Facility: OTHER | Age: 51
End: 2021-09-30
Payer: COMMERCIAL

## 2021-09-30 ENCOUNTER — ANESTHESIA EVENT (OUTPATIENT)
Dept: SURGERY | Facility: OTHER | Age: 51
End: 2021-09-30
Payer: COMMERCIAL

## 2021-09-30 ENCOUNTER — HOSPITAL ENCOUNTER (OUTPATIENT)
Facility: OTHER | Age: 51
Discharge: HOME | End: 2021-09-30
Attending: SURGERY | Admitting: SURGERY
Payer: COMMERCIAL

## 2021-09-30 VITALS
OXYGEN SATURATION: 96 % | RESPIRATION RATE: 16 BRPM | TEMPERATURE: 98.1 F | HEART RATE: 75 BPM | WEIGHT: 231.6 LBS | BODY MASS INDEX: 34.7 KG/M2 | DIASTOLIC BLOOD PRESSURE: 64 MMHG | SYSTOLIC BLOOD PRESSURE: 98 MMHG

## 2021-09-30 PROCEDURE — 250N000009 HC RX 250: Performed by: NURSE ANESTHETIST, CERTIFIED REGISTERED

## 2021-09-30 PROCEDURE — 45378 DIAGNOSTIC COLONOSCOPY: CPT | Performed by: SURGERY

## 2021-09-30 PROCEDURE — 43239 EGD BIOPSY SINGLE/MULTIPLE: CPT | Performed by: NURSE ANESTHETIST, CERTIFIED REGISTERED

## 2021-09-30 PROCEDURE — 88305 TISSUE EXAM BY PATHOLOGIST: CPT

## 2021-09-30 PROCEDURE — 43239 EGD BIOPSY SINGLE/MULTIPLE: CPT | Performed by: SURGERY

## 2021-09-30 PROCEDURE — 250N000011 HC RX IP 250 OP 636: Performed by: NURSE ANESTHETIST, CERTIFIED REGISTERED

## 2021-09-30 PROCEDURE — 258N000003 HC RX IP 258 OP 636: Performed by: SURGERY

## 2021-09-30 RX ORDER — NALOXONE HYDROCHLORIDE 0.4 MG/ML
0.2 INJECTION, SOLUTION INTRAMUSCULAR; INTRAVENOUS; SUBCUTANEOUS
Status: DISCONTINUED | OUTPATIENT
Start: 2021-09-30 | End: 2021-09-30 | Stop reason: HOSPADM

## 2021-09-30 RX ORDER — LIDOCAINE HYDROCHLORIDE 20 MG/ML
INJECTION, SOLUTION INFILTRATION; PERINEURAL PRN
Status: DISCONTINUED | OUTPATIENT
Start: 2021-09-30 | End: 2021-09-30

## 2021-09-30 RX ORDER — PROPOFOL 10 MG/ML
INJECTION, EMULSION INTRAVENOUS CONTINUOUS PRN
Status: DISCONTINUED | OUTPATIENT
Start: 2021-09-30 | End: 2021-09-30

## 2021-09-30 RX ORDER — FLUMAZENIL 0.1 MG/ML
0.2 INJECTION, SOLUTION INTRAVENOUS
Status: DISCONTINUED | OUTPATIENT
Start: 2021-09-30 | End: 2021-09-30 | Stop reason: HOSPADM

## 2021-09-30 RX ORDER — NALOXONE HYDROCHLORIDE 0.4 MG/ML
0.4 INJECTION, SOLUTION INTRAMUSCULAR; INTRAVENOUS; SUBCUTANEOUS
Status: DISCONTINUED | OUTPATIENT
Start: 2021-09-30 | End: 2021-09-30 | Stop reason: HOSPADM

## 2021-09-30 RX ORDER — LIDOCAINE 40 MG/G
CREAM TOPICAL
Status: DISCONTINUED | OUTPATIENT
Start: 2021-09-30 | End: 2021-09-30 | Stop reason: HOSPADM

## 2021-09-30 RX ORDER — SODIUM CHLORIDE, SODIUM LACTATE, POTASSIUM CHLORIDE, CALCIUM CHLORIDE 600; 310; 30; 20 MG/100ML; MG/100ML; MG/100ML; MG/100ML
INJECTION, SOLUTION INTRAVENOUS CONTINUOUS
Status: DISCONTINUED | OUTPATIENT
Start: 2021-09-30 | End: 2021-09-30 | Stop reason: HOSPADM

## 2021-09-30 RX ORDER — PROPOFOL 10 MG/ML
INJECTION, EMULSION INTRAVENOUS PRN
Status: DISCONTINUED | OUTPATIENT
Start: 2021-09-30 | End: 2021-09-30

## 2021-09-30 RX ADMIN — PROPOFOL 100 MG: 10 INJECTION, EMULSION INTRAVENOUS at 12:36

## 2021-09-30 RX ADMIN — SODIUM CHLORIDE, POTASSIUM CHLORIDE, SODIUM LACTATE AND CALCIUM CHLORIDE: 600; 310; 30; 20 INJECTION, SOLUTION INTRAVENOUS at 11:32

## 2021-09-30 RX ADMIN — PROPOFOL 135 MCG/KG/MIN: 10 INJECTION, EMULSION INTRAVENOUS at 12:36

## 2021-09-30 RX ADMIN — LIDOCAINE HYDROCHLORIDE 60 MG: 20 INJECTION, SOLUTION INFILTRATION; PERINEURAL at 12:36

## 2021-09-30 NOTE — ANESTHESIA CARE TRANSFER NOTE
Patient: Christiano Metcalf    Procedure(s):  ESOPHAGOGASTRODUODENOSCOPY, WITH BIOPSY  COLONOSCOPY    Diagnosis: Rectal bleeding [K62.5]  Hematemesis [K92.0]  Diagnosis Additional Information: No value filed.    Anesthesia Type:   MAC     Note:    Oropharynx: oropharynx clear of all foreign objects  Level of Consciousness: drowsy  Oxygen Supplementation: room air    Independent Airway: airway patency satisfactory and stable  Dentition: dentition unchanged  Vital Signs Stable: post-procedure vital signs reviewed and stable  Report to RN Given: handoff report given  Patient transferred to: Phase II    Handoff Report: Identifed the Patient, Identified the Reponsible Provider, Reviewed the pertinent medical history, Discussed the surgical course, Reviewed Intra-OP anesthesia mangement and issues during anesthesia, Set expectations for post-procedure period and Allowed opportunity for questions and acknowledgement of understanding      Vitals:  Vitals Value Taken Time   BP     Temp     Pulse     Resp     SpO2         Electronically Signed By: RACHEL ROCA CRNA  September 30, 2021  1:14 PM

## 2021-09-30 NOTE — ANESTHESIA POSTPROCEDURE EVALUATION
Patient: Christiano Metcalf    Procedure(s):  ESOPHAGOGASTRODUODENOSCOPY, WITH BIOPSY  COLONOSCOPY    Diagnosis:Rectal bleeding [K62.5]  Hematemesis [K92.0]  Diagnosis Additional Information: No value filed.    Anesthesia Type:  MAC    Note:  Disposition: Outpatient   Postop Pain Control: Uneventful            Sign Out: Well controlled pain   PONV: No   Neuro/Psych: Uneventful            Sign Out: Acceptable/Baseline neuro status   Airway/Respiratory: Uneventful            Sign Out: Acceptable/Baseline resp. status   CV/Hemodynamics: Uneventful            Sign Out: Acceptable CV status; No obvious hypovolemia; No obvious fluid overload   Other NRE: NONE   DID A NON-ROUTINE EVENT OCCUR? No           Last vitals:  Vitals Value Taken Time   BP 98/64 09/30/21 1345   Temp     Pulse 75 09/30/21 1345   Resp 16 09/30/21 1313   SpO2 96 % 09/30/21 1345       Electronically Signed By: RACHEL ROCA CRNA  September 30, 2021  2:10 PM

## 2021-09-30 NOTE — DISCHARGE INSTRUCTIONS
[facility]  Adult Discharge Orders & Instructions    ________________________________________________________________          For 12 hours after surgery  1. Get plenty of rest.  A responsible adult must stay with you for at least 12 hours after you leave the hospital.   2. You may feel lightheaded.  IF so, sit for a few minutes before standing.  Have someone help you get up.   3. You may have a slight fever. Call the doctor if your fever is over 101 F (38.3 C) (taken under the tongue) or lasts longer than 24 hours.  4. You may have a dry mouth, a sore throat, muscle aches or trouble sleeping.  These should go away after 24 hours.  5. Do not make important or legal decisions.  6.   Do not drive or use heavy equipment.  If you have weakness or tingling, don't drive or use heavy equipment until this feeling goes away.    To contact a doctor, call   [phone]_______________________

## 2021-09-30 NOTE — OP NOTE
PROCEDURE NOTE    SURGEON:Jamel Kruger MD    PRE-OP DIAGNOSIS:  Rectal bleeding, Hematemesis, family hx       POST-OP DIAGNOSIS: Rectal bleeding, Hematemesis, family hx       PROCEDURE PLANNED:   Diagnostic EGD      Diagnostic Colonoscopy    PROCEDURE PERFORMED:  Flexible EGD with biopsies, Colonoscopy     SPECIMEN:      ID Type Source Tests Collected by Time Destination   1 :  Biopsy Small Intestine, Duodenum SURGICAL PATHOLOGY EXAM Jamel Kruger MD 9/30/2021 12:40 PM    2 :  Biopsy Stomach, Antrum SURGICAL PATHOLOGY EXAM Jamel Kruger MD 9/30/2021 12:43 PM    3 :  Biopsy Esophagus, Distal SURGICAL PATHOLOGY EXAM Jamel Kruger MD 9/30/2021 12:45 PM          ANESTHESIA:  Monitor Anesthesia Care  CRNA Independent: Snehal Kang APRN CRNA  Anesthesia coverage requested    ESTIMATED BLOOD LOSS: none    COMPLICATIONS:  None    INDICATION FOR THE PROCEDURE: The patient is a 51 year old male. The patient presents with hx of rectal bleeding and Hematemesis. I explained to the patient the risks, benefits and alternatives to diagnostic EGD and colonoscopy for evaluating for bleeding source. We specifically discussed the risks of bleeding, infection, perforation, potential inability to reach the cecum and the risks of sedation. The patient's questions were answered and the patient wished to proceed. Informed consent paperwork was completed.    PROCEDURE: The patient was taken to the endoscopy suite. Appropriate monitors were attached.  The patient was placed in the left lateral decubitus position. Bite block was positioned.Timeout was performed confirming the patient's identity and procedure to be performed.  After appropriate sedation was confirmed, the flexible endoscope was advanced into the oropharynx. The posterior oropharynx appeared grossly normal. The scope was advanced into the proximal esophagus. The esophagus was insufflated with air. The scope was advanced under direct visualization. No acute  abnormalities of the esophagus were noted. The scope was advanced into the stomach. The scope was advanced through the pylorus into the duodenal bulb. The bulb and distal duodenum appeared grossly normal.  The scope was withdrawn back into the stomach. Antral biopsy was obtained and sent to pathology. The scope was retroflexed and the GE junction inspected. No abnormalities were noted. The scope was returned to a neutral position and the stomach was decompressed. The scope was withdrawnto the GE junction and biopsy obtained. The mucosa of the esophagus was inspected while withdrawing the scope. No abnormalities were noted. The scope was withdrawn from the patient. The bite block was removed.    The patient was repositioned. After appropriate sedation, digital rectal exam was performed.  There was normal tone and no gross abnormality was noted.  The lubricated flexible colonoscope was introduced into the anus the colonwas insufflated with air. The prep quality was adequate. Under direct visualization the scope was advanced to the cecum. The ileocecal valve was intubated and the terminal ileum inspected. No gross abnormality was noted. The scope was withdrawn back into the cecum. The mucosa of colon was inspected while withdrawing the scope. No abnormalities were noted other than a few nonbleeding venous malformations in the left colon. The scope was retroflexed in the rectum and the anorectal junction was inspected. No abnormalities were noted. The scope was returned to a neutral position and the colon was decompressed. The scope was removed. The patient tolerated the procedure with no immediately apparent complication. The patient was taken to recovery in stable condition.    FOLLOW UP:  RECOMMENDATIONS 5 yr follow up due to family hx.     Jamel Kruger MD     CC: Gino Frankel

## 2021-09-30 NOTE — ANESTHESIA PREPROCEDURE EVALUATION
Anesthesia Pre-Procedure Evaluation    Patient: Christiano Metcalf   MRN: [id number] : 1970        Preoperative Diagnosis: Rectal bleeding [K62.5]  Hematemesis [K92.0]   Procedure : Procedure(s):  ESOPHAGOGASTRODUODENOSCOPY (EGD)  COLONOSCOPY     Past Medical History:   Diagnosis Date     Gout     No Comments Provided     Hypomagnesemia     No Comments Provided     Pain in left knee     No Comments Provided     Personal history of other (healed) physical injury and trauma     ,repair     Unspecified injury of unspecified wrist, hand and finger(s), initial encounter           Past Surgical History:   Procedure Laterality Date     ARTHROSCOPY KNEE      Right knee meniscal repair, arthroscopic ally     ARTHROSCOPY KNEE      ,Left knee scope     FINGER SURGERY      Right thumb ORIF     OTHER SURGICAL HISTORY      7/15/14,,HERNIA REPAIR,Left,LIH with mesh     OTHER SURGICAL HISTORY      5/10/16,,HERNIA REPAIR,Right,RIH with plug/patch      Allergies   Allergen Reactions     No Clinical Screening - See Comments      Peas - n/v - flushing - skin turns red  Spam - rash     Seasonal      Other reaction(s): Sneezing  Other reaction(s): Sneezing      Social History     Tobacco Use     Smoking status: Former Smoker     Types: Cigarettes     Quit date: 2010     Years since quittin.6     Smokeless tobacco: Never Used   Substance Use Topics     Alcohol use: No     Alcohol/week: 0.0 standard drinks     Comment: Alcoholic Drinks/day: seldom      Wt Readings from Last 1 Encounters:   21 105.1 kg (231 lb 9.6 oz)        Anesthesia Evaluation   Pt has had prior anesthetic.     No history of anesthetic complications       ROS/MED HX  ENT/Pulmonary:     (+) Intermittent, asthma Treatment: Inhaler prn,      Neurologic: Comment: Learning disability      Cardiovascular:  - neg cardiovascular ROS     METS/Exercise Tolerance: >4 METS    Hematologic:     (+) anemia,     Musculoskeletal:  - neg  musculoskeletal ROS     GI/Hepatic:  - neg GI/hepatic ROS     Renal/Genitourinary:  - neg Renal ROS     Endo:  - neg endo ROS     Psychiatric/Substance Use:  - neg psychiatric ROS     Infectious Disease:  - neg infectious disease ROS     Malignancy:  - neg malignancy ROS     Other:  - neg other ROS          Physical Exam    Airway        Mallampati: III   TM distance: > 3 FB   Neck ROM: full   Mouth opening: > 3 cm    Respiratory Devices and Support         Dental     Comment: Upper partial    (+) partials      Cardiovascular   cardiovascular exam normal       Rhythm and rate: regular and normal     Pulmonary   pulmonary exam normal        breath sounds clear to auscultation           OUTSIDE LABS:  CBC:   Lab Results   Component Value Date    WBC 7.1 09/05/2021    WBC 8.6 09/04/2021    HGB 11.0 (L) 09/07/2021    HGB 11.1 (L) 09/05/2021    HCT 35.3 (L) 09/05/2021    HCT 39.6 (L) 09/04/2021     09/05/2021     09/04/2021     BMP:   Lab Results   Component Value Date     09/05/2021     09/04/2021    POTASSIUM 3.3 (L) 09/05/2021    POTASSIUM 3.8 09/04/2021    CHLORIDE 105 09/05/2021    CHLORIDE 103 09/04/2021    CO2 27 09/05/2021    CO2 24 09/04/2021    BUN 26 (H) 09/05/2021    BUN 36 (H) 09/04/2021    CR 1.21 09/05/2021    CR 1.57 (H) 09/04/2021    GLC 90 09/05/2021     (H) 09/04/2021     COAGS: No results found for: PTT, INR, FIBR  POC: No results found for: BGM, HCG, HCGS  HEPATIC:   Lab Results   Component Value Date    ALBUMIN 3.8 09/04/2021    PROTTOTAL 7.3 09/04/2021    ALT 55 (H) 09/13/2021    AST 61 (H) 09/13/2021    ALKPHOS 72 09/04/2021    BILITOTAL 0.7 09/04/2021     OTHER:   Lab Results   Component Value Date    LACT 1.3 09/04/2021    SANDRO 8.7 09/05/2021    MAG 1.0 (LL) 02/11/2014    LIPASE 31 09/04/2021    CRP 0.6 (H) 04/05/2019       Anesthesia Plan    ASA Status:  2   NPO Status:  NPO Appropriate    Anesthesia Type: MAC.     - Reason for MAC: straight local not clinically  adequate   Induction: Intravenous.           Consents    Anesthesia Plan(s) and associated risks, benefits, and realistic alternatives discussed. Questions answered and patient/representative(s) expressed understanding.     - Discussed with:  Patient         Postoperative Care            Comments:                RACHEL REICH CRNA

## 2021-10-04 RX ORDER — SUCRALFATE 1 G/1
TABLET ORAL
Qty: 360 TABLET | Refills: 2 | Status: SHIPPED | OUTPATIENT
Start: 2021-10-04 | End: 2022-05-13

## 2021-10-04 NOTE — TELEPHONE ENCOUNTER
" Disp Refills Start End ARACELI   sucralfate (CARAFATE) 1 GM tablet 100 tablet 0 9/5/2021  No   Sig - Route: Take 1 tablet (1 g) by mouth 4 times daily (before meals and nightly) - Oral       LOV: 9/7/2021  Future Office visit: No future appointment scheduled at this time.      Routing refill request to provider for review/approval because:  Received limited refill    Requested Prescriptions   Pending Prescriptions Disp Refills     sucralfate (CARAFATE) 1 GM tablet [Pharmacy Med Name: SUCRALFATE 1GM TABLETS] 100 tablet 0     Sig: TAKE 1 TABLET(1 GRAM) BY MOUTH FOUR TIMES DAILY BEFORE MEALS AND AT NIGHT       Miscellaneous Gastrointestinal Agents Passed - 9/29/2021  4:05 AM        Passed - Recent (12 mo) or future (30 days) visit within the authorizing provider's specialty     Patient has had an office visit with the authorizing provider or a provider within the authorizing providers department within the previous 12 mos or has a future within next 30 days. See \"Patient Info\" tab in inbasket, or \"Choose Columns\" in Meds & Orders section of the refill encounter.              Passed - Medication is active on med list        Passed - Patient is 18 years of age or older         Unable to complete prescription refill per RN Medication Refill Policy.................... Georgiana Bassett RN ....................  10/4/2021   11:18 AM        "

## 2021-10-05 LAB
PATH REPORT.COMMENTS IMP SPEC: NORMAL
PATH REPORT.FINAL DX SPEC: NORMAL
PHOTO IMAGE: NORMAL

## 2021-10-09 ENCOUNTER — HEALTH MAINTENANCE LETTER (OUTPATIENT)
Age: 51
End: 2021-10-09

## 2021-10-25 ENCOUNTER — IMMUNIZATION (OUTPATIENT)
Dept: FAMILY MEDICINE | Facility: OTHER | Age: 51
End: 2021-10-25
Attending: FAMILY MEDICINE
Payer: COMMERCIAL

## 2021-10-25 VITALS
HEART RATE: 96 BPM | BODY MASS INDEX: 34.36 KG/M2 | OXYGEN SATURATION: 97 % | DIASTOLIC BLOOD PRESSURE: 86 MMHG | HEIGHT: 69 IN | RESPIRATION RATE: 18 BRPM | SYSTOLIC BLOOD PRESSURE: 136 MMHG | TEMPERATURE: 97.7 F | WEIGHT: 232 LBS

## 2021-10-25 DIAGNOSIS — L03.012 CELLULITIS OF FINGER OF LEFT HAND: ICD-10-CM

## 2021-10-25 DIAGNOSIS — Z23 NEED FOR INFLUENZA VACCINATION: Primary | ICD-10-CM

## 2021-10-25 DIAGNOSIS — Z13.220 LIPID SCREENING: ICD-10-CM

## 2021-10-25 DIAGNOSIS — Z13.220 SCREENING CHOLESTEROL LEVEL: ICD-10-CM

## 2021-10-25 DIAGNOSIS — R73.9 HYPERGLYCEMIA: ICD-10-CM

## 2021-10-25 DIAGNOSIS — K29.01 GASTROINTESTINAL HEMORRHAGE ASSOCIATED WITH ACUTE GASTRITIS: ICD-10-CM

## 2021-10-25 DIAGNOSIS — L29.9 EAR ITCHING: ICD-10-CM

## 2021-10-25 LAB
ALBUMIN SERPL-MCNC: 4.2 G/DL (ref 3.5–5.7)
ALP SERPL-CCNC: 98 U/L (ref 34–104)
ALT SERPL W P-5'-P-CCNC: 63 U/L (ref 7–52)
ANION GAP SERPL CALCULATED.3IONS-SCNC: 11 MMOL/L (ref 3–14)
AST SERPL W P-5'-P-CCNC: 78 U/L (ref 13–39)
BILIRUB SERPL-MCNC: 0.7 MG/DL (ref 0.3–1)
BUN SERPL-MCNC: 16 MG/DL (ref 7–25)
CALCIUM SERPL-MCNC: 9.2 MG/DL (ref 8.6–10.3)
CHLORIDE BLD-SCNC: 100 MMOL/L (ref 98–107)
CHOLEST SERPL-MCNC: 170 MG/DL
CO2 SERPL-SCNC: 27 MMOL/L (ref 21–31)
CREAT SERPL-MCNC: 1.04 MG/DL (ref 0.7–1.3)
FASTING STATUS PATIENT QL REPORTED: ABNORMAL
GFR SERPL CREATININE-BSD FRML MDRD: 83 ML/MIN/1.73M2
GLUCOSE BLD-MCNC: 124 MG/DL (ref 70–105)
HDLC SERPL-MCNC: 30 MG/DL (ref 23–92)
LDLC SERPL CALC-MCNC: 73 MG/DL
NONHDLC SERPL-MCNC: 140 MG/DL
POTASSIUM BLD-SCNC: 3 MMOL/L (ref 3.5–5.1)
PROT SERPL-MCNC: 7.9 G/DL (ref 6.4–8.9)
PSA SERPL-MCNC: 0.67 UG/L (ref 0–4)
SODIUM SERPL-SCNC: 138 MMOL/L (ref 134–144)
TRIGL SERPL-MCNC: 337 MG/DL

## 2021-10-25 PROCEDURE — G0103 PSA SCREENING: HCPCS | Mod: ZL | Performed by: FAMILY MEDICINE

## 2021-10-25 PROCEDURE — 91300 PR COVID VAC PFIZER DIL RECON 30 MCG/0.3 ML IM: CPT

## 2021-10-25 PROCEDURE — 99213 OFFICE O/P EST LOW 20 MIN: CPT | Performed by: FAMILY MEDICINE

## 2021-10-25 PROCEDURE — 36415 COLL VENOUS BLD VENIPUNCTURE: CPT | Mod: ZL | Performed by: FAMILY MEDICINE

## 2021-10-25 PROCEDURE — G0008 ADMIN INFLUENZA VIRUS VAC: HCPCS

## 2021-10-25 PROCEDURE — G0463 HOSPITAL OUTPT CLINIC VISIT: HCPCS | Mod: 25

## 2021-10-25 PROCEDURE — 80061 LIPID PANEL: CPT | Mod: ZL | Performed by: FAMILY MEDICINE

## 2021-10-25 PROCEDURE — G0463 HOSPITAL OUTPT CLINIC VISIT: HCPCS

## 2021-10-25 PROCEDURE — 82040 ASSAY OF SERUM ALBUMIN: CPT | Mod: ZL | Performed by: FAMILY MEDICINE

## 2021-10-25 RX ORDER — CETIRIZINE HYDROCHLORIDE 10 MG/1
10 TABLET ORAL 2 TIMES DAILY
Qty: 180 TABLET | Refills: 3 | Status: SHIPPED | OUTPATIENT
Start: 2021-10-25 | End: 2022-09-06

## 2021-10-25 RX ORDER — CEPHALEXIN 500 MG/1
500 CAPSULE ORAL 3 TIMES DAILY
Qty: 21 CAPSULE | Refills: 0 | Status: SHIPPED | OUTPATIENT
Start: 2021-10-25 | End: 2021-11-01

## 2021-10-25 ASSESSMENT — PAIN SCALES - GENERAL: PAINLEVEL: NO PAIN (0)

## 2021-10-25 ASSESSMENT — MIFFLIN-ST. JEOR: SCORE: 1889.79

## 2021-10-25 NOTE — PROGRESS NOTES
"SUBJECTIVE:  Christiano Metcalf is a 51 year old male here for follow-up.  He had a EGD and colonoscopy performed on September 30 for rectal bleeding and hematemesis.  Biopsies all came back returning normal and is recommend that he repeat colonoscopy and EGD again in 5 years due to family history.  The rectal bleeding that he once had has since resolved.    He reports that several months ago he got some wood stuck in his left third digit.  He was able remove this on his own however he continues to drain and get crusty.  Has had no fevers or chills.    He has no other new concerns today    Allergies:  Allergies   Allergen Reactions     No Clinical Screening - See Comments      Peas - n/v - flushing - skin turns red  Spam - rash     Seasonal      Other reaction(s): Sneezing  Other reaction(s): Sneezing       ROS:    As above otherwise ROS is unremarkable.    OBJECTIVE:  /86   Pulse 96   Temp 97.7  F (36.5  C)   Resp 18   Ht 1.74 m (5' 8.5\")   Wt 105.2 kg (232 lb)   SpO2 97%   BMI 34.76 kg/m      EXAM:  General Appearance: Pleasant, alert, appropriate appearance for age. No acute distress  Head: Normal. Normocephalic, atraumatic.  Gastrointestinal: Soft, nontender, no abnormal masses or organomegaly. BS normal.  Musculoskeletal: No edema.  Skin: Left third digit shows erythema distally as well as some crusting on the edge of his nail.  No active drainage.  Neurologic Exam: CN 2-12 grossly intact.  Normal gait.    Psychiatric Exam: Alert and oriented, appropriate affect.    ASSESSEMENT AND PLAN:    1. Gastrointestinal hemorrhage associated with acute gastritis    2. Ear itching    3. Cellulitis of finger of left hand    4. Screening cholesterol level    5. Lipid screening      He will continue Protonix and Carafate.  Follow-up EGD and colonoscopy in 5 years.    For his left third finger will treat with Keflex for 1 week.  Also encourage salt water soaks with Epson salt.    We will get fasting labs " today.    We will update flu shot today.    We will coordinate his Covid booster as he is over 6 months out and has a history of asthma.    Otto Frankel MD  Family Medicine

## 2021-10-25 NOTE — NURSING NOTE
Patient presents today for follow up. He is no longer having any bleeding.      Medication Reconciliation Complete    Faviola Hernandez LPN  10/25/2021 11:14 AM

## 2021-11-19 DIAGNOSIS — M1A.00X0 IDIOPATHIC CHRONIC GOUT WITHOUT TOPHUS, UNSPECIFIED SITE: ICD-10-CM

## 2021-11-23 RX ORDER — ALLOPURINOL 300 MG/1
TABLET ORAL
Qty: 90 TABLET | Refills: 3 | OUTPATIENT
Start: 2021-11-23

## 2021-11-23 NOTE — TELEPHONE ENCOUNTER
[facility] sent Rx request for the following:   allopurinol (ZYLOPRIM) 300 MG tablet  Sig TAKE 1 TABLET(300 MG) BY MOUTH DAILY    Last Prescription Date:   08/26/2021  Last Fill Qty/Refills:         90, R-3      Redundant refill request refused: Too soon:    Unable to complete prescription refill per RN Medication Refill Policy.................... Cindy Gracia RN ....................  11/23/2021   9:19 AM

## 2022-01-10 ENCOUNTER — HOSPITAL ENCOUNTER (EMERGENCY)
Facility: OTHER | Age: 52
Discharge: HOME | End: 2022-01-10
Attending: PHYSICIAN ASSISTANT | Admitting: PHYSICIAN ASSISTANT
Payer: COMMERCIAL

## 2022-01-10 VITALS
RESPIRATION RATE: 20 BRPM | SYSTOLIC BLOOD PRESSURE: 121 MMHG | TEMPERATURE: 99.1 F | OXYGEN SATURATION: 96 % | DIASTOLIC BLOOD PRESSURE: 73 MMHG | HEART RATE: 84 BPM

## 2022-01-10 DIAGNOSIS — R06.02 SOB (SHORTNESS OF BREATH): ICD-10-CM

## 2022-01-10 DIAGNOSIS — J45.21 MILD INTERMITTENT ASTHMA WITH EXACERBATION: ICD-10-CM

## 2022-01-10 PROCEDURE — 99283 EMERGENCY DEPT VISIT LOW MDM: CPT | Performed by: PHYSICIAN ASSISTANT

## 2022-01-10 PROCEDURE — 99282 EMERGENCY DEPT VISIT SF MDM: CPT | Performed by: PHYSICIAN ASSISTANT

## 2022-01-10 ASSESSMENT — ENCOUNTER SYMPTOMS
FACIAL ASYMMETRY: 0
FACIAL SWELLING: 0
SEIZURES: 0
AGITATION: 0
SHORTNESS OF BREATH: 1
BACK PAIN: 0
ABDOMINAL PAIN: 0
FLANK PAIN: 0
STRIDOR: 0
COUGH: 1
FEVER: 0
TREMORS: 0
EYE PAIN: 0
CONFUSION: 0

## 2022-01-10 NOTE — LETTER
January 10, 2022      To Whom It May Concern:      Christiano Metcalf was seen in our Emergency Department today, 01/10/22.   He will need to be off work tomorrow to recover but can return to work on 1/12/2022.  He may return sooner if he feels improved.    Sincerely,                      Quinton Abbasi, MPA, PA-C

## 2022-01-11 NOTE — ED TRIAGE NOTES
"ED Nursing Triage Note (General)   ________________________________    Christiano Metcalf is a 51 year old Male that presents to triage private car  With history of an asthma attack. Reports 45 min ago he was smelling smoke from his down stairs neighbors apartment and got a dry throat, shortness of breath. Pt reports using his albuterol inhaler daily when he's at home dealing with this. Tonight it did not work. No other illness recently. Felt fine prior. \"They just wont stop smoking in the apartment! My son can vouch for it!\"  This is reported by patient. \"I am on the waiting list for an emergency apartment with my mental health worker!\"     Significant symptoms had onset 45 minute(s) ago.  Pulse 84   Temp 99.1  F (37.3  C) (Tympanic)   Resp 20   SpO2 96% t  Patient appears alert  and oriented, in no acute distress., and  behavior.  Anxiety: observed  GCS Total = 15  Airway: intact  Breathing noted as Normal, bilateral lung sounds clear, no wheeze noted   Circulation Normal  Skin:  Normal  Oxygen sats 97-98% in triage.       PRE HOSPITAL PRIOR LIVING SITUATION home  "

## 2022-01-11 NOTE — ED NOTES
Pt is just requesting a work note so he can stay with his son tomorrow instead of stay at his apartment where people are smoking.

## 2022-01-11 NOTE — ED PROVIDER NOTES
History     Chief Complaint   Patient presents with     Shortness of Breath     HPI  Christiano Metcalf is a 51 year old male who is here due to an asthma attack and shortness of breath.  He reports that 45 minutes ago he was smelling smoke from his downstairs neighbors apartment and got a dry throat.  He had a coughing session which has improved.  He has an albuterol MDI at home but does not have a spacer.  He did not feel like his MDI was working so he is here for further evaluation.    Allergies:  Allergies   Allergen Reactions     No Clinical Screening - See Comments      Peas - n/v - flushing - skin turns red  Spam - rash     Seasonal      Other reaction(s): Sneezing  Other reaction(s): Sneezing       Problem List:    Patient Active Problem List    Diagnosis Date Noted     Gastrointestinal hemorrhage associated with acute gastritis 09/04/2021     Priority: Medium     Long term current use of non-steroidal anti-inflammatories (NSAID) 09/04/2021     Priority: Medium     Anemia due to blood loss, acute 09/04/2021     Priority: Medium     Microcytic anemia 09/04/2021     Priority: Medium     Psoriasis 09/04/2021     Priority: Medium     Acute kidney injury (H) 09/04/2021     Priority: Medium     Gastrointestinal hemorrhage, unspecified gastrointestinal hemorrhage type 09/04/2021     Priority: Medium     Impingement syndrome of left shoulder 10/12/2020     Priority: Medium     Pain of left sternoclavicular joint 10/12/2020     Priority: Medium     Tendinopathy of left biceps tendon 10/12/2020     Priority: Medium     Mild intermittent asthma without complication 08/31/2018     Priority: Medium     Gout 02/01/2018     Priority: Medium     Learning disability 06/14/2011     Priority: Medium        Past Medical History:    Past Medical History:   Diagnosis Date     Gout      Hypomagnesemia      Pain in left knee      Personal history of other (healed) physical injury and trauma      Unspecified injury of unspecified  wrist, hand and finger(s), initial encounter        Past Surgical History:    Past Surgical History:   Procedure Laterality Date     ARTHROSCOPY KNEE      Right knee meniscal repair, arthroscopic ally     ARTHROSCOPY KNEE      ,Left knee scope     COLONOSCOPY N/A 2021    follow up 5 years family history, 2021     ESOPHAGOSCOPY, GASTROSCOPY, DUODENOSCOPY (EGD), COMBINED N/A 2021    Procedure: ESOPHAGOGASTRODUODENOSCOPY, WITH BIOPSY;  Surgeon: Jamel Kruger MD;  Location: [facility] OR     FINGER SURGERY      Right thumb ORIF     OTHER SURGICAL HISTORY      7/15/14,,HERNIA REPAIR,Left,LIH with mesh     OTHER SURGICAL HISTORY      5/10/16,,HERNIA REPAIR,Right,RIH with plug/patch       Family History:    Family History   Problem Relation Age of Onset     Diabetes Mother         Diabetes     Other - See Comments Father         episode of gout.     Colon Cancer Father         Cancer-colon,diagnosed in early 60s     Family History Negative Son         Good Health,     Family History Negative Daughter         Good Health,     Family History Negative Son         Good Health,       Social History:  Marital Status:  Legally  [3]  Social History     Tobacco Use     Smoking status: Former Smoker     Types: Cigarettes     Quit date: 2010     Years since quittin.9     Smokeless tobacco: Never Used   Vaping Use     Vaping Use: Never used   Substance Use Topics     Alcohol use: No     Alcohol/week: 0.0 standard drinks     Comment: Alcoholic Drinks/day: seldom     Drug use: No        Medications:    albuterol (PROAIR HFA/PROVENTIL HFA/VENTOLIN HFA) 108 (90 Base) MCG/ACT inhaler  allopurinol (ZYLOPRIM) 300 MG tablet  amLODIPine (NORVASC) 2.5 MG tablet  cetirizine (ZYRTEC) 10 MG tablet  HUMIRA PEN 40 MG/0.8ML pen kit  pantoprazole (PROTONIX) 20 MG EC tablet  sertraline (ZOLOFT) 100 MG tablet  sucralfate (CARAFATE) 1 GM tablet          Review of Systems   Constitutional: Negative  for fever.   HENT: Negative for drooling and facial swelling.    Eyes: Negative for pain and visual disturbance.   Respiratory: Positive for cough and shortness of breath. Negative for stridor.    Cardiovascular: Negative for chest pain.   Gastrointestinal: Negative for abdominal pain.   Genitourinary: Negative for flank pain.   Musculoskeletal: Negative for back pain.   Skin: Negative for pallor.   Neurological: Negative for tremors, seizures and facial asymmetry.   Psychiatric/Behavioral: Negative for agitation and confusion.   All other systems reviewed and are negative.      Physical Exam   BP: 121/73  Pulse: 84  Temp: 99.1  F (37.3  C)  Resp: 20  SpO2: 96 %      Physical Exam  Vitals and nursing note reviewed.   Constitutional:       General: He is not in acute distress.     Appearance: Normal appearance. He is not ill-appearing or toxic-appearing.   HENT:      Head: Normocephalic. No raccoon eyes, right periorbital erythema or left periorbital erythema.      Right Ear: No drainage or tenderness.      Left Ear: No drainage or tenderness.      Nose: Nose normal.   Eyes:      General: Lids are normal. Gaze aligned appropriately. No scleral icterus.     Extraocular Movements: Extraocular movements intact.   Neck:      Trachea: No tracheal deviation.   Cardiovascular:      Rate and Rhythm: Normal rate.   Pulmonary:      Effort: Pulmonary effort is normal. No respiratory distress.      Breath sounds: No stridor. No wheezing.      Comments: Lung sounds were clear SaO2 is 96% on room air.  Does not appear to be any respiratory distress.  No tachypnea.  Abdominal:      Tenderness: There is no abdominal tenderness.   Musculoskeletal:         General: No deformity or signs of injury. Normal range of motion.      Cervical back: Normal range of motion. No signs of trauma.   Skin:     General: Skin is warm and dry.      Coloration: Skin is not jaundiced or pale.   Neurological:      General: No focal deficit present.       Mental Status: He is alert and oriented to person, place, and time.      GCS: GCS eye subscore is 4. GCS verbal subscore is 5. GCS motor subscore is 6.      Motor: No tremor or seizure activity.   Psychiatric:         Attention and Perception: Attention normal.         Mood and Affect: Mood normal.         ED Course        No results found for this or any previous visit (from the past 24 hour(s)).    Medications - No data to display    Assessments & Plan (with Medical Decision Making)     I have reviewed the nursing notes.    I have reviewed the findings, diagnosis, plan and need for follow up with the patient.      New Prescriptions    No medications on file       Final diagnoses:   SOB (shortness of breath)   Mild intermittent asthma with exacerbation     Afebrile.  Vital signs stable.  Patient with apparent asthma attack from smoke from his neighbors.  RT came down and evaluated this patient the patient was given a spacer for his MDI and he feels this helped considerably.  A work note was written for the patient as well.  Follow-up with his primary care provider if symptoms persist for further evaluation as needed.  1/10/2022   [facility]     Quinton Abbasi PA-C  01/10/22 2050

## 2022-01-11 NOTE — ED TRIAGE NOTES
Respiratory therapy came to talk to patient in triage about the appropriate use of his in haler. Pt feels more comfortable using his inhaler now and demonstrated this with RT. RT gave pt a spacer.  Pt is just requesting a note from the doctor and would like to go home.

## 2022-01-14 DIAGNOSIS — M1A.00X0 IDIOPATHIC CHRONIC GOUT WITHOUT TOPHUS, UNSPECIFIED SITE: ICD-10-CM

## 2022-01-17 RX ORDER — NAPROXEN 500 MG/1
TABLET ORAL
Qty: 180 TABLET | Refills: 3 | OUTPATIENT
Start: 2022-01-17

## 2022-01-17 NOTE — TELEPHONE ENCOUNTER
The original prescription was discontinued on 9/5/2021 by Enoch Daniel MD for the following reason: Stop at Discharge    Unable to complete prescription refill per RN Medication Refill Policy.................... Cindy Gracia RN ....................  1/17/2022   9:13 AM

## 2022-03-24 ENCOUNTER — OFFICE VISIT (OUTPATIENT)
Dept: FAMILY MEDICINE | Facility: OTHER | Age: 52
End: 2022-03-24
Attending: PHYSICIAN ASSISTANT
Payer: COMMERCIAL

## 2022-03-24 VITALS
DIASTOLIC BLOOD PRESSURE: 82 MMHG | BODY MASS INDEX: 37.47 KG/M2 | RESPIRATION RATE: 14 BRPM | OXYGEN SATURATION: 97 % | SYSTOLIC BLOOD PRESSURE: 156 MMHG | WEIGHT: 253 LBS | HEART RATE: 87 BPM | TEMPERATURE: 98.3 F | HEIGHT: 69 IN

## 2022-03-24 DIAGNOSIS — J02.9 SORE THROAT: Primary | ICD-10-CM

## 2022-03-24 DIAGNOSIS — S01.502A TONGUE WOUND, INITIAL ENCOUNTER: ICD-10-CM

## 2022-03-24 LAB — GROUP A STREP BY PCR: NOT DETECTED

## 2022-03-24 PROCEDURE — U0003 INFECTIOUS AGENT DETECTION BY NUCLEIC ACID (DNA OR RNA); SEVERE ACUTE RESPIRATORY SYNDROME CORONAVIRUS 2 (SARS-COV-2) (CORONAVIRUS DISEASE [COVID-19]), AMPLIFIED PROBE TECHNIQUE, MAKING USE OF HIGH THROUGHPUT TECHNOLOGIES AS DESCRIBED BY CMS-2020-01-R: HCPCS | Mod: ZL | Performed by: PHYSICIAN ASSISTANT

## 2022-03-24 PROCEDURE — 87651 STREP A DNA AMP PROBE: CPT | Mod: ZL | Performed by: PHYSICIAN ASSISTANT

## 2022-03-24 PROCEDURE — 99214 OFFICE O/P EST MOD 30 MIN: CPT | Mod: CS | Performed by: PHYSICIAN ASSISTANT

## 2022-03-24 PROCEDURE — C9803 HOPD COVID-19 SPEC COLLECT: HCPCS | Performed by: PHYSICIAN ASSISTANT

## 2022-03-24 PROCEDURE — G0463 HOSPITAL OUTPT CLINIC VISIT: HCPCS

## 2022-03-24 ASSESSMENT — PATIENT HEALTH QUESTIONNAIRE - PHQ9
10. IF YOU CHECKED OFF ANY PROBLEMS, HOW DIFFICULT HAVE THESE PROBLEMS MADE IT FOR YOU TO DO YOUR WORK, TAKE CARE OF THINGS AT HOME, OR GET ALONG WITH OTHER PEOPLE: SOMEWHAT DIFFICULT
SUM OF ALL RESPONSES TO PHQ QUESTIONS 1-9: 13
SUM OF ALL RESPONSES TO PHQ QUESTIONS 1-9: 13

## 2022-03-24 NOTE — PROGRESS NOTES
Assessment & Plan     1. Sore throat  Differential includes viral or strep pharyngitis, viral URI, COVID, influenza, RSV, asthma, allergies, bronchitis, pneumonia, etc.  Strep and Covid swabs pending as below.  Will notify with results and treat if indicated.  - Symptomatic; Unknown COVID-19 Virus (Coronavirus) by PCR Nose  - Group A Streptococcus PCR Throat Swab    2. Tongue wound, initial encounter  Exam showing to bite wounds to left middle and posterior tongue without signs of infection.  Encourage symptomatic management with Tylenol or ibuprofen.  Follow-up as needed.      Return if symptoms worsen or fail to improve.    Emili Simmons PA-C  [facility]    Zully Merritt is a 52 year old who presents for the following health issues     History of Present Illness       Mental Health Follow-up:                    Today's PHQ-9         PHQ-9 Total Score: 13  PHQ-9 Q9 Thoughts of better off dead/self-harm past 2 weeks :   (P) Not at all    How difficult have these problems made it for you to do your work, take care of things at home, or get along with other people: Somewhat difficult        Reason for visit:  Headache sore throat    He eats 0-1 servings of fruits and vegetables daily.He consumes 5 sweetened beverage(s) daily.He exercises with enough effort to increase his heart rate 9 or less minutes per day.  He exercises with enough effort to increase his heart rate 3 or less days per week. He is missing 1 dose(s) of medications per week.    Here for evaluation of sore throat and headache for the past week.  He reports he does have an associated dry cough.  No associated fever/chills, shortness of breath, wheezing, body aches, GI symptoms, rash.  No known sick contacts.  Does report that he did bite his tongue a little while ago and he has significant pain over that area still.  He would like this looked at today as well.      PAST MEDICAL HISTORY:   Past Medical History:    Diagnosis Date     Gout     No Comments Provided     Hypomagnesemia     No Comments Provided     Pain in left knee     No Comments Provided     Personal history of other (healed) physical injury and trauma     ,repair     Unspecified injury of unspecified wrist, hand and finger(s), initial encounter            PAST SURGICAL HISTORY:   Past Surgical History:   Procedure Laterality Date     ARTHROSCOPY KNEE      Right knee meniscal repair, arthroscopic ally     ARTHROSCOPY KNEE      2010,Left knee scope     COLONOSCOPY N/A 2021    follow up 5 years family history, 2021     ESOPHAGOSCOPY, GASTROSCOPY, DUODENOSCOPY (EGD), COMBINED N/A 2021    Procedure: ESOPHAGOGASTRODUODENOSCOPY, WITH BIOPSY;  Surgeon: Jamel Kruger MD;  Location: [facility] OR     FINGER SURGERY      Right thumb ORIF     OTHER SURGICAL HISTORY      7/15/14,,HERNIA REPAIR,Left,LIH with mesh     OTHER SURGICAL HISTORY      5/10/16,,HERNIA REPAIR,Right,RIH with plug/patch       FAMILY HISTORY:   Family History   Problem Relation Age of Onset     Diabetes Mother         Diabetes     Other - See Comments Father         episode of gout.     Colon Cancer Father         Cancer-colon,diagnosed in early 60s     Family History Negative Son         Good Health,     Family History Negative Daughter         Good Health,     Family History Negative Son         Good Health,       SOCIAL HISTORY:   Social History     Tobacco Use     Smoking status: Former Smoker     Types: Cigarettes     Quit date: 2010     Years since quittin.1     Smokeless tobacco: Never Used   Substance Use Topics     Alcohol use: No     Alcohol/week: 0.0 standard drinks     Comment: Alcoholic Drinks/day: seldom        Allergies   Allergen Reactions     No Clinical Screening - See Comments      Peas - n/v - flushing - skin turns red  Spam - rash     Seasonal      Other reaction(s): Sneezing  Other reaction(s): Sneezing     Current Outpatient  "Medications   Medication     albuterol (PROAIR HFA/PROVENTIL HFA/VENTOLIN HFA) 108 (90 Base) MCG/ACT inhaler     allopurinol (ZYLOPRIM) 300 MG tablet     amLODIPine (NORVASC) 2.5 MG tablet     cetirizine (ZYRTEC) 10 MG tablet     HUMIRA PEN 40 MG/0.8ML pen kit     pantoprazole (PROTONIX) 20 MG EC tablet     sertraline (ZOLOFT) 100 MG tablet     sucralfate (CARAFATE) 1 GM tablet     No current facility-administered medications for this visit.         Review of Systems   Per HPI        Objective    BP (!) 156/82   Pulse 87   Temp 98.3  F (36.8  C)   Resp 14   Ht 1.74 m (5' 8.5\")   Wt 114.8 kg (253 lb)   SpO2 97%   BMI 37.91 kg/m    Body mass index is 37.91 kg/m .  Physical Exam   General: Pleasant, in no apparent distress.  Eyes: Sclera are white and conjunctiva are clear bilaterally. Lacrimal apparatus free of erythema, edema, and discharge bilaterally.  Ears: External ears without erythema or edema. Tympanic membranes are pearly white and without erythema, scarring or perforations bilaterally. External auditory canals are free of foreign bodies, erythema, ulcers, and masses.  Nose: External nose is symmetrical and free of lesions and deformities.   Oropharynx: Oral mucosa is pink and without ulcers, nodules, and white patches. Tongue is symmetrical, pink, and with 2 linear bite marks to middle and posterior left tongue without signs of infection. Pharynx is mildly erythematous, symmetrical, and without lesions. Uvula is midline. Tonsils are pink, symmetrical, and without edema, ulcers, or exudates, and 1+ bilaterally.  Cardiovascular: Regular rate and rhythm with S1 equal to S2. No murmurs, friction rubs, or gallops.   Respiratory: Lungs are resonant and clear to auscultation bilaterally. No wheezes, crackles, or rhonchi.  Psych: Appropriate mood and affect.            "

## 2022-03-24 NOTE — NURSING NOTE
Patient presents to clinic with sore throat and headache.  Jessie Ramirez LPN ....................  3/24/2022   2:01 PM

## 2022-03-25 ASSESSMENT — PATIENT HEALTH QUESTIONNAIRE - PHQ9: SUM OF ALL RESPONSES TO PHQ QUESTIONS 1-9: 13

## 2022-03-26 LAB — SARS-COV-2 RNA RESP QL NAA+PROBE: NEGATIVE

## 2022-04-23 ENCOUNTER — APPOINTMENT (OUTPATIENT)
Dept: GENERAL RADIOLOGY | Facility: OTHER | Age: 52
End: 2022-04-23
Attending: FAMILY MEDICINE
Payer: COMMERCIAL

## 2022-04-23 ENCOUNTER — HOSPITAL ENCOUNTER (EMERGENCY)
Facility: OTHER | Age: 52
Discharge: HOME | End: 2022-04-23
Attending: FAMILY MEDICINE | Admitting: FAMILY MEDICINE
Payer: COMMERCIAL

## 2022-04-23 VITALS
TEMPERATURE: 97.2 F | HEART RATE: 94 BPM | WEIGHT: 250 LBS | BODY MASS INDEX: 37.89 KG/M2 | DIASTOLIC BLOOD PRESSURE: 80 MMHG | OXYGEN SATURATION: 93 % | HEIGHT: 68 IN | RESPIRATION RATE: 20 BRPM | SYSTOLIC BLOOD PRESSURE: 165 MMHG

## 2022-04-23 DIAGNOSIS — L08.9 FINGER INFECTION: ICD-10-CM

## 2022-04-23 DIAGNOSIS — L03.012 ACUTE PARONYCHIA OF FINGER OF LEFT HAND: ICD-10-CM

## 2022-04-23 PROCEDURE — 99283 EMERGENCY DEPT VISIT LOW MDM: CPT | Performed by: FAMILY MEDICINE

## 2022-04-23 PROCEDURE — 73140 X-RAY EXAM OF FINGER(S): CPT | Mod: LT

## 2022-04-23 PROCEDURE — 250N000013 HC RX MED GY IP 250 OP 250 PS 637: Performed by: FAMILY MEDICINE

## 2022-04-23 RX ORDER — CEPHALEXIN 500 MG/1
500 CAPSULE ORAL 4 TIMES DAILY
Qty: 28 CAPSULE | Refills: 0 | Status: SHIPPED | OUTPATIENT
Start: 2022-04-23 | End: 2022-04-30

## 2022-04-23 RX ADMIN — CEPHALEXIN 500 MG: 250 CAPSULE ORAL at 20:16

## 2022-04-23 ASSESSMENT — ENCOUNTER SYMPTOMS: WOUND: 1

## 2022-04-23 NOTE — ED PROVIDER NOTES
History     Chief Complaint   Patient presents with     Wound Infection     The history is provided by the patient and medical records.     Christiano Metcalf is a 52 year old male here with infection of the tip of his left middle finger. He was seen in clinic October 25, 2021 for an injury to the finger. At that time he told Dr Frankel that the injury had occurred several months prior.  He was treated with Keflex. Since that time, over the past six months, he tells me it has not gotten any better. Today the tip of the finger is red with swelling. The tip of the finger is numb now. He cannot feel anything on the tip of the left middle finger.     His last tetanus was 2019.     Allergies:  Allergies   Allergen Reactions     No Clinical Screening - See Comments      Peas - n/v - flushing - skin turns red  Spam - rash     Seasonal      Other reaction(s): Sneezing  Other reaction(s): Sneezing       Problem List:    Patient Active Problem List    Diagnosis Date Noted     Gastrointestinal hemorrhage associated with acute gastritis 09/04/2021     Priority: Medium     Long term current use of non-steroidal anti-inflammatories (NSAID) 09/04/2021     Priority: Medium     Anemia due to blood loss, acute 09/04/2021     Priority: Medium     Microcytic anemia 09/04/2021     Priority: Medium     Psoriasis 09/04/2021     Priority: Medium     Acute kidney injury (H) 09/04/2021     Priority: Medium     Gastrointestinal hemorrhage, unspecified gastrointestinal hemorrhage type 09/04/2021     Priority: Medium     Impingement syndrome of left shoulder 10/12/2020     Priority: Medium     Pain of left sternoclavicular joint 10/12/2020     Priority: Medium     Tendinopathy of left biceps tendon 10/12/2020     Priority: Medium     Mild intermittent asthma without complication 08/31/2018     Priority: Medium     Gout 02/01/2018     Priority: Medium     Learning disability 06/14/2011     Priority: Medium        Past Medical History:    Past  Medical History:   Diagnosis Date     Gout      Hypomagnesemia      Pain in left knee      Personal history of other (healed) physical injury and trauma      Unspecified injury of unspecified wrist, hand and finger(s), initial encounter        Past Surgical History:    Past Surgical History:   Procedure Laterality Date     ARTHROSCOPY KNEE      Right knee meniscal repair, arthroscopic ally     ARTHROSCOPY KNEE      ,Left knee scope     COLONOSCOPY N/A 2021    follow up 5 years family history, 2021     ESOPHAGOSCOPY, GASTROSCOPY, DUODENOSCOPY (EGD), COMBINED N/A 2021    Procedure: ESOPHAGOGASTRODUODENOSCOPY, WITH BIOPSY;  Surgeon: Jamel Kruger MD;  Location: [facility] OR     FINGER SURGERY      Right thumb ORIF     OTHER SURGICAL HISTORY      7/15/14,,HERNIA REPAIR,Left,LIH with mesh     OTHER SURGICAL HISTORY      5/10/16,,HERNIA REPAIR,Right,RIH with plug/patch       Family History:    Family History   Problem Relation Age of Onset     Diabetes Mother         Diabetes     Other - See Comments Father         episode of gout.     Colon Cancer Father         Cancer-colon,diagnosed in early 60s     Family History Negative Son         Good Health,     Family History Negative Daughter         Good Health,     Family History Negative Son         Good Health,       Social History:  Marital Status:  Legally  [3]  Social History     Tobacco Use     Smoking status: Former Smoker     Types: Cigarettes     Quit date: 2010     Years since quittin.2     Smokeless tobacco: Never Used   Vaping Use     Vaping Use: Never used   Substance Use Topics     Alcohol use: No     Alcohol/week: 0.0 standard drinks     Comment: Alcoholic Drinks/day: seldom     Drug use: No        Medications:    albuterol (PROAIR HFA/PROVENTIL HFA/VENTOLIN HFA) 108 (90 Base) MCG/ACT inhaler  allopurinol (ZYLOPRIM) 300 MG tablet  amLODIPine (NORVASC) 2.5 MG tablet  cetirizine (ZYRTEC) 10 MG  "tablet  HUMIRA PEN 40 MG/0.8ML pen kit  pantoprazole (PROTONIX) 20 MG EC tablet  sertraline (ZOLOFT) 100 MG tablet  sucralfate (CARAFATE) 1 GM tablet          Review of Systems   Skin: Positive for wound.   All other systems reviewed and are negative.      Physical Exam   BP: (!) 165/80  Pulse: 94  Temp: 97.2  F (36.2  C)  Resp: 20  Height: 172.7 cm (5' 8\")  Weight: 113.4 kg (250 lb)  SpO2: 93 %      Physical Exam  Vitals and nursing note reviewed.   Constitutional:       General: He is not in acute distress.     Appearance: Normal appearance. He is normal weight. He is not ill-appearing.   Musculoskeletal:      Comments: Exam of the left hand shows no injury other than the tip of the left middle finger. The tip of the finger is red, swollen and he reports no sensation. There is some drainage around the nail.    Neurological:      Mental Status: He is alert.           Assessments & Plan (with Medical Decision Making)  Christiano Metcalf is a 52 year old male here with infection of the tip of his left middle finger. He was seen in clinic October 25, 2021 for an injury to the finger. At that time he told Dr Frankel that the injury had occurred several months prior.  He was treated with Keflex. Since that time, over the past six months, he tells me it has not gotten any better. Today the tip of the finger is red with swelling. The tip of the finger is numb now. He cannot feel anything on the tip of the left middle finger.  His last tetanus was 2019.  VS in the ED BP (!) 165/80   Pulse 94   Temp 97.2  F (36.2  C) (Tympanic)   Resp 20   Ht 1.727 m (5' 8\")   Wt 113.4 kg (250 lb)   SpO2 93%   BMI 38.01 kg/m    Exam shows redness and numbness of the tip of the finger. There is no redness up the finger, no lymphadenopathy of the left axilla.  Xray pending.  I am signing out his care to Dr Gray at change of shift.      I have reviewed the nursing notes.      Steven Fraire MD  04/23/22 1851       CONTINUATION " OF CARE    Patient signed out to me by Dr. Fraire as above.  Dr. Fraire want me to follow-up on the x-ray.  No bony destruction is noted on the x-ray.  Conservative therapy recommended.  We will treat again conservatively with antibiotics for acute paronychia and recommend close follow-up in clinic.  May need nail removal after acute paronychia is resolved.  No evidence of felon.      Results for orders placed or performed during the hospital encounter of 04/23/22   XR Finger Left G/E 2 Views    Impression    IMPRESSION: No fracture or bony destruction.      SELMA DE LA ROSA MD         SYSTEM ID:  [id number]         Final diagnoses:   Finger infection - left middle finger   Acute paronychia of finger of left hand          Randy Gray MD  05/03/22 1030

## 2022-04-23 NOTE — ED TRIAGE NOTES
"ED Nursing Triage Note (General)   ________________________________    Christiano Metcalf is a 52 year old Male that presents to triage private car  With history of  Finger infection of the  Left distal 3rd finger reported by patient. Distal tip of finger is swollen and patient has lost sensation up to the first joint.     Significant symptoms had onset 8 month(s) ago.  BP (!) 165/80   Pulse 94   Temp 97.2  F (36.2  C) (Tympanic)   Resp 20   Ht 1.727 m (5' 8\")   Wt 113.4 kg (250 lb)   SpO2 93%   BMI 38.01 kg/m  t  Patient appears alert , in no acute distress., and pleasant behavior.    GCS 15  Airway: intact  Breathing noted as Normal  Circulation Normal  Skin:  Normal  Action taken:  Triage to critical care immediately      PRE HOSPITAL PRIOR LIVING SITUATION Alone    Patient did take antibiotic a couple of months ago and soaked in epsom salt.     "

## 2022-04-27 ENCOUNTER — HOSPITAL ENCOUNTER (OUTPATIENT)
Dept: MRI IMAGING | Facility: OTHER | Age: 52
Discharge: HOME | End: 2022-04-27
Attending: ORTHOPAEDIC SURGERY | Admitting: ORTHOPAEDIC SURGERY
Payer: COMMERCIAL

## 2022-04-27 DIAGNOSIS — M48.02 STENOSIS OF CERVICAL SPINE REGION: ICD-10-CM

## 2022-04-27 PROCEDURE — 72141 MRI NECK SPINE W/O DYE: CPT

## 2022-05-03 ENCOUNTER — OFFICE VISIT (OUTPATIENT)
Dept: FAMILY MEDICINE | Facility: OTHER | Age: 52
End: 2022-05-03
Attending: PHYSICIAN ASSISTANT
Payer: COMMERCIAL

## 2022-05-03 VITALS
BODY MASS INDEX: 38.26 KG/M2 | RESPIRATION RATE: 16 BRPM | DIASTOLIC BLOOD PRESSURE: 89 MMHG | TEMPERATURE: 97.5 F | SYSTOLIC BLOOD PRESSURE: 152 MMHG | WEIGHT: 251.6 LBS | HEART RATE: 82 BPM | OXYGEN SATURATION: 96 %

## 2022-05-03 DIAGNOSIS — H60.393 INFECTIVE OTITIS EXTERNA, BILATERAL: Primary | ICD-10-CM

## 2022-05-03 PROCEDURE — 99214 OFFICE O/P EST MOD 30 MIN: CPT | Performed by: PHYSICIAN ASSISTANT

## 2022-05-03 PROCEDURE — G0463 HOSPITAL OUTPT CLINIC VISIT: HCPCS

## 2022-05-03 RX ORDER — CIPROFLOXACIN AND DEXAMETHASONE 3; 1 MG/ML; MG/ML
4 SUSPENSION/ DROPS AURICULAR (OTIC) 2 TIMES DAILY
Qty: 7.5 ML | Refills: 0 | Status: SHIPPED | OUTPATIENT
Start: 2022-05-03 | End: 2022-05-13

## 2022-05-03 ASSESSMENT — PAIN SCALES - GENERAL: PAINLEVEL: SEVERE PAIN (6)

## 2022-05-03 NOTE — PROGRESS NOTES
Assessment & Plan     1. Infective otitis externa, bilateral  Symptoms and exam consistent with otitis externa.  Prescription for Ciprodex as below.  Educated on medication, use, side effects.  Encourage avoidance of Q-tips.  Discussed follow-up with ENT/audiology as patient reports he has continued tinnitus and decreased hearing.  Patient reports he will discuss this with his PCP at his previously scheduled visit.  - ciprofloxacin-dexamethasone (CIPRODEX) 0.3-0.1 % otic suspension; Place 4 drops into both ears 2 times daily  Dispense: 7.5 mL; Refill: 0    Return if symptoms worsen or fail to improve.    Emili Simmons PA-C  [facility] AND [facility]    Zully Merritt is a 52 year old who presents for the following health issues     History of Present Illness       Reason for visit:  Ear pain  Symptom onset:  1-3 days ago    He eats 2-3 servings of fruits and vegetables daily.He consumes 3 sweetened beverage(s) daily.He exercises with enough effort to increase his heart rate 9 or less minutes per day.  He exercises with enough effort to increase his heart rate 3 or less days per week.   He is taking medications regularly.     Here for evaluation of ear concerns.  Patient reports for several years he has had ringing in bilateral ears.  Reports he was evaluated by an ENT regarding this couple years ago who recommended hearing aids at that time.  Patient reports when he was working he would have yearly audiology exams which also showed decreased hearing ability bilaterally.  Patient notes more recently he developed pain bilaterally right greater than left.  This began a couple days ago.  No associated fever/chills, ear drainage, recent cough or cold symptoms.  Has not taken anything for symptomatic relief.    PAST MEDICAL HISTORY:   Past Medical History:   Diagnosis Date     Gout     No Comments Provided     Hypomagnesemia     No Comments Provided     Pain in left knee     No Comments Provided      Personal history of other (healed) physical injury and trauma     ,repair     Unspecified injury of unspecified wrist, hand and finger(s), initial encounter            PAST SURGICAL HISTORY:   Past Surgical History:   Procedure Laterality Date     ARTHROSCOPY KNEE      Right knee meniscal repair, arthroscopic ally     ARTHROSCOPY KNEE      ,Left knee scope     COLONOSCOPY N/A 2021    follow up 5 years family history, 2021     ESOPHAGOSCOPY, GASTROSCOPY, DUODENOSCOPY (EGD), COMBINED N/A 2021    Procedure: ESOPHAGOGASTRODUODENOSCOPY, WITH BIOPSY;  Surgeon: Jamel Kruger MD;  Location: [facility] OR     FINGER SURGERY      Right thumb ORIF     OTHER SURGICAL HISTORY      7/15/14,,HERNIA REPAIR,Left,LIH with mesh     OTHER SURGICAL HISTORY      5/10/16,,HERNIA REPAIR,Right,RIH with plug/patch       FAMILY HISTORY:   Family History   Problem Relation Age of Onset     Diabetes Mother         Diabetes     Other - See Comments Father         episode of gout.     Colon Cancer Father         Cancer-colon,diagnosed in early 60s     Family History Negative Son         Good Health,     Family History Negative Daughter         Good Health,     Family History Negative Son         Good Health,       SOCIAL HISTORY:   Social History     Tobacco Use     Smoking status: Former Smoker     Types: Cigarettes     Quit date: 2010     Years since quittin.2     Smokeless tobacco: Never Used   Substance Use Topics     Alcohol use: No     Alcohol/week: 0.0 standard drinks     Comment: Alcoholic Drinks/day: seldom        Allergies   Allergen Reactions     No Clinical Screening - See Comments      Peas - n/v - flushing - skin turns red  Spam - rash     Seasonal      Other reaction(s): Sneezing  Other reaction(s): Sneezing     Current Outpatient Medications   Medication     albuterol (PROAIR HFA/PROVENTIL HFA/VENTOLIN HFA) 108 (90 Base) MCG/ACT inhaler     allopurinol (ZYLOPRIM) 300 MG  tablet     amLODIPine (NORVASC) 2.5 MG tablet     cetirizine (ZYRTEC) 10 MG tablet     ciprofloxacin-dexamethasone (CIPRODEX) 0.3-0.1 % otic suspension     HUMIRA PEN 40 MG/0.8ML pen kit     pantoprazole (PROTONIX) 20 MG EC tablet     sertraline (ZOLOFT) 100 MG tablet     sucralfate (CARAFATE) 1 GM tablet     No current facility-administered medications for this visit.         Review of Systems   Per HPI        Objective    BP (!) 152/89 (BP Location: Right arm, Patient Position: Sitting, Cuff Size: Adult Regular)   Pulse 82   Temp 97.5  F (36.4  C) (Tympanic)   Resp 16   Wt 114.1 kg (251 lb 9.6 oz)   SpO2 96%   BMI 38.26 kg/m    Body mass index is 38.26 kg/m .  Physical Exam   General: Pleasant, in no apparent distress.  Eyes: Sclera are white and conjunctiva are clear bilaterally. Lacrimal apparatus free of erythema, edema, and discharge bilaterally.  Ears: External ears without erythema or edema. Tympanic membranes are pearly white and without erythema, scarring or perforations bilaterally. External auditory canals are erythematous and edematous worse on right than left.  Mild puslike drainage on right.  Psych: Appropriate mood and affect.

## 2022-05-03 NOTE — NURSING NOTE
"Patient presents to clinic with bilateral ear ringing that started a \"a long time ago\".  Jessie Ramirez, DIOGO ....................  5/3/2022   12:51 PM      "

## 2022-05-13 ENCOUNTER — OFFICE VISIT (OUTPATIENT)
Dept: FAMILY MEDICINE | Facility: OTHER | Age: 52
End: 2022-05-13
Attending: FAMILY MEDICINE
Payer: COMMERCIAL

## 2022-05-13 VITALS
TEMPERATURE: 98.3 F | HEART RATE: 88 BPM | WEIGHT: 248.2 LBS | DIASTOLIC BLOOD PRESSURE: 98 MMHG | RESPIRATION RATE: 20 BRPM | OXYGEN SATURATION: 98 % | HEIGHT: 69 IN | BODY MASS INDEX: 36.76 KG/M2 | SYSTOLIC BLOOD PRESSURE: 140 MMHG

## 2022-05-13 DIAGNOSIS — L40.9 PSORIASIS: ICD-10-CM

## 2022-05-13 DIAGNOSIS — K29.01 GASTROINTESTINAL HEMORRHAGE ASSOCIATED WITH ACUTE GASTRITIS: ICD-10-CM

## 2022-05-13 DIAGNOSIS — I10 BENIGN ESSENTIAL HYPERTENSION: ICD-10-CM

## 2022-05-13 DIAGNOSIS — J45.20 MILD INTERMITTENT ASTHMA WITHOUT COMPLICATION: ICD-10-CM

## 2022-05-13 DIAGNOSIS — Z01.818 PREOPERATIVE EXAMINATION: Primary | ICD-10-CM

## 2022-05-13 DIAGNOSIS — E66.01 MORBID OBESITY (H): ICD-10-CM

## 2022-05-13 DIAGNOSIS — M1A.00X0 IDIOPATHIC CHRONIC GOUT WITHOUT TOPHUS, UNSPECIFIED SITE: ICD-10-CM

## 2022-05-13 DIAGNOSIS — M48.02 CERVICAL STENOSIS OF SPINAL CANAL: ICD-10-CM

## 2022-05-13 PROBLEM — K92.2 GASTROINTESTINAL HEMORRHAGE, UNSPECIFIED GASTROINTESTINAL HEMORRHAGE TYPE: Status: RESOLVED | Noted: 2021-09-04 | Resolved: 2022-05-13

## 2022-05-13 PROBLEM — N17.9 ACUTE KIDNEY INJURY (H): Status: RESOLVED | Noted: 2021-09-04 | Resolved: 2022-05-13

## 2022-05-13 PROBLEM — Z79.1 LONG TERM CURRENT USE OF NON-STEROIDAL ANTI-INFLAMMATORIES (NSAID): Status: RESOLVED | Noted: 2021-09-04 | Resolved: 2022-05-13

## 2022-05-13 PROBLEM — M25.512 PAIN OF LEFT STERNOCLAVICULAR JOINT: Status: RESOLVED | Noted: 2020-10-12 | Resolved: 2022-05-13

## 2022-05-13 PROBLEM — D50.9 MICROCYTIC ANEMIA: Status: RESOLVED | Noted: 2021-09-04 | Resolved: 2022-05-13

## 2022-05-13 PROBLEM — D62 ANEMIA DUE TO BLOOD LOSS, ACUTE: Status: RESOLVED | Noted: 2021-09-04 | Resolved: 2022-05-13

## 2022-05-13 LAB
ANION GAP SERPL CALCULATED.3IONS-SCNC: 10 MMOL/L (ref 3–14)
BUN SERPL-MCNC: 13 MG/DL (ref 7–25)
CALCIUM SERPL-MCNC: 9.5 MG/DL (ref 8.6–10.3)
CHLORIDE BLD-SCNC: 102 MMOL/L (ref 98–107)
CO2 SERPL-SCNC: 26 MMOL/L (ref 21–31)
CREAT SERPL-MCNC: 0.9 MG/DL (ref 0.7–1.3)
GFR SERPL CREATININE-BSD FRML MDRD: >90 ML/MIN/1.73M2
GLUCOSE BLD-MCNC: 155 MG/DL (ref 70–105)
HGB BLD-MCNC: 10.7 G/DL (ref 13.3–17.7)
POTASSIUM BLD-SCNC: 3.4 MMOL/L (ref 3.5–5.1)
SODIUM SERPL-SCNC: 138 MMOL/L (ref 134–144)

## 2022-05-13 PROCEDURE — G0463 HOSPITAL OUTPT CLINIC VISIT: HCPCS

## 2022-05-13 PROCEDURE — 99214 OFFICE O/P EST MOD 30 MIN: CPT | Performed by: FAMILY MEDICINE

## 2022-05-13 PROCEDURE — 36415 COLL VENOUS BLD VENIPUNCTURE: CPT | Mod: ZL | Performed by: FAMILY MEDICINE

## 2022-05-13 PROCEDURE — 80048 BASIC METABOLIC PNL TOTAL CA: CPT | Mod: ZL | Performed by: FAMILY MEDICINE

## 2022-05-13 PROCEDURE — 93005 ELECTROCARDIOGRAM TRACING: CPT | Performed by: FAMILY MEDICINE

## 2022-05-13 PROCEDURE — 93010 ELECTROCARDIOGRAM REPORT: CPT | Performed by: INTERNAL MEDICINE

## 2022-05-13 PROCEDURE — 85018 HEMOGLOBIN: CPT | Mod: ZL | Performed by: FAMILY MEDICINE

## 2022-05-13 RX ORDER — PANTOPRAZOLE SODIUM 20 MG/1
TABLET, DELAYED RELEASE ORAL
Qty: 180 TABLET | Refills: 3 | COMMUNITY
Start: 2022-05-13 | End: 2022-07-09

## 2022-05-13 ASSESSMENT — PAIN SCALES - GENERAL: PAINLEVEL: EXTREME PAIN (9)

## 2022-05-13 ASSESSMENT — ANXIETY QUESTIONNAIRES
7. FEELING AFRAID AS IF SOMETHING AWFUL MIGHT HAPPEN: NOT AT ALL
5. BEING SO RESTLESS THAT IT IS HARD TO SIT STILL: MORE THAN HALF THE DAYS
GAD7 TOTAL SCORE: 8
4. TROUBLE RELAXING: SEVERAL DAYS
6. BECOMING EASILY ANNOYED OR IRRITABLE: MORE THAN HALF THE DAYS
GAD7 TOTAL SCORE: 8
8. IF YOU CHECKED OFF ANY PROBLEMS, HOW DIFFICULT HAVE THESE MADE IT FOR YOU TO DO YOUR WORK, TAKE CARE OF THINGS AT HOME, OR GET ALONG WITH OTHER PEOPLE?: SOMEWHAT DIFFICULT
7. FEELING AFRAID AS IF SOMETHING AWFUL MIGHT HAPPEN: NOT AT ALL
1. FEELING NERVOUS, ANXIOUS, OR ON EDGE: SEVERAL DAYS
GAD7 TOTAL SCORE: 8
2. NOT BEING ABLE TO STOP OR CONTROL WORRYING: SEVERAL DAYS
3. WORRYING TOO MUCH ABOUT DIFFERENT THINGS: SEVERAL DAYS

## 2022-05-13 ASSESSMENT — PATIENT HEALTH QUESTIONNAIRE - PHQ9
SUM OF ALL RESPONSES TO PHQ QUESTIONS 1-9: 10
10. IF YOU CHECKED OFF ANY PROBLEMS, HOW DIFFICULT HAVE THESE PROBLEMS MADE IT FOR YOU TO DO YOUR WORK, TAKE CARE OF THINGS AT HOME, OR GET ALONG WITH OTHER PEOPLE: SOMEWHAT DIFFICULT
SUM OF ALL RESPONSES TO PHQ QUESTIONS 1-9: 10

## 2022-05-13 NOTE — NURSING NOTE
"Chief Complaint   Patient presents with     Pre-Op Exam         Initial BP (!) 140/98   Pulse 88   Temp 98.3  F (36.8  C) (Tympanic)   Resp 20   Ht 1.74 m (5' 8.5\")   Wt 112.6 kg (248 lb 3.2 oz)   SpO2 98%   BMI 37.19 kg/m   Estimated body mass index is 37.19 kg/m  as calculated from the following:    Height as of this encounter: 1.74 m (5' 8.5\").    Weight as of this encounter: 112.6 kg (248 lb 3.2 oz).         Norma J. Gosselin, LPN   "

## 2022-05-13 NOTE — PROGRESS NOTES
Answers for HPI/ROS submitted by the patient on 5/13/2022  If you checked off any problems, how difficult have these problems made it for you to do your work, take care of things at home, or get along with other people?: Somewhat difficult  PHQ9 TOTAL SCORE: 10  LUPE 7 TOTAL SCORE: 8    [facility]  [address]  [place] [address]  Phone: [phone]  Fax: [phone]  Primary Provider: Gino Kate  Pre-op Performing Provider: GINO KATE      PREOPERATIVE EVALUATION:  Today's date: 5/13/2022    Christiano Metcalf is a 52 year old male who presents for a preoperative evaluation.    Surgical Information:  Surgery/Procedure: Anterior cervical decompression and fusion C5-C7  Surgery Location: [facility]  Surgeon: Dr JENNIFER Mejía  Surgery Date: 6/9/2022  Time of Surgery:   Where patient plans to recover: At home with family  Fax number for surgical facility: [phone] and [phone]    Type of Anesthesia Anticipated: Choice    Assessment & Plan     The proposed surgical procedure is considered INTERMEDIATE risk.    Preoperative examination  He is optimized for his upcoming procedure.  Labs are currently pending.  He does not need to make any adjustments on his medications.  He avoids NSAIDs already given his history of gastritis.  He will be n.p.o. after midnight and take his normal medications in the morning with a sip of water.  - EKG 12-lead, tracing only  - Basic Metabolic Panel; Future  - Hemoglobin; Future  - Basic Metabolic Panel  - Hemoglobin    Cervical stenosis of spinal canal      Benign essential hypertension  Discussed that his blood pressure is slightly elevated today.  Asked him to start checking his blood pressure at home.  He needs to start working on daily exercise and attempt to lose weight.    Idiopathic chronic gout without tophus, unspecified site  Continue with allopurinol.    Mild intermittent asthma without complication  Continue with  albuterol as needed    Gastrointestinal hemorrhage associated with acute gastritis  Continue Protonix  - pantoprazole (PROTONIX) 20 MG EC tablet; TAKE 2 TABLETS(40 MG) BY MOUTH DAILY    Morbid obesity (H)  Once again discussed importance of weight loss through diet and exercise.    Psoriasis  Continue with Humira every 14 days.    RECOMMENDATION:  APPROVAL GIVEN to proceed with proposed procedure, without further diagnostic evaluation.      Subjective     HPI related to upcoming procedure: He has a history of cervical spine stenosis with pain in his posterior neck rating to his left upper arm.  He has been seen by neurosurgery and is scheduled to undergo fusion in June.    He has a history of hypertension, seasonal allergies and depression of all been stable.  His gout has been stable.  He has psoriasis and uses Humira every 2 weeks with good symptomatic control.  He reports using albuterol once to twice daily.      Preop Questions 5/13/2022   1. Have you ever had a heart attack or stroke? No   2. Have you ever had surgery on your heart or blood vessels, such as a stent placement, a coronary artery bypass, or surgery on an artery in your head, neck, heart, or legs? No   3. Do you have chest pain with activity? No   4. Do you have a history of  heart failure? No   5. Do you currently have a cold, bronchitis or symptoms of other infection? No   6. Do you have a cough, shortness of breath, or wheezing? YES - uses albuterol twice a day   7. Do you or anyone in your family have previous history of blood clots? No   8. Do you or does anyone in your family have a serious bleeding problem such as prolonged bleeding following surgeries or cuts? No   9. Have you ever had problems with anemia or been told to take iron pills? No   10. Have you had any abnormal blood loss such as black, tarry or bloody stools? No   11. Have you ever had a blood transfusion? No   12. Are you willing to have a blood transfusion if it is medically  needed before, during, or after your surgery? Yes   13. Have you or any of your relatives ever had problems with anesthesia? No   14. Do you have sleep apnea, excessive snoring or daytime drowsiness? No   15. Do you have any artifical heart valves or other implanted medical devices like a pacemaker, defibrillator, or continuous glucose monitor? No   16. Do you have artificial joints? No   17. Are you allergic to latex? No     Health Care Directive:  Patient does not have a Health Care Directive or Living Will: Discussed advance care planning with patient; however, patient declined at this time.    Preoperative Review of :   reviewed - no record of controlled substances prescribed.    Review of Systems  Constitutional, neuro, ENT, endocrine, pulmonary, cardiac, gastrointestinal, genitourinary, musculoskeletal, integument and psychiatric systems are negative, except as otherwise noted.    Patient Active Problem List    Diagnosis Date Noted     Benign essential hypertension 05/13/2022     Priority: Medium     Morbid obesity (H) 05/13/2022     Priority: Medium     Psoriasis 09/04/2021     Priority: Medium     Impingement syndrome of left shoulder 10/12/2020     Priority: Medium     Tendinopathy of left biceps tendon 10/12/2020     Priority: Medium     Mild intermittent asthma without complication 08/31/2018     Priority: Medium     Gout 02/01/2018     Priority: Medium     Learning disability 06/14/2011     Priority: Medium      Past Medical History:   Diagnosis Date     Gout     No Comments Provided     Hypomagnesemia     No Comments Provided     Pain in left knee     No Comments Provided     Personal history of other (healed) physical injury and trauma     2000,repair     Unspecified injury of unspecified wrist, hand and finger(s), initial encounter     1999     Past Surgical History:   Procedure Laterality Date     ARTHROSCOPY KNEE      Right knee meniscal repair, arthroscopic ally     ARTHROSCOPY KNEE       ",Left knee scope     COLONOSCOPY N/A 2021    follow up 5 years family history, 2021     ESOPHAGOSCOPY, GASTROSCOPY, DUODENOSCOPY (EGD), COMBINED N/A 2021    Procedure: ESOPHAGOGASTRODUODENOSCOPY, WITH BIOPSY;  Surgeon: Jamel Kruger MD;  Location: [facility] OR     FINGER SURGERY      Right thumb ORIF     OTHER SURGICAL HISTORY      7/15/14,,HERNIA REPAIR,Left,LIH with mesh     OTHER SURGICAL HISTORY      5/10/16,,HERNIA REPAIR,Right,RIH with plug/patch     Current Outpatient Medications   Medication Sig Dispense Refill     pantoprazole (PROTONIX) 20 MG EC tablet TAKE 2 TABLETS(40 MG) BY MOUTH DAILY 180 tablet 3     albuterol (PROAIR HFA/PROVENTIL HFA/VENTOLIN HFA) 108 (90 Base) MCG/ACT inhaler Inhale 2 puffs into the lungs 4 times daily as needed for shortness of breath / dyspnea 8.5 g 11     allopurinol (ZYLOPRIM) 300 MG tablet TAKE 1 TABLET(300 MG) BY MOUTH DAILY 90 tablet 3     amLODIPine (NORVASC) 2.5 MG tablet TAKE 1 TABLET(2.5 MG) BY MOUTH DAILY 90 tablet 3     cetirizine (ZYRTEC) 10 MG tablet Take 1 tablet (10 mg) by mouth 2 times daily 180 tablet 3     HUMIRA PEN 40 MG/0.8ML pen kit Inject 40 mg as directed every 14 days       sertraline (ZOLOFT) 100 MG tablet TAKE 1 TABLET(100 MG) BY MOUTH DAILY 90 tablet 3       Allergies   Allergen Reactions     No Clinical Screening - See Comments      Peas - n/v - flushing - skin turns red  Spam - rash     Seasonal      Other reaction(s): Sneezing  Other reaction(s): Sneezing        Social History     Tobacco Use     Smoking status: Former Smoker     Types: Cigarettes     Quit date: 2010     Years since quittin.2     Smokeless tobacco: Never Used   Substance Use Topics     Alcohol use: No     Alcohol/week: 0.0 standard drinks     Comment: Alcoholic Drinks/day: seldom       Objective     BP (!) 140/98   Pulse 88   Temp 98.3  F (36.8  C) (Tympanic)   Resp 20   Ht 1.74 m (5' 8.5\")   Wt 112.6 kg (248 lb 3.2 oz)   SpO2 98%   BMI " 37.19 kg/m      Physical Exam    GENERAL APPEARANCE: healthy, alert and no distress     EYES: EOMI,  PERRL     HENT: ear canals and TM's normal and nose and mouth without ulcers or lesions     NECK: no adenopathy, no asymmetry, masses, or scars and thyroid normal to palpation     RESP: lungs clear to auscultation - no rales, rhonchi or wheezes     CV: regular rates and rhythm, normal S1 S2, no S3 or S4 and no murmur, click or rub     ABDOMEN:  soft, nontender, no HSM or masses and bowel sounds normal     MS: extremities normal- no gross deformities noted, no evidence of inflammation in joints, FROM in all extremities.     SKIN: no suspicious lesions or rashes     NEURO: Normal strength and tone, sensory exam grossly normal, mentation intact and speech normal     PSYCH: mentation appears normal. and affect normal/bright     LYMPHATICS: No cervical adenopathy    Diagnostics:  Labs pending at this time.  Results will be reviewed when available.   EKG: appears normal, NSR, normal axis, normal intervals, no acute ST/T changes c/w ischemia, no LVH by voltage criteria, unchanged from previous tracings    Revised Cardiac Risk Index (RCRI):  The patient has the following serious cardiovascular risks for perioperative complications:   - No serious cardiac risks = 0 points     RCRI Interpretation: 0 points: Class I (very low risk - 0.4% complication rate)           Signed Electronically by: Gino Frankel  Copy of this evaluation report is provided to requesting physician.

## 2022-05-14 ASSESSMENT — ANXIETY QUESTIONNAIRES: GAD7 TOTAL SCORE: 8

## 2022-05-16 LAB
ATRIAL RATE - MUSE: 82 BPM
DIASTOLIC BLOOD PRESSURE - MUSE: NORMAL MMHG
INTERPRETATION ECG - MUSE: NORMAL
P AXIS - MUSE: 11 DEGREES
PR INTERVAL - MUSE: 164 MS
QRS DURATION - MUSE: 112 MS
QT - MUSE: 392 MS
QTC - MUSE: 457 MS
R AXIS - MUSE: 49 DEGREES
SYSTOLIC BLOOD PRESSURE - MUSE: NORMAL MMHG
T AXIS - MUSE: -30 DEGREES
VENTRICULAR RATE- MUSE: 82 BPM

## 2022-06-03 ENCOUNTER — TELEPHONE (OUTPATIENT)
Dept: FAMILY MEDICINE | Facility: OTHER | Age: 52
End: 2022-06-03
Payer: COMMERCIAL

## 2022-06-06 ENCOUNTER — ALLIED HEALTH/NURSE VISIT (OUTPATIENT)
Dept: FAMILY MEDICINE | Facility: OTHER | Age: 52
End: 2022-06-06
Attending: FAMILY MEDICINE
Payer: COMMERCIAL

## 2022-06-06 DIAGNOSIS — Z20.822 COVID-19 RULED OUT: Primary | ICD-10-CM

## 2022-06-06 PROCEDURE — U0003 INFECTIOUS AGENT DETECTION BY NUCLEIC ACID (DNA OR RNA); SEVERE ACUTE RESPIRATORY SYNDROME CORONAVIRUS 2 (SARS-COV-2) (CORONAVIRUS DISEASE [COVID-19]), AMPLIFIED PROBE TECHNIQUE, MAKING USE OF HIGH THROUGHPUT TECHNOLOGIES AS DESCRIBED BY CMS-2020-01-R: HCPCS | Mod: ZL

## 2022-06-06 PROCEDURE — C9803 HOPD COVID-19 SPEC COLLECT: HCPCS

## 2022-06-06 NOTE — PROGRESS NOTES
Patient here today for Covid test. Surgery on 6/9 at [facility]. Dr. DIMITRI Mejía. Fax: [phone]     Marylou Whiting CNA .............................on 6/6/2022 at 10:49 AM

## 2022-06-07 LAB — SARS-COV-2 RNA RESP QL NAA+PROBE: NEGATIVE

## 2022-06-20 ENCOUNTER — HOSPITAL ENCOUNTER (EMERGENCY)
Facility: OTHER | Age: 52
Discharge: HOME | End: 2022-06-20
Attending: PHYSICIAN ASSISTANT | Admitting: PHYSICIAN ASSISTANT
Payer: COMMERCIAL

## 2022-06-20 VITALS
HEART RATE: 77 BPM | SYSTOLIC BLOOD PRESSURE: 150 MMHG | TEMPERATURE: 97.3 F | DIASTOLIC BLOOD PRESSURE: 94 MMHG | HEIGHT: 68 IN | WEIGHT: 230 LBS | RESPIRATION RATE: 18 BRPM | OXYGEN SATURATION: 97 % | BODY MASS INDEX: 34.86 KG/M2

## 2022-06-20 DIAGNOSIS — R23.8 SKIN IRRITATION: ICD-10-CM

## 2022-06-20 DIAGNOSIS — E87.6 HYPOKALEMIA: ICD-10-CM

## 2022-06-20 DIAGNOSIS — E83.42 HYPOMAGNESEMIA: ICD-10-CM

## 2022-06-20 DIAGNOSIS — R79.89 ELEVATED LFTS: ICD-10-CM

## 2022-06-20 DIAGNOSIS — R19.7 DIARRHEA: ICD-10-CM

## 2022-06-20 LAB
ALBUMIN SERPL-MCNC: 4.4 G/DL (ref 3.5–5.7)
ALP SERPL-CCNC: 102 U/L (ref 34–104)
ALT SERPL W P-5'-P-CCNC: 197 U/L (ref 7–52)
ANION GAP SERPL CALCULATED.3IONS-SCNC: 12 MMOL/L (ref 3–14)
AST SERPL W P-5'-P-CCNC: 248 U/L (ref 13–39)
BASOPHILS # BLD AUTO: 0 10E3/UL (ref 0–0.2)
BASOPHILS NFR BLD AUTO: 0 %
BILIRUB SERPL-MCNC: 0.9 MG/DL (ref 0.3–1)
BUN SERPL-MCNC: 19 MG/DL (ref 7–25)
CALCIUM SERPL-MCNC: 9.8 MG/DL (ref 8.6–10.3)
CHLORIDE BLD-SCNC: 100 MMOL/L (ref 98–107)
CO2 SERPL-SCNC: 24 MMOL/L (ref 21–31)
CREAT SERPL-MCNC: 1.06 MG/DL (ref 0.7–1.3)
EOSINOPHIL # BLD AUTO: 0.1 10E3/UL (ref 0–0.7)
EOSINOPHIL NFR BLD AUTO: 1 %
ERYTHROCYTE [DISTWIDTH] IN BLOOD BY AUTOMATED COUNT: 20.6 % (ref 10–15)
GFR SERPL CREATININE-BSD FRML MDRD: 84 ML/MIN/1.73M2
GLUCOSE BLD-MCNC: 79 MG/DL (ref 70–105)
HCT VFR BLD AUTO: 38.9 % (ref 40–53)
HGB BLD-MCNC: 11.8 G/DL (ref 13.3–17.7)
IMM GRANULOCYTES # BLD: 0 10E3/UL
IMM GRANULOCYTES NFR BLD: 0 %
LYMPHOCYTES # BLD AUTO: 2.4 10E3/UL (ref 0.8–5.3)
LYMPHOCYTES NFR BLD AUTO: 29 %
MAGNESIUM SERPL-MCNC: 1.3 MG/DL (ref 1.9–2.7)
MCH RBC QN AUTO: 20.9 PG (ref 26.5–33)
MCHC RBC AUTO-ENTMCNC: 30.3 G/DL (ref 31.5–36.5)
MCV RBC AUTO: 69 FL (ref 78–100)
MONOCYTES # BLD AUTO: 0.5 10E3/UL (ref 0–1.3)
MONOCYTES NFR BLD AUTO: 7 %
NEUTROPHILS # BLD AUTO: 5 10E3/UL (ref 1.6–8.3)
NEUTROPHILS NFR BLD AUTO: 63 %
NRBC # BLD AUTO: 0 10E3/UL
NRBC BLD AUTO-RTO: 0 /100
PLATELET # BLD AUTO: 253 10E3/UL (ref 150–450)
POTASSIUM BLD-SCNC: 3.1 MMOL/L (ref 3.5–5.1)
PROT SERPL-MCNC: 9.1 G/DL (ref 6.4–8.9)
RBC # BLD AUTO: 5.64 10E6/UL (ref 4.4–5.9)
SODIUM SERPL-SCNC: 136 MMOL/L (ref 134–144)
WBC # BLD AUTO: 8 10E3/UL (ref 4–11)

## 2022-06-20 PROCEDURE — 250N000013 HC RX MED GY IP 250 OP 250 PS 637: Performed by: PHYSICIAN ASSISTANT

## 2022-06-20 PROCEDURE — 99284 EMERGENCY DEPT VISIT MOD MDM: CPT | Performed by: PHYSICIAN ASSISTANT

## 2022-06-20 PROCEDURE — 85025 COMPLETE CBC W/AUTO DIFF WBC: CPT | Performed by: PHYSICIAN ASSISTANT

## 2022-06-20 PROCEDURE — 86708 HEPATITIS A ANTIBODY: CPT | Performed by: PHYSICIAN ASSISTANT

## 2022-06-20 PROCEDURE — 83735 ASSAY OF MAGNESIUM: CPT | Performed by: PHYSICIAN ASSISTANT

## 2022-06-20 PROCEDURE — 99284 EMERGENCY DEPT VISIT MOD MDM: CPT | Mod: 25 | Performed by: PHYSICIAN ASSISTANT

## 2022-06-20 PROCEDURE — 86803 HEPATITIS C AB TEST: CPT | Performed by: PHYSICIAN ASSISTANT

## 2022-06-20 PROCEDURE — 96365 THER/PROPH/DIAG IV INF INIT: CPT | Performed by: PHYSICIAN ASSISTANT

## 2022-06-20 PROCEDURE — 36415 COLL VENOUS BLD VENIPUNCTURE: CPT | Performed by: PHYSICIAN ASSISTANT

## 2022-06-20 PROCEDURE — 250N000011 HC RX IP 250 OP 636: Performed by: PHYSICIAN ASSISTANT

## 2022-06-20 PROCEDURE — 80053 COMPREHEN METABOLIC PANEL: CPT | Performed by: PHYSICIAN ASSISTANT

## 2022-06-20 PROCEDURE — 87340 HEPATITIS B SURFACE AG IA: CPT | Performed by: PHYSICIAN ASSISTANT

## 2022-06-20 PROCEDURE — 258N000003 HC RX IP 258 OP 636: Performed by: PHYSICIAN ASSISTANT

## 2022-06-20 RX ORDER — POTASSIUM CHLORIDE 1500 MG/1
40 TABLET, EXTENDED RELEASE ORAL ONCE
Status: COMPLETED | OUTPATIENT
Start: 2022-06-20 | End: 2022-06-20

## 2022-06-20 RX ORDER — DIBUCAINE 1 G/100G
OINTMENT TOPICAL 3 TIMES DAILY PRN
Qty: 56 G | Refills: 0 | Status: SHIPPED | OUTPATIENT
Start: 2022-06-20 | End: 2022-06-23

## 2022-06-20 RX ORDER — MAGNESIUM SULFATE HEPTAHYDRATE 40 MG/ML
2 INJECTION, SOLUTION INTRAVENOUS ONCE
Status: COMPLETED | OUTPATIENT
Start: 2022-06-20 | End: 2022-06-20

## 2022-06-20 RX ADMIN — MAGNESIUM SULFATE HEPTAHYDRATE 2 G: 40 INJECTION, SOLUTION INTRAVENOUS at 19:32

## 2022-06-20 RX ADMIN — POTASSIUM CHLORIDE 40 MEQ: 1500 TABLET, EXTENDED RELEASE ORAL at 19:22

## 2022-06-20 RX ADMIN — SODIUM CHLORIDE 1000 ML: 9 INJECTION, SOLUTION INTRAVENOUS at 19:33

## 2022-06-20 ASSESSMENT — ENCOUNTER SYMPTOMS
FEVER: 0
DYSURIA: 0
VOMITING: 0
CONFUSION: 0
SHORTNESS OF BREATH: 0
ABDOMINAL PAIN: 0
DIARRHEA: 1
NAUSEA: 0

## 2022-06-20 NOTE — ED PROVIDER NOTES
History     Chief Complaint   Patient presents with     Diarrhea     For months, no abdominal pain.      HPI  Christiano Metcalf is a 52 year old male who presents to the ED for evaluation of diarrhea. Pt comes into the ER today reporting diarrhea for months. It hasn't gotten better. No abdominal pain, no vomiting. Denies fevers. He does not recall recent antibiotics.   No fevers, bloody stools, recent travels.    Allergies:  Allergies   Allergen Reactions     No Clinical Screening - See Comments      Peas - n/v - flushing - skin turns red  Spam - rash     Seasonal      Other reaction(s): Sneezing  Other reaction(s): Sneezing       Problem List:    Patient Active Problem List    Diagnosis Date Noted     Benign essential hypertension 05/13/2022     Priority: Medium     Morbid obesity (H) 05/13/2022     Priority: Medium     Psoriasis 09/04/2021     Priority: Medium     Impingement syndrome of left shoulder 10/12/2020     Priority: Medium     Tendinopathy of left biceps tendon 10/12/2020     Priority: Medium     Mild intermittent asthma without complication 08/31/2018     Priority: Medium     Gout 02/01/2018     Priority: Medium     Learning disability 06/14/2011     Priority: Medium        Past Medical History:    Past Medical History:   Diagnosis Date     Gout      Hypomagnesemia      Pain in left knee      Personal history of other (healed) physical injury and trauma      Unspecified injury of unspecified wrist, hand and finger(s), initial encounter        Past Surgical History:    Past Surgical History:   Procedure Laterality Date     ARTHROSCOPY KNEE      Right knee meniscal repair, arthroscopic ally     ARTHROSCOPY KNEE      2010,Left knee scope     COLONOSCOPY N/A 09/30/2021    follow up 5 years family history, 9/30/2021     ESOPHAGOSCOPY, GASTROSCOPY, DUODENOSCOPY (EGD), COMBINED N/A 09/30/2021    Procedure: ESOPHAGOGASTRODUODENOSCOPY, WITH BIOPSY;  Surgeon: Jamel Kruger MD;  Location: [facility] OR     FINGER  "SURGERY      Right thumb ORIF     OTHER SURGICAL HISTORY      7/15/14,,HERNIA REPAIR,Left,LIH with mesh     OTHER SURGICAL HISTORY      5/10/16,,HERNIA REPAIR,Right,RIH with plug/patch       Family History:    Family History   Problem Relation Age of Onset     Diabetes Mother         Diabetes     Other - See Comments Father         episode of gout.     Colon Cancer Father         Cancer-colon,diagnosed in early 60s     Family History Negative Son         Good Health,     Family History Negative Daughter         Good Health,     Family History Negative Son         Good Health,       Social History:  Marital Status:  Legally  [3]  Social History     Tobacco Use     Smoking status: Former Smoker     Types: Cigarettes     Quit date: 2010     Years since quittin.3     Smokeless tobacco: Never Used   Vaping Use     Vaping Use: Never used   Substance Use Topics     Alcohol use: No     Alcohol/week: 0.0 standard drinks     Comment: Alcoholic Drinks/day: seldom     Drug use: No        Medications:    dibucaine 1 % OINT rectal ointment  albuterol (PROAIR HFA/PROVENTIL HFA/VENTOLIN HFA) 108 (90 Base) MCG/ACT inhaler  allopurinol (ZYLOPRIM) 300 MG tablet  amLODIPine (NORVASC) 2.5 MG tablet  cetirizine (ZYRTEC) 10 MG tablet  HUMIRA PEN 40 MG/0.8ML pen kit  pantoprazole (PROTONIX) 20 MG EC tablet  sertraline (ZOLOFT) 100 MG tablet          Review of Systems   Constitutional: Negative for fever.   HENT: Negative for congestion.    Eyes: Negative for visual disturbance.   Respiratory: Negative for shortness of breath.    Cardiovascular: Negative for chest pain.   Gastrointestinal: Positive for diarrhea. Negative for abdominal pain, nausea and vomiting.   Genitourinary: Negative for dysuria.   Psychiatric/Behavioral: Negative for confusion.       Physical Exam   BP: (!) 128/91  Pulse: 99  Temp: 98.4  F (36.9  C)  Resp: 18  Height: 172.7 cm (5' 8\")  Weight: 104.3 kg (230 lb)  SpO2: 96 " %      Physical Exam  Constitutional:       General: He is not in acute distress.     Appearance: He is well-developed. He is not diaphoretic.   HENT:      Head: Normocephalic and atraumatic.   Eyes:      General: No scleral icterus.     Conjunctiva/sclera: Conjunctivae normal.   Cardiovascular:      Rate and Rhythm: Normal rate and regular rhythm.   Pulmonary:      Effort: Pulmonary effort is normal.      Breath sounds: Normal breath sounds.   Abdominal:      Palpations: Abdomen is soft.      Tenderness: There is no abdominal tenderness.   Musculoskeletal:         General: No deformity.      Cervical back: Neck supple.   Lymphadenopathy:      Cervical: No cervical adenopathy.   Skin:     General: Skin is warm and dry.      Coloration: Skin is not jaundiced.      Findings: No rash.   Neurological:      Mental Status: He is alert and oriented to person, place, and time. Mental status is at baseline.   Psychiatric:         Mood and Affect: Mood normal.         Behavior: Behavior normal.         ED Course                 Procedures              Critical Care time:  none               No results found for this or any previous visit (from the past 24 hour(s)).    Medications   magnesium sulfate 2 g in water intermittent infusion (0 g Intravenous Stopped 6/20/22 2023)   0.9% sodium chloride BOLUS (0 mLs Intravenous Stopped 6/20/22 2033)   potassium chloride ER (KLOR-CON M) CR tablet 40 mEq (40 mEq Oral Given 6/20/22 1922)       Assessments & Plan (with Medical Decision Making)   Pt nontoxic in NAD. Heart, lung, bowel sounds normal, abd soft, nontender to palpation, nondistended. VSS, afebrile.     He has no leukocytosis, hemoglobin slightly low 11.8 but this is actually improved for him.  He has hypomagnesemia, hypokalemia.  He does have elevated LFTs.    He is given fluids, magnesium, potassium.    Does very well in the ED and has no episodes of vomiting or diarrhea.    His symptoms seem most consistent with a viral  cause at this time.    He will continue with conservative treatment and referrals placed for him to follow-up with PCP as well as to have lab work redrawn prior to that appointment and he was sent home with stool kits for C. difficile and enteric bacteria and virus panel.    Strict return precautions are given to the pt, they will return if symptoms are worsening or concerning. The pt understands and agrees with the plan and they are discharged.     Saúl Richey PA-C    I have reviewed the nursing notes.    I have reviewed the findings, diagnosis, plan and need for follow up with the patient.       Discharge Medication List as of 6/20/2022  8:34 PM      START taking these medications    Details   dibucaine 1 % OINT rectal ointment Place rectally 3 times daily as needed for irritationDisp-56 g, [id number]             Final diagnoses:   Skin irritation   Diarrhea   Elevated LFTs   Hypomagnesemia   Hypokalemia       6/20/2022   [facility] AND [facility]     Saúl Richey PA  06/22/22 0142

## 2022-06-20 NOTE — ED NOTES
Writer offered to get pt something to eat, pt requested a fruit cup. Provided pt with a cup of peaches.

## 2022-06-20 NOTE — ED TRIAGE NOTES
Pt comes into the ER today reporting diarrhea for months. It hasn't gotten better. No abdominal pain, no vomiting. Denies fevers. He does not recall recent antibiotics.      Triage Assessment     Row Name 06/20/22 [id number]       Triage Assessment (Adult)    Airway WDL WDL       Respiratory WDL    Respiratory WDL WDL       Skin Circulation/Temperature WDL    Skin Circulation/Temperature WDL WDL       Cardiac WDL    Cardiac WDL WDL       Peripheral/Neurovascular WDL    Peripheral Neurovascular WDL WDL       Cognitive/Neuro/Behavioral WDL    Cognitive/Neuro/Behavioral WDL WDL

## 2022-06-21 LAB
HAV IGG SER QL IA: NONREACTIVE
HBV SURFACE AG SERPL QL IA: NONREACTIVE
HCV AB SERPL QL IA: NONREACTIVE

## 2022-06-21 NOTE — DISCHARGE INSTRUCTIONS
Get plenty of fluids and rest.  As discussed, he had slight drop in your magnesium and potassium.  He also had slight increase in your liver functions today.  Those findings combined with your diarrhea seem consistent with a viral cause.  I do recommend however that you still drop off a stool sample at your earliest convenience we will send you home with a stool kit.  I sent a skin paste to [facility] that you can start using tomorrow to help with skin irritation.  A referral was placed for you to follow-up with PCP as well as to have your blood work rechecked to ensure that things are improving.  Return to the ED if there are greatly worsening or concerning symptoms.  I think over the next couple of days your diarrhea should continue to improve, if is greatly worsening or concerning her to become more dehydrated either return to the ED or you can always try little over-the-counter Imodium as well.

## 2022-07-09 ENCOUNTER — HOSPITAL ENCOUNTER (EMERGENCY)
Facility: OTHER | Age: 52
Discharge: HOME | End: 2022-07-09
Attending: EMERGENCY MEDICINE | Admitting: PHYSICIAN ASSISTANT
Payer: COMMERCIAL

## 2022-07-09 VITALS
WEIGHT: 230 LBS | HEIGHT: 69 IN | DIASTOLIC BLOOD PRESSURE: 93 MMHG | RESPIRATION RATE: 16 BRPM | BODY MASS INDEX: 34.07 KG/M2 | SYSTOLIC BLOOD PRESSURE: 166 MMHG | HEART RATE: 93 BPM | TEMPERATURE: 98.7 F | OXYGEN SATURATION: 98 %

## 2022-07-09 DIAGNOSIS — T81.41XA INFECTION OF SUPERFICIAL INCISIONAL SURGICAL SITE AFTER PROCEDURE, INITIAL ENCOUNTER: ICD-10-CM

## 2022-07-09 LAB
ALBUMIN SERPL-MCNC: 3.7 G/DL (ref 3.5–5.7)
ALP SERPL-CCNC: 112 U/L (ref 34–104)
ALT SERPL W P-5'-P-CCNC: 89 U/L (ref 7–52)
ANION GAP SERPL CALCULATED.3IONS-SCNC: 10 MMOL/L (ref 3–14)
AST SERPL W P-5'-P-CCNC: 110 U/L (ref 13–39)
BASOPHILS # BLD AUTO: 0 10E3/UL (ref 0–0.2)
BASOPHILS NFR BLD AUTO: 0 %
BILIRUB SERPL-MCNC: 0.5 MG/DL (ref 0.3–1)
BUN SERPL-MCNC: 17 MG/DL (ref 7–25)
CALCIUM SERPL-MCNC: 9 MG/DL (ref 8.6–10.3)
CHLORIDE BLD-SCNC: 99 MMOL/L (ref 98–107)
CO2 SERPL-SCNC: 28 MMOL/L (ref 21–31)
CREAT SERPL-MCNC: 0.99 MG/DL (ref 0.7–1.3)
CRP SERPL HS-MCNC: 3.5 MG/L
EOSINOPHIL # BLD AUTO: 0.2 10E3/UL (ref 0–0.7)
EOSINOPHIL NFR BLD AUTO: 3 %
ERYTHROCYTE [DISTWIDTH] IN BLOOD BY AUTOMATED COUNT: 20.5 % (ref 10–15)
ERYTHROCYTE [SEDIMENTATION RATE] IN BLOOD BY WESTERGREN METHOD: 23 MM/HR (ref 0–20)
GFR SERPL CREATININE-BSD FRML MDRD: >90 ML/MIN/1.73M2
GLUCOSE BLD-MCNC: 147 MG/DL (ref 70–105)
HCT VFR BLD AUTO: 34.4 % (ref 40–53)
HGB BLD-MCNC: 10.5 G/DL (ref 13.3–17.7)
IMM GRANULOCYTES # BLD: 0 10E3/UL
IMM GRANULOCYTES NFR BLD: 0 %
LYMPHOCYTES # BLD AUTO: 2.5 10E3/UL (ref 0.8–5.3)
LYMPHOCYTES NFR BLD AUTO: 35 %
MCH RBC QN AUTO: 21.5 PG (ref 26.5–33)
MCHC RBC AUTO-ENTMCNC: 30.5 G/DL (ref 31.5–36.5)
MCV RBC AUTO: 71 FL (ref 78–100)
MONOCYTES # BLD AUTO: 0.5 10E3/UL (ref 0–1.3)
MONOCYTES NFR BLD AUTO: 7 %
NEUTROPHILS # BLD AUTO: 3.9 10E3/UL (ref 1.6–8.3)
NEUTROPHILS NFR BLD AUTO: 55 %
NRBC # BLD AUTO: 0 10E3/UL
NRBC BLD AUTO-RTO: 0 /100
PLATELET # BLD AUTO: 192 10E3/UL (ref 150–450)
POTASSIUM BLD-SCNC: 3.2 MMOL/L (ref 3.5–5.1)
PROT SERPL-MCNC: 7.4 G/DL (ref 6.4–8.9)
RBC # BLD AUTO: 4.88 10E6/UL (ref 4.4–5.9)
SODIUM SERPL-SCNC: 137 MMOL/L (ref 134–144)
WBC # BLD AUTO: 7.2 10E3/UL (ref 4–11)

## 2022-07-09 PROCEDURE — 87075 CULTR BACTERIA EXCEPT BLOOD: CPT | Performed by: PHYSICIAN ASSISTANT

## 2022-07-09 PROCEDURE — 96372 THER/PROPH/DIAG INJ SC/IM: CPT | Performed by: PHYSICIAN ASSISTANT

## 2022-07-09 PROCEDURE — 250N000011 HC RX IP 250 OP 636: Performed by: PHYSICIAN ASSISTANT

## 2022-07-09 PROCEDURE — 99283 EMERGENCY DEPT VISIT LOW MDM: CPT | Performed by: PHYSICIAN ASSISTANT

## 2022-07-09 PROCEDURE — 86141 C-REACTIVE PROTEIN HS: CPT | Performed by: PHYSICIAN ASSISTANT

## 2022-07-09 PROCEDURE — 87040 BLOOD CULTURE FOR BACTERIA: CPT | Performed by: PHYSICIAN ASSISTANT

## 2022-07-09 PROCEDURE — 99284 EMERGENCY DEPT VISIT MOD MDM: CPT | Performed by: PHYSICIAN ASSISTANT

## 2022-07-09 PROCEDURE — 85025 COMPLETE CBC W/AUTO DIFF WBC: CPT | Performed by: PHYSICIAN ASSISTANT

## 2022-07-09 PROCEDURE — 85652 RBC SED RATE AUTOMATED: CPT | Performed by: PHYSICIAN ASSISTANT

## 2022-07-09 PROCEDURE — 80053 COMPREHEN METABOLIC PANEL: CPT | Performed by: PHYSICIAN ASSISTANT

## 2022-07-09 PROCEDURE — 87205 SMEAR GRAM STAIN: CPT | Performed by: PHYSICIAN ASSISTANT

## 2022-07-09 PROCEDURE — 36415 COLL VENOUS BLD VENIPUNCTURE: CPT | Performed by: PHYSICIAN ASSISTANT

## 2022-07-09 RX ORDER — SULFAMETHOXAZOLE/TRIMETHOPRIM 800-160 MG
1 TABLET ORAL 2 TIMES DAILY
Qty: 20 TABLET | Refills: 0 | Status: SHIPPED | OUTPATIENT
Start: 2022-07-09 | End: 2022-07-19

## 2022-07-09 RX ORDER — CEFTRIAXONE SODIUM 1 G
1 VIAL (EA) INJECTION ONCE
Status: COMPLETED | OUTPATIENT
Start: 2022-07-09 | End: 2022-07-09

## 2022-07-09 RX ADMIN — CEFTRIAXONE SODIUM 1 G: 1 INJECTION, POWDER, FOR SOLUTION INTRAMUSCULAR; INTRAVENOUS at 22:41

## 2022-07-09 ASSESSMENT — ENCOUNTER SYMPTOMS
STRIDOR: 0
TREMORS: 0
FLANK PAIN: 0
BACK PAIN: 0
CONFUSION: 0
FACIAL SWELLING: 0
FEVER: 0
SEIZURES: 0
EYE PAIN: 0
FACIAL ASYMMETRY: 0
AGITATION: 0
ABDOMINAL PAIN: 0

## 2022-07-10 NOTE — ED PROVIDER NOTES
"  History     Chief Complaint   Patient presents with     Wound Infection     HPI  Christiano Metcalf is a 52 year old male who underwent anterior cervical decompression and fusion of the C5-C7 by Dr. Mejía through [facility] on 6/9/2022.  Since then he has been in [place] collar.  He is to wear this almost 6-8 weeks.  He is scheduled to see  on 7/22/2022 of this month.  However today when his son was helping his dad they noticed he still has his Steri-Strips on and that there appears to be some \"pustular drainage\" from the incision area.  He denies any fever or chills he is here for further evaluation at this time.  He has not seen anyone in follow-up thus far.    Allergies:  Allergies   Allergen Reactions     No Clinical Screening - See Comments      Peas - n/v - flushing - skin turns red  Spam - rash     Seasonal      Other reaction(s): Sneezing  Other reaction(s): Sneezing       Problem List:    Patient Active Problem List    Diagnosis Date Noted     Benign essential hypertension 05/13/2022     Priority: Medium     Morbid obesity (H) 05/13/2022     Priority: Medium     Psoriasis 09/04/2021     Priority: Medium     Impingement syndrome of left shoulder 10/12/2020     Priority: Medium     Tendinopathy of left biceps tendon 10/12/2020     Priority: Medium     Mild intermittent asthma without complication 08/31/2018     Priority: Medium     Gout 02/01/2018     Priority: Medium     Learning disability 06/14/2011     Priority: Medium        Past Medical History:    Past Medical History:   Diagnosis Date     Gout      Hypomagnesemia      Pain in left knee      Personal history of other (healed) physical injury and trauma      Unspecified injury of unspecified wrist, hand and finger(s), initial encounter        Past Surgical History:    Past Surgical History:   Procedure Laterality Date     ARTHROSCOPY KNEE      Right knee meniscal repair, arthroscopic ally     ARTHROSCOPY KNEE      2010,Left knee scope     " COLONOSCOPY N/A 2021    follow up 5 years family history, 2021     ESOPHAGOSCOPY, GASTROSCOPY, DUODENOSCOPY (EGD), COMBINED N/A 2021    Procedure: ESOPHAGOGASTRODUODENOSCOPY, WITH BIOPSY;  Surgeon: Jamel Kruger MD;  Location: [facility] OR     FINGER SURGERY      Right thumb ORIF     OTHER SURGICAL HISTORY      7/15/14,,HERNIA REPAIR,Left,LIH with mesh     OTHER SURGICAL HISTORY      5/10/16,,HERNIA REPAIR,Right,RIH with plug/patch       Family History:    Family History   Problem Relation Age of Onset     Diabetes Mother         Diabetes     Other - See Comments Father         episode of gout.     Colon Cancer Father         Cancer-colon,diagnosed in early 60s     Family History Negative Son         Good Health,     Family History Negative Daughter         Good Health,     Family History Negative Son         Good Health,       Social History:  Marital Status:  Legally  [3]  Social History     Tobacco Use     Smoking status: Former Smoker     Types: Cigarettes     Quit date: 2010     Years since quittin.3     Smokeless tobacco: Never Used   Vaping Use     Vaping Use: Never used   Substance Use Topics     Alcohol use: No     Alcohol/week: 0.0 standard drinks     Comment: Alcoholic Drinks/day: seldom     Drug use: No        Medications:    albuterol (PROAIR HFA/PROVENTIL HFA/VENTOLIN HFA) 108 (90 Base) MCG/ACT inhaler  allopurinol (ZYLOPRIM) 300 MG tablet  amLODIPine (NORVASC) 2.5 MG tablet  cetirizine (ZYRTEC) 10 MG tablet  HUMIRA PEN 40 MG/0.8ML pen kit  sertraline (ZOLOFT) 100 MG tablet          Review of Systems   Constitutional: Negative for fever.   HENT: Negative for drooling and facial swelling.    Eyes: Negative for pain and visual disturbance.   Respiratory: Negative for stridor.    Cardiovascular: Negative for chest pain.   Gastrointestinal: Negative for abdominal pain.   Genitourinary: Negative for flank pain.   Musculoskeletal: Negative for back pain.  "       Neck with incisional drainage   Skin: Negative for pallor.   Neurological: Negative for tremors, seizures and facial asymmetry.   Psychiatric/Behavioral: Negative for agitation and confusion.   All other systems reviewed and are negative.      Physical Exam   BP: (!) 166/93  Pulse: 93  Temp: 98.7  F (37.1  C)  Resp: 16  Height: 174 cm (5' 8.5\")  Weight: 104.3 kg (230 lb)  SpO2: 98 %      Physical Exam  Vitals and nursing note reviewed.   Constitutional:       General: He is not in acute distress.     Appearance: Normal appearance. He is not ill-appearing or toxic-appearing.   HENT:      Head: Normocephalic. No raccoon eyes, right periorbital erythema or left periorbital erythema.      Right Ear: No drainage or tenderness.      Left Ear: No drainage or tenderness.      Nose: Nose normal.   Eyes:      General: Lids are normal. Gaze aligned appropriately. No scleral icterus.     Extraocular Movements: Extraocular movements intact.   Neck:      Trachea: No tracheal deviation.      Comments: Anterior cervical incision has some bloody yellowish-green discharge to the central portion.  I do not appreciate any dehiscence.  Wound cultures were obtained.  And are pending his wound was cleansed and then Steri-Strips reapplied as well as a bandage.  Cardiovascular:      Rate and Rhythm: Normal rate.   Pulmonary:      Effort: Pulmonary effort is normal. No respiratory distress.      Breath sounds: No stridor.   Abdominal:      Tenderness: There is no abdominal tenderness.   Musculoskeletal:         General: No deformity or signs of injury. Normal range of motion.      Cervical back: Normal range of motion. No signs of trauma.   Skin:     General: Skin is warm and dry.      Coloration: Skin is not jaundiced or pale.   Neurological:      General: No focal deficit present.      Mental Status: He is alert and oriented to person, place, and time.      GCS: GCS eye subscore is 4. GCS verbal subscore is 5. GCS motor subscore is " 6.      Motor: No tremor or seizure activity.   Psychiatric:         Attention and Perception: Attention normal.         Mood and Affect: Mood normal.         ED Course     Results for orders placed or performed during the hospital encounter of 07/09/22 (from the past 24 hour(s))   Skin Aerobic Bacterial Culture Routine with Gram Stain    Specimen: Neck; Skin   Result Value Ref Range    Gram Stain Result 3+ PMNs Seen (A)     Gram Stain Result 3+ Gram positive cocci (A)    CBC with platelets differential    Narrative    The following orders were created for panel order CBC with platelets differential.  Procedure                               Abnormality         Status                     ---------                               -----------         ------                     CBC with platelets and d...[[id number]]  Abnormal            Final result                 Please view results for these tests on the individual orders.   C-Reactive Protein high sensitivity [facility]   Result Value Ref Range    C-Reactive Protein High Sensitivity 3.5 mg/L   Erythrocyte sedimentation rate auto   Result Value Ref Range    Erythrocyte Sedimentation Rate 23 (H) 0 - 20 mm/hr   Comprehensive metabolic panel   Result Value Ref Range    Sodium 137 134 - 144 mmol/L    Potassium 3.2 (L) 3.5 - 5.1 mmol/L    Chloride 99 98 - 107 mmol/L    Carbon Dioxide (CO2) 28 21 - 31 mmol/L    Anion Gap 10 3 - 14 mmol/L    Urea Nitrogen 17 7 - 25 mg/dL    Creatinine 0.99 0.70 - 1.30 mg/dL    Calcium 9.0 8.6 - 10.3 mg/dL    Glucose 147 (H) 70 - 105 mg/dL    Alkaline Phosphatase 112 (H) 34 - 104 U/L     (H) 13 - 39 U/L    ALT 89 (H) 7 - 52 U/L    Protein Total 7.4 6.4 - 8.9 g/dL    Albumin 3.7 3.5 - 5.7 g/dL    Bilirubin Total 0.5 0.3 - 1.0 mg/dL    GFR Estimate >90 >60 mL/min/1.73m2   CBC with platelets and differential   Result Value Ref Range    WBC Count 7.2 4.0 - 11.0 10e3/uL    RBC Count 4.88 4.40 - 5.90 10e6/uL    Hemoglobin 10.5 (L) 13.3 - 17.7 g/dL     Hematocrit 34.4 (L) 40.0 - 53.0 %    MCV 71 (L) 78 - 100 fL    MCH 21.5 (L) 26.5 - 33.0 pg    MCHC 30.5 (L) 31.5 - 36.5 g/dL    RDW 20.5 (H) 10.0 - 15.0 %    Platelet Count 192 150 - 450 10e3/uL    % Neutrophils 55 %    % Lymphocytes 35 %    % Monocytes 7 %    % Eosinophils 3 %    % Basophils 0 %    % Immature Granulocytes 0 %    NRBCs per 100 WBC 0 <1 /100    Absolute Neutrophils 3.9 1.6 - 8.3 10e3/uL    Absolute Lymphocytes 2.5 0.8 - 5.3 10e3/uL    Absolute Monocytes 0.5 0.0 - 1.3 10e3/uL    Absolute Eosinophils 0.2 0.0 - 0.7 10e3/uL    Absolute Basophils 0.0 0.0 - 0.2 10e3/uL    Absolute Immature Granulocytes 0.0 <=0.4 10e3/uL    Absolute NRBCs 0.0 10e3/uL       Medications   cefTRIAXone (ROCEPHIN) in lidocaine 1% (PF) injection 1 g (1 g Intramuscular Given 7/9/22 2691)       Assessments & Plan (with Medical Decision Making)     I have reviewed the nursing notes.    I have reviewed the findings, diagnosis, plan and need for follow up with the patient.      Discharge Medication List as of 7/9/2022 10:50 PM      START taking these medications    Details   sulfamethoxazole-trimethoprim (BACTRIM DS) 800-160 MG tablet Take 1 tablet by mouth 2 times daily for 10 days, Disp-20 tablet, R-0, Local Print             Final diagnoses:   Infection of superficial incisional surgical site after procedure, initial encounter     Afebrile.  Vital signs stable.  Patient 1 month status post anterior cervical decompression and fusion of the C5-C7.  He is not scheduled to see his surgeon for 2 more weeks.  He remains in [place] collar as directed by his surgeon.  His son today noted with a wound check that the patient still had Steri-Strips on and he had some yellowish-red discharge.  He is here with concerns for infection.  Denies any fever or chills.  Wound cultures were obtained and are pending.  CBC showed normal white blood cells no left shift.  His hemoglobin is 10.5.  This is down from 2 weeks ago when it was 11.8.  CRP is  normal.  ESR is elevated at 23.  CMP shows elevated LFTs with ALT at 89, AST is 110 and alk phos is 112.  Potassium is only slightly decreased at 3.2.  Blood cultures are pending.  Concerns for possible early infection.  Patient was given Rocephin IM in the ER.  Rx for Bactrim as well.  He will need to follow-up in 7-10-10 days for wound check.  Follow-up sooner if there is any other concerns problems or questions.  He needs to contact his surgeon on Monday to inform them of his disposition at this time.  7/9/2022   [facility]     Quinton Abbasi PA-C  07/10/22 0630

## 2022-07-10 NOTE — ED TRIAGE NOTES
Pt ambulated onto unit wearing [place] collar. Pt reports having surgery June 9th. Has incision on anterior neck. Pt reports son was helping pt bath this evening and noticed wound was oozing yellow pus. Pt denies feeling chills, fever, or pain. Pt denies taking any antibiotics post surgery. Pt reports only taking pain medications following surgery.     Triage Assessment     Row Name 07/09/22 8400       Triage Assessment (Adult)    Airway WDL WDL       Respiratory WDL    Respiratory WDL WDL       Skin Circulation/Temperature WDL    Skin Circulation/Temperature WDL WDL       Cardiac WDL    Cardiac WDL WDL       Peripheral/Neurovascular WDL    Peripheral Neurovascular WDL WDL       Cognitive/Neuro/Behavioral WDL    Cognitive/Neuro/Behavioral WDL WDL

## 2022-07-11 ENCOUNTER — NURSE TRIAGE (OUTPATIENT)
Dept: NURSING | Facility: CLINIC | Age: 52
End: 2022-07-11

## 2022-07-11 LAB
BACTERIA SKIN AEROBE CULT: ABNORMAL
GRAM STAIN RESULT: ABNORMAL
GRAM STAIN RESULT: ABNORMAL

## 2022-07-12 ENCOUNTER — TELEPHONE (OUTPATIENT)
Dept: EMERGENCY MEDICINE | Facility: OTHER | Age: 52
End: 2022-07-12

## 2022-07-12 NOTE — TELEPHONE ENCOUNTER
Patient calling for wound care education.  Pt reports he was seen in the ED two days ago for an infected surgical incision.  Steri-strips and a bandage placed in the ED.  Pt questions if he needs to change the bandage.      RN informed pt that yes, he can change the bandage tonight.  He denies fluid drainage and states he's feeling okay.  He denies new or worsening symptoms since ED visit.  He is taking the oral antibiotic.     RN also gave pt the following information:       ED discharge note recommended pt to contact surgery team today (Monday) but pt didn't do this, stating he has an appointment next week.  RN encouraged him to call his surgery team to tomorrow to let them know about the ED visit, dx, and how they want him to treat his wound.      Patient verbalized understanding and had no further questions.      Emelina Kiran RN  [facility] Nurse Advisor    Reason for Disposition    Health Information question, no triage required and triager able to answer question    Additional Information    Negative: RN needs further essential information from caller in order to complete triage    Negative: Requesting regular office appointment    Negative: [1] Caller requesting NON-URGENT health information AND [2] PCP's office is the best resource    Protocols used: INFORMATION ONLY CALL - NO TRIAGE[facility]

## 2022-07-12 NOTE — TELEPHONE ENCOUNTER
[facility] Emergency Department/Urgent Care Lab result notification:    Reason for call  Notify of lab results, assess symptoms,  review ED providers recommendations (if necessary) and advise per ED lab result f/u protocol.    Lab result  Final Skin Aerobic Bacterial Culture (specimen - Neck) report on 7/11/22  [facility] Emergency Dept discharge antibiotic prescribed:  Sulfamethoxazole-Trimethoprim (Bactrim DS, Septra DS) 800-160 mg PO tablet, 1 tablet by mouth 2 times daily for 10 days  #1. Bacteria, 3+ Staphylococcus aureus ,  is [SUSCEPTIBLE] to antibiotic.  Incision and Drainage performed in the [place] ED: No  Recommendations in treatment per [facility] ED lab result culture protocol  5:29  Relayed Results to patient and he states the area is healing well. He will continue antibiotics and care plan as discussed with his provider. Patient had no questions or concerns    Neris Marte RN  [facility] Result RN  [facility] Emergency Dept Lab Result RN  Ph# [phone]

## 2022-07-14 LAB
BACTERIA BLD CULT: NO GROWTH
BACTERIA BLD CULT: NO GROWTH

## 2022-08-20 DIAGNOSIS — I10 ESSENTIAL HYPERTENSION: ICD-10-CM

## 2022-08-22 RX ORDER — AMLODIPINE BESYLATE 2.5 MG/1
TABLET ORAL
Qty: 90 TABLET | Refills: 3 | Status: SHIPPED | OUTPATIENT
Start: 2022-08-22 | End: 2022-09-06

## 2022-08-22 NOTE — TELEPHONE ENCOUNTER
" Disp Refills Start End ARACELI   amLODIPine (NORVASC) 2.5 MG tablet 90 tablet 3 8/26/2021  No   Sig: TAKE 1 TABLET(2.5 MG) BY MOUTH DAILY       LOV: 5/13/2022  Future Office visit: No future appointment scheduled at this time.    Routing refill request to provider for review/approval.  Per Quality report patient is due for: Preventive care visit, zoster immunization, covid-19 vaccine.     Requested Prescriptions   Pending Prescriptions Disp Refills     amLODIPine (NORVASC) 2.5 MG tablet [Pharmacy Med Name: AMLODIPINE BESYLATE 2.5MG TABLETS] 90 tablet 3     Sig: TAKE 1 TABLET(2.5 MG) BY MOUTH DAILY       Calcium Channel Blockers Protocol  Failed - 8/20/2022  4:06 AM        Failed - Blood pressure under 140/90 in past 12 months     BP Readings from Last 3 Encounters:   07/09/22 (!) 166/93   06/20/22 (!) 150/94   05/13/22 (!) 140/98                 Passed - Recent (12 mo) or future (30 days) visit within the authorizing provider's specialty     Patient has had an office visit with the authorizing provider or a provider within the authorizing providers department within the previous 12 mos or has a future within next 30 days. See \"Patient Info\" tab in inbasket, or \"Choose Columns\" in Meds & Orders section of the refill encounter.              Passed - Medication is active on med list        Passed - Patient is age 18 or older        Passed - Normal serum creatinine on file in past 12 months     Recent Labs   Lab Test 07/09/22  2239   CR 0.99       Ok to refill medication if creatinine is low             Routed to provider for review and consideration. Georgiana Bassett RN  ....................  8/22/2022   12:37 PM      "

## 2022-08-25 ENCOUNTER — TRANSFERRED RECORDS (OUTPATIENT)
Dept: HEALTH INFORMATION MANAGEMENT | Facility: OTHER | Age: 52
End: 2022-08-25

## 2022-08-29 ENCOUNTER — LAB (OUTPATIENT)
Dept: LAB | Facility: OTHER | Age: 52
End: 2022-08-29
Payer: COMMERCIAL

## 2022-08-29 DIAGNOSIS — L28.1 PRURIGO NODULARIS: ICD-10-CM

## 2022-08-29 DIAGNOSIS — Z79.899 LONG TERM USE OF DRUG: ICD-10-CM

## 2022-08-29 DIAGNOSIS — L53.8 OTHER SPECIFIED ERYTHEMATOUS CONDITIONS: ICD-10-CM

## 2022-08-29 DIAGNOSIS — L40.0 PSORIASIS VULGARIS: ICD-10-CM

## 2022-08-29 LAB
ALBUMIN SERPL-MCNC: 3.8 G/DL (ref 3.5–5.7)
ALP SERPL-CCNC: 110 U/L (ref 34–104)
ALT SERPL W P-5'-P-CCNC: 112 U/L (ref 7–52)
ANION GAP SERPL CALCULATED.3IONS-SCNC: 8 MMOL/L (ref 3–14)
AST SERPL W P-5'-P-CCNC: 127 U/L (ref 13–39)
BASOPHILS # BLD AUTO: 0 10E3/UL (ref 0–0.2)
BASOPHILS NFR BLD AUTO: 1 %
BILIRUB SERPL-MCNC: 0.7 MG/DL (ref 0.3–1)
BUN SERPL-MCNC: 12 MG/DL (ref 7–25)
CALCIUM SERPL-MCNC: 9.8 MG/DL (ref 8.6–10.3)
CHLORIDE BLD-SCNC: 100 MMOL/L (ref 98–107)
CO2 SERPL-SCNC: 28 MMOL/L (ref 21–31)
CREAT SERPL-MCNC: 0.92 MG/DL (ref 0.7–1.3)
EOSINOPHIL # BLD AUTO: 0.2 10E3/UL (ref 0–0.7)
EOSINOPHIL NFR BLD AUTO: 2 %
ERYTHROCYTE [DISTWIDTH] IN BLOOD BY AUTOMATED COUNT: 20.2 % (ref 10–15)
GFR SERPL CREATININE-BSD FRML MDRD: >90 ML/MIN/1.73M2
GLUCOSE BLD-MCNC: 113 MG/DL (ref 70–105)
HCT VFR BLD AUTO: 38.5 % (ref 40–53)
HGB BLD-MCNC: 12.1 G/DL (ref 13.3–17.7)
IMM GRANULOCYTES # BLD: 0 10E3/UL
IMM GRANULOCYTES NFR BLD: 0 %
LYMPHOCYTES # BLD AUTO: 2.2 10E3/UL (ref 0.8–5.3)
LYMPHOCYTES NFR BLD AUTO: 34 %
MCH RBC QN AUTO: 22.7 PG (ref 26.5–33)
MCHC RBC AUTO-ENTMCNC: 31.4 G/DL (ref 31.5–36.5)
MCV RBC AUTO: 72 FL (ref 78–100)
MONOCYTES # BLD AUTO: 0.5 10E3/UL (ref 0–1.3)
MONOCYTES NFR BLD AUTO: 7 %
NEUTROPHILS # BLD AUTO: 3.7 10E3/UL (ref 1.6–8.3)
NEUTROPHILS NFR BLD AUTO: 56 %
NRBC # BLD AUTO: 0 10E3/UL
NRBC BLD AUTO-RTO: 0 /100
PLATELET # BLD AUTO: 198 10E3/UL (ref 150–450)
POTASSIUM BLD-SCNC: 3.1 MMOL/L (ref 3.5–5.1)
PROT SERPL-MCNC: 8 G/DL (ref 6.4–8.9)
RBC # BLD AUTO: 5.34 10E6/UL (ref 4.4–5.9)
SODIUM SERPL-SCNC: 136 MMOL/L (ref 134–144)
WBC # BLD AUTO: 6.6 10E3/UL (ref 4–11)

## 2022-08-29 PROCEDURE — 36415 COLL VENOUS BLD VENIPUNCTURE: CPT | Mod: ZL

## 2022-08-29 PROCEDURE — 85025 COMPLETE CBC W/AUTO DIFF WBC: CPT | Mod: ZL

## 2022-08-29 PROCEDURE — 86481 TB AG RESPONSE T-CELL SUSP: CPT | Mod: ZL

## 2022-08-29 PROCEDURE — 80053 COMPREHEN METABOLIC PANEL: CPT | Mod: ZL

## 2022-08-31 LAB
GAMMA INTERFERON BACKGROUND BLD IA-ACNC: 0 IU/ML
M TB IFN-G BLD-IMP: NEGATIVE
M TB IFN-G CD4+ BCKGRND COR BLD-ACNC: 10 IU/ML
MITOGEN IGNF BCKGRD COR BLD-ACNC: 0.01 IU/ML
MITOGEN IGNF BCKGRD COR BLD-ACNC: 0.02 IU/ML
QUANTIFERON MITOGEN: 10 IU/ML
QUANTIFERON NIL TUBE: 0 IU/ML
QUANTIFERON TB1 TUBE: 0.02 IU/ML
QUANTIFERON TB2 TUBE: 0.01

## 2022-09-01 ENCOUNTER — TRANSFERRED RECORDS (OUTPATIENT)
Dept: HEALTH INFORMATION MANAGEMENT | Facility: OTHER | Age: 52
End: 2022-09-01

## 2022-09-01 DIAGNOSIS — M1A.00X0 IDIOPATHIC CHRONIC GOUT WITHOUT TOPHUS, UNSPECIFIED SITE: ICD-10-CM

## 2022-09-01 RX ORDER — ALLOPURINOL 300 MG/1
TABLET ORAL
Qty: 90 TABLET | Refills: 3 | Status: SHIPPED | OUTPATIENT
Start: 2022-09-01 | End: 2022-09-06

## 2022-09-02 ENCOUNTER — HOSPITAL ENCOUNTER (OUTPATIENT)
Dept: GENERAL RADIOLOGY | Facility: OTHER | Age: 52
Discharge: HOME | End: 2022-09-02
Attending: PHYSICIAN ASSISTANT | Admitting: PHYSICIAN ASSISTANT
Payer: COMMERCIAL

## 2022-09-02 DIAGNOSIS — Z48.89 ENCOUNTER FOR OTHER SPECIFIED SURGICAL AFTERCARE: ICD-10-CM

## 2022-09-02 DIAGNOSIS — M48.02 SPINAL STENOSIS, CERVICAL REGION: ICD-10-CM

## 2022-09-02 PROCEDURE — 72040 X-RAY EXAM NECK SPINE 2-3 VW: CPT

## 2022-09-06 ENCOUNTER — OFFICE VISIT (OUTPATIENT)
Dept: FAMILY MEDICINE | Facility: OTHER | Age: 52
End: 2022-09-06
Attending: FAMILY MEDICINE
Payer: COMMERCIAL

## 2022-09-06 VITALS
HEIGHT: 69 IN | DIASTOLIC BLOOD PRESSURE: 82 MMHG | SYSTOLIC BLOOD PRESSURE: 122 MMHG | BODY MASS INDEX: 36.73 KG/M2 | OXYGEN SATURATION: 97 % | WEIGHT: 248 LBS | RESPIRATION RATE: 18 BRPM | TEMPERATURE: 97.8 F | HEART RATE: 74 BPM

## 2022-09-06 DIAGNOSIS — E66.01 MORBID OBESITY (H): ICD-10-CM

## 2022-09-06 DIAGNOSIS — M1A.00X0 IDIOPATHIC CHRONIC GOUT WITHOUT TOPHUS, UNSPECIFIED SITE: ICD-10-CM

## 2022-09-06 DIAGNOSIS — F33.1 MODERATE EPISODE OF RECURRENT MAJOR DEPRESSIVE DISORDER (H): ICD-10-CM

## 2022-09-06 DIAGNOSIS — Z12.5 SCREENING FOR PROSTATE CANCER: ICD-10-CM

## 2022-09-06 DIAGNOSIS — H93.11 TINNITUS, RIGHT: ICD-10-CM

## 2022-09-06 DIAGNOSIS — Z00.00 ROUTINE HISTORY AND PHYSICAL EXAMINATION OF ADULT: Primary | ICD-10-CM

## 2022-09-06 DIAGNOSIS — L40.9 PSORIASIS: ICD-10-CM

## 2022-09-06 DIAGNOSIS — I10 ESSENTIAL HYPERTENSION: ICD-10-CM

## 2022-09-06 DIAGNOSIS — L29.9 EAR ITCHING: ICD-10-CM

## 2022-09-06 DIAGNOSIS — J45.20 MILD INTERMITTENT ASTHMA WITHOUT COMPLICATION: ICD-10-CM

## 2022-09-06 PROBLEM — M67.922 TENDINOPATHY OF LEFT BICEPS TENDON: Status: RESOLVED | Noted: 2020-10-12 | Resolved: 2022-09-06

## 2022-09-06 PROBLEM — M75.42 IMPINGEMENT SYNDROME OF LEFT SHOULDER: Status: RESOLVED | Noted: 2020-10-12 | Resolved: 2022-09-06

## 2022-09-06 LAB
ALBUMIN SERPL-MCNC: 3.9 G/DL (ref 3.5–5.7)
ALP SERPL-CCNC: 95 U/L (ref 34–104)
ALT SERPL W P-5'-P-CCNC: 93 U/L (ref 7–52)
ANION GAP SERPL CALCULATED.3IONS-SCNC: 13 MMOL/L (ref 3–14)
AST SERPL W P-5'-P-CCNC: 99 U/L (ref 13–39)
BILIRUB SERPL-MCNC: 0.6 MG/DL (ref 0.3–1)
BUN SERPL-MCNC: 15 MG/DL (ref 7–25)
CALCIUM SERPL-MCNC: 9 MG/DL (ref 8.6–10.3)
CHLORIDE BLD-SCNC: 102 MMOL/L (ref 98–107)
CHOLEST SERPL-MCNC: 167 MG/DL
CO2 SERPL-SCNC: 25 MMOL/L (ref 21–31)
CREAT SERPL-MCNC: 1.05 MG/DL (ref 0.7–1.3)
FASTING STATUS PATIENT QL REPORTED: YES
GFR SERPL CREATININE-BSD FRML MDRD: 85 ML/MIN/1.73M2
GLUCOSE BLD-MCNC: 132 MG/DL (ref 70–105)
HDLC SERPL-MCNC: 26 MG/DL (ref 23–92)
HOLD SPECIMEN: NORMAL
LDLC SERPL CALC-MCNC: 100 MG/DL
NONHDLC SERPL-MCNC: 141 MG/DL
POTASSIUM BLD-SCNC: 3.5 MMOL/L (ref 3.5–5.1)
PROT SERPL-MCNC: 8.3 G/DL (ref 6.4–8.9)
PSA SERPL-MCNC: 0.62 UG/L (ref 0–4)
SODIUM SERPL-SCNC: 140 MMOL/L (ref 134–144)
TRIGL SERPL-MCNC: 207 MG/DL

## 2022-09-06 PROCEDURE — 80061 LIPID PANEL: CPT | Mod: ZL | Performed by: FAMILY MEDICINE

## 2022-09-06 PROCEDURE — 80053 COMPREHEN METABOLIC PANEL: CPT | Mod: ZL | Performed by: FAMILY MEDICINE

## 2022-09-06 PROCEDURE — 99396 PREV VISIT EST AGE 40-64: CPT | Performed by: FAMILY MEDICINE

## 2022-09-06 PROCEDURE — G0463 HOSPITAL OUTPT CLINIC VISIT: HCPCS

## 2022-09-06 PROCEDURE — G0103 PSA SCREENING: HCPCS | Mod: ZL | Performed by: FAMILY MEDICINE

## 2022-09-06 PROCEDURE — 36415 COLL VENOUS BLD VENIPUNCTURE: CPT | Mod: ZL | Performed by: FAMILY MEDICINE

## 2022-09-06 RX ORDER — ALLOPURINOL 300 MG/1
TABLET ORAL
Qty: 90 TABLET | Refills: 3 | Status: SHIPPED | OUTPATIENT
Start: 2022-09-06 | End: 2023-10-23

## 2022-09-06 RX ORDER — ALBUTEROL SULFATE 90 UG/1
2 AEROSOL, METERED RESPIRATORY (INHALATION) 4 TIMES DAILY PRN
Qty: 8.5 G | Refills: 11 | Status: SHIPPED | OUTPATIENT
Start: 2022-09-06 | End: 2023-10-16

## 2022-09-06 RX ORDER — AMLODIPINE BESYLATE 2.5 MG/1
2.5 TABLET ORAL DAILY
Qty: 90 TABLET | Refills: 3 | Status: SHIPPED | OUTPATIENT
Start: 2022-09-06 | End: 2023-10-23

## 2022-09-06 RX ORDER — CETIRIZINE HYDROCHLORIDE 10 MG/1
10 TABLET ORAL 2 TIMES DAILY
Qty: 180 TABLET | Refills: 3 | Status: SHIPPED | OUTPATIENT
Start: 2022-09-06 | End: 2023-09-15

## 2022-09-06 RX ORDER — SERTRALINE HYDROCHLORIDE 100 MG/1
100 TABLET, FILM COATED ORAL DAILY
Qty: 90 TABLET | Refills: 3 | Status: SHIPPED | OUTPATIENT
Start: 2022-09-06 | End: 2023-11-30

## 2022-09-06 ASSESSMENT — ENCOUNTER SYMPTOMS
HEMATURIA: 0
SORE THROAT: 0
EYE PAIN: 0
FEVER: 0
SHORTNESS OF BREATH: 0
HEADACHES: 0
ARTHRALGIAS: 1
NAUSEA: 0
HEMATOCHEZIA: 0
NERVOUS/ANXIOUS: 0
HEARTBURN: 1
DYSURIA: 0
CONSTIPATION: 0
WEAKNESS: 0
JOINT SWELLING: 0
DIARRHEA: 0
PARESTHESIAS: 0
ABDOMINAL PAIN: 0
PALPITATIONS: 0
CHILLS: 0
DIZZINESS: 0
MYALGIAS: 0
COUGH: 0

## 2022-09-06 ASSESSMENT — ASTHMA QUESTIONNAIRES
QUESTION_2 LAST FOUR WEEKS HOW OFTEN HAVE YOU HAD SHORTNESS OF BREATH: THREE TO SIX TIMES A WEEK
QUESTION_5 LAST FOUR WEEKS HOW WOULD YOU RATE YOUR ASTHMA CONTROL: SOMEWHAT CONTROLLED
QUESTION_4 LAST FOUR WEEKS HOW OFTEN HAVE YOU USED YOUR RESCUE INHALER OR NEBULIZER MEDICATION (SUCH AS ALBUTEROL): TWO OR THREE TIMES PER WEEK
QUESTION_3 LAST FOUR WEEKS HOW OFTEN DID YOUR ASTHMA SYMPTOMS (WHEEZING, COUGHING, SHORTNESS OF BREATH, CHEST TIGHTNESS OR PAIN) WAKE YOU UP AT NIGHT OR EARLIER THAN USUAL IN THE MORNING: TWO OR THREE NIGHTS A WEEK
ACT_TOTALSCORE: 15
QUESTION_1 LAST FOUR WEEKS HOW MUCH OF THE TIME DID YOUR ASTHMA KEEP YOU FROM GETTING AS MUCH DONE AT WORK, SCHOOL OR AT HOME: A LITTLE OF THE TIME
ACT_TOTALSCORE: 15

## 2022-09-06 ASSESSMENT — PATIENT HEALTH QUESTIONNAIRE - PHQ9
SUM OF ALL RESPONSES TO PHQ QUESTIONS 1-9: 12
SUM OF ALL RESPONSES TO PHQ QUESTIONS 1-9: 12
10. IF YOU CHECKED OFF ANY PROBLEMS, HOW DIFFICULT HAVE THESE PROBLEMS MADE IT FOR YOU TO DO YOUR WORK, TAKE CARE OF THINGS AT HOME, OR GET ALONG WITH OTHER PEOPLE: SOMEWHAT DIFFICULT

## 2022-09-06 ASSESSMENT — PAIN SCALES - GENERAL: PAINLEVEL: WORST PAIN (10)

## 2022-09-06 NOTE — PROGRESS NOTES
SUBJECTIVE:   Christiano Metcalf is a 52 year old male who presents for Preventive Visit.    He has a history of gout which has been under good control with allopurinol.    He continues to see rheumatology for psoriatic arthritis and continues on Humira every 2 weeks.    He has a history of asthma and uses albuterol once or twice a week.    He continues have bilateral knee pain.    He has had worsening right sided tinnitus.  He has seen ENT previously for this.    Patient has been advised of split billing requirements and indicates understanding: Yes  Are you in the first 12 months of your Medicare coverage?  No    Healthy Habits:     Getting at least 3 servings of Calcium per day:  Yes    Bi-annual eye exam:  NO    Dental care twice a year:  NO    Sleep apnea or symptoms of sleep apnea:  Excessive snoring    Diet:  Other    Frequency of exercise:  2-3 days/week    Duration of exercise:  15-30 minutes    Taking medications regularly:  Yes    Medication side effects:  None    PHQ-2 Total Score: 1    Additional concerns today:  No    Do you feel safe in your environment? Yes    Have you ever done Advance Care Planning? (For example, a Health Directive, POLST, or a discussion with a medical provider or your loved ones about your wishes): No, advance care planning information given to patient to review.  Patient declined advance care planning discussion at this time.       Fall risk  Fall Risk Assessment not completed.    Cognitive Screening   1) Repeat 3 items (Leader, Season, Table)    2) Clock draw: NORMAL  3) 3 item recall: Recalls 2 objects   Results: NORMAL clock, 1-2 items recalled: COGNITIVE IMPAIRMENT LESS LIKELY    Mini-CogTM Copyright JOANNA Logan. Licensed by the author for use in [facility]; reprinted with permission (rico@.[facility]). All rights reserved.      Do you have sleep apnea, excessive snoring or daytime drowsiness?: no    Reviewed and updated as needed this visit by clinical staff   Tobacco  " Allergies  Meds   Med Hx  Surg Hx  Fam Hx  Soc Hx          Reviewed and updated as needed this visit by Provider                   Social History     Tobacco Use     Smoking status: Former Smoker     Types: Cigarettes     Quit date: 2010     Years since quittin.5     Smokeless tobacco: Never Used   Substance Use Topics     Alcohol use: No     Alcohol/week: 0.0 standard drinks     Comment: Alcoholic Drinks/day: seldom         Alcohol Use 2022   Prescreen: >3 drinks/day or >7 drinks/week? No     Current providers sharing in care for this patient include:   Patient Care Team:  Gino Frankel as PCP - General (Family Practice)  Gino Frankel as Assigned PCP    The following health maintenance items are reviewed in Epic and correct as of today:  Health Maintenance Due   Topic Date Due     ADVANCE CARE PLANNING  Never done     ZOSTER IMMUNIZATION (1 of 2) Never done     PREVENTIVE CARE VISIT  2021     COVID-19 Vaccine (4 - Booster for Pfizer series) 2022     LUNG CANCER SCREENING  2022     INFLUENZA VACCINE (1) 2022     Lab work is in process      Review of Systems      OBJECTIVE:   /82 (BP Location: Right arm, Patient Position: Sitting, Cuff Size: Adult Large)   Pulse 74   Temp 97.8  F (36.6  C) (Tympanic)   Resp 18   Ht 1.753 m (5' 9\")   Wt 112.5 kg (248 lb)   SpO2 97%   BMI 36.62 kg/m   Estimated body mass index is 36.62 kg/m  as calculated from the following:    Height as of this encounter: 1.753 m (5' 9\").    Weight as of this encounter: 112.5 kg (248 lb).  Physical Exam  GENERAL: healthy, alert and no distress  EYES: Eyes grossly normal to inspection, PERRL and conjunctivae and sclerae normal  HENT: ear canals and TM's normal, nose and mouth without ulcers or lesions  NECK: no adenopathy, no asymmetry, masses, or scars and thyroid normal to palpation  RESP: lungs clear to auscultation - no rales, rhonchi or wheezes  CV: regular rate and rhythm, normal S1 S2, " no S3 or S4, no murmur, click or rub, no peripheral edema and peripheral pulses strong  ABDOMEN: soft, nontender, no hepatosplenomegaly, no masses and bowel sounds normal  MS: no gross musculoskeletal defects noted, no edema  SKIN: no suspicious lesions or rashes  NEURO: Normal strength and tone, mentation intact and speech normal  PSYCH: mentation appears normal, affect normal/bright    Diagnostic Test Results:  none     ASSESSMENT / PLAN:       ICD-10-CM    1. Routine history and physical examination of adult  Z00.00    2. Mild intermittent asthma without complication  J45.20 albuterol (PROAIR HFA/PROVENTIL HFA/VENTOLIN HFA) 108 (90 Base) MCG/ACT inhaler   3. Ear itching  L29.9 cetirizine (ZYRTEC) 10 MG tablet   4. Moderate episode of recurrent major depressive disorder (H)  F33.1 sertraline (ZOLOFT) 100 MG tablet   5. Idiopathic chronic gout without tophus, unspecified site  M1A.00X0 allopurinol (ZYLOPRIM) 300 MG tablet   6. Essential hypertension  I10 amLODIPine (NORVASC) 2.5 MG tablet     Comprehensive Metabolic Panel     Lipid Panel     Lipid Panel     Comprehensive Metabolic Panel   7. Screening for prostate cancer  Z12.5 PSA Screen [facility]     PSA Screen [facility]   8. Psoriasis  L40.9    9. Morbid obesity (H)  E66.01    10. Tinnitus, right  H93.11 Adult ENT  Referral     For his worsening tinnitus we will refer him back to ENT to discuss further options.    Medications refilled again for another year.    Fasting labs were drawn today.    He is up-to-date on immunizations.  Consider flu and COVID boosters this fall.    For his knees recommend that he use Aleve twice daily with meals.  If this is not helpful he can follow-up for further assessment.  Also discussed importance of weight loss.    Patient has been advised of split billing requirements and indicates understanding: Yes    COUNSELING:  Reviewed preventive health counseling, as reflected in patient instructions       Regular exercise       Healthy  "diet/nutrition       Vision screening       Hearing screening       Colon cancer screening       Prostate cancer screening    Estimated body mass index is 36.62 kg/m  as calculated from the following:    Height as of this encounter: 1.753 m (5' 9\").    Weight as of this encounter: 112.5 kg (248 lb).    Weight management plan: Discussed healthy diet and exercise guidelines    He reports that he quit smoking about 12 years ago. His smoking use included cigarettes. He has never used smokeless tobacco.      Appropriate preventive services were discussed with this patient, including applicable screening as appropriate for cardiovascular disease, diabetes, osteopenia/osteoporosis, and glaucoma.  As appropriate for age/gender, discussed screening for colorectal cancer, prostate cancer, breast cancer, and cervical cancer. Checklist reviewing preventive services available has been given to the patient.    Reviewed patients plan of care and provided an AVS. The Basic Care Plan (routine screening as documented in Health Maintenance) for [place] meets the Care Plan requirement. This Care Plan has been established and reviewed with the Patient.    Counseling Resources:  ATP IV Guidelines  Pooled Cohorts Equation Calculator  Breast Cancer Risk Calculator  Breast Cancer: Medication to Reduce Risk  FRAX Risk Assessment  ICSI Preventive Guidelines  Dietary Guidelines for Americans, 2010  [employer] MyPlate  ASA Prophylaxis  Lung CA Screening    Gino Frankel  [facility]    Identified Health Risks:Review current opioid prescriptions    For a patient with a current opioid prescription:    Reviewed potential Opioid Use Disorder (OUD) risk factors: No n/a    Evaluate their pain severity and current treatment plan:     Provide information on non-opioid treatment options:    Refer to a specialist, as appropriate:    Get more information on pain management in the [facility] Pain Management Best Practices Inter-agency Task Force " Report    Screen for potential Substance Use Disorders (SUDs)    Reviewed the patient s potential risk factors for SUDs: No n/a    Refer to treatment or specialist, as appropriate:     A screening tool isn t required but you may use one:  Find more information in the National [place] on Drug Abuse Screening and Assessment Tools Chart    Answers for HPI/ROS submitted by the patient on 9/6/2022  If you checked off any problems, how difficult have these problems made it for you to do your work, take care of things at home, or get along with other people?: Somewhat difficult  PHQ9 TOTAL SCORE: 12

## 2022-09-06 NOTE — NURSING NOTE
"Chief Complaint   Patient presents with     Wellness Visit     Patient here for annual wellness, medication refills. Also has an ear issue, have been itchy, and ringing.        Initial /82 (BP Location: Right arm, Patient Position: Sitting, Cuff Size: Adult Large)   Pulse 74   Temp 97.8  F (36.6  C) (Tympanic)   Resp 18   Ht 1.753 m (5' 9\")   Wt 112.5 kg (248 lb)   SpO2 97%   BMI 36.62 kg/m   Estimated body mass index is 36.62 kg/m  as calculated from the following:    Height as of this encounter: 1.753 m (5' 9\").    Weight as of this encounter: 112.5 kg (248 lb).  Medication Reconciliation: complete    Judy Lopes LPN    Advance Care Directive reviewed    "

## 2022-09-12 ENCOUNTER — TELEPHONE (OUTPATIENT)
Dept: FAMILY MEDICINE | Facility: OTHER | Age: 52
End: 2022-09-12

## 2022-09-12 NOTE — TELEPHONE ENCOUNTER
Called Pt and Verified correct prescription. He states its what he always uses and I verified it was the same on. Pt is going to call and verify with pharmacy   Rachel Ramirez LPN on 9/12/2022 at 3:32 PM

## 2022-09-12 NOTE — TELEPHONE ENCOUNTER
Pt states that the wrong inhaler to the pharmacy at [facility].  Please call to clarify.      Collin Baig on 9/12/2022 at 10:09 AM

## 2022-09-23 ENCOUNTER — TELEPHONE (OUTPATIENT)
Dept: FAMILY MEDICINE | Facility: OTHER | Age: 52
End: 2022-09-23

## 2022-09-23 DIAGNOSIS — F33.1 MODERATE EPISODE OF RECURRENT MAJOR DEPRESSIVE DISORDER (H): ICD-10-CM

## 2022-09-23 RX ORDER — SERTRALINE HYDROCHLORIDE 100 MG/1
100 TABLET, FILM COATED ORAL DAILY
Qty: 90 TABLET | Refills: 3 | OUTPATIENT
Start: 2022-09-23

## 2022-09-23 NOTE — TELEPHONE ENCOUNTER
[id number] of [place] sent Rx request for the following:      Requested Prescriptions   Pending Prescriptions Disp Refills     sertraline (ZOLOFT) 100 MG tablet 90 tablet 3     Sig: Take 1 tablet (100 mg) by mouth daily       There is no refill protocol information for this order          Last Prescription Date:   09/06/2022  Last Fill Qty/Refills:         90, R-3    Last Office Visit:              09/06/2022  Future Office visit:        None    Patient should have refills available at the pharmacy.    Cee Small RN  ....................  9/23/2022   11:30 AM

## 2022-09-23 NOTE — TELEPHONE ENCOUNTER
Reason for call: Medication or medication refill    Name of medication requested: Zoloft    Are you out of the medication? yes    What pharmacy do you use? [facility]    Preferred method for responding to this message: Telephone Call    Phone number patient can be reached at: Cell number on file:    Telephone Information:   Mobile [phone]       If we cannot reach you directly, may we leave a detailed response at the number you provided? Yes

## 2022-10-04 ENCOUNTER — OFFICE VISIT (OUTPATIENT)
Dept: OTOLARYNGOLOGY | Facility: OTHER | Age: 52
End: 2022-10-04
Attending: OTOLARYNGOLOGY
Payer: COMMERCIAL

## 2022-10-04 DIAGNOSIS — H93.19 TINNITUS, UNSPECIFIED LATERALITY: ICD-10-CM

## 2022-10-04 DIAGNOSIS — J35.9 LESION OF TONSIL: ICD-10-CM

## 2022-10-04 DIAGNOSIS — H90.3 SENSORY HEARING LOSS, BILATERAL: Primary | ICD-10-CM

## 2022-10-04 PROCEDURE — G0463 HOSPITAL OUTPT CLINIC VISIT: HCPCS

## 2022-10-18 NOTE — PROGRESS NOTES
document embedded image  Patient Name: Christiano Metcalf    Address: [address]     YOB: 1970    [place] [address]    MR Number: [id number]    Phone: [phone]  PCP: Gino Frankel MD            Appointment Date: 10/04/22   Visit Provider: Vladimir Alex MD    cc: Gino Frankel MD; ~    ENT Progress Note  Intake  Visit Reasons: Tinnitus/Throat concerns    HPI  History of Present Illness  Chief complaint:  Tinnitus, chronic throat irritation    History  The patient is a 52-year-old man who comes to the office today with 2 concerns.  The 1st is regarding some low-grade chronic tinnitus that has persisted for months.  He does have some hearing loss that was previously documented elsewhere.  He has significant history of occupational noise exposure.  Has no family history of hearing loss.  He has no history of head injury.  He has no history of chronic recurring ear infections.  He has no history consistent with ototoxicity.    His 2nd concern is regarding some chronic throat irritation.  He points to his hypopharynx.  He is a former smoker.  He denies hemoptysis, hematemesis, dysphagia, odynophagia, otalgia, voice change.    Exam  The external auditory canals and TMs are unremarkable  Oral cavity oropharynx-he has a disc like lesion on the superior pole of his left tonsil.  I can see no other mucosal lesions  Indirect laryngoscopy-he is very difficult secondary to gag  Bimanual exam-I can palpate the firm dysplasia lesion at the superior pole of his left tonsil  Neck-no palpable masses or adenopathy  Head neck integument-Clear  Nasal-no obstruction or purulence  General-the patient appears well and in no distress  Neuro-there are no focal cranial nerve deficits  Audiogram-    Allergies    No Known Allergies Allergy (Verified 10/06/22 08:12)    PFSH  PFSH:     Past Medical History: (Updated 10/06/22 @ 08:12 by Steve Fraire, Med Assist)    Asthma  Chronic  headaches  Hypertension     Social History: (Updated 10/06/22 @ 08:13 by Steve Fraire, Med Assist)  Smoking Status:  Former smoker  second hand exposure:  Yes  alcohol intake:  former  substance use type:  does not use       Vital Signs      10/06/22  08:12  Weight   108.862 kg  Weight (lb)   240 lbs and  0.0  ozs  Weight Measurement Method   Stated by Patient    A&P  Assessment & Plan  (1) Tinnitus:        Status: Chronic        Code(s):  H93.19 - Tinnitus, unspecified ear  (2) Sensorineural hearing loss, bilateral:        Status: Acute        Code(s):  H90.3 - Sensorineural hearing loss, bilateral  (3) Lesion of tonsil:        Status: Acute        Code(s):  J35.9 - Chronic disease of tonsils and adenoids, unspecified  We will make arrangements for panendoscopy and biopsy of his left tonsil lesion.  I am hopeful this is simply a papillary.  His audiogram does demonstrate mild sensorineural hearing loss but enough loss that he would likely benefit from hearing aids.  He is interested in a hearing aid to help with tinnitus in his well.  He was encouraged to call and make arrangements for hearing aid evaluation with our audiologist here in [place].    Plan  Lesion.      Vladimir Alex MD    10/03/22 1614    <Electronically signed by Vladimir Alex MD> 10/06/22 8178

## 2022-10-25 ENCOUNTER — OFFICE VISIT (OUTPATIENT)
Dept: FAMILY MEDICINE | Facility: OTHER | Age: 52
End: 2022-10-25
Attending: FAMILY MEDICINE
Payer: COMMERCIAL

## 2022-10-25 ENCOUNTER — APPOINTMENT (OUTPATIENT)
Dept: OTOLARYNGOLOGY | Facility: OTHER | Age: 52
End: 2022-10-25
Attending: OTOLARYNGOLOGY
Payer: COMMERCIAL

## 2022-10-25 VITALS
TEMPERATURE: 98.8 F | HEIGHT: 69 IN | HEART RATE: 98 BPM | WEIGHT: 249.8 LBS | BODY MASS INDEX: 37 KG/M2 | RESPIRATION RATE: 18 BRPM | OXYGEN SATURATION: 99 % | SYSTOLIC BLOOD PRESSURE: 126 MMHG | DIASTOLIC BLOOD PRESSURE: 80 MMHG

## 2022-10-25 DIAGNOSIS — Z01.818 PREOPERATIVE EXAMINATION: Primary | ICD-10-CM

## 2022-10-25 DIAGNOSIS — K21.9 GASTROESOPHAGEAL REFLUX DISEASE WITHOUT ESOPHAGITIS: ICD-10-CM

## 2022-10-25 LAB
ANION GAP SERPL CALCULATED.3IONS-SCNC: 10 MMOL/L (ref 3–14)
BUN SERPL-MCNC: 12 MG/DL (ref 7–25)
CALCIUM SERPL-MCNC: 9.5 MG/DL (ref 8.6–10.3)
CHLORIDE BLD-SCNC: 100 MMOL/L (ref 98–107)
CO2 SERPL-SCNC: 26 MMOL/L (ref 21–31)
CREAT SERPL-MCNC: 0.95 MG/DL (ref 0.7–1.3)
GFR SERPL CREATININE-BSD FRML MDRD: >90 ML/MIN/1.73M2
GLUCOSE BLD-MCNC: 176 MG/DL (ref 70–105)
HGB BLD-MCNC: 13.4 G/DL (ref 13.3–17.7)
POTASSIUM BLD-SCNC: 3.5 MMOL/L (ref 3.5–5.1)
SODIUM SERPL-SCNC: 136 MMOL/L (ref 134–144)

## 2022-10-25 PROCEDURE — 91312 COVID-19,PF,PFIZER BOOSTER BIVALENT: CPT

## 2022-10-25 PROCEDURE — G0463 HOSPITAL OUTPT CLINIC VISIT: HCPCS | Mod: 25

## 2022-10-25 PROCEDURE — 99214 OFFICE O/P EST MOD 30 MIN: CPT | Performed by: FAMILY MEDICINE

## 2022-10-25 PROCEDURE — 36415 COLL VENOUS BLD VENIPUNCTURE: CPT | Mod: ZL | Performed by: FAMILY MEDICINE

## 2022-10-25 PROCEDURE — 85018 HEMOGLOBIN: CPT | Mod: ZL | Performed by: FAMILY MEDICINE

## 2022-10-25 PROCEDURE — 0124A COVID-19,PF,PFIZER BOOSTER BIVALENT: CPT

## 2022-10-25 PROCEDURE — 80048 BASIC METABOLIC PNL TOTAL CA: CPT | Mod: ZL | Performed by: FAMILY MEDICINE

## 2022-10-25 RX ORDER — CETIRIZINE HYDROCHLORIDE 10 MG/1
20 TABLET ORAL AT BEDTIME
COMMUNITY
End: 2022-10-25

## 2022-10-25 RX ORDER — OXYCODONE HYDROCHLORIDE 5 MG/1
5-10 TABLET ORAL
COMMUNITY
Start: 2022-06-10 | End: 2022-10-25

## 2022-10-25 RX ORDER — PSEUDOEPHED/ACETAMINOPH/DIPHEN 30MG-500MG
TABLET ORAL
COMMUNITY
Start: 2022-06-10

## 2022-10-25 RX ORDER — SERTRALINE HYDROCHLORIDE 100 MG/1
100 TABLET, FILM COATED ORAL
COMMUNITY
End: 2022-10-25

## 2022-10-25 RX ORDER — PANTOPRAZOLE SODIUM 20 MG/1
40 TABLET, DELAYED RELEASE ORAL DAILY
Qty: 90 TABLET | Refills: 3 | Status: SHIPPED | OUTPATIENT
Start: 2022-10-25 | End: 2023-05-01

## 2022-10-25 RX ORDER — SUCRALFATE 1 G/1
1 TABLET ORAL
COMMUNITY
End: 2022-10-25

## 2022-10-25 RX ORDER — AMLODIPINE BESYLATE 2.5 MG/1
2.5 TABLET ORAL
COMMUNITY
End: 2023-11-30

## 2022-10-25 RX ORDER — FEXOFENADINE HCL 180 MG/1
360 TABLET ORAL
COMMUNITY
End: 2022-10-25

## 2022-10-25 RX ORDER — AMOXICILLIN 250 MG
2 CAPSULE ORAL
COMMUNITY
Start: 2022-06-10 | End: 2022-10-25

## 2022-10-25 RX ORDER — PANTOPRAZOLE SODIUM 20 MG/1
40 TABLET, DELAYED RELEASE ORAL
COMMUNITY
End: 2022-10-25

## 2022-10-25 RX ORDER — BETAMETHASONE DIPROPIONATE 0.05 %
OINTMENT (GRAM) TOPICAL
COMMUNITY
Start: 2022-02-24 | End: 2024-03-22

## 2022-10-25 RX ORDER — ALLOPURINOL 300 MG/1
300 TABLET ORAL
COMMUNITY
End: 2022-10-25

## 2022-10-25 RX ORDER — METHOCARBAMOL 750 MG/1
750 TABLET, FILM COATED ORAL
COMMUNITY
Start: 2022-06-10 | End: 2022-10-25

## 2022-10-25 ASSESSMENT — PAIN SCALES - GENERAL: PAINLEVEL: EXTREME PAIN (8)

## 2022-10-25 NOTE — NURSING NOTE
"Chief Complaint   Patient presents with     Pre-Op Exam     Throat surgery- Dr. Alex @ [facility]     Pt here for Pre-op throat surgery.    Initial /80 (BP Location: Right arm, Patient Position: Sitting, Cuff Size: Adult Large)   Pulse 98   Temp 98.8  F (37.1  C) (Tympanic)   Resp 18   Ht 1.753 m (5' 9\")   Wt 113.3 kg (249 lb 12.8 oz)   SpO2 99%   BMI 36.89 kg/m   Estimated body mass index is 36.89 kg/m  as calculated from the following:    Height as of this encounter: 1.753 m (5' 9\").    Weight as of this encounter: 113.3 kg (249 lb 12.8 oz).     Advance Care Directive on file? No  Advance Care Directive provided to patient? Given info    FOOD SECURITY SCREENING QUESTIONS:    The next two questions are to help us understand your food security.  If you are feeling you need any assistance in this area, we have resources available to support you today.    Hunger Vital Signs:  Within the past 12 months we worried whether our food would run out before we got money to buy more. Never  Within the past 12 months the food we bought just didn't last and we didn't have money to get more. Never    Medication review complete?: Yes      Rachel Ramirez LPN   "

## 2022-10-25 NOTE — PROGRESS NOTES
[facility]  [address]  [place] [address]  Phone: [phone]  Fax: [phone]  Primary Provider: Piter Kate  Pre-op Performing Provider: PITER KATE      PREOPERATIVE EVALUATION:  Today's date: 10/25/2022    Christiano Metcalf is a 52 year old male who presents for a preoperative evaluation.    Surgical Information:   Surgery/Procedure: Throat   Surgery Location: [facility]   Surgeon:   Surgery Date: 10/27/22   Time of Surgery: TBD  Where patient plans to recover: At home with family  Fax number for surgical facility: [phone]    Type of Anesthesia Anticipated: Choice    Assessment & Plan     The proposed surgical procedure is considered LOW risk.    Preoperative examination  He is optimized for his operating procedure.  No medication adjustments are necessary.  He will take his morning medications with a sip of water.  He will otherwise be n.p.o. after midnight.  - Basic Metabolic Panel; Future  - Hemoglobin; Future    Acid reflux  Protonix refilled at his request.  - pantoprazole (PROTONIX) 20 MG EC tablet; Take 2 tablets (40 mg) by mouth daily    COVID booster was given today.    RECOMMENDATION:  APPROVAL GIVEN to proceed with proposed procedure, without further diagnostic evaluation.      Subjective     HPI related to upcoming procedure: He was recently seen by ENT for some hearing loss and tinnitus.  During his evaluation he was found to have a lesion on one of his tonsils.  He is planning on having an upcoming biopsy of this lesion.  No previous history of bleeding or anesthesia problems.    The remainder of his chronic medical problems have been stable recently.    Preop Questions 10/25/2022   1. Have you ever had a heart attack or stroke? No   2. Have you ever had surgery on your heart or blood vessels, such as a stent placement, a coronary artery bypass, or surgery on an artery in your head, neck, heart, or legs? No   3. Do you have  chest pain with activity? No   4. Do you have a history of  heart failure? No   5. Do you currently have a cold, bronchitis or symptoms of other infection? No   6. Do you have a cough, shortness of breath, or wheezing? YES -history of asthma, uses albuterol once a week.   7. Do you or anyone in your family have previous history of blood clots? No   8. Do you or does anyone in your family have a serious bleeding problem such as prolonged bleeding following surgeries or cuts? No   9. Have you ever had problems with anemia or been told to take iron pills? No   10. Have you had any abnormal blood loss such as black, tarry or bloody stools? No   11. Have you ever had a blood transfusion? No   12. Are you willing to have a blood transfusion if it is medically needed before, during, or after your surgery? Yes   13. Have you or any of your relatives ever had problems with anesthesia? No   14. Do you have sleep apnea, excessive snoring or daytime drowsiness? No   15. Do you have any artifical heart valves or other implanted medical devices like a pacemaker, defibrillator, or continuous glucose monitor? No   16. Do you have artificial joints? No   17. Are you allergic to latex? No       Health Care Directive:  Patient does not have a Health Care Directive or Living Will: Discussed advance care planning with patient; however, patient declined at this time.    Preoperative Review of :   reviewed - controlled substances prescribed by other outside provider(s).    Review of Systems  CONSTITUTIONAL: NEGATIVE for fever, chills, change in weight  ENT/MOUTH: NEGATIVE for ear, mouth and throat problems  RESP: NEGATIVE for significant cough or SOB  CV: NEGATIVE for chest pain, palpitations or peripheral edema    Patient Active Problem List    Diagnosis Date Noted     Benign essential hypertension 05/13/2022     Priority: Medium     Morbid obesity (H) 05/13/2022     Priority: Medium     Psoriasis 09/04/2021     Priority: Medium      Mild intermittent asthma without complication 08/31/2018     Priority: Medium     Gout 02/01/2018     Priority: Medium     Learning disability 06/14/2011     Priority: Medium      Past Medical History:   Diagnosis Date     Gout     No Comments Provided     Hypomagnesemia     No Comments Provided     Pain in left knee     No Comments Provided     Personal history of other (healed) physical injury and trauma     2000,repair     Unspecified injury of unspecified wrist, hand and finger(s), initial encounter     1999     Past Surgical History:   Procedure Laterality Date     ARTHROSCOPY KNEE      Right knee meniscal repair, arthroscopic ally     ARTHROSCOPY KNEE      2010,Left knee scope     COLONOSCOPY N/A 09/30/2021    follow up 5 years family history, 9/30/2021     ESOPHAGOSCOPY, GASTROSCOPY, DUODENOSCOPY (EGD), COMBINED N/A 09/30/2021    Procedure: ESOPHAGOGASTRODUODENOSCOPY, WITH BIOPSY;  Surgeon: Jamel Kruger MD;  Location: [facility] OR     FINGER SURGERY      Right thumb ORIF     OTHER SURGICAL HISTORY      7/15/14,,HERNIA REPAIR,Left,LIH with mesh     OTHER SURGICAL HISTORY      5/10/16,,HERNIA REPAIR,Right,RIH with plug/patch     Current Outpatient Medications   Medication Sig Dispense Refill     ACETAMINOPHEN EXTRA STRENGTH 500 MG tablet        albuterol (PROAIR HFA/PROVENTIL HFA/VENTOLIN HFA) 108 (90 Base) MCG/ACT inhaler Inhale 2 puffs into the lungs 4 times daily as needed for shortness of breath / dyspnea 8.5 g 11     allopurinol (ZYLOPRIM) 300 MG tablet TAKE 1 TABLET(300 MG) BY MOUTH DAILY 90 tablet 3     amLODIPine (NORVASC) 2.5 MG tablet Take 2.5 mg by mouth       amLODIPine (NORVASC) 2.5 MG tablet Take 1 tablet (2.5 mg) by mouth daily 90 tablet 3     betamethasone dipropionate (DIPROSONE) 0.05 % external ointment        cetirizine (ZYRTEC) 10 MG tablet Take 20 mg by mouth At Bedtime       HUMIRA PEN 40 MG/0.8ML pen kit Inject 40 mg as directed every 14 days       pantoprazole (PROTONIX) 20 MG  "EC tablet Take 40 mg by mouth       sertraline (ZOLOFT) 100 MG tablet Take 100 mg by mouth       sertraline (ZOLOFT) 100 MG tablet Take 1 tablet (100 mg) by mouth daily 90 tablet 3     allopurinol (ZYLOPRIM) 300 MG tablet Take 300 mg by mouth (Patient not taking: Reported on 10/25/2022)       cetirizine (ZYRTEC) 10 MG tablet Take 1 tablet (10 mg) by mouth 2 times daily (Patient not taking: Reported on 10/25/2022) 180 tablet 3     fexofenadine (ALLEGRA) 180 MG tablet Take 360 mg by mouth (Patient not taking: Reported on 10/25/2022)       methocarbamol (ROBAXIN) 750 MG tablet Take 750 mg by mouth (Patient not taking: Reported on 10/25/2022)       oxyCODONE (ROXICODONE) 5 MG tablet Take 5-10 mg by mouth (Patient not taking: Reported on 10/25/2022)       senna-docusate (SENOKOT-S/PERICOLACE) 8.6-50 MG tablet Take 2 tablets by mouth (Patient not taking: Reported on 10/25/2022)       sucralfate (CARAFATE) 1 GM tablet Take 1 g by mouth (Patient not taking: Reported on 10/25/2022)         Allergies   Allergen Reactions     No Clinical Screening - See Comments      Peas - n/v - flushing - skin turns red  Spam - rash     Seasonal      Other reaction(s): Sneezing  Other reaction(s): Sneezing     Seasonal Allergies Difficulty breathing        Social History     Tobacco Use     Smoking status: Former     Types: Cigarettes     Quit date: 2010     Years since quittin.6     Smokeless tobacco: Never   Substance Use Topics     Alcohol use: Never           Objective     /80 (BP Location: Right arm, Patient Position: Sitting, Cuff Size: Adult Large)   Pulse 98   Temp 98.8  F (37.1  C) (Tympanic)   Resp 18   Ht 1.753 m (5' 9\")   Wt 113.3 kg (249 lb 12.8 oz)   SpO2 99%   BMI 36.89 kg/m      Physical Exam  GENERAL APPEARANCE: healthy, alert and no distress  HENT: ear canals and TM's normal and nose and mouth without ulcers or lesions  RESP: lungs clear to auscultation - no rales, rhonchi or wheezes  CV: regular rate " and rhythm, normal S1 S2, no S3 or S4 and no murmur, click or rub   NEURO: Normal strength and tone, sensory exam grossly normal, mentation intact and speech normal    Recent Labs   Lab Test 09/06/22  0824 08/29/22  1345 07/09/22  2239   HGB  --  12.1* 10.5*   PLT  --  198 192    136 137   POTASSIUM 3.5 3.1* 3.2*   CR 1.05 0.92 0.99        Diagnostics:  Labs pending at this time.  Results will be reviewed when available.   EKG from May show sinus rhythm without any new ischemic changes.    Revised Cardiac Risk Index (RCRI):  The patient has the following serious cardiovascular risks for perioperative complications:   - No serious cardiac risks = 0 points     RCRI Interpretation: 0 points: Class I (very low risk - 0.4% complication rate)           Signed Electronically by: Gino Frankel  Copy of this evaluation report is provided to requesting physician.

## 2022-10-27 ENCOUNTER — TRANSFERRED RECORDS (OUTPATIENT)
Dept: HEALTH INFORMATION MANAGEMENT | Facility: OTHER | Age: 52
End: 2022-10-27

## 2022-10-31 ENCOUNTER — LAB (OUTPATIENT)
Dept: LAB | Facility: OTHER | Age: 52
End: 2022-10-31
Attending: PHYSICIAN ASSISTANT
Payer: COMMERCIAL

## 2022-10-31 DIAGNOSIS — L40.0 PLAQUE PSORIASIS: ICD-10-CM

## 2022-10-31 LAB
ALBUMIN SERPL-MCNC: 3.8 G/DL (ref 3.5–5.7)
ALP SERPL-CCNC: 110 U/L (ref 34–104)
ALT SERPL W P-5'-P-CCNC: 114 U/L (ref 7–52)
ANION GAP SERPL CALCULATED.3IONS-SCNC: 9 MMOL/L (ref 3–14)
AST SERPL W P-5'-P-CCNC: 155 U/L (ref 13–39)
BILIRUB SERPL-MCNC: 0.7 MG/DL (ref 0.3–1)
BUN SERPL-MCNC: 17 MG/DL (ref 7–25)
CALCIUM SERPL-MCNC: 9.1 MG/DL (ref 8.6–10.3)
CHLORIDE BLD-SCNC: 99 MMOL/L (ref 98–107)
CO2 SERPL-SCNC: 31 MMOL/L (ref 21–31)
CREAT SERPL-MCNC: 1.03 MG/DL (ref 0.7–1.3)
GFR SERPL CREATININE-BSD FRML MDRD: 87 ML/MIN/1.73M2
GLUCOSE BLD-MCNC: 107 MG/DL (ref 70–105)
POTASSIUM BLD-SCNC: 3.2 MMOL/L (ref 3.5–5.1)
PROT SERPL-MCNC: 8.2 G/DL (ref 6.4–8.9)
SODIUM SERPL-SCNC: 139 MMOL/L (ref 134–144)

## 2022-10-31 PROCEDURE — 36415 COLL VENOUS BLD VENIPUNCTURE: CPT | Mod: ZL

## 2022-10-31 PROCEDURE — 80053 COMPREHEN METABOLIC PANEL: CPT | Mod: ZL

## 2022-11-08 ENCOUNTER — OFFICE VISIT (OUTPATIENT)
Dept: OTOLARYNGOLOGY | Facility: OTHER | Age: 52
End: 2022-11-08
Attending: OTOLARYNGOLOGY
Payer: COMMERCIAL

## 2022-11-08 DIAGNOSIS — Z09 POSTOP CHECK: Primary | ICD-10-CM

## 2022-11-08 PROCEDURE — G0463 HOSPITAL OUTPT CLINIC VISIT: HCPCS

## 2022-11-08 NOTE — NURSING NOTE
Patient here for post op panendoscopy and biopsy.Medication Reconciliation: complete.    Elenita Dominguez LPN  11/8/2022 10:02 AM

## 2022-11-08 NOTE — PROGRESS NOTES
document embedded image  Patient Name: Christiano Metcalf    Address: [address] [place], [place] [address]     YOB: 1970    [place] [address]    MR Number: [id number]    Phone: [phone]  PCP: Gino Frankel MD            Appointment Date: 11/08/22   Visit Provider: Vladimir Alex MD    cc: Gino Frankel MD; ~    ENT Progress Note  Intake  Visit Reasons: PO Panendoscopy and biopsy    HPI  History of Present Illness  Chief complaint: Postop check    History  The patient is a 52-year-old man who recently underwent panendoscopy and biopsy of a left palate lesion.  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  He is here today for postop check.  He still has some lingering low-grade discomfort at the biopsy site.     Exam  There is granulation tissue at the biopsy site but appears to be healing well.  There are no other lesions visible    Allergies    seasonal allergies Adverse Reaction (Verified 10/27/22 12:14)  Unknown    PFSH  PFSH:   Past Medical History: (Reviewed 10/26/22 @ 12:08 by Reid Neal MD)    Asthma  Chronic headaches  Hypertension     Social History: (Reviewed 10/26/22 @ 12:08 by Reid Neal MD)  Smoking Status:  Former smoker  second hand exposure:  Yes  alcohol intake:  former  substance use type:  does not use       A&P  Assessment & Plan  (1) Postop check:        Status: Acute        Code(s):  Z09 - Encounter for follow-up examination after completed treatment for conditions other than malignant neoplasm  He is welcome to return should other lesions or difficulties arise in the future.      Vladimir Alex MD    11/08/22 0859    <Electronically signed by Vladimir Alex MD> 11/09/22 6803

## 2022-11-15 ENCOUNTER — APPOINTMENT (OUTPATIENT)
Dept: OTOLARYNGOLOGY | Facility: OTHER | Age: 52
End: 2022-11-15
Attending: FAMILY MEDICINE
Payer: COMMERCIAL

## 2022-11-21 ENCOUNTER — OFFICE VISIT (OUTPATIENT)
Dept: FAMILY MEDICINE | Facility: OTHER | Age: 52
End: 2022-11-21
Attending: FAMILY MEDICINE
Payer: COMMERCIAL

## 2022-11-21 VITALS
RESPIRATION RATE: 17 BRPM | BODY MASS INDEX: 36.95 KG/M2 | TEMPERATURE: 98.1 F | OXYGEN SATURATION: 99 % | HEART RATE: 86 BPM | DIASTOLIC BLOOD PRESSURE: 92 MMHG | SYSTOLIC BLOOD PRESSURE: 128 MMHG | WEIGHT: 250.2 LBS

## 2022-11-21 DIAGNOSIS — R74.8 INCREASED LIVER ENZYMES: Primary | ICD-10-CM

## 2022-11-21 LAB — HBA1C MFR BLD: 8.5 % (ref 4–6.2)

## 2022-11-21 PROCEDURE — 83036 HEMOGLOBIN GLYCOSYLATED A1C: CPT | Mod: ZL | Performed by: FAMILY MEDICINE

## 2022-11-21 PROCEDURE — 36415 COLL VENOUS BLD VENIPUNCTURE: CPT | Mod: ZL | Performed by: FAMILY MEDICINE

## 2022-11-21 PROCEDURE — 99213 OFFICE O/P EST LOW 20 MIN: CPT | Performed by: FAMILY MEDICINE

## 2022-11-21 PROCEDURE — G0463 HOSPITAL OUTPT CLINIC VISIT: HCPCS

## 2022-11-21 ASSESSMENT — PAIN SCALES - GENERAL: PAINLEVEL: NO PAIN (0)

## 2022-11-21 NOTE — NURSING NOTE
"Chief Complaint   Patient presents with     RECHECK     Labs       Patient presents to the clinic for follow-up labs.    FOOD SECURITY SCREENING QUESTIONS:    The next two questions are to help us understand your food security.  If you are feeling you need any assistance in this area, we have resources available to support you today.    Hunger Vital Signs:  Within the past 12 months we worried whether our food would run out before we got money to buy more. Never  Within the past 12 months the food we bought just didn't last and we didn't have money to get more. Never    Advance Care Directive on file? No  Advance Care Directive provided to patient? Declined      Initial BP (!) 128/92 (BP Location: Right arm, Patient Position: Sitting, Cuff Size: Adult Large)   Pulse 86   Temp 98.1  F (36.7  C) (Tympanic)   Resp 17   Wt 113.5 kg (250 lb 3.2 oz)   SpO2 99%   BMI 36.95 kg/m   Estimated body mass index is 36.95 kg/m  as calculated from the following:    Height as of 10/25/22: 1.753 m (5' 9\").    Weight as of this encounter: 113.5 kg (250 lb 3.2 oz).  Medication Reconciliation: complete        Jacqui Sanon  "

## 2022-11-21 NOTE — PROGRESS NOTES
Assessment & Plan     Increased liver enzymes  Likely due to fatty liver.  He has had hepatitis labs drawn in June which were unremarkable.  We will repeat liver enzymes and hemoglobin A1c given his history of hyperglycemia.  We will also set him up for an ultrasound of his liver.  He will follow-up after his ultrasound to review results.  In meantime recommend weight loss.  - Hepatic Function Panel; Future  - Hemoglobin A1c; Future  - US Abdomen Limited; Future                 No follow-ups on file.    Gino Frankel MD  [facility]    Zully Merritt is a 52 year old presenting for the following health issues:  RECHECK (Labs/)    He has had liver enzyme elevation based on labs drawn by his dermatologist.  He comes in today for follow-up.    History of Present Illness       Reason for visit:  Follow up appointment    He eats 2-3 servings of fruits and vegetables daily.He consumes 5 sweetened beverage(s) daily.He exercises with enough effort to increase his heart rate 10 to 19 minutes per day.  He exercises with enough effort to increase his heart rate 3 or less days per week.   He is taking medications regularly.             Review of Systems         Objective    BP (!) 128/92 (BP Location: Right arm, Patient Position: Sitting, Cuff Size: Adult Large)   Pulse 86   Temp 98.1  F (36.7  C) (Tympanic)   Resp 17   Wt 113.5 kg (250 lb 3.2 oz)   SpO2 99%   BMI 36.95 kg/m    Body mass index is 36.95 kg/m .  Physical Exam   GENERAL: healthy, alert and no distress

## 2022-12-06 ENCOUNTER — APPOINTMENT (OUTPATIENT)
Dept: OTOLARYNGOLOGY | Facility: OTHER | Age: 52
End: 2022-12-06
Attending: FAMILY MEDICINE
Payer: COMMERCIAL

## 2022-12-09 ENCOUNTER — HOSPITAL ENCOUNTER (OUTPATIENT)
Dept: GENERAL RADIOLOGY | Facility: OTHER | Age: 52
Discharge: HOME | End: 2022-12-09
Attending: ORTHOPAEDIC SURGERY | Admitting: ORTHOPAEDIC SURGERY
Payer: COMMERCIAL

## 2022-12-09 DIAGNOSIS — Z98.1 ARTHRODESIS STATUS: ICD-10-CM

## 2022-12-09 PROCEDURE — 72040 X-RAY EXAM NECK SPINE 2-3 VW: CPT

## 2022-12-19 ENCOUNTER — HOSPITAL ENCOUNTER (OUTPATIENT)
Dept: ULTRASOUND IMAGING | Facility: OTHER | Age: 52
Discharge: HOME | End: 2022-12-19
Attending: FAMILY MEDICINE | Admitting: FAMILY MEDICINE
Payer: COMMERCIAL

## 2022-12-19 DIAGNOSIS — R74.8 INCREASED LIVER ENZYMES: ICD-10-CM

## 2022-12-19 PROCEDURE — 76705 ECHO EXAM OF ABDOMEN: CPT

## 2023-01-03 ENCOUNTER — APPOINTMENT (OUTPATIENT)
Dept: OTOLARYNGOLOGY | Facility: OTHER | Age: 53
End: 2023-01-03
Attending: OTOLARYNGOLOGY
Payer: COMMERCIAL

## 2023-03-06 ENCOUNTER — OFFICE VISIT (OUTPATIENT)
Dept: FAMILY MEDICINE | Facility: OTHER | Age: 53
End: 2023-03-06
Attending: NURSE PRACTITIONER
Payer: COMMERCIAL

## 2023-03-06 VITALS
OXYGEN SATURATION: 97 % | TEMPERATURE: 98.2 F | WEIGHT: 254.5 LBS | SYSTOLIC BLOOD PRESSURE: 130 MMHG | RESPIRATION RATE: 20 BRPM | HEART RATE: 72 BPM | BODY MASS INDEX: 37.58 KG/M2 | DIASTOLIC BLOOD PRESSURE: 84 MMHG

## 2023-03-06 DIAGNOSIS — J45.20 MILD INTERMITTENT ASTHMA WITHOUT COMPLICATION: ICD-10-CM

## 2023-03-06 DIAGNOSIS — J02.9 SORE THROAT: ICD-10-CM

## 2023-03-06 DIAGNOSIS — R05.1 ACUTE COUGH: Primary | ICD-10-CM

## 2023-03-06 DIAGNOSIS — Z20.822 EXPOSURE TO 2019 NOVEL CORONAVIRUS: ICD-10-CM

## 2023-03-06 DIAGNOSIS — R50.9 FEVER IN ADULT: ICD-10-CM

## 2023-03-06 LAB
FLUAV RNA SPEC QL NAA+PROBE: NEGATIVE
FLUBV RNA RESP QL NAA+PROBE: NEGATIVE
GROUP A STREP BY PCR: NOT DETECTED
RSV RNA SPEC NAA+PROBE: NEGATIVE
SARS-COV-2 RNA RESP QL NAA+PROBE: NEGATIVE

## 2023-03-06 PROCEDURE — 87637 SARSCOV2&INF A&B&RSV AMP PRB: CPT | Mod: ZL | Performed by: NURSE PRACTITIONER

## 2023-03-06 PROCEDURE — G0463 HOSPITAL OUTPT CLINIC VISIT: HCPCS

## 2023-03-06 PROCEDURE — 99213 OFFICE O/P EST LOW 20 MIN: CPT | Mod: CS | Performed by: NURSE PRACTITIONER

## 2023-03-06 PROCEDURE — C9803 HOPD COVID-19 SPEC COLLECT: HCPCS | Performed by: NURSE PRACTITIONER

## 2023-03-06 PROCEDURE — 87651 STREP A DNA AMP PROBE: CPT | Mod: ZL | Performed by: NURSE PRACTITIONER

## 2023-03-06 RX ORDER — GABAPENTIN 300 MG/1
CAPSULE ORAL
COMMUNITY
Start: 2023-02-18 | End: 2023-11-30

## 2023-03-06 RX ORDER — TRAZODONE HYDROCHLORIDE 50 MG/1
TABLET, FILM COATED ORAL
COMMUNITY
Start: 2022-12-16

## 2023-03-06 ASSESSMENT — PAIN SCALES - GENERAL: PAINLEVEL: SEVERE PAIN (7)

## 2023-03-06 ASSESSMENT — PATIENT HEALTH QUESTIONNAIRE - PHQ9: SUM OF ALL RESPONSES TO PHQ QUESTIONS 1-9: 1

## 2023-03-06 ASSESSMENT — ENCOUNTER SYMPTOMS
RHINORRHEA: 1
SINUS PRESSURE: 1
EYES NEGATIVE: 1
CARDIOVASCULAR NEGATIVE: 1
ABDOMINAL DISTENTION: 0
SORE THROAT: 1
ARTHRALGIAS: 1
COUGH: 1
FATIGUE: 1
HEADACHES: 1
APPETITE CHANGE: 1
ACTIVITY CHANGE: 1
SHORTNESS OF BREATH: 1
WHEEZING: 0
GASTROINTESTINAL NEGATIVE: 1
FEVER: 0

## 2023-03-06 NOTE — PROGRESS NOTES
Christiano Metcalf  1970    ASSESSMENT/PLAN:   1. Acute cough  2. Fever in adult  3. Sore throat  - Group A Streptococcus PCR Throat Swab  4. Exposure to 2019 novel coronavirus  5. Mild intermittent asthma without complication  - Symptomatic Influenza A/B, RSV, & SARS-CoV2 PCR (COVID-19) Nose    Patient is experiencing 4 to 5 days of URI symptoms including sinus congestion, headache, cough, wheezing, shortness of breath, fatigue and weakness.  Exposed to significant other which is positive for COVID-19 today.  Vital signs are stable today, oxygen 97%.  Lungs clear to auscultation, no wheezing.  Throat is red, erythematous and tonsils are swollen bilaterally +2+3, uvula swelling.  No visible exudate.  He does have tenderness and cervical adenopathy bilaterally.  Recommend proceeding with 19 test.  He would like to be tested for influenza, RSV and strep throat as well.  Think this is reasonable, as we are still seeing high volumes of strep throat in the community.  Will be notified of results and treated appropriately once they return.  He would be eligible to see Paxlovid should he test positive for COVID-19.  Over-the-counter conservative treatment options are reviewed with him today in clinic.    Patient agrees with plan of care and verbalizes understating. AVS printed. Patient education provided verbally and written instructions provided as requested. Patient made aware of emergent sings and symptoms to monitor for and when to seek additional care/follow up.     SUBJECTIVE:   CHIEF COMPLAINT/ REASON FOR VISIT  Patient presents with:  Throat Problem: Sore throat. Stuffy nose and body aches for 2 days. Girlfriend tested positive for COVID today    Hallie Urias LPN on 3/6/2023 at 3:43 PM       HISTORY OF PRESENT ILLNESS  Christiano Metcalf is a pleasant 53 year old male presents to [facility] clinic today after being exposed to COVID-19 by his girlfriend.  She tested positive earlier today.  Patient states on  "Thursday, about 5 days ago he developed slight headache and sinus congestion.  On Friday he woke up with a sore throat, body aches, feeling weak, congested, cough.  Has been feeling wheezy and short of breath, he has history of asthma.  He has been taking Tylenol cold and sinus and using his albuterol inhaler as needed.  He denies any known fever or chills.  He denies any gastrointestinal symptoms.  He states \" I feel like I was run over by a train\".    I have reviewed the nursing notes.  I have reviewed allergies, medication list, problem list, and past medical history.    REVIEW OF SYSTEMS  Review of Systems   Constitutional: Positive for activity change, appetite change and fatigue. Negative for fever.   HENT: Positive for congestion, rhinorrhea, sinus pressure and sore throat. Negative for ear pain.    Eyes: Negative.    Respiratory: Positive for cough and shortness of breath. Negative for wheezing.    Cardiovascular: Negative.  Negative for chest pain.   Gastrointestinal: Negative.  Negative for abdominal distention.   Genitourinary: Negative.    Musculoskeletal: Positive for arthralgias.   Neurological: Positive for headaches.        VITAL SIGNS  Vitals:    03/06/23 1546   BP: 130/84   BP Location: Right arm   Patient Position: Sitting   Cuff Size: Adult Large   Pulse: 72   Resp: 20   Temp: 98.2  F (36.8  C)   SpO2: 97%   Weight: 115.4 kg (254 lb 8 oz)      Body mass index is 37.58 kg/m .    OBJECTIVE:   PHYSICAL EXAM  Physical Exam  Vitals reviewed.   Constitutional:       Appearance: Normal appearance. He is ill-appearing. He is not toxic-appearing.   HENT:      Head: Normocephalic and atraumatic.      Right Ear: Tympanic membrane, ear canal and external ear normal.      Left Ear: Tympanic membrane, ear canal and external ear normal.      Ears:      Comments: Patient wears bilateral hearing aids     Nose: Congestion and rhinorrhea present.      Mouth/Throat:      Lips: Pink.      Pharynx: Uvula midline. " Posterior oropharyngeal erythema and uvula swelling present. No oropharyngeal exudate.      Tonsils: No tonsillar exudate. 3+ on the right. 3+ on the left.   Eyes:      Conjunctiva/sclera: Conjunctivae normal.   Cardiovascular:      Rate and Rhythm: Normal rate and regular rhythm.      Pulses: Normal pulses.      Heart sounds: Normal heart sounds. No murmur heard.  Pulmonary:      Effort: Pulmonary effort is normal. No respiratory distress.      Breath sounds: Normal breath sounds. No wheezing.   Musculoskeletal:      Cervical back: Neck supple. Tenderness present.   Lymphadenopathy:      Cervical: Cervical adenopathy present.   Skin:     Findings: No rash.   Neurological:      General: No focal deficit present.      Mental Status: He is alert and oriented to person, place, and time.   Psychiatric:         Mood and Affect: Mood normal.         Behavior: Behavior normal.         Thought Content: Thought content normal.         Judgment: Judgment normal.        DIAGNOSTICS  No results found for any visits on 03/06/23.     Faviola Lan NP  [facility]

## 2023-03-06 NOTE — PATIENT INSTRUCTIONS
Flonase nasal spray, twice daily.  This is an intranasal steroid.  This will help with sinus congestion and postnasal drip.  Afrin nasal spray is also an option.  This is a little bit stronger. I would not recommend using this longer than 3 days at this and cause rebound congestion.  Consider picking up a daily nondrowsy antihistamine such as Allegra, Claritin or Zyrtec.  This should be taken once daily.  This can help with sinus drainage, congestion which should also improve sore throat and reduce drainage causing a cough.  Stay well hydrated. Push water. If you need to flavor the water that is ok.  Caffeine does not help with dehydration, this actually worsens dehydration.  Avoid pop and coffee.  Adequate rest.  Your body needs time to recover to fight off this infection.  Tylenol   Acetaminophen, you can use regular strength or extra strength for fever or pain. Should you choose extra strength acetaminophen (Tylenol), that dosing is 1000 g every 8 hours as needed. Regular strength 650mg every 4 hours as needed.

## 2023-03-07 ENCOUNTER — TELEPHONE (OUTPATIENT)
Dept: FAMILY MEDICINE | Facility: OTHER | Age: 53
End: 2023-03-07
Payer: COMMERCIAL

## 2023-03-07 NOTE — TELEPHONE ENCOUNTER
Patient was seen in  yesterday for covid test.  He said he was negative but Dr. Lan was going to call him back, but he never received a call. He has questions.  Please call      Ranjana Booth on 3/7/2023 at 8:22 AM

## 2023-03-07 NOTE — TELEPHONE ENCOUNTER
Spoke with patient. He states that he was around someone who tested positive for COVID yesterday. He states he currently is asymptomatic. Advised patient that if he develops symptoms he can be retested for COVID. RACHEL León DNP  ....................  3/7/2023   9:05 AM

## 2023-06-20 ENCOUNTER — APPOINTMENT (OUTPATIENT)
Dept: OTOLARYNGOLOGY | Facility: OTHER | Age: 53
End: 2023-06-20
Attending: FAMILY MEDICINE
Payer: COMMERCIAL

## 2023-09-13 DIAGNOSIS — L29.9 EAR ITCHING: ICD-10-CM

## 2023-09-15 RX ORDER — CETIRIZINE HYDROCHLORIDE 10 MG/1
10 TABLET ORAL 2 TIMES DAILY
Qty: 60 TABLET | Refills: 11 | Status: SHIPPED | OUTPATIENT
Start: 2023-09-15 | End: 2024-09-20

## 2023-09-21 ENCOUNTER — LAB (OUTPATIENT)
Dept: LAB | Facility: OTHER | Age: 53
End: 2023-09-21
Payer: COMMERCIAL

## 2023-09-21 DIAGNOSIS — Z79.899 ENCOUNTER FOR LONG-TERM (CURRENT) USE OF MEDICATIONS: ICD-10-CM

## 2023-09-21 LAB
ALBUMIN SERPL BCG-MCNC: 4 G/DL (ref 3.5–5.2)
ALP SERPL-CCNC: 141 U/L (ref 40–129)
ALT SERPL W P-5'-P-CCNC: 58 U/L (ref 0–70)
ANION GAP SERPL CALCULATED.3IONS-SCNC: 11 MMOL/L (ref 7–15)
AST SERPL W P-5'-P-CCNC: 66 U/L (ref 0–45)
BASOPHILS # BLD AUTO: 0 10E3/UL (ref 0–0.2)
BASOPHILS NFR BLD AUTO: 0 %
BILIRUB SERPL-MCNC: 0.8 MG/DL
BUN SERPL-MCNC: 9.9 MG/DL (ref 6–20)
CALCIUM SERPL-MCNC: 9.4 MG/DL (ref 8.6–10)
CHLORIDE SERPL-SCNC: 101 MMOL/L (ref 98–107)
CREAT SERPL-MCNC: 0.9 MG/DL (ref 0.67–1.17)
DEPRECATED HCO3 PLAS-SCNC: 27 MMOL/L (ref 22–29)
EGFRCR SERPLBLD CKD-EPI 2021: >90 ML/MIN/1.73M2
EOSINOPHIL # BLD AUTO: 0.1 10E3/UL (ref 0–0.7)
EOSINOPHIL NFR BLD AUTO: 2 %
ERYTHROCYTE [DISTWIDTH] IN BLOOD BY AUTOMATED COUNT: 16.2 % (ref 10–15)
GLUCOSE SERPL-MCNC: 122 MG/DL (ref 70–99)
HCT VFR BLD AUTO: 40.9 % (ref 40–53)
HGB BLD-MCNC: 13.7 G/DL (ref 13.3–17.7)
IMM GRANULOCYTES # BLD: 0 10E3/UL
IMM GRANULOCYTES NFR BLD: 0 %
LYMPHOCYTES # BLD AUTO: 2.1 10E3/UL (ref 0.8–5.3)
LYMPHOCYTES NFR BLD AUTO: 33 %
MCH RBC QN AUTO: 26.9 PG (ref 26.5–33)
MCHC RBC AUTO-ENTMCNC: 33.5 G/DL (ref 31.5–36.5)
MCV RBC AUTO: 80 FL (ref 78–100)
MONOCYTES # BLD AUTO: 0.5 10E3/UL (ref 0–1.3)
MONOCYTES NFR BLD AUTO: 7 %
NEUTROPHILS # BLD AUTO: 3.6 10E3/UL (ref 1.6–8.3)
NEUTROPHILS NFR BLD AUTO: 58 %
NRBC # BLD AUTO: 0 10E3/UL
NRBC BLD AUTO-RTO: 0 /100
PLATELET # BLD AUTO: 152 10E3/UL (ref 150–450)
POTASSIUM SERPL-SCNC: 3.3 MMOL/L (ref 3.4–5.3)
PROT SERPL-MCNC: 8.1 G/DL (ref 6.4–8.3)
RBC # BLD AUTO: 5.09 10E6/UL (ref 4.4–5.9)
SODIUM SERPL-SCNC: 139 MMOL/L (ref 136–145)
WBC # BLD AUTO: 6.3 10E3/UL (ref 4–11)

## 2023-09-21 PROCEDURE — 86481 TB AG RESPONSE T-CELL SUSP: CPT | Mod: ZL

## 2023-09-21 PROCEDURE — 85025 COMPLETE CBC W/AUTO DIFF WBC: CPT | Mod: ZL

## 2023-09-21 PROCEDURE — 80053 COMPREHEN METABOLIC PANEL: CPT | Mod: ZL

## 2023-09-21 PROCEDURE — 36415 COLL VENOUS BLD VENIPUNCTURE: CPT | Mod: ZL

## 2023-09-23 LAB
QUANTIFERON MITOGEN: 10 IU/ML
QUANTIFERON NIL TUBE: 0.01 IU/ML
QUANTIFERON TB1 TUBE: 0.02 IU/ML
QUANTIFERON TB2 TUBE: 0.02

## 2023-09-25 LAB
GAMMA INTERFERON BACKGROUND BLD IA-ACNC: 0.01 IU/ML
M TB IFN-G BLD-IMP: NEGATIVE
M TB IFN-G CD4+ BCKGRND COR BLD-ACNC: 9.99 IU/ML
MITOGEN IGNF BCKGRD COR BLD-ACNC: 0.01 IU/ML
MITOGEN IGNF BCKGRD COR BLD-ACNC: 0.01 IU/ML

## 2023-10-07 ENCOUNTER — HEALTH MAINTENANCE LETTER (OUTPATIENT)
Age: 53
End: 2023-10-07

## 2023-10-17 DIAGNOSIS — I10 ESSENTIAL HYPERTENSION: ICD-10-CM

## 2023-10-17 DIAGNOSIS — M1A.00X0 IDIOPATHIC CHRONIC GOUT WITHOUT TOPHUS, UNSPECIFIED SITE: ICD-10-CM

## 2023-10-23 RX ORDER — AMLODIPINE BESYLATE 2.5 MG/1
2.5 TABLET ORAL DAILY
Qty: 90 TABLET | Refills: 3 | Status: SHIPPED | OUTPATIENT
Start: 2023-10-23 | End: 2023-11-30

## 2023-10-23 RX ORDER — ALLOPURINOL 300 MG/1
TABLET ORAL
Qty: 90 TABLET | Refills: 3 | Status: SHIPPED | OUTPATIENT
Start: 2023-10-23

## 2023-10-23 NOTE — TELEPHONE ENCOUNTER
[id number] of [place] sent Rx request for the following:      Requested Prescriptions   Pending Prescriptions Disp Refills    amLODIPine (NORVASC) 2.5 MG tablet [Pharmacy Med Name: amLODIPine Besylate 2.5 MG Oral Tablet] 90 tablet 0     Sig: Take 1 tablet by mouth once daily   Last Prescription Date:   9/6/22  Last Fill Qty/Refills:         90, R-3        allopurinol (ZYLOPRIM) 300 MG tablet [Pharmacy Med Name: Allopurinol 300 MG Oral Tablet] 90 tablet 0     Sig: Take 1 tablet by mouth once daily   Last Prescription Date:   9/6/22  Last Fill Qty/Refills:         90, R-3      Gout Agents Protocol Failed - 10/23/2023 11:39 AM        Failed - Has Uric Acid on file in past 12 months and value is less than 6     Recent Labs   Lab Test 09/04/18  1539   URIC 6.1     If level is 6mg/dL or greater, ok to refill one time and refer to provider.      Last Office Visit:              11/21/22   Future Office visit:             Next 5 appointments (look out 90 days)      Nov 30, 2023 11:00 AM  Office Visit with Blu Serna MD  [facility] ([facility] ) [address]  [place] [id number] [phone]          Unable to complete prescription refill per RN Medication Refill Policy.     Franchesca Abreu RN .............. 10/23/2023  11:41 AM

## 2023-11-18 DIAGNOSIS — K21.9 GASTROESOPHAGEAL REFLUX DISEASE WITHOUT ESOPHAGITIS: ICD-10-CM

## 2023-11-18 RX ORDER — PANTOPRAZOLE SODIUM 20 MG/1
TABLET, DELAYED RELEASE ORAL
Qty: 90 TABLET | Refills: 0 | Status: SHIPPED | OUTPATIENT
Start: 2023-11-18 | End: 2024-01-05

## 2023-11-30 ENCOUNTER — OFFICE VISIT (OUTPATIENT)
Dept: FAMILY MEDICINE | Facility: OTHER | Age: 53
End: 2023-11-30
Attending: FAMILY MEDICINE
Payer: COMMERCIAL

## 2023-11-30 VITALS
WEIGHT: 247.2 LBS | HEART RATE: 65 BPM | OXYGEN SATURATION: 96 % | BODY MASS INDEX: 36.61 KG/M2 | SYSTOLIC BLOOD PRESSURE: 134 MMHG | TEMPERATURE: 98 F | RESPIRATION RATE: 18 BRPM | HEIGHT: 69 IN | DIASTOLIC BLOOD PRESSURE: 82 MMHG

## 2023-11-30 DIAGNOSIS — M75.102 ROTATOR CUFF SYNDROME, LEFT: ICD-10-CM

## 2023-11-30 DIAGNOSIS — M1A.00X0 IDIOPATHIC CHRONIC GOUT WITHOUT TOPHUS, UNSPECIFIED SITE: ICD-10-CM

## 2023-11-30 DIAGNOSIS — F33.1 MODERATE EPISODE OF RECURRENT MAJOR DEPRESSIVE DISORDER (H): ICD-10-CM

## 2023-11-30 DIAGNOSIS — Z00.00 ROUTINE GENERAL MEDICAL EXAMINATION AT A HEALTH CARE FACILITY: Primary | ICD-10-CM

## 2023-11-30 DIAGNOSIS — I10 ESSENTIAL HYPERTENSION: ICD-10-CM

## 2023-11-30 DIAGNOSIS — Z87.891 PERSONAL HISTORY OF TOBACCO USE: ICD-10-CM

## 2023-11-30 DIAGNOSIS — E11.9 TYPE 2 DIABETES MELLITUS WITHOUT COMPLICATION, WITHOUT LONG-TERM CURRENT USE OF INSULIN (H): ICD-10-CM

## 2023-11-30 LAB
ANION GAP SERPL CALCULATED.3IONS-SCNC: 12 MMOL/L (ref 7–15)
BUN SERPL-MCNC: 10.3 MG/DL (ref 6–20)
CALCIUM SERPL-MCNC: 10 MG/DL (ref 8.6–10)
CHLORIDE SERPL-SCNC: 101 MMOL/L (ref 98–107)
CREAT SERPL-MCNC: 0.92 MG/DL (ref 0.67–1.17)
CREAT UR-MCNC: 84.9 MG/DL
DEPRECATED HCO3 PLAS-SCNC: 26 MMOL/L (ref 22–29)
EGFRCR SERPLBLD CKD-EPI 2021: >90 ML/MIN/1.73M2
GLUCOSE SERPL-MCNC: 84 MG/DL (ref 70–99)
HBA1C MFR BLD: 6.5 % (ref 4–6.2)
HOLD SPECIMEN: NORMAL
MICROALBUMIN UR-MCNC: 58.3 MG/L
MICROALBUMIN/CREAT UR: 68.67 MG/G CR (ref 0–17)
POTASSIUM SERPL-SCNC: 3.4 MMOL/L (ref 3.4–5.3)
SODIUM SERPL-SCNC: 139 MMOL/L (ref 135–145)
URATE SERPL-MCNC: 5.7 MG/DL (ref 3.4–7)

## 2023-11-30 PROCEDURE — 91320 SARSCV2 VAC 30MCG TRS-SUC IM: CPT

## 2023-11-30 PROCEDURE — 83036 HEMOGLOBIN GLYCOSYLATED A1C: CPT | Mod: ZL | Performed by: FAMILY MEDICINE

## 2023-11-30 PROCEDURE — 99396 PREV VISIT EST AGE 40-64: CPT | Performed by: FAMILY MEDICINE

## 2023-11-30 PROCEDURE — 99213 OFFICE O/P EST LOW 20 MIN: CPT | Mod: 25 | Performed by: FAMILY MEDICINE

## 2023-11-30 PROCEDURE — 82570 ASSAY OF URINE CREATININE: CPT | Mod: ZL | Performed by: FAMILY MEDICINE

## 2023-11-30 PROCEDURE — 82310 ASSAY OF CALCIUM: CPT | Mod: ZL | Performed by: FAMILY MEDICINE

## 2023-11-30 PROCEDURE — 84550 ASSAY OF BLOOD/URIC ACID: CPT | Mod: ZL | Performed by: FAMILY MEDICINE

## 2023-11-30 PROCEDURE — G0463 HOSPITAL OUTPT CLINIC VISIT: HCPCS | Mod: 25

## 2023-11-30 PROCEDURE — 90677 PCV20 VACCINE IM: CPT

## 2023-11-30 PROCEDURE — G0296 VISIT TO DETERM LDCT ELIG: HCPCS | Performed by: FAMILY MEDICINE

## 2023-11-30 PROCEDURE — 36415 COLL VENOUS BLD VENIPUNCTURE: CPT | Mod: ZL | Performed by: FAMILY MEDICINE

## 2023-11-30 RX ORDER — LISINOPRIL 20 MG/1
20 TABLET ORAL DAILY
Qty: 90 TABLET | Refills: 4 | Status: SHIPPED | OUTPATIENT
Start: 2023-11-30

## 2023-11-30 RX ORDER — SERTRALINE HYDROCHLORIDE 100 MG/1
100 TABLET, FILM COATED ORAL DAILY
Qty: 90 TABLET | Refills: 3 | Status: SHIPPED | OUTPATIENT
Start: 2023-11-30

## 2023-11-30 RX ORDER — ASPIRIN 81 MG/1
81 TABLET ORAL DAILY
COMMUNITY
Start: 2023-11-30 | End: 2024-03-22

## 2023-11-30 RX ORDER — HYDROCORTISONE 2.5 %
CREAM (GRAM) TOPICAL
COMMUNITY
Start: 2023-03-10

## 2023-11-30 RX ORDER — FLUOCINONIDE TOPICAL SOLUTION USP, 0.05% 0.5 MG/ML
SOLUTION TOPICAL
COMMUNITY
Start: 2023-03-09 | End: 2024-02-28

## 2023-11-30 RX ORDER — ATORVASTATIN CALCIUM 40 MG/1
40 TABLET, FILM COATED ORAL DAILY
Qty: 90 TABLET | Refills: 4 | Status: SHIPPED | OUTPATIENT
Start: 2023-11-30

## 2023-11-30 RX ORDER — GABAPENTIN 400 MG/1
CAPSULE ORAL
COMMUNITY
Start: 2023-11-24

## 2023-11-30 ASSESSMENT — ASTHMA QUESTIONNAIRES
QUESTION_3 LAST FOUR WEEKS HOW OFTEN DID YOUR ASTHMA SYMPTOMS (WHEEZING, COUGHING, SHORTNESS OF BREATH, CHEST TIGHTNESS OR PAIN) WAKE YOU UP AT NIGHT OR EARLIER THAN USUAL IN THE MORNING: ONCE A WEEK
QUESTION_5 LAST FOUR WEEKS HOW WOULD YOU RATE YOUR ASTHMA CONTROL: SOMEWHAT CONTROLLED
QUESTION_2 LAST FOUR WEEKS HOW OFTEN HAVE YOU HAD SHORTNESS OF BREATH: ONCE A DAY
EMERGENCY_ROOM_LAST_YEAR_TOTAL: ONE
QUESTION_4 LAST FOUR WEEKS HOW OFTEN HAVE YOU USED YOUR RESCUE INHALER OR NEBULIZER MEDICATION (SUCH AS ALBUTEROL): TWO OR THREE TIMES PER WEEK
ACT_TOTALSCORE: 14
ACT_TOTALSCORE: 14
QUESTION_1 LAST FOUR WEEKS HOW MUCH OF THE TIME DID YOUR ASTHMA KEEP YOU FROM GETTING AS MUCH DONE AT WORK, SCHOOL OR AT HOME: SOME OF THE TIME

## 2023-11-30 ASSESSMENT — PAIN SCALES - GENERAL: PAINLEVEL: MODERATE PAIN (5)

## 2023-11-30 ASSESSMENT — PATIENT HEALTH QUESTIONNAIRE - PHQ9
10. IF YOU CHECKED OFF ANY PROBLEMS, HOW DIFFICULT HAVE THESE PROBLEMS MADE IT FOR YOU TO DO YOUR WORK, TAKE CARE OF THINGS AT HOME, OR GET ALONG WITH OTHER PEOPLE: SOMEWHAT DIFFICULT
SUM OF ALL RESPONSES TO PHQ QUESTIONS 1-9: 5
SUM OF ALL RESPONSES TO PHQ QUESTIONS 1-9: 5

## 2023-11-30 NOTE — PROGRESS NOTES
SUBJECTIVE:   René is a 53 year old, presenting for the following:  Physical        2023    10:52 AM   Additional Questions   Roomed by DIOGO Hickman   Accompanied by Self         2023    10:52 AM   Patient Reported Additional Medications   Patient reports taking the following new medications N/A       Healthy Habits:     Taking medications regularly:  0  History of Present Illness       Reason for visit:  Physical    He eats 0-1 servings of fruits and vegetables daily.He consumes 4 sweetened beverage(s) daily.He exercises with enough effort to increase his heart rate 10 to 19 minutes per day.  He exercises with enough effort to increase his heart rate 4 days per week.   He is taking medications regularly.      Today's PHQ-9 Score:       2023    10:48 AM   PHQ-9 SCORE   PHQ-9 Total Score MyChart 5 (Mild depression)   PHQ-9 Total Score 5                       Social History     Tobacco Use     Smoking status: Former     Types: Cigarettes     Quit date: 2010     Years since quittin.7     Smokeless tobacco: Never   Substance Use Topics     Alcohol use: Never             2022     7:43 AM   Alcohol Use   Prescreen: >3 drinks/day or >7 drinks/week? No       Last PSA:   Prostate Specific Antigen Screen   Date Value Ref Range Status   2022 0.62 0.00 - 4.00 ug/L Final       Reviewed orders with patient. Reviewed health maintenance and updated orders accordingly - Yes  Labs reviewed in [facility]  Current Outpatient Medications   Medication Sig Dispense Refill     ACETAMINOPHEN EXTRA STRENGTH 500 MG tablet        albuterol (PROAIR HFA/PROVENTIL HFA/VENTOLIN HFA) 108 (90 Base) MCG/ACT inhaler INHALE 2 PUFFS INTO LUNGS 4 TIMES DAILY AS NEEDED FOR SHORTNESS OF BREATH 54 g 0     allopurinol (ZYLOPRIM) 300 MG tablet Take 1 tablet by mouth once daily 90 tablet 3     betamethasone dipropionate (DIPROSONE) 0.05 % external ointment        cetirizine (ZYRTEC) 10 MG tablet Take 1 tablet by mouth twice  daily 60 tablet 11     fluocinonide (LIDEX) 0.05 % external solution APPLY SOLUTION TOPICALLY TO SCALP TWICE DAILY AS NEEDED FOR FLARES       gabapentin (NEURONTIN) 400 MG capsule TAKE 1 CAPSULE BY MOUTH IN THE MORNING AND 1 CAPSULE IN THE EVENING. MAY TAKE 1 ADDITIONAL CAPSULE AS NEEDED PER DAY FOR ANXIETY OR PAIN.       HUMIRA PEN 40 MG/0.8ML pen kit Inject 40 mg as directed every 14 days       hydrocortisone 2.5 % cream APPLY CREAM TWICE DAILY TO AFFECTED ITCHY AREAS ON THE FACE, GROIN, AND AXILLA UNTIL CLEAR       pantoprazole (PROTONIX) 20 MG EC tablet Take 2 tablets by mouth once daily 90 tablet 0     sertraline (ZOLOFT) 100 MG tablet Take 1 tablet (100 mg) by mouth daily 90 tablet 3     SV MELATONIN 3 MG TBDP TAKE 1 TO 2 TABLETS BY MOUTH AT NIGHT FOR SLEEP FOR INSOMNIA       traZODone (DESYREL) 50 MG tablet TAKE 2 TO 3 TABLETS BY MOUTH AT BEDTIME FOR SLEEP       Allergies   Allergen Reactions     No Clinical Screening - See Comments Other (See Comments)     Peas - n/v - flushing - skin turns red    Spam - rash     Seasonal      Other reaction(s): Sneezing  Other reaction(s): Sneezing     Seasonal Allergies Difficulty breathing     Recent Labs   Lab Test 09/21/23  1338 11/21/22  1522 10/31/22  1040 10/25/22  1327 09/06/22  0824 05/13/22  1116 10/25/21  1140 09/04/21  1043 06/24/21  0020 05/07/21  0520 04/05/19  1102 09/04/18  1539   A1C  --  8.5*  --   --   --   --   --   --   --   --   --   --    LDL  --   --   --   --  100  --  73  --   --   --   --  126*   HDL  --   --   --   --  26  --  30  --   --   --   --  39   TRIG  --   --   --   --  207*  --  337*  --   --   --   --  170*   ALT 58  --  114*  --  93*   < > 63*   < > 80* 123*   < > 122*   CR 0.90  --  1.03   < > 1.05   < > 1.04   < > 1.15 0.97   < > 0.89   GFRESTIMATED >90  --  87   < > 85   < > 83   < > 67 82   < > >90   GFRESTBLACK  --   --   --   --   --   --   --   --  81 >90   < > >90   POTASSIUM 3.3*  --  3.2*   < > 3.5   < > 3.0*   < > 2.9* 3.3*  "  < > 3.4*    < > = values in this interval not displayed.        Reviewed and updated as needed this visit by clinical staff   Tobacco  Allergies  Meds   Med Hx  Surg Hx  Fam Hx  Soc Hx      Smoked from age [age] at 1-2 ppd     Reviewed and updated as needed this visit by Provider                     Review of Systems    A1c 1 year ago was high. he does not know or recall being told he has diabetes.     Sees derm for his psoriasis     Has significant loss of range of motion of left shoulder. Pain with lifting jug of milk and with reaching overhead.  Awaiting a referral to Dr. Byers to arrange surgery. Was seen here at the time of injury by Dr. Dickey and was advised to have surgery. Records review shows that he had seen Dr. Byers on 1/11/21, and has not had follow up since then. Had an MRI, 9/2020:  MPRESSION:   Marrow edema and effusion at the AC joint suggesting repetitive injury  and/or sequelae of bony contusion. No evidence of fracture.     Moderate tendinosis of the supraspinatous and infraspinatus tendons at  the humeral attachment site.     Mild tendinosis of the subscapularis.     MARCIANO ESTES MD          OBJECTIVE:   /82   Pulse 65   Temp 98  F (36.7  C) (Tympanic)   Resp 18   Ht 1.746 m (5' 8.75\")   Wt 112.1 kg (247 lb 3.2 oz)   SpO2 96%   BMI 36.77 kg/m      Physical Exam  GENERAL: healthy, alert and no distress  EYES: Eyes grossly normal to inspection, PERRL and conjunctivae and sclerae normal  HENT: ear canals and TM's normal, nose and mouth without ulcers or lesions  NECK: no adenopathy, no asymmetry, masses, or scars and thyroid normal to palpation  RESP: lungs clear to auscultation - no rales, rhonchi or wheezes  CV: regular rate and rhythm, normal S1 S2, no S3 or S4, no murmur, click or rub, no peripheral edema and peripheral pulses strong  ABDOMEN: soft, nontender, no hepatosplenomegaly, no masses and bowel sounds normal  MS: left shoulder with about 90 degrees of abduction " actively and passively I can get to about 100. Pain with empty can testing and impingement testing as well. No significant pain on palpation. Some guarding with exam   SKIN: no suspicious lesions or rashes  NEURO: Normal strength and tone, mentation intact and speech normal  PSYCH: mentation appears normal, affect normal/bright  Diabetic foot exam: normal DP and PT pulses, no trophic changes or ulcerative lesions, and abnormal monofilament testing to heels.    Diagnostic Test Results:  Labs reviewed in Epic  Results for orders placed or performed in visit on 11/30/23 (from the past 24 hour(s))   Basic Metabolic Panel   Result Value Ref Range    Sodium 139 135 - 145 mmol/L    Potassium 3.4 3.4 - 5.3 mmol/L    Chloride 101 98 - 107 mmol/L    Carbon Dioxide (CO2) 26 22 - 29 mmol/L    Anion Gap 12 7 - 15 mmol/L    Urea Nitrogen 10.3 6.0 - 20.0 mg/dL    Creatinine 0.92 0.67 - 1.17 mg/dL    GFR Estimate >90 >60 mL/min/1.73m2    Calcium 10.0 8.6 - 10.0 mg/dL    Glucose 84 70 - 99 mg/dL   Albumin Random Urine Quantitative with Creat Ratio   Result Value Ref Range    Creatinine Urine mg/dL 84.9 mg/dL    Albumin Urine mg/L 58.3 mg/L    Albumin Urine mg/g Cr 68.67 (H) 0.00 - 17.00 mg/g Cr   Hemoglobin A1c   Result Value Ref Range    Hemoglobin A1C 6.5 (H) 4.0 - 6.2 %   Uric acid   Result Value Ref Range    Uric Acid 5.7 3.4 - 7.0 mg/dL   Extra Tube    Narrative    The following orders were created for panel order Extra Tube.  Procedure                               Abnormality         Status                     ---------                               -----------         ------                     Extra Serum Separator Tu...[[id number]]                      Final result                 Please view results for these tests on the individual orders.   Extra Serum Separator Tube (SST)   Result Value Ref Range    Hold Specimen [facility]        ASSESSMENT/PLAN:   (Z00.00) Routine general medical examination at a health care facility   "(primary encounter diagnosis)  Comment:    Plan:      (F33.1) Moderate episode of recurrent major depressive disorder (H)  Comment: stable, refilled without changes  Plan: sertraline (ZOLOFT) 100 MG tablet             (E11.9) Type 2 diabetes mellitus without complication, without long-term current use of insulin (H)  Comment: has had this for a year, without treatment at this point. Will initiate all of the below meds, discussed with him diet changes, weight loss   Plan: atorvastatin (LIPITOR) 40 MG tablet, metFORMIN         (GLUCOPHAGE) 1000 MG tablet, Basic Metabolic         Panel, Albumin Random Urine Quantitative with         Creat Ratio, Hemoglobin A1c, aspirin 81 MG EC         tablet, AMB Adult Diabetes Educator Referral             (M1A.00X0) Idiopathic chronic gout without tophus, unspecified site  Comment: well controlled  Plan: Uric acid             (I10) Essential hypertension  Comment: change norvasc to this, given diabetes,  for renal protection  Plan: lisinopril (ZESTRIL) 20 MG tablet             (M75.102) Rotator cuff syndrome, left  Comment: perhaps now has some adhesive capsulitis as well.Has been lost to follow up by his orthopedist, so will make a new referral to resume care. I warned the pt he might giorgio another MRI  Plan:        (    (Z87.895) Personal history of tobacco use  Comment:    Plan: Prof fee: Shared Decision Making for Lung         Cancer Screening, CT Chest Lung Cancer Scrn Low        Dose wo                     COUNSELING:   Reviewed preventive health counseling, as reflected in patient instructions       Regular exercise       Healthy diet/nutrition      BMI:   Estimated body mass index is 36.77 kg/m  as calculated from the following:    Height as of this encounter: 1.746 m (5' 8.75\").    Weight as of this encounter: 112.1 kg (247 lb 3.2 oz).   Weight management plan: Discussed healthy diet and exercise guidelines      He reports that he quit smoking about 13 years ago. His smoking " use included cigarettes. He has never used smokeless tobacco.            Blu Serna MD  [facility] Cancer Screening Shared Decision Making Visit     Christiano Metcalf, a 53 year old male, is eligible for lung cancer screening    History   Smoking Status     Former     Types: Cigarettes     Quit date: 2/17/2010   Smokeless Tobacco     Never       I have discussed with patient the risks and benefits of screening for lung cancer with low-dose CT.     The risks include:    radiation exposure: one low dose chest CT has as much ionizing radiation as about 15 chest x-rays, or 6 months of background radiation living in [place]      false positives: most findings/nodules are NOT cancer, but some might still require additional diagnostic evaluation, including biopsy    over-diagnosis: some slow growing cancers that might never have been clinically significant will be detected and treated unnecessarily     The benefit of early detection of lung cancer is contingent upon adherence to annual screening or more frequent follow up if indicated.     Furthermore, to benefit from screening, Christiano must be willing and able to undergo diagnostic procedures, if indicated. Although no specific guide is available for determining severity of comorbidities, it is reasonable to withhold screening in patients who have greater mortality risk from other diseases.     We did discuss that the best way to prevent lung cancer is to not smoke.    Some patients may value a numeric estimation of lung cancer risk when evaluating if lung cancer screening is right for them, here is one calculator:    ShouldIScreen

## 2023-11-30 NOTE — PATIENT INSTRUCTIONS
Preventive Health Recommendations  Male Ages 50 - 64    Yearly exam:             See your health care provider every year in order to  o   Review health changes.   o   Discuss preventive care.    o   Review your medicines if your doctor has prescribed any.   Have a cholesterol test every 5 years, or more frequently if you are at risk for high cholesterol/heart disease.   Have a diabetes test (fasting glucose) every three years. If you are at risk for diabetes, you should have this test more often.   Have a colonoscopy at age 45, or have a yearly FIT test (stool test). These exams will check for colon cancer.    Talk with your health care provider about whether or not a prostate cancer screening test (PSA) is right for you.  You should be tested each year for STDs (sexually transmitted diseases), if you re at risk.     Shots: Get a flu shot each year. Get a tetanus shot every 10 years.     Nutrition:  Eat at least 5 servings of fruits and vegetables daily.   Eat whole-grain bread, whole-wheat pasta and brown rice instead of white grains and rice.   Get adequate Calcium and Vitamin D.     Lifestyle  Exercise for at least 150 minutes a week (30 minutes a day, 5 days a week). This will help you control your weight and prevent disease.   Limit alcohol to one drink per day.   No smoking.   Wear sunscreen to prevent skin cancer.   See your dentist every six months for an exam and cleaning.   See your eye doctor every 1 to 2 years.    Lung Cancer Screening   Frequently Asked Questions  If you are at high-risk for lung cancer, getting screened with low-dose computed tomography (LDCT) every year can help save your life. This handout offers answers to some of the most common questions about lung cancer screening. If you have other questions, please call [phone] ([phone]).     What is it?  Lung cancer screening uses special X-ray technology to create an image of your lung tissue. The exam is quick and easy and  takes less than 10 seconds. We don t give you any medicine or use any needles. You can eat before and after the exam. You don t need to change your clothes as long as the clothing on your chest doesn t contain metal. But, you do need to be able to hold your breath for at least 6 seconds during the exam.    What is the goal of lung cancer screening?  The goal of lung cancer screening is to save lives. Many times, lung cancer is not found until a person starts having physical symptoms. Lung cancer screening can help detect lung cancer in the earliest stages when it may be easier to treat.    Who should be screened for lung cancer?  We suggest lung cancer screening for anyone who is at high-risk for lung cancer. You are in the high-risk group if you:      are between the ages of 55 and 79, and    have smoked at least 1 pack of cigarettes a day for 20 or more years, and    still smoke or have quit within the past 15 years.    However, if you have a new cough or shortness of breath, you should talk to your doctor before being screened.    Why does it matter if I have symptoms?  Certain symptoms can be a sign that you have a condition in your lungs that should be checked and treated by your doctor. These symptoms include fever, chest pain, a new or changing cough, shortness of breath that you have never felt before, coughing up blood or unexplained weight loss. Having any of these symptoms can greatly affect the results of lung cancer screening.       Should all smokers get an LDCT lung cancer screening exam?  It depends. Lung cancer screening is for a very specific group of men and women who have a history of heavy smoking over a long period of time (see  Who should be screened for lung cancer  above).  I am in the high-risk group, but have been diagnosed with cancer in the past. Is LDCT lung cancer screening right for me?  In some cases, you should not have LDCT lung screening, such as when your doctor is already  following your cancer with CT scan studies. Your doctor will help you decide if LDCT lung screening is right for you.  Do I need to have a screening exam every year?  Yes. If you are in the high-risk group described earlier, you should get an LDCT lung cancer screening exam every year until you are 79, or are no longer willing or able to undergo screening and possible procedures to diagnose and treat lung cancer.  How effective is LDCT at preventing death from lung cancer?  Studies have shown that LDCT lung cancer screening can lower the risk of death from lung cancer by 20 percent in people who are at high-risk.  What are the risks?  There are some risks and limitations of LDCT lung cancer screening. We want to make sure you understand the risks and benefits, so please let us know if you have any questions. Your doctor may want to talk with you more about these risks.    Radiation exposure: As with any exam that uses radiation, there is a very small increased risk of cancer. The amount of radiation in LDCT is small--about the same amount a person would get from a mammogram. Your doctor orders the exam when he or she feels the potential benefits outweigh the risks.    False negatives: No test is perfect, including LDCT. It is possible that you may have a medical condition, including lung cancer, that is not found during your exam. This is called a false negative result.    False positives and more testing: LDCT very often finds something in the lung that could be cancer, but in fact is not. This is called a false positive result. False positive tests often cause anxiety. To make sure these findings are not cancer, you may need to have more tests. These tests will be done only if you give us permission. Sometimes patients need a treatment that can have side effects, such as a biopsy. For more information on false positives, see  What can I expect from the results?     Findings not related to lung cancer: Your LDCT  exam also takes pictures of areas of your body next to your lungs. In a very small number of cases, the CT scan will show an abnormal finding in one of these areas, such as your kidneys, adrenal glands, liver or thyroid. This finding may not be serious, but you may need more tests. Your doctor can help you decide what other tests you may need, if any.  What can I expect from the results?  About 1 out of 4 LDCT exams will find something that may need more tests. Most of the time, these findings are lung nodules. Lung nodules are very small collections of tissue in the lung. These nodules are very common, and the vast majority--more than 97 percent--are not cancer (benign). Most are normal lymph nodes or small areas of scarring from past infections.  But, if a small lung nodule is found to be cancer, the cancer can be cured more than 90 percent of the time. To know if the nodule is cancer, we may need to get more images before your next yearly screening exam. If the nodule has suspicious features (for example, it is large, has an odd shape or grows over time), we will refer you to a specialist for further testing.  Will my doctor also get the results?  Yes. Your doctor will get a copy of your results.  Is it okay to keep smoking now that there s a cancer screening exam?  No. Tobacco is one of the strongest cancer-causing agents. It causes not only lung cancer, but other cancers and cardiovascular (heart) diseases as well. The damage caused by smoking builds over time. This means that the longer you smoke, the higher your risk of disease. While it is never too late to quit, the sooner you quit, the better.  Where can I find help to quit smoking?  The best way to prevent lung cancer is to stop smoking. If you have already quit smoking, congratulations and keep it up! For help on quitting smoking, please call [employer] at 8-579-QUITNOW (1-423.517.6362) or the [employer] at 1-548.750.6197 to find local  resources near you.  One-on-one health coaching:  If you d prefer to work individually with a health care provider on tobacco cessation, we offer:      Medication Therapy Management:  Our specially trained pharmacists work closely with you and your doctor to help you quit smoking.  Call [phone] or [phone] (toll free).

## 2023-11-30 NOTE — NURSING NOTE
"Chief Complaint   Patient presents with    Physical       Initial /82   Pulse 65   Temp 98  F (36.7  C) (Tympanic)   Resp 18   Ht 1.746 m (5' 8.75\")   Wt 112.1 kg (247 lb 3.2 oz)   SpO2 96%   BMI 36.77 kg/m   Estimated body mass index is 36.77 kg/m  as calculated from the following:    Height as of this encounter: 1.746 m (5' 8.75\").    Weight as of this encounter: 112.1 kg (247 lb 3.2 oz).  Medication Reconciliation: complete          "

## 2023-12-04 ENCOUNTER — HOSPITAL ENCOUNTER (OUTPATIENT)
Dept: CT IMAGING | Facility: OTHER | Age: 53
Discharge: HOME | End: 2023-12-04
Attending: FAMILY MEDICINE | Admitting: FAMILY MEDICINE
Payer: COMMERCIAL

## 2023-12-04 DIAGNOSIS — Z87.891 PERSONAL HISTORY OF TOBACCO USE: ICD-10-CM

## 2023-12-04 PROCEDURE — 71271 CT THORAX LUNG CANCER SCR C-: CPT

## 2023-12-18 DIAGNOSIS — E11.9 TYPE 2 DIABETES MELLITUS WITHOUT COMPLICATION, WITHOUT LONG-TERM CURRENT USE OF INSULIN (H): ICD-10-CM

## 2023-12-18 NOTE — TELEPHONE ENCOUNTER
Message from pharmacy:  Pt is experiencing some GI issues/diarrhea and wishes to try Metformin ER tablets, would you be willing to try Metformin ER 50o mg tablets with directions 2 tabs (1000 mg) BID? If so, Please send updated Rx.    Franchesca Abreu RN .............. 12/18/2023  10:34 AM

## 2023-12-19 NOTE — TELEPHONE ENCOUNTER
Message from pharmacy:  Pt is experiencing some GI issues/diarrhea and wishes to try Metformin ER tablets, would you be willing to try Metformin  mg tablets with directions 2 tabs (1000 mg) BID? If so, Please send updated Rx.      Last Prescription Date:   Last Qty/Refills:  /   Last Office Visit: 11/30/2023  Future Office Visit: 2/29/2024     Requested Prescriptions   Pending Prescriptions Disp Refills    metFORMIN (GLUCOPHAGE XR) 500 MG 24 hr tablet       Sig: Take 2 tablets (1,000 mg) by mouth 2 times daily (with meals)       Biguanide Agents Passed - 12/19/2023  2:14 PM       Routing refill request to provider for review/approval because:  Patient is requesting a different prescription.      Precious Oliveros RN on 12/19/2023 at 2:15 PM

## 2023-12-20 RX ORDER — METFORMIN HCL 500 MG
1000 TABLET, EXTENDED RELEASE 24 HR ORAL 2 TIMES DAILY WITH MEALS
Qty: 90 TABLET | Refills: 4 | Status: SHIPPED | OUTPATIENT
Start: 2023-12-20 | End: 2024-01-25 | Stop reason: SINTOL

## 2023-12-21 ENCOUNTER — TELEPHONE (OUTPATIENT)
Dept: FAMILY MEDICINE | Facility: OTHER | Age: 53
End: 2023-12-21
Payer: COMMERCIAL

## 2023-12-21 NOTE — TELEPHONE ENCOUNTER
Notified [facility] of providers note.  Jessie Ramirez LPN ....................  12/21/2023   3:58 PM  EXT [id number]

## 2023-12-21 NOTE — TELEPHONE ENCOUNTER
Calling regarding metformin. States the prescription they received yesterday was for two 500 mg tablets twice a day while the old prescription was for one 1000 mg tablet twice a day. Pt states it was supposed to be an increase. Needing clarification

## 2023-12-21 NOTE — TELEPHONE ENCOUNTER
1000 milligram twice daily is the max dose that is effective. It is approved by the FDA for higher, but there is no benefit beyond 1000 milligram twice daily. Call him and clarify what he is thinking. Perhaps we need to add on additional meds if he is already taking the max and Idid not realize it?

## 2024-01-05 DIAGNOSIS — K21.9 GASTROESOPHAGEAL REFLUX DISEASE WITHOUT ESOPHAGITIS: ICD-10-CM

## 2024-01-05 RX ORDER — PANTOPRAZOLE SODIUM 20 MG/1
40 TABLET, DELAYED RELEASE ORAL DAILY
Qty: 180 TABLET | Refills: 0 | Status: SHIPPED | OUTPATIENT
Start: 2024-01-05 | End: 2024-04-11

## 2024-01-05 NOTE — TELEPHONE ENCOUNTER
Reason for call: Medication or medication refill    Name of medication requested: Pantoprazole 20 mg    How many days of medication do you have left? 0 days    What pharmacy do you use? [facility]    Preferred method for responding to this message: Telephone Call    Phone number patient can be reached at: Cell number on file:    Telephone Information:   Mobile [phone]       If we cannot reach you directly, may we leave a detailed response at the number you provided? Yes    Sybil Shi on 1/5/2024 at 8:57 AM

## 2024-01-05 NOTE — TELEPHONE ENCOUNTER
Requested Prescriptions   Pending Prescriptions Disp Refills    pantoprazole (PROTONIX) 20 MG EC tablet 90 tablet 0     Sig: Take 2 tablets (40 mg) by mouth daily     Last Prescription Date:   11/18/23  Last Fill Qty/Refills:         90, R-0    Last Office Visit:              11/30/23   Future Office visit:             Next 5 appointments (look out 90 days)      Feb 29, 2024 11:20 AM  SHORT with Blu Serna MD  [facility] ([facility] ) [address]  [place] [address]  [phone]          Per LOV note:  Return in about 3 months (around 2/29/2024) for Preventive Visit.     Prescription approved per [facility] Refill Protocol.    Franchesca Abreu RN .............. 1/5/2024  9:05 AM

## 2024-01-25 ENCOUNTER — TELEPHONE (OUTPATIENT)
Dept: EDUCATION SERVICES | Facility: OTHER | Age: 54
End: 2024-01-25

## 2024-01-25 ENCOUNTER — VIRTUAL VISIT (OUTPATIENT)
Dept: EDUCATION SERVICES | Facility: OTHER | Age: 54
End: 2024-01-25
Attending: FAMILY MEDICINE
Payer: COMMERCIAL

## 2024-01-25 DIAGNOSIS — I10 BENIGN ESSENTIAL HYPERTENSION: Primary | ICD-10-CM

## 2024-01-25 DIAGNOSIS — E11.9 TYPE 2 DIABETES MELLITUS WITHOUT COMPLICATION, WITHOUT LONG-TERM CURRENT USE OF INSULIN (H): Primary | ICD-10-CM

## 2024-01-25 PROCEDURE — G0108 DIAB MANAGE TRN  PER INDIV: HCPCS | Mod: TEL | Performed by: REGISTERED NURSE

## 2024-01-25 RX ORDER — AMLODIPINE BESYLATE 2.5 MG/1
2.5 TABLET ORAL DAILY
Qty: 35 TABLET | Refills: 0 | Status: SHIPPED | OUTPATIENT
Start: 2024-01-25 | End: 2024-03-04

## 2024-01-25 NOTE — PATIENT INSTRUCTIONS
Diabetes Goals and Reminders    Your A1c test should be done every 3 months.  Your goal is less than 8%.   Your last result is:  Lab Results   Component Value Date    A1C 6.5 11/30/2023       Your LDL cholesterol test should be done at least once a year.  Take a statin, if prescribed by your doctor, based on age and risk factors.  Your last result is:  LDL Cholesterol Calculated   Date Value Ref Range Status   09/06/2022 100 <=100 mg/dL Final   09/04/2018 126 (H) <100 mg/dL Final     Comment:     Above desirable:  100-129 mg/dl  Borderline High:  130-159 mg/dL  High:             160-189 mg/dL  Very high:       >189 mg/dl         Have blood pressure and weight checked every three months.  Your blood pressure goal is 140/90 or less.  Your last blood pressure reading was:   BP Readings from Last 1 Encounters:   11/30/23 134/82       Test your blood sugar 1 times per day.  Your home blood glucose target ranges are:  Fasting or Before meals:   2 hours after a meal: Less than 180      Avoid all tobacco.  Follow your healthy diet and exercise plan.  See the eye doctor every year.  See the dentist every six months.  Have kidney function tested yearly.    Take all medicine as prescribed.  Please let me know if you are having symptoms you don t expect or if you wish to stop any medicine.    Follow Up Plan  Please call or visit Diabetes Education if blood sugars are consistently out of target.  Your future lab plan is:  HgA1c in early March 2024.    If you need your cholesterol checked at your next appointment, you should fast 8 to 10 hours before your appointment.  Do not eat or drink anything besides water.  Drink plenty of water and take all your usual medicine.    SUMMARY FOR TODAY'S VISIT    1.  Begin testing blood sugar 1 time daily.  Discussed using a FreeStyle Karl 3 Continuous Glucose Monitoring (CGM) system.  A prescription will be sent to Dr Serna for approval.    2.  Discussed carbohydrate counting using the  Plate Method for portion control.  Reviewed balanced diet, food groups and incorporating variety, including fruits/vegetables/whole grains/lean protein/nuts and seeds into meals.      A key strategy in this plan is, along with medication need, to participate in low to moderate physical activity, such as walking, working up to a minimum of 30 minutes most days, as tolerated.       3.  Please follow-up for continued diabetes education, as needed.       Lisbeth Trevizo RN, BSN, [facility]  1/25/2024 9:15 AM

## 2024-01-25 NOTE — TELEPHONE ENCOUNTER
Requested Prescriptions   Pending Prescriptions Disp Refills    amLODIPine (NORVASC) 2.5 MG tablet       Sig: Take 1 tablet (2.5 mg) by mouth daily       Calcium Channel Blockers Protocol  Failed - 1/25/2024 10:07 AM        Failed - Medication is active on med list     Last Prescription Date:     11/30/23 1111 Discontinue Blu Serna MD      Outpatient Medication Detail     Disp Refills Start End ARACELI   amLODIPine (NORVASC) 2.5 MG tablet (Discontinued) 90 tablet 3 10/23/2023 11/30/2023 No   Sig - Route: Take 1 tablet by mouth once daily - Oral     Discontinuation was not discussed in visit.     Last Office Visit:              11/30/23   Future Office visit:             Next 5 appointments (look out 90 days)      Feb 28, 2024  8:00 AM  (Arrive by 7:45 AM)  SHORT with Blu Serna MD  [facility] ([facility] ) [address]  [place] [address]  [phone]          Per LOV note:    Return in about 3 months (around 2/29/2024) for Preventive Visit.    Unable to complete prescription refill per RN Medication Refill Policy.     Routing to covering provider for refill consideration, as PCP/provider is out of clinic >48 hours or Pt is completely out of medication and provider is out of the clinic today.    Franchesca Abreu RN .............. 1/25/2024  10:09 AM

## 2024-01-25 NOTE — TELEPHONE ENCOUNTER
Reason for call: Medication or medication refill    Name of medication requested: Amlodipine 2.5 mg    How many days of medication do you have left? 0 days    What pharmacy do you use? [facility]    Preferred method for responding to this message: Telephone Call    Phone number patient can be reached at: Cell number on file:    Telephone Information:   Mobile [phone]       If we cannot reach you directly, may we leave a detailed response at the number you provided? Yes    Sybil Shi on 1/25/2024 at 10:00 AM

## 2024-01-26 NOTE — PROGRESS NOTES
"Diabetes Self-Management Education & Support    Presents for:      Type of Service: Telephone Visit    Originating Location (Patient Location): Home  Distant Location (Provider Location): Offsite  Mode of Communication:  Telephone    Telephone Visit Start Time:  8:30 am  Telephone Visit End Time (telephone visit stop time): 9:30 am    How would patient like to obtain AVS? Mail a copy      ASSESSMENT:  Patient newly diagnosed T2DM.  Discussed pathophysiology with interpretation and meaning of HgA1c.  Stressed importance of testing BG daily to help keep tight control of T2DM, helping to minimize risk for possible complications of uncontrolled diabetes.  Discussed benefits of keeping A1c under 8% and encouraged patient to begin testing BG daily.    Patient states he stopped taking Metformin XR due to GI side effect, diarrhea.  \"I was taking just one tab daily, but diarrhea never stopped, so I stopped the Metformin and diarrhea went away.\"    Discussed carbohydrate counting using the Plate Method for portion control.  Reviewed balanced diet, food groups and incorporating variety, including fruits/vegetables/whole grains/lean protein/nuts and seeds into meals.      A key strategy in this plan is, along with medication need, to participate in low to moderate physical activity, such as walking, working up to a minimum of 30 minutes most days, as tolerated.   Patient states he walks his dog 1 - 2 miles most days of the week, notes his walk takes 30 - 45 minutes.    BG meter training discussed and patient requests to use CGM sensor to monitor glucose.      Current prerequisites for insurance coverage of CGM:  1.  Patient has diabetes:  YES     Patient meets 1 of 1 required criteria for insurance coverage of CGM.    Patient interested in [employer] Karl 3 CGM system.         Patient's most recent   Lab Results   Component Value Date    A1C 6.5 11/30/2023     is meeting goal of <8.0    Diabetes knowledge and skills assessment: " "  Patient is knowledgeable in diabetes management concepts related to: Being Active    Continue education with the following diabetes management concepts: Healthy Eating, Monitoring, Problem Solving, Reducing Risks, and Healthy Coping    Based on learning assessment above, most appropriate setting for further diabetes education would be: Individual setting.      PLAN    Discontinue Metformin due to GI side effect.  Begin monitoring glucose, prescription for FreeStyle Karl 3 sensors to be sent to PCP for approval.    Topics to cover at upcoming visits: Monitoring, Problem Solving, Reducing Risks, and Healthy Coping    Follow-up: 2/21/24, 11:00 am    See Care Plan for co-developed, patient-state behavior change goals.  AVS provided for patient today.    Education Materials Provided:  Type 2 Diabetes packet, Planning Healthy Meals, Activity Pyramid, A1c flyer, Tips on SMBG, Diabetes Success Plan, DSMS sheet, FreeStyle Karl 3 brochure, and New T2DM  folder.        SUBJECTIVE/OBJECTIVE:     Cultural Influences/Ethnic Background:  Not  or     Diabetes Symptoms & Complications:        Patient Problem List and Family Medical History reviewed for relevant medical history, current medical status, and diabetes risk factors.    Vitals:  There were no vitals taken for this visit.  Estimated body mass index is 36.77 kg/m  as calculated from the following:    Height as of 11/30/23: 1.746 m (5' 8.75\").    Weight as of 11/30/23: 112.1 kg (247 lb 3.2 oz).   Last 3 BP:   BP Readings from Last 3 Encounters:   11/30/23 134/82   03/06/23 130/84   11/21/22 (!) 128/92       History   Smoking Status    Former    Types: Cigarettes    Quit date: 2/17/2010   Smokeless Tobacco    Never       Labs:  Lab Results   Component Value Date    A1C 6.5 11/30/2023     Lab Results   Component Value Date    GLC 84 11/30/2023     10/31/2022     06/24/2021     Lab Results   Component Value Date     09/06/2022     " "09/04/2018     HDL Cholesterol   Date Value Ref Range Status   09/04/2018 39 23 - 92 mg/dL Final     Direct Measure HDL   Date Value Ref Range Status   09/06/2022 26 23 - 92 mg/dL Final   ]  GFR Estimate   Date Value Ref Range Status   11/30/2023 >90 >60 mL/min/1.73m2 Final   06/24/2021 67 >60 mL/min/[1.73_m2] Final     GFR Estimate If Black   Date Value Ref Range Status   06/24/2021 81 >60 mL/min/[1.73_m2] Final     Lab Results   Component Value Date    CR 0.92 11/30/2023    CR 1.15 06/24/2021     No results found for: \"MICROALBUMIN    Healthy Coping:     Patient Activation Measure Survey Score:       No data to display                  Care Plan and Education Provided:  Care Plan: Diabetes   Updates made by Lisbeth Trevizo RN since 1/25/2024 12:00 AM        Problem: HbA1C Not In Goal         Goal: Establish Regular Follow-Ups with PCP         Task: Discuss with PCP the recommended timing for patient's next follow up visit(s)    Responsible User: Lisbeth Trevizo RN        Task: Discuss schedule for PCP visits with patient Completed 1/25/2024   Responsible User: Lisbeth Trevizo RN        Goal: Get HbA1C Level in Goal         Task: Educate patient on diabetes education self-management topics Completed 1/25/2024   Responsible User: Lisbeth Trevizo RN        Task: Educate patient on benefits of regular glucose monitoring Completed 1/25/2024   Responsible User: Lisbeth Trevizo RN        Task: Refer patient to appropriate extended care team member, as needed (Medication Therapy Management, Behavioral Health, Physical Therapy, etc.)    Responsible User: Lisbeth Trevizo RN        Task: Discuss diabetes treatment plan with patient Completed 1/25/2024   Responsible User: Lisbeth Trevizo RN        Problem: Diabetes Self-Management Education Needed to Optimize Self-Care Behaviors         Goal: Understand diabetes pathophysiology and disease progression         Task: Provide education on diabetes pathophysiology and " disease progression specfic to patient's diabetes type Completed 1/25/2024   Responsible User: Lisbeth Trevizo RN        Goal: Healthy Eating - follow a healthy eating pattern for diabetes         Task: Provide education on portion control and consistency in amount, composition and timing of food intake Completed 1/25/2024   Responsible User: Lisbeth Trevizo RN        Task: Provide education on managing carbohydrate intake (carbohydrate counting, plate planning method, etc.)    Responsible User: Lisbeth Trevizo RN        Task: Provide education on weight management    Responsible User: Lisbeth Trevizo RN        Task: Provide education on heart healthy eating    Responsible User: Lisbeth Trevizo RN        Task: Provide education on eating out    Responsible User: Lisbeth Trevizo RN        Task: Develop individualized healthy eating plan with patient    Responsible User: Lisbeth Trevizo RN        Goal: Being Active - get regular physical activity, working up to at least 150 minutes per week         Task: Provide education on relationship of activity to glucose and precautions to take if at risk for low glucose Completed 1/25/2024   Responsible User: Lisbeth Trevizo RN        Task: Discuss barriers to physical activity with patient    Responsible User: Lisbeth Trevizo RN        Task: Develop physical activity plan with patient    Responsible User: Lisbeth Trevizo RN        Task: Explore community resources including walking groups, assistance programs, and home videos    Responsible User: Lisbeth Trevizo RN        Goal: Monitoring - monitor glucose and ketones as directed         Task: Provide education on blood glucose monitoring (purpose, proper technique, frequency, glucose targets, interpreting results, when to use glucose control solution, sharps disposal) Completed 1/25/2024   Responsible User: Lisbeth Trevizo RN        Task: Provide education on continuous glucose monitoring (sensor placement, use of  lisa or /reader, understanding glucose trends, alerts and alarms, differences between sensor glucose and blood glucose)    Responsible User: Lisbeth Trevizo RN   Note:    Consult given, questions answered regarding Continuous Glucose Monitoring (CGM), discussed different systems offered and insurance coverage.         Task: Provide education on ketone monitoring (when to monitor, frequency, etc.)    Responsible User: Lisbeth Trevizo RN        Goal: Taking Medication - patient is consistently taking medications as directed         Task: Provide education on action of prescribed medication, including when to take and possible side effects Completed 1/25/2024   Responsible User: Lisbeth Trevizo RN        Task: Provide education on insulin and injectable diabetes medications, including administration, storage, site selection and rotation for injection sites    Responsible User: Lisbeth Trevizo RN        Task: Discuss barriers to medication adherence with patient and provide management technique ideas as appropriate    Responsible User: Lisbeth Trevizo RN        Task: Provide education on frequency and refill details of medications    Responsible User: Lisbeth Trevizo RN        Goal: Problem Solving - know how to prevent and manage short-term diabetes complications         Task: Provide education on high blood glucose - causes, signs/symptoms, prevention and treatment Completed 1/25/2024   Responsible User: Lisbeth Trevizo RN        Task: Provide education on low blood glucose - causes, signs/symptoms, prevention, treatment, carrying a carbohydrate source at all times, and medical identification    Responsible User: Lisbeth Trevizo RN        Task: Provide education on safe travel with diabetes    Responsible User: Lisbeth Trevizo RN        Task: Provide education on how to care for diabetes on sick days    Responsible User: Lisbeth Trevizo RN        Task: Provide education on when to call a health care  provider    Responsible User: Lisbeth Trevizo RN        Goal: Reducing Risks - know how to prevent and treat long-term diabetes complications         Task: Provide education on major complications of diabetes, prevention, early diagnostic measures and treatment of complications Completed 1/25/2024   Responsible User: Lisbeth Trevizo RN        Task: Provide education on recommended care for dental, eye and foot health Completed 1/25/2024   Responsible User: Lisbeth Trevizo RN        Task: Provide education on Hemoglobin A1c - goals and relationship to blood glucose levels    Responsible User: Lisbeth Trevizo RN        Task: Provide education on recommendations for heart health - lipid levels and goals, blood pressure and goals, and aspirin therapy, if indicated    Responsible User: Lisbeth Trevizo RN        Task: Provide education on tobacco cessation    Responsible User: Lisbeth Trevizo RN        Goal: Healthy Coping - use available resources to cope with the challenges of managing diabetes         Task: Discuss recognizing feelings about having diabetes Completed 1/25/2024   Responsible User: Lisbeth Trevizo RN        Task: Provide education on the benefits of making appropriate lifestyle changes Completed 1/25/2024   Responsible User: Lisbeth Trevizo RN        Task: Provide education on benefits of utilizing support systems    Responsible User: Lisbeth Trevizo RN        Task: Discuss methods for coping with stress    Responsible User: Lisbeth Trevizo RN        Task: Provide education on when to seek professional counseling    Responsible User: Lisbeth Trevizo RN Suzette Childs, RN, BSN, [facility]  1/25/2024 9:01 AM     Time Spent: 60 minutes  Encounter Type: Individual    Any diabetes medication dose changes were made via the CDE Protocol per the patient's primary care provider. A copy of this encounter was shared with the provider.

## 2024-01-26 NOTE — TELEPHONE ENCOUNTER
Patient requests to use FreeStyle Karl 3 CGM for glucose monitoring.  Patient has [employer] insurance and currently meets 1 of 1 criteria for coverage:    1.  Patient has diabetes:  YES       If accepted as written, please sign pending orders.     Thank you,     Lisbeth Trevizo RN, BSN, [facility]  1/25/2024 10:21 PM

## 2024-01-29 RX ORDER — BLOOD-GLUCOSE SENSOR
1 EACH MISCELLANEOUS
Qty: 2 EACH | Refills: 11 | Status: SHIPPED | OUTPATIENT
Start: 2024-01-29 | End: 2024-07-10

## 2024-01-29 RX ORDER — KETOROLAC TROMETHAMINE 30 MG/ML
1 INJECTION, SOLUTION INTRAMUSCULAR; INTRAVENOUS SEE ADMIN INSTRUCTIONS
Qty: 1 EACH | Refills: 0 | Status: SHIPPED | OUTPATIENT
Start: 2024-01-29 | End: 2024-02-21

## 2024-01-30 ENCOUNTER — HOSPITAL ENCOUNTER (OUTPATIENT)
Dept: MRI IMAGING | Facility: OTHER | Age: 54
Discharge: HOME | End: 2024-01-30
Attending: ORTHOPAEDIC SURGERY | Admitting: ORTHOPAEDIC SURGERY
Payer: COMMERCIAL

## 2024-01-30 DIAGNOSIS — M75.42 IMPINGEMENT SYNDROME OF LEFT SHOULDER: ICD-10-CM

## 2024-01-30 PROCEDURE — 73221 MRI JOINT UPR EXTREM W/O DYE: CPT | Mod: LT

## 2024-02-05 ENCOUNTER — TELEPHONE (OUTPATIENT)
Dept: FAMILY MEDICINE | Facility: OTHER | Age: 54
End: 2024-02-05
Payer: COMMERCIAL

## 2024-02-05 DIAGNOSIS — I10 BENIGN ESSENTIAL HYPERTENSION: ICD-10-CM

## 2024-02-05 RX ORDER — AMLODIPINE BESYLATE 2.5 MG/1
2.5 TABLET ORAL DAILY
Qty: 35 TABLET | Refills: 0 | Status: CANCELLED | OUTPATIENT
Start: 2024-02-05

## 2024-02-05 NOTE — TELEPHONE ENCOUNTER
It appears that this med request was also sent to Dr Berrios on 1/25.  I do not see that it has been addressed however.  Can you send over this RX in Dr Serna's absence, please.

## 2024-02-05 NOTE — TELEPHONE ENCOUNTER
Pt is looking for Amlodipine prescription that was suppose to be sent over Carlos Baig on 2/5/2024 at 10:33 AM

## 2024-02-21 ENCOUNTER — ALLIED HEALTH/NURSE VISIT (OUTPATIENT)
Dept: EDUCATION SERVICES | Facility: OTHER | Age: 54
End: 2024-02-21
Attending: FAMILY MEDICINE
Payer: COMMERCIAL

## 2024-02-21 DIAGNOSIS — E11.9 TYPE 2 DIABETES MELLITUS WITHOUT COMPLICATION, WITHOUT LONG-TERM CURRENT USE OF INSULIN (H): Primary | ICD-10-CM

## 2024-02-21 PROCEDURE — 95249 CONT GLUC MNTR PT PROV EQP: CPT | Performed by: REGISTERED NURSE

## 2024-02-21 NOTE — PATIENT INSTRUCTIONS
Continuous Glucose Monitoring Personal Start      Congratulations! You have decided to utilize the FreeStyle Karl 3 Continuous Glucose Monitoring System. We strongly believe in the use of the new technologies to better assist you in managing your diabetes. These technologies give both you and your provider a better understanding of your diabetes and current trends, which will enable you to improve control of your blood glucose levels.      Low and high alerts are set 70 mg/dL and 200 mg/dL. Sensor inserted today at 11:57 am.     You may view your CGM information from your cell phone FreeStyle Karl 3 lisa.  Choose Menu and scroll down to view the different options.     PLAN:      1.  Scan your sensor with your cell phone to begin sensor session .      2. Events - You can enter events such as meal time carbohydrates or activity.    3. Battery - Keep phone charged to continue receiving sensor glucose data.        4. Please follow up if any questions or concerns.  Remember if you have FreeStyle Karl questions you can also call the 24 hour technical support line at FreeStyle Karl Customer Support at [phone].      Lisbeth Trevizo RN, BSN, [facility]  2/21/2024 12:03 PM

## 2024-02-27 ASSESSMENT — ASTHMA QUESTIONNAIRES
QUESTION_3 LAST FOUR WEEKS HOW OFTEN DID YOUR ASTHMA SYMPTOMS (WHEEZING, COUGHING, SHORTNESS OF BREATH, CHEST TIGHTNESS OR PAIN) WAKE YOU UP AT NIGHT OR EARLIER THAN USUAL IN THE MORNING: TWO OR THREE NIGHTS A WEEK
QUESTION_1 LAST FOUR WEEKS HOW MUCH OF THE TIME DID YOUR ASTHMA KEEP YOU FROM GETTING AS MUCH DONE AT WORK, SCHOOL OR AT HOME: A LITTLE OF THE TIME
ACT_TOTALSCORE: 14
ACT_TOTALSCORE: 14
QUESTION_2 LAST FOUR WEEKS HOW OFTEN HAVE YOU HAD SHORTNESS OF BREATH: ONCE A DAY
QUESTION_4 LAST FOUR WEEKS HOW OFTEN HAVE YOU USED YOUR RESCUE INHALER OR NEBULIZER MEDICATION (SUCH AS ALBUTEROL): TWO OR THREE TIMES PER WEEK
QUESTION_5 LAST FOUR WEEKS HOW WOULD YOU RATE YOUR ASTHMA CONTROL: SOMEWHAT CONTROLLED

## 2024-02-27 ASSESSMENT — PATIENT HEALTH QUESTIONNAIRE - PHQ9
SUM OF ALL RESPONSES TO PHQ QUESTIONS 1-9: 11
10. IF YOU CHECKED OFF ANY PROBLEMS, HOW DIFFICULT HAVE THESE PROBLEMS MADE IT FOR YOU TO DO YOUR WORK, TAKE CARE OF THINGS AT HOME, OR GET ALONG WITH OTHER PEOPLE: NOT DIFFICULT AT ALL
SUM OF ALL RESPONSES TO PHQ QUESTIONS 1-9: 11

## 2024-02-27 NOTE — COMMUNITY RESOURCES LIST (ENGLISH)
02/27/2024   [facility]  N/A  For questions about this resource list or additional care needs, please contact your primary care clinic or care manager.  Phone: [phone]   Email: N/A   Address: [address] [place] [id number]   Hours: N/A        Food and Nutrition       Food pantry  1  [facility] Distance: 1.38 miles      In-Person   [address] [address]  Language: English  Hours: Mon - Thu 11:00 AM - 3:30 PM  Fees: Free   Phone: (484) 640-6955 Email: info@[employer] Website: https://[employer]     2  [facility] Food Shelf Distance: 15.11 miles      [facility]   [address] [place] [address]  Language: English  Hours: Thu 10:00 AM - 1:00 PM  Fees: Free   Phone: (518) 653-6654 Email: violet@[employer] Website: https://www.[employer] AdvisorsRiverAreaFoodShelf/     SNAP application assistance  3  [facility] Distance: 0.48 miles      [address] [place] [address]  Language: English  Hours: Mon - Fri 8:00 AM - 4:30 PM  Fees: Free   Phone: (102) 329-8247 Website: https://www.[facility]./[facility]/Health-Human-Services     4  [facility] Economic Opportunity [facility] Distance: 0.49 miles      In-Person, Phone/Virtual   [address] [place] [address]  Language: English  Hours: Mon - Fri 8:00 AM - 4:30 PM  Fees: Free   Phone: (386) 769-3841 Website: https://www.[employer]/partner/xjvhpytgx-bfefqkfx-akhzeyynezb-[facility]-grand-rapids          Important Numbers & Websites       Emergency Services   911  City Services   311  Poison Control   (782) 790-8139  Suicide Prevention Lifeline   (861) 803-1061 (TALK)  Child Abuse Hotline   (965) 726-2071 (4-A-Child)  Sexual Assault Hotline   (369) 786-7092 (HOPE)  National Runaway Safeline   (712) 156-4855 (RUNAWAY)  All-Options Talkline   (883) 396-9527  Substance Abuse  Referral   (386) 836-6117 (HELP)

## 2024-02-28 ENCOUNTER — OFFICE VISIT (OUTPATIENT)
Dept: FAMILY MEDICINE | Facility: OTHER | Age: 54
End: 2024-02-28
Attending: FAMILY MEDICINE
Payer: COMMERCIAL

## 2024-02-28 VITALS
SYSTOLIC BLOOD PRESSURE: 130 MMHG | RESPIRATION RATE: 16 BRPM | DIASTOLIC BLOOD PRESSURE: 82 MMHG | HEART RATE: 75 BPM | BODY MASS INDEX: 36.32 KG/M2 | TEMPERATURE: 97.8 F | OXYGEN SATURATION: 98 % | WEIGHT: 244.2 LBS

## 2024-02-28 DIAGNOSIS — E11.9 TYPE 2 DIABETES MELLITUS WITHOUT COMPLICATION, WITHOUT LONG-TERM CURRENT USE OF INSULIN (H): Primary | ICD-10-CM

## 2024-02-28 LAB
CHOLEST SERPL-MCNC: 97 MG/DL
FASTING STATUS PATIENT QL REPORTED: ABNORMAL
HBA1C MFR BLD: 6.4 % (ref 4–6.2)
HDLC SERPL-MCNC: 33 MG/DL
LDLC SERPL CALC-MCNC: 28 MG/DL
NONHDLC SERPL-MCNC: 64 MG/DL
TRIGL SERPL-MCNC: 181 MG/DL

## 2024-02-28 PROCEDURE — 80061 LIPID PANEL: CPT | Mod: ZL | Performed by: FAMILY MEDICINE

## 2024-02-28 PROCEDURE — 83036 HEMOGLOBIN GLYCOSYLATED A1C: CPT | Mod: ZL | Performed by: FAMILY MEDICINE

## 2024-02-28 PROCEDURE — 99213 OFFICE O/P EST LOW 20 MIN: CPT | Performed by: FAMILY MEDICINE

## 2024-02-28 PROCEDURE — 36415 COLL VENOUS BLD VENIPUNCTURE: CPT | Mod: ZL | Performed by: FAMILY MEDICINE

## 2024-02-28 PROCEDURE — G0463 HOSPITAL OUTPT CLINIC VISIT: HCPCS

## 2024-02-28 ASSESSMENT — PAIN SCALES - GENERAL: PAINLEVEL: WORST PAIN (10)

## 2024-02-28 NOTE — PROGRESS NOTES
Diabetes Self-Management Education & Support    Presents for: Personal CGM Start    Type of Service: In Person Visit           Sensor was inserted with no resistance or bleeding at insertion site.    CGM-specific education:   Freestyle Karl sensor: insertion technique, sensor site location and rotation, insulin administration in relation to sensor placement, sensor wear, reasons to remove sensor (MRI, CT, diathermy), Vitamin C & Aspirin effects on sensor, Karl [place]: frequency of scanning sensor, length of time data is visible, use of built in glucose meter, Precision X-tra test strips, and Use of trends and graphs for pattern management and problem solving         ASSESSMENT:  Patient was instructed in features of FreeStyle Karl 3 CGMS. Patient will wear the sensor for 14 days and can view data on his smart phone lisa.        Patient demonstrated understanding by inserting his own sensor.  Sensor inserted without difficulty, scanned sensor to begin sensor session.  Helped patient program his cell phone lisa.  Glucose settings set 70 - 200 mg/dL.          Pt verbalized understanding of concepts discussed and recommendations provided today.    Continue education with the following diabetes management concepts: Healthy Eating, Problem Solving, and Reducing Risks    PLAN    Begin using FreeStyle Karl 3 CGM and follow up, as needed, for continued diabetes education.     Follow-up: TBD per patient schedule.     See Care Plan for co-developed, patient-state behavior change goals.  AVS provided for patient today.    Education Materials Provided:  FreeStyle Karl 3 Technical Support contact information.      SUBJECTIVE/OBJECTIVE:  Presents for: Personal CGM Start  Accompanied by: Self  Diabetes education in the past 24mo: Yes  Focus of Visit: CGM  Type of CGM visit: Personal CGM Start  Diabetes type: Type 2  Disease course: Other (see Comments)  Cultural Influences/Ethnic Background:  Not  or     Diabetes  "Symptoms & Complications:  Diabetes Related Symptoms: Fatigue, Polyphagia (increased hunger), Visual change, Slow healing wounds  Weight trend: Decreasing  Symptom course: Stable  Disease course: Other (see Comments)       Patient Problem List and Family Medical History reviewed for relevant medical history, current medical status, and diabetes risk factors.    Vitals:  There were no vitals taken for this visit.  Estimated body mass index is 36.32 kg/m  as calculated from the following:    Height as of 11/30/23: 1.746 m (5' 8.75\").    Weight as of 2/28/24: 110.8 kg (244 lb 3.2 oz).   Last 3 BP:   BP Readings from Last 3 Encounters:   02/28/24 130/82   11/30/23 134/82   03/06/23 130/84       History   Smoking Status    Former    Types: Cigarettes    Quit date: 2/17/2010   Smokeless Tobacco    Never       Labs:  Lab Results   Component Value Date    A1C 6.4 02/28/2024     Lab Results   Component Value Date    GLC 84 11/30/2023     10/31/2022     06/24/2021     Lab Results   Component Value Date    LDL 28 02/28/2024     09/04/2018     HDL Cholesterol   Date Value Ref Range Status   09/04/2018 39 23 - 92 mg/dL Final     Direct Measure HDL   Date Value Ref Range Status   02/28/2024 33 (L) >=40 mg/dL Final   ]  GFR Estimate   Date Value Ref Range Status   11/30/2023 >90 >60 mL/min/1.73m2 Final   06/24/2021 67 >60 mL/min/[1.73_m2] Final     GFR Estimate If Black   Date Value Ref Range Status   06/24/2021 81 >60 mL/min/[1.73_m2] Final     Lab Results   Component Value Date    CR 0.92 11/30/2023    CR 1.15 06/24/2021     No results found for: \"MICROALBUMIN\"    Healthy Eating:  Meal planning/habits: Low carb, Low salt  Beverages: Water    Being Active:       Monitoring:  Monitoring Assessed Today: Yes  Did patient bring glucose meter to appointment? : Yes  Blood Glucose Meter: CGM  Times checking blood sugar at home (number): Never (Beginning FreeStyle Karl 3 CGM today.)      Taking " Medications:      Current Treatments: None    Problem Solving:  Patient carries a carbohydrate source: No    Hypoglycemia Symptoms  Hypoglycemia: Headaches    Hypoglycemia Complications  Hypoglycemia Complications: None    Reducing Risks:       Healthy Coping:  Stage of change: PREPARATION (Decided to change - considering how)  Support resources: Offerings in Clinic Communities  Patient Activation Measure Survey Score:       No data to display                  Care Plan and Education Provided:  There are no care plans that you recently modified to display for this patient.      Lisbeth Trevizo RN, BSN, [facility]  2/21/2024 12:36 PM     Time Spent: 60 minutes  Encounter Type: Individual    Any diabetes medication dose changes were made via the CDE Protocol per the patient's primary care provider. A copy of this encounter was shared with the provider.

## 2024-02-28 NOTE — NURSING NOTE
"Chief Complaint   Patient presents with    Diabetes       Initial /82   Pulse 75   Temp 97.8  F (36.6  C) (Tympanic)   Resp 16   Wt 110.8 kg (244 lb 3.2 oz)   SpO2 98%   BMI 36.32 kg/m   Estimated body mass index is 36.32 kg/m  as calculated from the following:    Height as of 11/30/23: 1.746 m (5' 8.75\").    Weight as of this encounter: 110.8 kg (244 lb 3.2 oz).  Medication Reconciliation: complete          "

## 2024-02-28 NOTE — PROGRESS NOTES
Assessment & Plan     (E11.9) Type 2 diabetes mellitus without complication, without long-term current use of insulin (H)  (primary encounter diagnosis)  Comment: great control, no changes to meds. Can follow up in 3-6 months now. Congratulated him on continued success.   Plan: Hemoglobin A1c, Lipid Panel                           Return in about 6 months (around 8/28/2024) for Follow up diabetes.    Zully Merritt is a 53 year old, presenting for the following health issues:  Diabetes      2/28/2024     7:56 AM   Additional Questions   Roomed by DIOGO Hickman   Accompanied by Self         2/28/2024     7:56 AM   Patient Reported Additional Medications   Patient reports taking the following new medications N/A     History of Present Illness       Reason for visit:  Follow up for my diabetes and medication    He eats 0-1 servings of fruits and vegetables daily.He consumes 2 sweetened beverage(s) daily.He exercises with enough effort to increase his heart rate 9 or less minutes per day.  He exercises with enough effort to increase his heart rate 3 or less days per week.   He is taking medications regularly.         Diabetes Follow-up    How often are you checking your blood sugar? Four or more times daily  Blood sugar testing frequency justification:  Patient modifying lifestyle changes (diet, exercise) with blood sugars  What time of day are you checking your blood sugars (select all that apply)?  Before and after meals and Not applicable  Have you had any blood sugars above 200?  Yes 205  Have you had any blood sugars below 70?  Yes 69  What symptoms do you notice when your blood sugar is low?  Shaky, Dizzy, and Weak  What concerns do you have today about your diabetes? None   Do you have any of these symptoms? (Select all that apply)  No numbness or tingling in feet.  No redness, sores or blisters on feet.  No complaints of excessive thirst.  No reports of blurry vision.  No significant changes to  weight.  Have you had a diabetic eye exam in the last 12 months? No        BP Readings from Last 2 Encounters:   02/28/24 130/82   11/30/23 134/82     Hemoglobin A1C (%)   Date Value   11/30/2023 6.5 (H)   11/21/2022 8.5 (H)     LDL Cholesterol Calculated (mg/dL)   Date Value   09/06/2022 100   10/25/2021 73   09/04/2018 126 (H)         How many servings of fruits and vegetables do you eat daily?  0-1  On average, how many sweetened beverages do you drink each day (Examples: soda, juice, sweet tea, etc.  Do NOT count diet or artificially sweetened beverages)?   2  How many days per week do you exercise enough to make your heart beat faster? 7  How many minutes a day do you exercise enough to make your heart beat faster? 60 or more  How many days per week do you miss taking your medication? 0  Current Outpatient Medications   Medication    ACETAMINOPHEN EXTRA STRENGTH 500 MG tablet    albuterol (PROAIR HFA/PROVENTIL HFA/VENTOLIN HFA) 108 (90 Base) MCG/ACT inhaler    allopurinol (ZYLOPRIM) 300 MG tablet    amLODIPine (NORVASC) 2.5 MG tablet    atorvastatin (LIPITOR) 40 MG tablet    betamethasone dipropionate (DIPROSONE) 0.05 % external ointment    cetirizine (ZYRTEC) 10 MG tablet    Continuous Blood Gluc Sensor (FREESTYLE JAMES 3 SENSOR) MISC    gabapentin (NEURONTIN) 400 MG capsule    HUMIRA PEN 40 MG/0.8ML pen kit    hydrocortisone 2.5 % cream    lisinopril (ZESTRIL) 20 MG tablet    pantoprazole (PROTONIX) 20 MG EC tablet    sertraline (ZOLOFT) 100 MG tablet    SV MELATONIN 3 MG TBDP    traZODone (DESYREL) 50 MG tablet    aspirin 81 MG EC tablet    fluocinonide (LIDEX) 0.05 % external solution     No current facility-administered medications for this visit.     He has no concerns at all. No chest pain, no shortness of breath. Has a CGM.              Objective    /82   Pulse 75   Temp 97.8  F (36.6  C) (Tympanic)   Resp 16   Wt 110.8 kg (244 lb 3.2 oz)   SpO2 98%   BMI 36.32 kg/m    Body mass index is  36.32 kg/m .  Physical Exam   GENERAL: alert and no distress  RESP: lungs clear to auscultation - no rales, rhonchi or wheezes  CV: regular rate and rhythm, normal S1 S2, no S3 or S4, no murmur, click or rub, no peripheral edema   PSYCH: mentation appears normal, affect normal/bright    Results for orders placed or performed in visit on 02/28/24   Lipid Panel     Status: Abnormal   Result Value Ref Range    Cholesterol 97 <200 mg/dL    Triglycerides 181 (H) <150 mg/dL    Direct Measure HDL 33 (L) >=40 mg/dL    LDL Cholesterol Calculated 28 <=100 mg/dL    Non HDL Cholesterol 64 <130 mg/dL    Patient Fasting > 8hrs? Unknown     Narrative    Cholesterol  Desirable:  <200 mg/dL    Triglycerides  Normal:  Less than 150 mg/dL  Borderline High:  150-199 mg/dL  High:  200-499 mg/dL  Very High:  Greater than or equal to 500 mg/dL    Direct Measure HDL  Female:  Greater than or equal to 50 mg/dL   Male:  Greater than or equal to 40 mg/dL    LDL Cholesterol  Desirable:  <100mg/dL  Above Desirable:  100-129 mg/dL   Borderline High:  130-159 mg/dL   High:  160-189 mg/dL   Very High:  >= 190 mg/dL    Non HDL Cholesterol  Desirable:  130 mg/dL  Above Desirable:  130-159 mg/dL  Borderline High:  160-189 mg/dL  High:  190-219 mg/dL  Very High:  Greater than or equal to 220 mg/dL   Hemoglobin A1c     Status: Abnormal   Result Value Ref Range    Hemoglobin A1C 6.4 (H) 4.0 - 6.2 %             Signed Electronically by: Blu Serna MD

## 2024-03-02 DIAGNOSIS — I10 BENIGN ESSENTIAL HYPERTENSION: ICD-10-CM

## 2024-03-04 RX ORDER — AMLODIPINE BESYLATE 2.5 MG/1
2.5 TABLET ORAL DAILY
Qty: 30 TABLET | Refills: 0 | Status: SHIPPED | OUTPATIENT
Start: 2024-03-04 | End: 2024-04-08

## 2024-03-04 NOTE — TELEPHONE ENCOUNTER
[id number] sent Rx request for the following:      Requested Prescriptions   Pending Prescriptions Disp Refills    amLODIPine (NORVASC) 2.5 MG tablet [Pharmacy Med Name: amLODIPine Besylate 2.5 MG Oral Tablet] 35 tablet 0     Sig: Take 1 tablet by mouth once daily       Calcium Channel Blockers Protocol  Passed - 3/2/2024 11:56 AM        Passed - Blood pressure under 140/90 in past 12 months     BP Readings from Last 3 Encounters:   02/28/24 130/82   11/30/23 134/82   03/06/23 130/84                 Passed - Recent (12 mo) or future (30 days) visit within the authorizing provider's specialty     The patient must have completed an in-person or virtual visit within the past 12 months or has a future visit scheduled within the next 90 days with the authorizing provider s specialty.  Urgent care and e-visits do not quality as an office visit for this protocol.          Passed - Medication is active on med list        Passed - Patient is age 18 or older        Passed - Normal serum creatinine on file in past 12 months     Recent Labs   Lab Test 11/30/23  1152   CR 0.92       Ok to refill medication if creatinine is low               Last Prescription Date:   1/25/24  Last Fill Qty/Refills:         35, R-0    Last Office Visit:              2/28/24   Future Office visit:           3/22/24    Prescription approved per [facility] Refill Protocol.   Alejandra Wren RN on 3/4/2024 at 8:52 AM

## 2024-03-19 ENCOUNTER — APPOINTMENT (OUTPATIENT)
Dept: OTOLARYNGOLOGY | Facility: OTHER | Age: 54
End: 2024-03-19
Attending: FAMILY MEDICINE
Payer: COMMERCIAL

## 2024-03-21 ASSESSMENT — PATIENT HEALTH QUESTIONNAIRE - PHQ9
SUM OF ALL RESPONSES TO PHQ QUESTIONS 1-9: 11
10. IF YOU CHECKED OFF ANY PROBLEMS, HOW DIFFICULT HAVE THESE PROBLEMS MADE IT FOR YOU TO DO YOUR WORK, TAKE CARE OF THINGS AT HOME, OR GET ALONG WITH OTHER PEOPLE: SOMEWHAT DIFFICULT
SUM OF ALL RESPONSES TO PHQ QUESTIONS 1-9: 11

## 2024-03-22 ENCOUNTER — OFFICE VISIT (OUTPATIENT)
Dept: FAMILY MEDICINE | Facility: OTHER | Age: 54
End: 2024-03-22
Attending: FAMILY MEDICINE
Payer: COMMERCIAL

## 2024-03-22 VITALS
OXYGEN SATURATION: 96 % | HEART RATE: 73 BPM | BODY MASS INDEX: 36.95 KG/M2 | TEMPERATURE: 98.4 F | DIASTOLIC BLOOD PRESSURE: 82 MMHG | WEIGHT: 248.4 LBS | RESPIRATION RATE: 17 BRPM | SYSTOLIC BLOOD PRESSURE: 132 MMHG

## 2024-03-22 DIAGNOSIS — M17.11 PRIMARY LOCALIZED OSTEOARTHROSIS OF RIGHT LOWER LEG: ICD-10-CM

## 2024-03-22 DIAGNOSIS — M17.12 PRIMARY LOCALIZED OSTEOARTHROSIS OF LEFT LOWER LEG: Primary | ICD-10-CM

## 2024-03-22 PROCEDURE — 250N000011 HC RX IP 250 OP 636: Mod: JZ | Performed by: FAMILY MEDICINE

## 2024-03-22 PROCEDURE — 20610 DRAIN/INJ JOINT/BURSA W/O US: CPT | Mod: 50 | Performed by: FAMILY MEDICINE

## 2024-03-22 PROCEDURE — G0463 HOSPITAL OUTPT CLINIC VISIT: HCPCS | Mod: 25

## 2024-03-22 PROCEDURE — 250N000009 HC RX 250: Performed by: FAMILY MEDICINE

## 2024-03-22 RX ORDER — METHYLPREDNISOLONE ACETATE 80 MG/ML
80 INJECTION, SUSPENSION INTRA-ARTICULAR; INTRALESIONAL; INTRAMUSCULAR; SOFT TISSUE ONCE
Status: COMPLETED | OUTPATIENT
Start: 2024-03-22 | End: 2024-03-22

## 2024-03-22 RX ADMIN — METHYLPREDNISOLONE ACETATE 80 MG: 80 INJECTION, SUSPENSION INTRA-ARTICULAR; INTRALESIONAL; INTRAMUSCULAR; SOFT TISSUE at 16:19

## 2024-03-22 RX ADMIN — LIDOCAINE HYDROCHLORIDE 4 ML: 10 INJECTION, SOLUTION INFILTRATION; PERINEURAL at 16:18

## 2024-03-22 ASSESSMENT — PAIN SCALES - GENERAL: PAINLEVEL: EXTREME PAIN (9)

## 2024-03-22 NOTE — PROGRESS NOTES
"  Assessment & Plan     (M17.12) Primary localized osteoarthrosis of left lower leg  (primary encounter diagnosis)  Comment:    Plan: methylPREDNISolone (DEPO-Medrol) injection 80         mg, lidocaine 1 % 4 mL, Large Joint/Bursa         injection and/or drainage (Shoulder, Knee)             (M17.11) Primary localized osteoarthrosis of right lower leg  Comment:    Plan: methylPREDNISolone (DEPO-Medrol) injection 80         mg            Can repeat these in 3 or more months. Consider repeat imaging at next visit.                   Return in about 6 weeks (around 5/3/2024) for Follow up, with me.    Zully Merritt is a 54 year old, presenting for the following health issues:  Injections (Bilateral knee injections)      3/22/2024     3:28 PM   Additional Questions   Roomed by DIOGO Hickman   Accompanied by Self         3/22/2024     3:28 PM   Patient Reported Additional Medications   Patient reports taking the following new medications N/A     History of Present Illness       Reason for visit:  Cortisone shots in my knees    He eats 0-1 servings of fruits and vegetables daily.He consumes 3 sweetened beverage(s) daily.He exercises with enough effort to increase his heart rate 9 or less minutes per day.  He exercises with enough effort to increase his heart rate 4 days per week.   He is taking medications regularly.     Would like bilateral knee injections. Stabbing pains in the  medial joint line, bilateral. Using a cane now. Last had this done in the 90's. He has had x-rays done in the past, he feels he was told there is \"calcium built up\". This was in 2020:    IMPRESSION: Advanced chondrocalcinosis for age. CPPD as well as  abnormalities of calcium metabolism/parathyroid hormone are in the  differential.     SELMA DE LA ROSA MD                Objective    /82   Pulse 73   Temp 98.4  F (36.9  C) (Tympanic)   Resp 17   Wt 112.7 kg (248 lb 6.4 oz)   SpO2 96%   BMI 36.95 kg/m    Body mass index is 36.95 " kg/m .  Physical Exam   GENERAL: alert and no distress  PSYCH: mentation appears normal, affect normal/bright  Left knee without erythema or effusion. No pain on palpation. Discussed with him risks and he consented to an injection/ prepped and infiltrated with 4 ml 1% lidocaine and 80 milligram depotmedrol. The same was then done on the right side.             Signed Electronically by: Blu Serna MD

## 2024-03-22 NOTE — NURSING NOTE
"Chief Complaint   Patient presents with    Injections     Bilateral knee injections       Initial /82   Pulse 73   Temp 98.4  F (36.9  C) (Tympanic)   Resp 17   Wt 112.7 kg (248 lb 6.4 oz)   SpO2 96%   BMI 36.95 kg/m   Estimated body mass index is 36.95 kg/m  as calculated from the following:    Height as of 11/30/23: 1.746 m (5' 8.75\").    Weight as of this encounter: 112.7 kg (248 lb 6.4 oz).  Medication Reconciliation: complete      "

## 2024-04-04 DIAGNOSIS — I10 BENIGN ESSENTIAL HYPERTENSION: ICD-10-CM

## 2024-04-08 DIAGNOSIS — K21.9 GASTROESOPHAGEAL REFLUX DISEASE WITHOUT ESOPHAGITIS: ICD-10-CM

## 2024-04-08 RX ORDER — AMLODIPINE BESYLATE 2.5 MG/1
2.5 TABLET ORAL DAILY
Qty: 90 TABLET | Refills: 0 | Status: SHIPPED | OUTPATIENT
Start: 2024-04-08 | End: 2024-07-16

## 2024-04-08 NOTE — TELEPHONE ENCOUNTER
[id number] of [place] sent Rx request for the following:      Requested Prescriptions   Pending Prescriptions Disp Refills    amLODIPine (NORVASC) 2.5 MG tablet [Pharmacy Med Name: amLODIPine Besylate 2.5 MG Oral Tablet] 30 tablet 0     Sig: Take 1 tablet by mouth once daily   Last Prescription Date:   3/4/24  Last Fill Qty/Refills:         30, R-0    Last Office Visit:              3/22/24  Future Office visit:             Next 5 appointments (look out 90 days)      Jun 24, 2024  9:00 AM  (Arrive by 8:45 AM)  SHORT with Blu Serna MD  [facility] ([facility] ) [address]  [place] [id number] [phone]          Per LOV note:  Return in about 6 weeks (around 5/3/2024) for Follow up, with me.     Unable to complete prescription refill per RN Medication Refill Policy.     Franchesca Abreu RN .............. 4/8/2024  11:40 AM

## 2024-04-09 ENCOUNTER — TRANSFERRED RECORDS (OUTPATIENT)
Dept: HEALTH INFORMATION MANAGEMENT | Facility: OTHER | Age: 54
End: 2024-04-09
Payer: COMMERCIAL

## 2024-04-11 RX ORDER — PANTOPRAZOLE SODIUM 20 MG/1
40 TABLET, DELAYED RELEASE ORAL DAILY
Qty: 180 TABLET | Refills: 0 | Status: SHIPPED | OUTPATIENT
Start: 2024-04-11 | End: 2024-07-17

## 2024-04-12 NOTE — ED NOTES
Patient was sleeping and woke up to assess current pain. Patient states chest pain is still a 4/10 in his midsternal chest and states shooting pain occasionally to his abdomen and is having increased abdominal pain. Patient stated earlier he has not ate much today prior to coming in to ER. MD Dr. Landry updated about current BP, patient symptoms, pain, and labs. Awaiting MD orders. Patient updated and awaiting MD.    actual

## 2024-05-07 ENCOUNTER — APPOINTMENT (OUTPATIENT)
Dept: OTOLARYNGOLOGY | Facility: OTHER | Age: 54
End: 2024-05-07
Attending: FAMILY MEDICINE
Payer: COMMERCIAL

## 2024-05-14 ENCOUNTER — OFFICE VISIT (OUTPATIENT)
Dept: OTOLARYNGOLOGY | Facility: OTHER | Age: 54
End: 2024-05-14
Attending: OTOLARYNGOLOGY
Payer: COMMERCIAL

## 2024-05-14 DIAGNOSIS — R13.13 DYSPHAGIA, PHARYNGEAL PHASE: Primary | ICD-10-CM

## 2024-05-14 PROCEDURE — G0463 HOSPITAL OUTPT CLINIC VISIT: HCPCS

## 2024-05-16 NOTE — PROGRESS NOTES
document embedded image  Patient Name: Christiano Metcalf   Address: [address] [place], [place] [address]    YOB: 1970   [place] [address]   MR Number: [id number]   Phone: [phone]  PCP: Gino Frankel MD           Appointment Date: 05/14/24  Visit Provider: Vladimir Alex MD    cc: Gino Frankel MD; ~    ENT Progress Note    Intake  Visit Reasons: sore throat/feels pain/like someone choking him    HPI  History of Present Illness  Chief complaint:  Pharyngeal dysphagia     History  The patient is a 54-year-old man who has had chronic persistent pharyngeal dysphagia.  A couple of years ago I took him to the operating room for panendoscopy and biopsy of palate lesion.  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  His endoscopy findings were unremarkable.  He has been on prolonged acid suppression.  He continues to complain of tightness and difficulties with things catching in his hypopharynx.    Exam   Oral cavity oropharynx-free of mucosal lesions or inflammation   Neck-no masses or adenopathy.  He was a scar from previous cervical fusion therapy.  Nasal-no obstruction or purulence  Indirect laryngoscopy-no visible lesions   Flexible fiberoptic laryngoscopy again reveals a neurologically intact larynx without mucosal lesion, his hypopharynx is clear.  Head and neck integument-Clear  General-the patient appears well and in no distress   Neuro-there are no focal cranial nerve deficits    Allergies    seasonal allergies Adverse Reaction (Verified 10/27/22 12:14)  Unknown    PFSH  PFSH:   Past Medical History: (Reviewed 10/26/22 @ 12:08 by Reid Neal MD)    Asthma  Chronic headaches  Hypertension      Social History: (Reviewed 10/26/22 @ 12:08 by Reid Neal MD)  Smoking Status:  Former smoker   second hand exposure:  Yes   alcohol intake:  former   substance use type:  does not use     A&P  Assessment & Plan  (1) Pharyngeal dysphagia:         Status: Acute        Code(s):  R13.13 - Dysphagia,  pharyngeal phase  The patient has failed acid suppression therapy.  He has had unremarkable direct scopes.  His symptoms have persisted.  I believe modified barium swallow at this point would be reasonable to assess his pharyngeal function.                Vladimir Alex MD    Filed: 05/16/24 [id number]     <Electronically signed by Vladimir Alex MD> 05/16/24 0890

## 2024-05-19 ASSESSMENT — ASTHMA QUESTIONNAIRES
QUESTION_3 LAST FOUR WEEKS HOW OFTEN DID YOUR ASTHMA SYMPTOMS (WHEEZING, COUGHING, SHORTNESS OF BREATH, CHEST TIGHTNESS OR PAIN) WAKE YOU UP AT NIGHT OR EARLIER THAN USUAL IN THE MORNING: ONCE OR TWICE
QUESTION_1 LAST FOUR WEEKS HOW MUCH OF THE TIME DID YOUR ASTHMA KEEP YOU FROM GETTING AS MUCH DONE AT WORK, SCHOOL OR AT HOME: SOME OF THE TIME
ACT_TOTALSCORE: 17
ACT_TOTALSCORE: 17
QUESTION_4 LAST FOUR WEEKS HOW OFTEN HAVE YOU USED YOUR RESCUE INHALER OR NEBULIZER MEDICATION (SUCH AS ALBUTEROL): TWO OR THREE TIMES PER WEEK
QUESTION_2 LAST FOUR WEEKS HOW OFTEN HAVE YOU HAD SHORTNESS OF BREATH: THREE TO SIX TIMES A WEEK
QUESTION_5 LAST FOUR WEEKS HOW WOULD YOU RATE YOUR ASTHMA CONTROL: WELL CONTROLLED

## 2024-05-23 ENCOUNTER — OFFICE VISIT (OUTPATIENT)
Dept: FAMILY MEDICINE | Facility: OTHER | Age: 54
End: 2024-05-23
Attending: FAMILY MEDICINE
Payer: COMMERCIAL

## 2024-05-23 VITALS
HEIGHT: 69 IN | HEART RATE: 68 BPM | DIASTOLIC BLOOD PRESSURE: 80 MMHG | RESPIRATION RATE: 18 BRPM | WEIGHT: 242 LBS | BODY MASS INDEX: 35.84 KG/M2 | SYSTOLIC BLOOD PRESSURE: 118 MMHG | OXYGEN SATURATION: 96 % | TEMPERATURE: 98.1 F

## 2024-05-23 DIAGNOSIS — I10 BENIGN ESSENTIAL HYPERTENSION: ICD-10-CM

## 2024-05-23 DIAGNOSIS — E66.01 CLASS 2 SEVERE OBESITY WITH SERIOUS COMORBIDITY AND BODY MASS INDEX (BMI) OF 36.0 TO 36.9 IN ADULT, UNSPECIFIED OBESITY TYPE (H): ICD-10-CM

## 2024-05-23 DIAGNOSIS — E66.812 CLASS 2 SEVERE OBESITY WITH SERIOUS COMORBIDITY AND BODY MASS INDEX (BMI) OF 36.0 TO 36.9 IN ADULT, UNSPECIFIED OBESITY TYPE (H): ICD-10-CM

## 2024-05-23 DIAGNOSIS — E11.9 TYPE 2 DIABETES MELLITUS WITHOUT COMPLICATION, WITHOUT LONG-TERM CURRENT USE OF INSULIN (H): ICD-10-CM

## 2024-05-23 DIAGNOSIS — Z01.818 PREOP GENERAL PHYSICAL EXAM: Primary | ICD-10-CM

## 2024-05-23 DIAGNOSIS — M75.42 IMPINGEMENT SYNDROME, SHOULDER, LEFT: ICD-10-CM

## 2024-05-23 DIAGNOSIS — L40.9 PSORIASIS: ICD-10-CM

## 2024-05-23 DIAGNOSIS — J45.30 MILD PERSISTENT ASTHMA WITHOUT COMPLICATION: ICD-10-CM

## 2024-05-23 LAB
ANION GAP SERPL CALCULATED.3IONS-SCNC: 13 MMOL/L (ref 7–15)
BUN SERPL-MCNC: 15.4 MG/DL (ref 6–20)
CALCIUM SERPL-MCNC: 10 MG/DL (ref 8.6–10)
CHLORIDE SERPL-SCNC: 101 MMOL/L (ref 98–107)
CREAT SERPL-MCNC: 0.93 MG/DL (ref 0.67–1.17)
DEPRECATED HCO3 PLAS-SCNC: 24 MMOL/L (ref 22–29)
EGFRCR SERPLBLD CKD-EPI 2021: >90 ML/MIN/1.73M2
GLUCOSE SERPL-MCNC: 94 MG/DL (ref 70–99)
HBA1C MFR BLD: 6.3 % (ref 4–6.2)
HGB BLD-MCNC: 15.7 G/DL (ref 13.3–17.7)
POTASSIUM SERPL-SCNC: 4 MMOL/L (ref 3.4–5.3)
SODIUM SERPL-SCNC: 138 MMOL/L (ref 135–145)

## 2024-05-23 PROCEDURE — G0463 HOSPITAL OUTPT CLINIC VISIT: HCPCS | Performed by: NURSE PRACTITIONER

## 2024-05-23 PROCEDURE — 85018 HEMOGLOBIN: CPT | Mod: ZL | Performed by: NURSE PRACTITIONER

## 2024-05-23 PROCEDURE — 80048 BASIC METABOLIC PNL TOTAL CA: CPT | Mod: ZL | Performed by: NURSE PRACTITIONER

## 2024-05-23 PROCEDURE — 36415 COLL VENOUS BLD VENIPUNCTURE: CPT | Mod: ZL | Performed by: NURSE PRACTITIONER

## 2024-05-23 PROCEDURE — 83036 HEMOGLOBIN GLYCOSYLATED A1C: CPT | Mod: ZL | Performed by: NURSE PRACTITIONER

## 2024-05-23 PROCEDURE — 99214 OFFICE O/P EST MOD 30 MIN: CPT | Performed by: NURSE PRACTITIONER

## 2024-05-23 RX ORDER — FLUTICASONE PROPIONATE AND SALMETEROL 100; 50 UG/1; UG/1
1 POWDER RESPIRATORY (INHALATION) 2 TIMES DAILY
Qty: 60 EACH | Refills: 11 | Status: SHIPPED | OUTPATIENT
Start: 2024-05-23

## 2024-05-23 ASSESSMENT — PATIENT HEALTH QUESTIONNAIRE - PHQ9
SUM OF ALL RESPONSES TO PHQ QUESTIONS 1-9: 9
10. IF YOU CHECKED OFF ANY PROBLEMS, HOW DIFFICULT HAVE THESE PROBLEMS MADE IT FOR YOU TO DO YOUR WORK, TAKE CARE OF THINGS AT HOME, OR GET ALONG WITH OTHER PEOPLE: NOT DIFFICULT AT ALL
SUM OF ALL RESPONSES TO PHQ QUESTIONS 1-9: 9

## 2024-05-23 ASSESSMENT — PAIN SCALES - GENERAL: PAINLEVEL: EXTREME PAIN (9)

## 2024-05-23 NOTE — PATIENT INSTRUCTIONS

## 2024-05-23 NOTE — NURSING NOTE
Patient presents today for pre op.    Medication Reconciliation Complete    Faviola Hernandez LPN  5/23/2024 10:50 AM

## 2024-05-23 NOTE — PROGRESS NOTES
Preoperative Evaluation  [facility] AND [facility]  [address]  [place] [address]  Phone: [phone]  Fax: [phone]  Primary Provider: Blu Serna MD  Pre-op Performing Provider: RACHEL Keller CNP  May 23, 2024             5/19/2024   Surgical Information   What procedure is being done?    Facility or Hospital where procedure/surgery will be performed: [facility]   Who is doing the procedure / surgery? Dr Gino Byers.    Date of surgery / procedure: 5-28-24   Time of surgery / procedure: Unknown at this time. We'll find out two days before surgery   Where do you plan to recover after surgery? at home alone     Fax number for surgical facility: [phone]    Assessment & Plan     The proposed surgical procedure is considered INTERMEDIATE risk.    Problem List Items Addressed This Visit          Digestive    Morbid obesity (H)       Endocrine    Diabetes mellitus, type 2 (H)    Relevant Orders    Basic Metabolic Panel (Completed)    Hemoglobin A1c (Completed)    Hemoglobin (Completed)       Circulatory    Benign essential hypertension    Relevant Orders    Basic Metabolic Panel (Completed)    Hemoglobin (Completed)       Musculoskeletal and Integumentary    Psoriasis    Relevant Medications    fluticasone-salmeterol (ADVAIR) 100-50 MCG/ACT inhaler     Other Visit Diagnoses       Preop general physical exam    -  Primary    Relevant Orders    Basic Metabolic Panel (Completed)    Hemoglobin (Completed)    Mild persistent asthma without complication        Relevant Medications    fluticasone-salmeterol (ADVAIR) 100-50 MCG/ACT inhaler    Impingement syndrome, shoulder, left              Mild persistent asthma, not well-controlled at this time.  He is not having any acute symptoms.  Will add Advair twice daily for controller inhaler, continue with as needed inhaler.    A1c due today.  Not currently on medications.  Discussed having small snack before bedtime to reduce low  blood sugar readings.    Discussed holding lisinopril morning of surgery.  Remainder of above health conditions are stable.     - No identified additional risk factors other than previously addressed    Preoperative Medication Instructions  Antiplatelet or Anticoagulation Medication Instructions   - Patient is on no antiplatelet or anticoagulation medications.    Additional Medication Instructions  Take all scheduled medications on the day of surgery EXCEPT for modifications listed below:   - Lisinopril- DO NOT TAKE on day of surgery (minimum 11 hours for general anesthesia).    Recommendation  Approval given to proceed with proposed procedure, without further diagnostic evaluation.        Zully Merritt is a 54 year old, presenting for the following:  Pre-Op Exam        HPI related to upcoming procedure: He presents to clinic today for preoperative clearance for upcoming shoulder surgery.  Overall, he reports he is feeling well.  He does not feel his asthma is under good control.  He has been using his albuterol inhaler frequently.  Does not use her controller medication but is interested in starting this.  Blood sugar readings are  through continuous glucose monitor.  He does have occasional lows at night, does not have a snack before bedtime.  He is not taking any medications for diabetes at this time.  Psoriasis, uses Humira every 2 weeks.  Blood pressure has been under good control.        5/19/2024   Pre-Op Questionnaire   Have you ever had a heart attack or stroke? No   Have you ever had surgery on your heart or blood vessels, such as a stent placement, a coronary artery bypass, or surgery on an artery in your head, neck, heart, or legs? No   Do you have chest pain with activity? No   Do you have a history of heart failure? No   Do you currently have a cold, bronchitis or symptoms of other infection? No   Do you have a cough, shortness of breath, or wheezing? No   Do you or anyone in your family  have previous history of blood clots? No   Do you or does anyone in your family have a serious bleeding problem such as prolonged bleeding following surgeries or cuts? No   Have you ever had problems with anemia or been told to take iron pills? No   Have you had any abnormal blood loss such as black, tarry or bloody stools? No   Have you ever had a blood transfusion? No   Are you willing to have a blood transfusion if it is medically needed before, during, or after your surgery? Yes   Have you or any of your relatives ever had problems with anesthesia? No   Do you have sleep apnea, excessive snoring or daytime drowsiness? No   Do you have any artifical heart valves or other implanted medical devices like a pacemaker, defibrillator, or continuous glucose monitor? No   Do you have artificial joints? No   Are you allergic to latex? No     Health Care Directive  Patient does not have a Health Care Directive or Living Will:     Preoperative Review of    reviewed - no record of controlled substances prescribed.      Status of Chronic Conditions:  See problem list for active medical problems.  Problems all longstanding and stable, except as noted/documented.  See ROS for pertinent symptoms related to these conditions.    Patient Active Problem List    Diagnosis Date Noted    Diabetes mellitus, type 2 (H) 11/30/2023     Priority: Medium    Benign essential hypertension 05/13/2022     Priority: Medium    Morbid obesity (H) 05/13/2022     Priority: Medium    Psoriasis 09/04/2021     Priority: Medium    Mild intermittent asthma without complication 08/31/2018     Priority: Medium    Gout 02/01/2018     Priority: Medium    Learning disability 06/14/2011     Priority: Medium      Past Medical History:   Diagnosis Date    Gout     No Comments Provided    Hypomagnesemia     No Comments Provided    Pain in left knee     No Comments Provided    Personal history of other (healed) physical injury and trauma     2000,repair     Unspecified injury of unspecified wrist, hand and finger(s), initial encounter     1999     Past Surgical History:   Procedure Laterality Date    ARTHROSCOPY KNEE      Right knee meniscal repair, arthroscopic ally    ARTHROSCOPY KNEE      2010,Left knee scope    COLONOSCOPY N/A 09/30/2021    follow up 5 years family history, 9/30/2021    ESOPHAGOSCOPY, GASTROSCOPY, DUODENOSCOPY (EGD), COMBINED N/A 09/30/2021    Procedure: ESOPHAGOGASTRODUODENOSCOPY, WITH BIOPSY;  Surgeon: Jamel Kruger MD;  Location: [facility] OR    FINGER SURGERY      Right thumb ORIF    OTHER SURGICAL HISTORY      7/15/14,,HERNIA REPAIR,Left,LIH with mesh    OTHER SURGICAL HISTORY      5/10/16,,HERNIA REPAIR,Right,RIH with plug/patch     Current Outpatient Medications   Medication Sig Dispense Refill    ACETAMINOPHEN EXTRA STRENGTH 500 MG tablet       albuterol (PROAIR HFA/PROVENTIL HFA/VENTOLIN HFA) 108 (90 Base) MCG/ACT inhaler INHALE 2 PUFFS INTO LUNGS 4 TIMES DAILY AS NEEDED FOR SHORTNESS OF BREATH 54 g 0    allopurinol (ZYLOPRIM) 300 MG tablet Take 1 tablet by mouth once daily 90 tablet 3    amLODIPine (NORVASC) 2.5 MG tablet Take 1 tablet by mouth once daily 90 tablet 0    atorvastatin (LIPITOR) 40 MG tablet Take 1 tablet (40 mg) by mouth daily 90 tablet 4    cetirizine (ZYRTEC) 10 MG tablet Take 1 tablet by mouth twice daily 60 tablet 11    Continuous Blood Gluc Sensor (FREESTYLE JAMES 3 SENSOR) MISC 1 each every 14 days 2 each 11    fluticasone-salmeterol (ADVAIR) 100-50 MCG/ACT inhaler Inhale 1 puff into the lungs 2 times daily 60 each 11    gabapentin (NEURONTIN) 400 MG capsule TAKE 1 CAPSULE BY MOUTH IN THE MORNING AND 1 CAPSULE IN THE EVENING. MAY TAKE 1 ADDITIONAL CAPSULE AS NEEDED PER DAY FOR ANXIETY OR PAIN.      HUMIRA PEN 40 MG/0.8ML pen kit Inject 40 mg as directed every 14 days      hydrocortisone 2.5 % cream APPLY CREAM TWICE DAILY TO AFFECTED ITCHY AREAS ON THE FACE, GROIN, AND AXILLA UNTIL CLEAR      lisinopril  "(ZESTRIL) 20 MG tablet Take 1 tablet (20 mg) by mouth daily 90 tablet 4    pantoprazole (PROTONIX) 20 MG EC tablet Take 2 tablets by mouth once daily 180 tablet 0    sertraline (ZOLOFT) 100 MG tablet Take 1 tablet (100 mg) by mouth daily 90 tablet 3    SV MELATONIN 3 MG TBDP TAKE 1 TO 2 TABLETS BY MOUTH AT NIGHT FOR SLEEP FOR INSOMNIA      traZODone (DESYREL) 50 MG tablet TAKE 2 TO 3 TABLETS BY MOUTH AT BEDTIME FOR SLEEP         Allergies   Allergen Reactions    Metformin Diarrhea    No Clinical Screening - See Comments Other (See Comments)     Peas - n/v - flushing - skin turns red    Spam - rash    Seasonal      Other reaction(s): Sneezing  Other reaction(s): Sneezing    Seasonal Allergies Difficulty breathing        Social History     Tobacco Use    Smoking status: Former     Current packs/day: 0.00     Types: Cigarettes     Quit date: 2010     Years since quittin.2    Smokeless tobacco: Never   Substance Use Topics    Alcohol use: Never     Family History   Problem Relation Age of Onset    Diabetes Mother         Diabetes    Other - See Comments Father         episode of gout.    Colon Cancer Father         Cancer-colon,diagnosed in early 60s    Family History Negative Son         Good Health,    Family History Negative Daughter         Good Health,    Family History Negative Son         Good Health,     History   Drug Use No          Objective    /80   Pulse 68   Temp 98.1  F (36.7  C)   Resp 18   Ht 1.746 m (5' 8.75\")   Wt 109.8 kg (242 lb)   SpO2 96%   BMI 36.00 kg/m     Estimated body mass index is 36 kg/m  as calculated from the following:    Height as of this encounter: 1.746 m (5' 8.75\").    Weight as of this encounter: 109.8 kg (242 lb).  Physical Exam  GENERAL: alert and no distress  EYES: Eyes grossly normal to inspection, PERRL and conjunctivae and sclerae normal  HENT: ear canals and TM's normal, nose and mouth without ulcers or lesions  NECK: no adenopathy, no " asymmetry, masses, or scars  RESP: lungs clear to auscultation - no rales, rhonchi or wheezes  CV: regular rate and rhythm, normal S1 S2, no S3 or S4, no murmur, click or rub, no peripheral edema  ABDOMEN: soft, nontender, no hepatosplenomegaly, no masses and bowel sounds normal  MS: no gross musculoskeletal defects noted, no edema  SKIN: no suspicious lesions or rashes  NEURO: Normal strength and tone, mentation intact and speech normal  PSYCH: mentation appears normal, affect normal/bright    Recent Labs   Lab Test 02/28/24  0820 11/30/23  1152 09/21/23  1338   HGB  --   --  13.7   PLT  --   --  152   NA  --  139 139   POTASSIUM  --  3.4 3.3*   CR  --  0.92 0.90   A1C 6.4* 6.5*  --         Diagnostics  Recent Results (from the past 24 hour(s))   Basic Metabolic Panel    Collection Time: 05/23/24 11:26 AM   Result Value Ref Range    Sodium 138 135 - 145 mmol/L    Potassium 4.0 3.4 - 5.3 mmol/L    Chloride 101 98 - 107 mmol/L    Carbon Dioxide (CO2) 24 22 - 29 mmol/L    Anion Gap 13 7 - 15 mmol/L    Urea Nitrogen 15.4 6.0 - 20.0 mg/dL    Creatinine 0.93 0.67 - 1.17 mg/dL    GFR Estimate >90 >60 mL/min/1.73m2    Calcium 10.0 8.6 - 10.0 mg/dL    Glucose 94 70 - 99 mg/dL   Hemoglobin A1c    Collection Time: 05/23/24 11:26 AM   Result Value Ref Range    Hemoglobin A1C 6.3 (H) 4.0 - 6.2 %   Hemoglobin    Collection Time: 05/23/24 11:26 AM   Result Value Ref Range    Hemoglobin 15.7 13.3 - 17.7 g/dL      No EKG required, no history of coronary heart disease, significant arrhythmia, peripheral arterial disease or other structural heart disease.    Revised Cardiac Risk Index (RCRI)  The patient has the following serious cardiovascular risks for perioperative complications:   - No serious cardiac risks = 0 points     RCRI Interpretation: 0 points: Class I (very low risk - 0.4% complication rate)         Signed Electronically by: RACHEL Keller CNP  Copy of this evaluation report is provided to requesting physician.

## 2024-06-03 ENCOUNTER — TRANSFERRED RECORDS (OUTPATIENT)
Dept: HEALTH INFORMATION MANAGEMENT | Facility: OTHER | Age: 54
End: 2024-06-03
Payer: COMMERCIAL

## 2024-06-03 LAB — RETINOPATHY: NEGATIVE

## 2024-06-13 ENCOUNTER — HOSPITAL ENCOUNTER (EMERGENCY)
Facility: OTHER | Age: 54
Discharge: HOME | End: 2024-06-13
Attending: FAMILY MEDICINE | Admitting: FAMILY MEDICINE
Payer: COMMERCIAL

## 2024-06-13 ENCOUNTER — APPOINTMENT (OUTPATIENT)
Dept: GENERAL RADIOLOGY | Facility: OTHER | Age: 54
End: 2024-06-13
Attending: FAMILY MEDICINE
Payer: COMMERCIAL

## 2024-06-13 VITALS
BODY MASS INDEX: 35.84 KG/M2 | SYSTOLIC BLOOD PRESSURE: 121 MMHG | WEIGHT: 242 LBS | HEART RATE: 77 BPM | HEIGHT: 69 IN | DIASTOLIC BLOOD PRESSURE: 74 MMHG | OXYGEN SATURATION: 95 % | RESPIRATION RATE: 17 BRPM | TEMPERATURE: 98.2 F

## 2024-06-13 DIAGNOSIS — S49.92XA SHOULDER INJURY, LEFT, INITIAL ENCOUNTER: ICD-10-CM

## 2024-06-13 PROCEDURE — 250N000013 HC RX MED GY IP 250 OP 250 PS 637: Performed by: FAMILY MEDICINE

## 2024-06-13 PROCEDURE — 99283 EMERGENCY DEPT VISIT LOW MDM: CPT | Performed by: FAMILY MEDICINE

## 2024-06-13 PROCEDURE — 73030 X-RAY EXAM OF SHOULDER: CPT | Mod: TC,LT

## 2024-06-13 PROCEDURE — 73070 X-RAY EXAM OF ELBOW: CPT | Mod: TC,LT

## 2024-06-13 PROCEDURE — 99283 EMERGENCY DEPT VISIT LOW MDM: CPT

## 2024-06-13 RX ORDER — ACETAMINOPHEN 325 MG/1
650 TABLET ORAL ONCE
Status: COMPLETED | OUTPATIENT
Start: 2024-06-13 | End: 2024-06-13

## 2024-06-13 RX ORDER — OXYCODONE HYDROCHLORIDE 5 MG/1
5 TABLET ORAL EVERY 4 HOURS PRN
Status: DISCONTINUED | OUTPATIENT
Start: 2024-06-13 | End: 2024-06-13 | Stop reason: HOSPADM

## 2024-06-13 RX ADMIN — ACETAMINOPHEN 650 MG: 325 TABLET, FILM COATED ORAL at 02:02

## 2024-06-13 RX ADMIN — OXYCODONE HYDROCHLORIDE 5 MG: 5 TABLET ORAL at 02:21

## 2024-06-13 ASSESSMENT — ENCOUNTER SYMPTOMS
FATIGUE: 0
SHORTNESS OF BREATH: 0
COUGH: 0
FEVER: 0

## 2024-06-13 ASSESSMENT — COLUMBIA-SUICIDE SEVERITY RATING SCALE - C-SSRS
1. IN THE PAST MONTH, HAVE YOU WISHED YOU WERE DEAD OR WISHED YOU COULD GO TO SLEEP AND NOT WAKE UP?: NO
2. HAVE YOU ACTUALLY HAD ANY THOUGHTS OF KILLING YOURSELF IN THE PAST MONTH?: NO
6. HAVE YOU EVER DONE ANYTHING, STARTED TO DO ANYTHING, OR PREPARED TO DO ANYTHING TO END YOUR LIFE?: NO

## 2024-06-13 ASSESSMENT — ACTIVITIES OF DAILY LIVING (ADL): ADLS_ACUITY_SCORE: 36

## 2024-06-13 NOTE — ED TRIAGE NOTES
Pt here via private car.  Pt states that he went to stand from toilet and lost his balance and hit his left elbow on the toilet tank.  Pt had rotator cuff surgery 2 weeks ago and does have incisions and bruising on the left shoulder/upper arm.  Pt states he now has pain in the left shoulder and into the neck.     Triage Assessment (Adult)       Row Name 06/13/24 0056          Triage Assessment    Airway WDL WDL        Respiratory WDL    Respiratory WDL WDL        Cardiac WDL    Cardiac WDL WDL        Peripheral/Neurovascular WDL    Peripheral Neurovascular WDL WDL        Cognitive/Neuro/Behavioral WDL    Cognitive/Neuro/Behavioral WDL WDL

## 2024-06-13 NOTE — ED PROVIDER NOTES
History     Chief Complaint   Patient presents with    Fall     HPI  Christiano Metcalf is a 54 year old male who presents after a fall at home.  He was going to the bathroom.  Last his balance fell over and hit his left elbow.  Unfortunately, he recently had a significant arthroscopic rotator cuff surgery about 2 weeks ago.  He was told to be careful and only do certain range of motion exercises.  With the following he injured the left shoulder to the point where he was up near his ear.  He does not think it dislocated.  Since then he has had severe neck pain and shoulder pain which is worse as when he had surgery.  He was given oxycodone to use as needed for pain after his surgery but only needed 3 tablets so he has not taken any today.    Discussed plan of care with patient and his son.  Informed that we cannot get MRI overnight.  Will check x-ray now to rule out fracture.  Will give oxycodone for pain control.    Allergies:  Allergies   Allergen Reactions    Metformin Diarrhea    No Clinical Screening - See Comments Other (See Comments)     Peas - n/v - flushing - skin turns red    Spam - rash    Seasonal      Other reaction(s): Sneezing  Other reaction(s): Sneezing    Ibuprofen GI Disturbance    Seasonal Allergies Difficulty breathing       Problem List:    Patient Active Problem List    Diagnosis Date Noted    Diabetes mellitus, type 2 (H) 11/30/2023     Priority: Medium    Benign essential hypertension 05/13/2022     Priority: Medium    Morbid obesity (H) 05/13/2022     Priority: Medium    Psoriasis 09/04/2021     Priority: Medium    Mild intermittent asthma without complication 08/31/2018     Priority: Medium    Gout 02/01/2018     Priority: Medium    Learning disability 06/14/2011     Priority: Medium        Past Medical History:    Past Medical History:   Diagnosis Date    Gout     Hypomagnesemia     Pain in left knee     Personal history of other (healed) physical injury and trauma     Unspecified  injury of unspecified wrist, hand and finger(s), initial encounter        Past Surgical History:    Past Surgical History:   Procedure Laterality Date    ARTHROSCOPY KNEE      Right knee meniscal repair, arthroscopic ally    ARTHROSCOPY KNEE      ,Left knee scope    COLONOSCOPY N/A 2021    follow up 5 years family history, 2021    ESOPHAGOSCOPY, GASTROSCOPY, DUODENOSCOPY (EGD), COMBINED N/A 2021    Procedure: ESOPHAGOGASTRODUODENOSCOPY, WITH BIOPSY;  Surgeon: Jamel Kruger MD;  Location: [facility] OR    FINGER SURGERY      Right thumb ORIF    OTHER SURGICAL HISTORY      7/15/14,,HERNIA REPAIR,Left,LIH with mesh    OTHER SURGICAL HISTORY      5/10/16,,HERNIA REPAIR,Right,RIH with plug/patch       Family History:    Family History   Problem Relation Age of Onset    Diabetes Mother         Diabetes    Other - See Comments Father         episode of gout.    Colon Cancer Father         Cancer-colon,diagnosed in early 60s    Family History Negative Son         Good Health,    Family History Negative Daughter         Good Health,    Family History Negative Son         Good Health,       Social History:  Marital Status:  Single [1]  Social History     Tobacco Use    Smoking status: Former     Current packs/day: 0.00     Types: Cigarettes     Quit date: 2010     Years since quittin.3    Smokeless tobacco: Never   Vaping Use    Vaping status: Never Used   Substance Use Topics    Alcohol use: Never    Drug use: No        Medications:    ACETAMINOPHEN EXTRA STRENGTH 500 MG tablet  albuterol (PROAIR HFA/PROVENTIL HFA/VENTOLIN HFA) 108 (90 Base) MCG/ACT inhaler  allopurinol (ZYLOPRIM) 300 MG tablet  amLODIPine (NORVASC) 2.5 MG tablet  atorvastatin (LIPITOR) 40 MG tablet  cetirizine (ZYRTEC) 10 MG tablet  Continuous Blood Gluc Sensor (FREESTYLE JAMES 3 SENSOR) MISC  fluticasone-salmeterol (ADVAIR) 100-50 MCG/ACT inhaler  gabapentin (NEURONTIN) 400 MG capsule  HUMIRA PEN 40 MG/0.8ML  "pen kit  hydrocortisone 2.5 % cream  lisinopril (ZESTRIL) 20 MG tablet  pantoprazole (PROTONIX) 20 MG EC tablet  sertraline (ZOLOFT) 100 MG tablet  SV MELATONIN 3 MG TBDP  traZODone (DESYREL) 50 MG tablet          Review of Systems   Constitutional:  Negative for fatigue and fever.   Respiratory:  Negative for cough and shortness of breath.    Cardiovascular:  Negative for chest pain.   Musculoskeletal:         Left elbow pain.  Left shoulder pain radiating up into the neck.  Decreased range of motion of the left shoulder with restriction due to recent surgery.  Pain at the left elbow   All other systems reviewed and are negative.      Physical Exam   BP: 121/74  Pulse: 77  Temp: 98.2  F (36.8  C)  Resp: 17  Height: 174.6 cm (5' 8.75\")  Weight: 109.8 kg (242 lb)  SpO2: 95 %      Physical Exam  Constitutional:       General: He is not in acute distress.     Appearance: Normal appearance. He is obese. He is not toxic-appearing.      Comments: Occasionally grimaces in pain but in no acute distress   HENT:      Head: Normocephalic and atraumatic.      Mouth/Throat:      Comments: Poor dentition with teeth missing  Eyes:      General: No scleral icterus.     Conjunctiva/sclera: Conjunctivae normal.   Cardiovascular:      Rate and Rhythm: Normal rate.      Heart sounds: Normal heart sounds.   Pulmonary:      Effort: Pulmonary effort is normal. No respiratory distress.      Breath sounds: Normal breath sounds.   Abdominal:      Palpations: Abdomen is soft.      Tenderness: There is no abdominal tenderness.   Musculoskeletal:         General: No deformity.      Cervical back: Neck supple.      Comments: Left elbow nontender to palpation.  Normal range of motion.  However there is tenderness and pain to palpation of the left shoulder.  Decreased range of motion and did not do extensive testing given recent surgery.  Trocar marks are healing well without erythema or drainage.   Skin:     General: Skin is warm.   Neurological: "      Mental Status: He is alert.         ED Course        Procedures              Critical Care time:  none               No results found for this or any previous visit (from the past 24 hour(s)).    Medications   oxyCODONE (ROXICODONE) tablet 5 mg (5 mg Oral $Given 6/13/24 0221)   acetaminophen (TYLENOL) tablet 650 mg (650 mg Oral $Given 6/13/24 0202)       Assessments & Plan (with Medical Decision Making)     I have reviewed the nursing notes.    I have reviewed the findings, diagnosis, plan and need for follow up with the patient.           Medical Decision Making  The patient's presentation was of moderate complexity (an undiagnosed new problem with uncertain prognosis).    The patient's evaluation involved:  ordering and/or review of 2 test(s) in this encounter (see separate area of note for details)    The patient's management necessitated moderate risk (prescription drug management including medications given in the ED).        New Prescriptions    No medications on file       Final diagnoses:   Shoulder injury, left, initial encounter   Patient with recent surgery.  Fall on left elbow.  Increasing pain of the left shoulder.  X-ray negative for fracture.  He has oxycodone at home for pain control.  Discussed we are not able to get an MRI at night here.  He will work on scheduling this in the morning.  Discussed that we will do need to be sent to his surgical team for further review prior to next steps and planning for injury.  Hopefully there is no major issues.  If he has increasing pain swelling redness fevers or other concerns return to the emergency room.  Son is able to drive him home.    6/13/2024   [facility]       Penelope Louis,   06/13/24 0252

## 2024-06-13 NOTE — DISCHARGE INSTRUCTIONS
Schedule MRI for today.  I will not be back until 7 PM.  If result comes back before then, please call the office and Dr. Serna's nurse can also review the results with you.  This will then need to be sent down to your doctor who did the surgery and reviewed

## 2024-06-15 ENCOUNTER — HOSPITAL ENCOUNTER (OUTPATIENT)
Dept: MRI IMAGING | Facility: OTHER | Age: 54
Discharge: HOME | End: 2024-06-15
Attending: FAMILY MEDICINE | Admitting: FAMILY MEDICINE
Payer: COMMERCIAL

## 2024-06-15 DIAGNOSIS — S49.92XA SHOULDER INJURY, LEFT, INITIAL ENCOUNTER: ICD-10-CM

## 2024-06-15 PROCEDURE — 73221 MRI JOINT UPR EXTREM W/O DYE: CPT | Mod: LT

## 2024-06-19 ASSESSMENT — ASTHMA QUESTIONNAIRES
ACT_TOTALSCORE: 14
QUESTION_4 LAST FOUR WEEKS HOW OFTEN HAVE YOU USED YOUR RESCUE INHALER OR NEBULIZER MEDICATION (SUCH AS ALBUTEROL): TWO OR THREE TIMES PER WEEK
QUESTION_1 LAST FOUR WEEKS HOW MUCH OF THE TIME DID YOUR ASTHMA KEEP YOU FROM GETTING AS MUCH DONE AT WORK, SCHOOL OR AT HOME: A LITTLE OF THE TIME
QUESTION_2 LAST FOUR WEEKS HOW OFTEN HAVE YOU HAD SHORTNESS OF BREATH: THREE TO SIX TIMES A WEEK
QUESTION_5 LAST FOUR WEEKS HOW WOULD YOU RATE YOUR ASTHMA CONTROL: SOMEWHAT CONTROLLED
QUESTION_3 LAST FOUR WEEKS HOW OFTEN DID YOUR ASTHMA SYMPTOMS (WHEEZING, COUGHING, SHORTNESS OF BREATH, CHEST TIGHTNESS OR PAIN) WAKE YOU UP AT NIGHT OR EARLIER THAN USUAL IN THE MORNING: FOUR OR MORE NIGHTS A WEEK
ACT_TOTALSCORE: 14

## 2024-06-20 ENCOUNTER — THERAPY VISIT (OUTPATIENT)
Dept: SPEECH THERAPY | Facility: OTHER | Age: 54
End: 2024-06-20
Attending: OTOLARYNGOLOGY
Payer: COMMERCIAL

## 2024-06-20 ENCOUNTER — HOSPITAL ENCOUNTER (OUTPATIENT)
Dept: GENERAL RADIOLOGY | Facility: OTHER | Age: 54
Discharge: HOME | End: 2024-06-20
Attending: OTOLARYNGOLOGY | Admitting: OTOLARYNGOLOGY
Payer: COMMERCIAL

## 2024-06-20 DIAGNOSIS — R13.13 DYSPHAGIA, PHARYNGEAL PHASE: ICD-10-CM

## 2024-06-20 DIAGNOSIS — R13.10 DYSPHAGIA: Primary | ICD-10-CM

## 2024-06-20 PROCEDURE — 92611 MOTION FLUOROSCOPY/SWALLOW: CPT | Mod: GN

## 2024-06-20 PROCEDURE — 74230 X-RAY XM SWLNG FUNCJ C+: CPT

## 2024-06-23 ENCOUNTER — HOSPITAL ENCOUNTER (EMERGENCY)
Facility: OTHER | Age: 54
Discharge: HOME | End: 2024-06-23
Payer: COMMERCIAL

## 2024-06-23 NOTE — ED TRIAGE NOTES
"Pt in triage for assistance with his lilian 3, unable to communicate phone with lilian reader, multiple attempts made by this RN.  Pt was not seen by an MD, pt declined to be seen stated \"I will follow-up with my diabetic provider in the morning.\"  "

## 2024-06-24 ENCOUNTER — OFFICE VISIT (OUTPATIENT)
Dept: FAMILY MEDICINE | Facility: OTHER | Age: 54
End: 2024-06-24
Attending: FAMILY MEDICINE
Payer: COMMERCIAL

## 2024-06-24 VITALS
OXYGEN SATURATION: 98 % | RESPIRATION RATE: 18 BRPM | SYSTOLIC BLOOD PRESSURE: 122 MMHG | WEIGHT: 237.6 LBS | DIASTOLIC BLOOD PRESSURE: 80 MMHG | HEART RATE: 78 BPM | TEMPERATURE: 98.4 F | BODY MASS INDEX: 35.34 KG/M2

## 2024-06-24 DIAGNOSIS — E11.9 TYPE 2 DIABETES MELLITUS WITHOUT COMPLICATION, WITHOUT LONG-TERM CURRENT USE OF INSULIN (H): Primary | ICD-10-CM

## 2024-06-24 PROCEDURE — G0463 HOSPITAL OUTPT CLINIC VISIT: HCPCS

## 2024-06-24 PROCEDURE — G2211 COMPLEX E/M VISIT ADD ON: HCPCS | Performed by: FAMILY MEDICINE

## 2024-06-24 PROCEDURE — 99213 OFFICE O/P EST LOW 20 MIN: CPT | Performed by: FAMILY MEDICINE

## 2024-06-24 RX ORDER — OXYCODONE AND ACETAMINOPHEN 5; 325 MG/1; MG/1
TABLET ORAL
COMMUNITY
Start: 2024-05-28 | End: 2024-06-24

## 2024-06-24 RX ORDER — KETOROLAC TROMETHAMINE 10 MG/1
TABLET, FILM COATED ORAL
COMMUNITY
Start: 2024-05-28 | End: 2024-06-24

## 2024-06-24 RX ORDER — PROCHLORPERAZINE 25 MG/1
SUPPOSITORY RECTAL
Qty: 1 EACH | Refills: 1 | Status: SHIPPED | OUTPATIENT
Start: 2024-06-24 | End: 2024-08-20

## 2024-06-24 RX ORDER — PROCHLORPERAZINE 25 MG/1
SUPPOSITORY RECTAL
Qty: 1 EACH | Refills: 0 | Status: SHIPPED | OUTPATIENT
Start: 2024-06-24 | End: 2024-06-26

## 2024-06-24 RX ORDER — PROCHLORPERAZINE 25 MG/1
SUPPOSITORY RECTAL
Qty: 3 EACH | Refills: 5 | Status: SHIPPED | OUTPATIENT
Start: 2024-06-24 | End: 2024-07-28

## 2024-06-24 ASSESSMENT — PATIENT HEALTH QUESTIONNAIRE - PHQ9
10. IF YOU CHECKED OFF ANY PROBLEMS, HOW DIFFICULT HAVE THESE PROBLEMS MADE IT FOR YOU TO DO YOUR WORK, TAKE CARE OF THINGS AT HOME, OR GET ALONG WITH OTHER PEOPLE: NOT DIFFICULT AT ALL
SUM OF ALL RESPONSES TO PHQ QUESTIONS 1-9: 10
SUM OF ALL RESPONSES TO PHQ QUESTIONS 1-9: 10

## 2024-06-24 ASSESSMENT — PAIN SCALES - GENERAL: PAINLEVEL: MILD PAIN (2)

## 2024-06-24 NOTE — NURSING NOTE
"Chief Complaint   Patient presents with    Diabetes       Initial /80   Pulse 78   Temp 98.4  F (36.9  C) (Temporal)   Resp 18   Wt 107.8 kg (237 lb 9.6 oz)   SpO2 98%   BMI 35.34 kg/m   Estimated body mass index is 35.34 kg/m  as calculated from the following:    Height as of 6/13/24: 1.746 m (5' 8.75\").    Weight as of this encounter: 107.8 kg (237 lb 9.6 oz).  Medication Reconciliation: complete          "

## 2024-06-24 NOTE — TELEPHONE ENCOUNTER
Patient requests to use Dexcom G6 CGM as FreeStyle Karl 3 is difficult for him and it does not work with his smart phone device.    Recommend patient switch from FreeStyle Karl 3 to Dexcom G6 CGM.    If accepted as written, please sign pending orders.    Thank you,    Lisbeth Trevizo RN, BSN, [facility]  6/24/2024 11:25 AM

## 2024-06-24 NOTE — PROGRESS NOTES
"  Assessment & Plan     (E11.9) Type 2 diabetes mellitus without complication, without long-term current use of insulin (H)  (primary encounter diagnosis)  Comment: doing great. Labs are all up to date.   Plan: congratulated him on successfully managing this without meds. Follow up in 5 months or so.           BMI  Estimated body mass index is 35.34 kg/m  as calculated from the following:    Height as of 6/13/24: 1.746 m (5' 8.75\").    Weight as of this encounter: 107.8 kg (237 lb 9.6 oz).             Return in about 5 months (around 11/24/2024) for Routine preventive, with me.    Zully Merritt is a 54 year old, presenting for the following health issues:  Diabetes        6/24/2024     8:49 AM   Additional Questions   Roomed by DIOGO Hickman   Accompanied by Self         6/24/2024     8:49 AM   Patient Reported Additional Medications   Patient reports taking the following new medications N/A     History of Present Illness       Diabetes:   He presents for follow up of diabetes.  He is checking home blood glucose four or more times daily.   He checks blood glucose before and after meals and at bedtime.  Blood glucose is never over 200 and sometimes under 70. He is aware of hypoglycemia symptoms including shakiness, dizziness and blurred vision.    He has no concerns regarding his diabetes at this time.   He is not experiencing numbness or burning in feet, excessive thirst, blurry vision, weight changes or redness, sores or blisters on feet.             Had a recent left shoulder surgery, was told he had gout crystals in the joint. Pt to start in a few weeks now. Still in a sling. Unable to do NSAIDS due to GI upset.  Last uric acid level was 11/23, was normal at 5.7. using tylenol for pain, only using this at hs.     Diabetes is diet controlled at this point. A1c was 8.5 1 year sago. Has cut out pop. Much less pasta, and has nearly stopped bread and ice cream too. Had loose stools with metformin. Is down about " 13# with all of this. He has a cgm that is not working now and he is meeting with our diabetes RN. He has a hard time reading it. His phone cannot scan it.     Recent Labs   Lab Test 05/23/24  1126 02/28/24  0820 11/30/23  1152 09/21/23  1338 11/21/22  1522 10/31/22  1040 10/25/22  1327 09/06/22  0824 05/13/22  1116 10/25/21  1140 09/04/21  1043 06/24/21  0020 05/07/21  0520   A1C 6.3* 6.4* 6.5*  --    < >  --   --   --   --   --   --   --   --    LDL  --  28  --   --   --   --   --  100  --  73  --   --   --    HDL  --  33*  --   --   --   --   --  26  --  30  --   --   --    TRIG  --  181*  --   --   --   --   --  207*  --  337*  --   --   --    ALT  --   --   --  58  --  114*  --  93*   < > 63*   < > 80* 123*   CR 0.93  --  0.92 0.90  --  1.03   < > 1.05   < > 1.04   < > 1.15 0.97   GFRESTIMATED >90  --  >90 >90  --  87   < > 85   < > 83   < > 67 82   GFRESTBLACK  --   --   --   --   --   --   --   --   --   --   --  81 >90   POTASSIUM 4.0  --  3.4 3.3*   < > 3.2*   < > 3.5   < > 3.0*   < > 2.9* 3.3*    < > = values in this interval not displayed.        Current Outpatient Medications   Medication Sig Dispense Refill    ACETAMINOPHEN EXTRA STRENGTH 500 MG tablet       albuterol (PROAIR HFA/PROVENTIL HFA/VENTOLIN HFA) 108 (90 Base) MCG/ACT inhaler INHALE 2 PUFFS INTO LUNGS 4 TIMES DAILY AS NEEDED FOR SHORTNESS OF BREATH 54 g 0    allopurinol (ZYLOPRIM) 300 MG tablet Take 1 tablet by mouth once daily 90 tablet 3    amLODIPine (NORVASC) 2.5 MG tablet Take 1 tablet by mouth once daily 90 tablet 0    atorvastatin (LIPITOR) 40 MG tablet Take 1 tablet (40 mg) by mouth daily 90 tablet 4    cetirizine (ZYRTEC) 10 MG tablet Take 1 tablet by mouth twice daily 60 tablet 11    Continuous Blood Gluc Sensor (FREESTYLE JAMES 3 SENSOR) MISC 1 each every 14 days 2 each 11    fluticasone-salmeterol (ADVAIR) 100-50 MCG/ACT inhaler Inhale 1 puff into the lungs 2 times daily 60 each 11    gabapentin (NEURONTIN) 400 MG capsule TAKE 1  CAPSULE BY MOUTH IN THE MORNING AND 1 CAPSULE IN THE EVENING. MAY TAKE 1 ADDITIONAL CAPSULE AS NEEDED PER DAY FOR ANXIETY OR PAIN.      HUMIRA PEN 40 MG/0.8ML pen kit Inject 40 mg as directed every 14 days      hydrocortisone 2.5 % cream APPLY CREAM TWICE DAILY TO AFFECTED ITCHY AREAS ON THE FACE, GROIN, AND AXILLA UNTIL CLEAR      lisinopril (ZESTRIL) 20 MG tablet Take 1 tablet (20 mg) by mouth daily 90 tablet 4    pantoprazole (PROTONIX) 20 MG EC tablet Take 2 tablets by mouth once daily 180 tablet 0    sertraline (ZOLOFT) 100 MG tablet Take 1 tablet (100 mg) by mouth daily 90 tablet 3    SV MELATONIN 3 MG TBDP TAKE 1 TO 2 TABLETS BY MOUTH AT NIGHT FOR SLEEP FOR INSOMNIA      traZODone (DESYREL) 50 MG tablet TAKE 2 TO 3 TABLETS BY MOUTH AT BEDTIME FOR SLEEP       No current facility-administered medications for this visit.                       Objective    /80   Pulse 78   Temp 98.4  F (36.9  C) (Temporal)   Resp 18   Wt 107.8 kg (237 lb 9.6 oz)   SpO2 98%   BMI 35.34 kg/m    Body mass index is 35.34 kg/m .  Physical Exam   GENERAL: alert and no distress  RESP: lungs clear to auscultation - no rales, rhonchi or wheezes  CV: regular rate and rhythm, normal S1 S2, no S3 or S4, no murmur, click or rub, no peripheral edema   PSYCH: mentation appears normal, affect normal/bright            Signed Electronically by: Blu Serna MD

## 2024-06-25 NOTE — PROGRESS NOTES
SPEECH LANGUAGE PATHOLOGY EVALUATION    Presenting condition or subjective complaint:   Pt presenting to evaluation independently. Referred from ENT due to ongoing issues with swallowing. He reports globus sensation and tightening around larynx, reports pain describing it as soreness/something poking in his throat. Denies issues with liquids, mostly meats. He had a neck fusion at C5-C6, very close to the location that he feels the globus sensation. 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 Recent ENT scope with ENT was negative. He also reports calcium buildup throughout his body.    Date of onset:      Relevant medical history:   Cervical fusion, removal of tongue base papilloma, reflux    Pain assessment: See objective evaluation for additional pain details     Objective     SWALLOW EVALUTION  Dysphagia history: See above  Current Diet/Method of Nutritional Intake: regular diet        VIDEOFLUOROSCOPIC SWALLOW STUDY  Radiologist: Dr. Cohn Orrenate   Views Taken: left lateral, A/P   Physical location of procedure: [facility]  Patient sitting upright on chair/stool     VFSS textures trialed:   VFSS Eval: Thin Liquids  Mode of Presentation: cup   Order of Presentation: 1, 2  Preparatory Phase: WFL  Oral Phase: WFL  Bolus Location When Swallow Initiated: posterior laryngeal surface of epiglottis  Pharyngeal Phase: residue in valleculae  Rosenbeck's Penetration Aspiration Scale: 2 - contrast enters airway, remains above the vocal cords, no residue remains (penetration)  Diagnostic Statement: Pt taking single and consecutive sips of thin liquids, demonstrating with flash penetration due to delayed epiglottic inversion, contacting inferior surface of epiglottis however no residuals remaining once closure was achieved. Mild pharyngeal residuals remaining. Esophageal protrusions visible, CP bar observed.     VFSS Eval: Purees  Mode of Presentation: spoon   Order of Presentation: 3, 4  Preparatory  Phase: WFL  Oral Phase: WFL  Bolus Location When Swallow Initiated: valleculae  Pharyngeal Phase: residue in valleculae  Rosenbeck s Penetration Aspiration Scale: 1 - no aspiration, contrast does not enter airway  Diagnostic Statement: Pt stripping bolus from spoon and demonstrating with WFL oral prep and oral phases, mild pharyngeal residuals remaining. CP bar visible. No overt s/s of aspiration/penetration observed.      VFSS Eval: Soft & Bite Sized  Mode of Presentation: spoon   Order of Presentation: 5  Preparatory Phase: WFL  Oral Phase: premature pharyngeal entry  Bolus Location When Swallow Initiated: posterior laryngeal surface of epiglottis  Pharyngeal Phase: WFL  Rosenbeck s Penetration Aspiration Scale: 1 - no aspiration, contrast does not enter airway  Diagnostic Statement: Pt demonstrating with premature pharyngeal entry to vallecula. No overt s/s of aspiration/penetration observed.     VFSS Eval: Solids  Mode of Presentation: spoon   Order of Presentation: 6, 7  Preparatory Phase: WFL  Oral Phase: WFL  Bolus Location When Swallow Initiated: posterior laryngeal surface of epiglottis  Pharyngeal Phase: WFL   Rosenbeck s Penetration Aspiration Scale: 1 - no aspiration, contrast does not enter airway  Diagnostic Statement: Pt demonstrating with WFL phases. No overt s/s of aspiration/penetration observed. Pt indicating feeling of something stuck at thyroid level however there were no visible residuals. CP bar visible.      ESOPHAGEAL PHASE OF SWALLOW  patient reports symptoms of esophageal dysphagia  patient presents with symptoms of esophageal dysphagia  esophageal sweep performed during today's videofluoroscopic exam  please refer to radiologist's report for details    Some dysmotility was observed on the sweep, as well as a 11 mm distal esophageal diverticulum     SWALLOW ASSESSMENT CLINICAL IMPRESSIONS AND RATIONALE  Diet Consistency Recommendations: thin liquids (level 0), regular diet    Recommended  Feeding/Eating Techniques: small bolus size, slow rate of intake, double swallow   Medication Administration Recommendations: meds with liquids or purees as tolerated   Instrumental Assessment Recommendations:  recommend additional esophageal imaging       Assessment & Plan   CLINICAL IMPRESSIONS   Medical Diagnosis: dysphagia    Treatment Diagnosis: pharyngoesophageal dysphagia   Impression/Assessment:  Pt is a 54 year old male presenting to evaluation with concerns of swallowing. His reported symptoms correlated with more of an esophageal etiology vs oropharyngeal. No overt s/s of aspiration were observed across trials. 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 On esophageal sweep, Radiologist identified an 11 mm diverticulum as well as  some esophageal dysmotility and stasis. Recommended 2 oropharyngeal exercises (Mendelsohn maneuver and CTAR) to promote strengthening of CP muscle and promote clearance of residuals. Additionally provided handouts on esophageal dysphagia, CP bar and diet prep to reduce difficulty with tough textures. Recommend followup with PCP to determine if additional imaging is warranted. Pt in agreement with plan.        Recommended Referrals to Other Professionals:  follow up with PCP  Education Assessment:   Learner/Method: Patient  Education Comments: Educated on A&P of swallowing musculature, assessment results, esophageal involvment  and exercises to complete at home    Risks and benefits of evaluation/treatment have been explained.   Patient/Family/caregiver agrees with Plan of Care.     Evaluation Time:    SLP Eval: VideoFluoroscopic Swallow function Minutes (41556): 75      Signing Clinician: ALBA BORJA, SLP

## 2024-06-26 RX ORDER — PROCHLORPERAZINE 25 MG/1
SUPPOSITORY RECTAL
Qty: 1 EACH | Refills: 0 | Status: SHIPPED | OUTPATIENT
Start: 2024-06-26 | End: 2024-08-07

## 2024-06-26 NOTE — TELEPHONE ENCOUNTER
"Pharmacy fax received for Dexcom G6  Device stating \"Insurance requires the prescription to state how many days this will last the patient (90 days). Please resend with that information.\"   "

## 2024-07-05 ENCOUNTER — TELEPHONE (OUTPATIENT)
Dept: FAMILY MEDICINE | Facility: OTHER | Age: 54
End: 2024-07-05
Payer: MEDICARE

## 2024-07-05 NOTE — TELEPHONE ENCOUNTER
TJP-Reason for call: Patient wanting a work in appointment.    Is the appointment for a Hospital Follow up?  No    (If yes - Unable to find an appointment with any provider during the time frame needed. Nurse/Provider - Can this patient be worked into a schedule with PCP or team member?)    Patient is having the following symptoms and/or what is the appt for:  Pre Op  DOS 07.18.24  The patient is requesting an appointment with  TJP or anyone that has time     Was an appointment offered for this call? No    If Yes, what is the date of the appointment?  NA     Preferred method for responding to this message: Telephone Call    Phone number patient can be reached at? Home number on file [phone] (home)    If we can't reach you directly, may we leave a detailed response at the number you provided? Yes    Can this message wait until your PCP/provider returns if unavailable today? Yes

## 2024-07-05 NOTE — TELEPHONE ENCOUNTER
I do not have capacity today and am out of the clinic next week. RACHEL Keller CNP on 7/5/2024 at 1:06 PM

## 2024-07-10 ENCOUNTER — OFFICE VISIT (OUTPATIENT)
Dept: FAMILY MEDICINE | Facility: OTHER | Age: 54
End: 2024-07-10
Attending: FAMILY MEDICINE
Payer: MEDICARE

## 2024-07-10 VITALS
WEIGHT: 241.4 LBS | RESPIRATION RATE: 16 BRPM | DIASTOLIC BLOOD PRESSURE: 82 MMHG | OXYGEN SATURATION: 96 % | SYSTOLIC BLOOD PRESSURE: 128 MMHG | HEART RATE: 67 BPM | TEMPERATURE: 97.7 F | HEIGHT: 69 IN | BODY MASS INDEX: 35.76 KG/M2

## 2024-07-10 DIAGNOSIS — E11.9 TYPE 2 DIABETES MELLITUS WITHOUT COMPLICATION, WITHOUT LONG-TERM CURRENT USE OF INSULIN (H): ICD-10-CM

## 2024-07-10 DIAGNOSIS — Z01.818 PREOP GENERAL PHYSICAL EXAM: ICD-10-CM

## 2024-07-10 DIAGNOSIS — J45.20 MILD INTERMITTENT ASTHMA WITHOUT COMPLICATION: ICD-10-CM

## 2024-07-10 DIAGNOSIS — J39.8 TRACHEAL STRICTURE: Primary | ICD-10-CM

## 2024-07-10 LAB
ANION GAP SERPL CALCULATED.3IONS-SCNC: 8 MMOL/L (ref 7–15)
BUN SERPL-MCNC: 14.8 MG/DL (ref 6–20)
CALCIUM SERPL-MCNC: 9.6 MG/DL (ref 8.6–10)
CHLORIDE SERPL-SCNC: 105 MMOL/L (ref 98–107)
CREAT SERPL-MCNC: 1 MG/DL (ref 0.67–1.17)
DEPRECATED HCO3 PLAS-SCNC: 29 MMOL/L (ref 22–29)
EGFRCR SERPLBLD CKD-EPI 2021: 89 ML/MIN/1.73M2
GLUCOSE SERPL-MCNC: 119 MG/DL (ref 70–99)
POTASSIUM SERPL-SCNC: 3.6 MMOL/L (ref 3.4–5.3)
SODIUM SERPL-SCNC: 142 MMOL/L (ref 135–145)

## 2024-07-10 PROCEDURE — G0463 HOSPITAL OUTPT CLINIC VISIT: HCPCS

## 2024-07-10 PROCEDURE — 80048 BASIC METABOLIC PNL TOTAL CA: CPT | Mod: ZL | Performed by: FAMILY MEDICINE

## 2024-07-10 PROCEDURE — 93005 ELECTROCARDIOGRAM TRACING: CPT | Performed by: FAMILY MEDICINE

## 2024-07-10 PROCEDURE — 93010 ELECTROCARDIOGRAM REPORT: CPT | Performed by: INTERNAL MEDICINE

## 2024-07-10 PROCEDURE — 99214 OFFICE O/P EST MOD 30 MIN: CPT | Performed by: FAMILY MEDICINE

## 2024-07-10 PROCEDURE — 36415 COLL VENOUS BLD VENIPUNCTURE: CPT | Mod: ZL | Performed by: FAMILY MEDICINE

## 2024-07-10 RX ORDER — ALBUTEROL SULFATE 90 UG/1
2 AEROSOL, METERED RESPIRATORY (INHALATION) EVERY 4 HOURS PRN
Qty: 54 G | Refills: 4 | Status: SHIPPED | OUTPATIENT
Start: 2024-07-10

## 2024-07-10 ASSESSMENT — PAIN SCALES - GENERAL: PAINLEVEL: SEVERE PAIN (7)

## 2024-07-10 NOTE — PROGRESS NOTES
Preoperative Evaluation  [facility]  [address]  [place] [address]  Phone: [phone]  Fax: [phone]  Primary Provider: Blu Serna MD  Pre-op Performing Provider: Blu Serna MD  Jul 10, 2024             7/6/2024   Surgical Information   What procedure is being done? Throat surgery   Facility or Hospital where procedure/surgery will be performed: [facility]   Who is doing the procedure / surgery? Dr Vladimir Alex   Date of surgery / procedure: July 18th   Time of surgery / procedure: Don't know the time of surgery until the day before   Where do you plan to recover after surgery? at home alone        Fax number for surgical facility: [phone]    Assessment & Plan     The proposed surgical procedure is considered INTERMEDIATE risk.    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  (primary encounter diagnosis)  Comment: s symptoms now  Plan: safe to proceed with surgical intervention    (J45.20) Mild intermittent asthma without complication  Comment:  well controlled  Plan: albuterol (PROAIR HFA/PROVENTIL HFA/VENTOLIN         HFA) 108 (90 Base) MCG/ACT inhaler             (E11.9) Type 2 diabetes mellitus without complication, without long-term current use of insulin (H)  Comment: also well controlled  Plan: EKG 12-lead, tracing only (Same Day), Basic         Metabolic Panel             (Z01.818) Preop general physical exam  Comment:    Plan:        Patient is non-insulin treated with more than one level 2 hypoglycemic events despite even diet-only controlled diabetes mellitus.  Recommend patient continue using CGM for diabetes management.      - No identified additional risk factors other than previously addressed    Preoperative Medication Instructions  Antiplatelet or Anticoagulation Medication Instructions   - Patient is on no antiplatelet or anticoagulation medications.    Additional Medication Instructions   - ACE/ARB: DO NOT TAKE on day of  surgery (minimum 11 hours for general anesthesia).    Recommendation  Approval given to proceed with proposed procedure, without further diagnostic evaluation.    Zully Merritt is a 54 year old, presenting for the following:  Pre-Op Exam (Throat surgery)          7/10/2024     8:07 AM   Additional Questions   Roomed by DIOGO Hickman   Accompanied by Self         7/10/2024     8:07 AM   Patient Reported Additional Medications   Patient reports taking the following new medications N/A     HPI related to upcoming procedure: he has had dysphagia at the level of his thyroid for at least 6 months. Feels this all worsened since his cervical fusion surgery. Has had a work up, and with video swallow on 6/20/24 it showed:    A cricopharyngeal bar is noted. There is a wide necked 11 mm distal  esophageal diverticulum directed anterolaterally on the right.     Please see the speech pathologist report for further findings and  recommendations.     HARVEY HEARD MD      He had a shoulder surgery in May, with no anesthesia complications at all.     Has diet controlled diabetes, doing well with this.     Recent Labs   Lab Test 05/23/24  1126 02/28/24  0820 11/30/23  1152 09/21/23  1338 11/21/22  1522 10/31/22  1040 10/25/22  1327 09/06/22  0824 05/13/22  1116 10/25/21  1140 09/04/21  1043 06/24/21  0020 05/07/21  0520   A1C 6.3* 6.4* 6.5*  --    < >  --   --   --   --   --   --   --   --    LDL  --  28  --   --   --   --   --  100  --  73  --   --   --    HDL  --  33*  --   --   --   --   --  26  --  30  --   --   --    TRIG  --  181*  --   --   --   --   --  207*  --  337*  --   --   --    ALT  --   --   --  58  --  114*  --  93*   < > 63*   < > 80* 123*   CR 0.93  --  0.92 0.90  --  1.03   < > 1.05   < > 1.04   < > 1.15 0.97   GFRESTIMATED >90  --  >90 >90  --  87   < > 85   < > 83   < > 67 82   GFRESTBLACK  --   --   --   --   --   --   --   --   --   --   --  81 >90   POTASSIUM 4.0  --  3.4 3.3*   < > 3.2*   < > 3.5   <  > 3.0*   < > 2.9* 3.3*    < > = values in this interval not displayed.                  7/6/2024   Pre-Op Questionnaire   Have you ever had a heart attack or stroke? No   Have you ever had surgery on your heart or blood vessels, such as a stent placement, a coronary artery bypass, or surgery on an artery in your head, neck, heart, or legs? No   Do you have chest pain with activity? No   Do you have a history of heart failure? No   Do you currently have a cold, bronchitis or symptoms of other infection? No   Do you have a cough, shortness of breath, or wheezing? No   Do you or anyone in your family have previous history of blood clots? (!) UNKNOWN     Do you or does anyone in your family have a serious bleeding problem such as prolonged bleeding following surgeries or cuts? No   Have you ever had problems with anemia or been told to take iron pills? No   Have you had any abnormal blood loss such as black, tarry or bloody stools? No   Have you ever had a blood transfusion? No   Are you willing to have a blood transfusion if it is medically needed before, during, or after your surgery? Yes   Have you or any of your relatives ever had problems with anesthesia? No   Do you have sleep apnea, excessive snoring or daytime drowsiness? No   Do you have any artifical heart valves or other implanted medical devices like a pacemaker, defibrillator, or continuous glucose monitor? (!) YES   What type of device do you have? Dexcom G7   Name of the clinic that manages your device [facility]   Do you have artificial joints? No   Are you allergic to latex? No        Health Care Directive  Patient does not have a Health Care Directive or Living Will: Discussed advance care planning with patient; information given to patient to review.    Preoperative Review of    reviewed - controlled substances reflected in medication list.          Patient Active Problem List    Diagnosis Date Noted    Diabetes mellitus,  type 2 (H) 11/30/2023     Priority: Medium    Benign essential hypertension 05/13/2022     Priority: Medium    Morbid obesity (H) 05/13/2022     Priority: Medium    Psoriasis 09/04/2021     Priority: Medium    Mild intermittent asthma without complication 08/31/2018     Priority: Medium    Gout 02/01/2018     Priority: Medium    Learning disability 06/14/2011     Priority: Medium      Past Medical History:   Diagnosis Date    Gout     No Comments Provided    Hypomagnesemia     No Comments Provided    Pain in left knee     No Comments Provided    Personal history of other (healed) physical injury and trauma     2000,repair    Unspecified injury of unspecified wrist, hand and finger(s), initial encounter     1999     Past Surgical History:   Procedure Laterality Date    ARTHROSCOPY KNEE      Right knee meniscal repair, arthroscopic ally    ARTHROSCOPY KNEE      2010,Left knee scope    COLONOSCOPY N/A 09/30/2021    follow up 5 years family history, 9/30/2021    ESOPHAGOSCOPY, GASTROSCOPY, DUODENOSCOPY (EGD), COMBINED N/A 09/30/2021    Procedure: ESOPHAGOGASTRODUODENOSCOPY, WITH BIOPSY;  Surgeon: Jamel Kruger MD;  Location: [facility] OR    FINGER SURGERY      Right thumb ORIF    OTHER SURGICAL HISTORY      7/15/14,,HERNIA REPAIR,Left,LIH with mesh    OTHER SURGICAL HISTORY      5/10/16,,HERNIA REPAIR,Right,RIH with plug/patch    SHOULDER ARTHROSCOPY W/ ROTATOR CUFF REPAIR Left 05/2024     Current Outpatient Medications   Medication Sig Dispense Refill    ACETAMINOPHEN EXTRA STRENGTH 500 MG tablet       albuterol (PROAIR HFA/PROVENTIL HFA/VENTOLIN HFA) 108 (90 Base) MCG/ACT inhaler INHALE 2 PUFFS INTO LUNGS 4 TIMES DAILY AS NEEDED FOR SHORTNESS OF BREATH 54 g 0    allopurinol (ZYLOPRIM) 300 MG tablet Take 1 tablet by mouth once daily 90 tablet 3    amLODIPine (NORVASC) 2.5 MG tablet Take 1 tablet by mouth once daily 90 tablet 0    atorvastatin (LIPITOR) 40 MG tablet Take 1 tablet (40 mg) by mouth daily 90 tablet 4     cetirizine (ZYRTEC) 10 MG tablet Take 1 tablet by mouth twice daily 60 tablet 11    Continuous Glucose  (DEXCOM G6 ) JEFFERSON Use to read blood sugars as per 's instructions every 90 days. 1 each 0    Continuous Glucose Sensor (DEXCOM G6 SENSOR) MISC Change every 10 days. 3 each 5    Continuous Glucose Transmitter (DEXCOM G6 TRANSMITTER) MISC Change every 3 months. 1 each 1    fluticasone-salmeterol (ADVAIR) 100-50 MCG/ACT inhaler Inhale 1 puff into the lungs 2 times daily 60 each 11    gabapentin (NEURONTIN) 400 MG capsule TAKE 1 CAPSULE BY MOUTH IN THE MORNING AND 1 CAPSULE IN THE EVENING. MAY TAKE 1 ADDITIONAL CAPSULE AS NEEDED PER DAY FOR ANXIETY OR PAIN.      HUMIRA PEN 40 MG/0.8ML pen kit Inject 40 mg as directed every 14 days      hydrocortisone 2.5 % cream APPLY CREAM TWICE DAILY TO AFFECTED ITCHY AREAS ON THE FACE, GROIN, AND AXILLA UNTIL CLEAR      lisinopril (ZESTRIL) 20 MG tablet Take 1 tablet (20 mg) by mouth daily 90 tablet 4    pantoprazole (PROTONIX) 20 MG EC tablet Take 2 tablets by mouth once daily 180 tablet 0    sertraline (ZOLOFT) 100 MG tablet Take 1 tablet (100 mg) by mouth daily 90 tablet 3    SV MELATONIN 3 MG TBDP TAKE 1 TO 2 TABLETS BY MOUTH AT NIGHT FOR SLEEP FOR INSOMNIA      traZODone (DESYREL) 50 MG tablet TAKE 2 TO 3 TABLETS BY MOUTH AT BEDTIME FOR SLEEP      Continuous Blood Gluc Sensor (FREESTYLE JAMES 3 SENSOR) MISC 1 each every 14 days 2 each 11       Allergies   Allergen Reactions    Metformin Diarrhea    No Clinical Screening - See Comments Other (See Comments)     Peas - n/v - flushing - skin turns red    Spam - rash    Seasonal      Other reaction(s): Sneezing  Other reaction(s): Sneezing    Ibuprofen GI Disturbance    Seasonal Allergies Difficulty breathing        Social History     Tobacco Use    Smoking status: Former     Current packs/day: 0.00     Types: Cigarettes     Quit date: 2010     Years since quittin.4    Smokeless tobacco:  "Never   Substance Use Topics    Alcohol use: Never       History   Drug Use No               Objective    /82   Pulse 67   Temp 97.7  F (36.5  C) (Tympanic)   Resp 16   Ht 1.746 m (5' 8.75\")   Wt 109.5 kg (241 lb 6.4 oz)   SpO2 96%   BMI 35.91 kg/m     Estimated body mass index is 35.91 kg/m  as calculated from the following:    Height as of this encounter: 1.746 m (5' 8.75\").    Weight as of this encounter: 109.5 kg (241 lb 6.4 oz).  Physical Exam  GENERAL: alert and no distress  EYES: Eyes grossly normal to inspection, PERRL and conjunctivae and sclerae normal  HENT: ear canals and TM's normal, nose and mouth without ulcers or lesions  NECK: no adenopathy, no asymmetry, masses, or scars  RESP: lungs clear to auscultation - no rales, rhonchi or wheezes  CV: regular rate and rhythm, normal S1 S2, no S3 or S4, no murmur, click or rub, no peripheral edema  ABDOMEN: soft, nontender, no hepatosplenomegaly, no masses and bowel sounds normal  MS: no gross musculoskeletal defects noted, no edema  SKIN: no suspicious lesions or rashes  NEURO: Normal strength and tone, mentation intact and speech normal  PSYCH: mentation appears normal, affect normal/bright    Recent Labs   Lab Test 05/23/24  1126 02/28/24  0820 11/30/23  1152 09/21/23  1338 09/21/23  1338   HGB 15.7  --   --   --  13.7   PLT  --   --   --   --  152     --  139  --  139   POTASSIUM 4.0  --  3.4  --  3.3*   CR 0.93  --  0.92  --  0.90   A1C 6.3* 6.4* 6.5*   < >  --     < > = values in this interval not displayed.        Diagnostics  Recent Results (from the past 168 hour(s))   EKG 12-lead, tracing only (Same Day)    Collection Time: 07/10/24  8:58 AM   Result Value Ref Range    Systolic Blood Pressure  mmHg    Diastolic Blood Pressure  mmHg    Ventricular Rate 58 BPM    Atrial Rate 58 BPM    DE Interval 188 ms    QRS Duration 114 ms     ms    QTc 428 ms    P Axis 59 degrees    R AXIS 23 degrees    T Axis 79 degrees    Interpretation " ECG       Sinus bradycardia  Otherwise normal ECG  When compared with ECG of 13-MAY-2022 11:11,  T wave inversion no longer evident in Inferior leads  Nonspecific T wave abnormality, worse in Lateral leads     Basic Metabolic Panel    Collection Time: 07/10/24  9:01 AM   Result Value Ref Range    Sodium 142 135 - 145 mmol/L    Potassium 3.6 3.4 - 5.3 mmol/L    Chloride 105 98 - 107 mmol/L    Carbon Dioxide (CO2) 29 22 - 29 mmol/L    Anion Gap 8 7 - 15 mmol/L    Urea Nitrogen 14.8 6.0 - 20.0 mg/dL    Creatinine 1.00 0.67 - 1.17 mg/dL    GFR Estimate 89 >60 mL/min/1.73m2    Calcium 9.6 8.6 - 10.0 mg/dL    Glucose 119 (H) 70 - 99 mg/dL          Revised Cardiac Risk Index (RCRI)  The patient has the following serious cardiovascular risks for perioperative complications:   - No serious cardiac risks = 0 points     RCRI Interpretation: 0 points: Class I (very low risk - 0.4% complication rate)         Signed Electronically by: Blu Serna MD  Copy of this evaluation report is provided to requesting physician.

## 2024-07-10 NOTE — NURSING NOTE
"Chief Complaint   Patient presents with    Pre-Op Exam     Throat surgery       Initial /82   Pulse 67   Temp 97.7  F (36.5  C) (Tympanic)   Resp 16   Ht 1.746 m (5' 8.75\")   Wt 109.5 kg (241 lb 6.4 oz)   SpO2 96%   BMI 35.91 kg/m   Estimated body mass index is 35.91 kg/m  as calculated from the following:    Height as of this encounter: 1.746 m (5' 8.75\").    Weight as of this encounter: 109.5 kg (241 lb 6.4 oz).  Medication Reconciliation: complete        "

## 2024-07-10 NOTE — PATIENT INSTRUCTIONS

## 2024-07-11 DIAGNOSIS — I10 BENIGN ESSENTIAL HYPERTENSION: ICD-10-CM

## 2024-07-12 LAB
ATRIAL RATE - MUSE: 58 BPM
DIASTOLIC BLOOD PRESSURE - MUSE: NORMAL MMHG
INTERPRETATION ECG - MUSE: NORMAL
P AXIS - MUSE: 59 DEGREES
PR INTERVAL - MUSE: 188 MS
QRS DURATION - MUSE: 114 MS
QT - MUSE: 436 MS
QTC - MUSE: 428 MS
R AXIS - MUSE: 23 DEGREES
SYSTOLIC BLOOD PRESSURE - MUSE: NORMAL MMHG
T AXIS - MUSE: 79 DEGREES
VENTRICULAR RATE- MUSE: 58 BPM

## 2024-07-16 RX ORDER — AMLODIPINE BESYLATE 2.5 MG/1
2.5 TABLET ORAL DAILY
Qty: 90 TABLET | Refills: 0 | Status: SHIPPED | OUTPATIENT
Start: 2024-07-16 | End: 2024-09-23

## 2024-07-16 NOTE — TELEPHONE ENCOUNTER
[facility] sent Rx request for the following:      Requested Prescriptions   Pending Prescriptions Disp Refills    amLODIPine (NORVASC) 2.5 MG tablet [Pharmacy Med Name: amLODIPine Besylate 2.5 MG Oral Tablet] 90 tablet 0     Sig: Take 1 tablet by mouth once daily       Calcium Channel Blockers Protocol  Passed - 7/16/2024  3:31 PM   Last Prescription Date:   4/8/24  Last Fill Qty/Refills:         90, R-0    Last Office Visit:              7/10/24   Future Office visit:            none  Marie Tong RN on 7/16/2024 at 3:32 PM

## 2024-07-17 DIAGNOSIS — K21.9 GASTROESOPHAGEAL REFLUX DISEASE WITHOUT ESOPHAGITIS: ICD-10-CM

## 2024-07-18 ENCOUNTER — TRANSFERRED RECORDS (OUTPATIENT)
Dept: HEALTH INFORMATION MANAGEMENT | Facility: OTHER | Age: 54
End: 2024-07-18
Payer: MEDICARE

## 2024-07-18 RX ORDER — PANTOPRAZOLE SODIUM 20 MG/1
40 TABLET, DELAYED RELEASE ORAL DAILY
Qty: 180 TABLET | Refills: 0 | Status: SHIPPED | OUTPATIENT
Start: 2024-07-18 | End: 2024-09-23

## 2024-07-28 ENCOUNTER — OFFICE VISIT (OUTPATIENT)
Dept: FAMILY MEDICINE | Facility: OTHER | Age: 54
End: 2024-07-28
Payer: MEDICARE

## 2024-07-28 VITALS
SYSTOLIC BLOOD PRESSURE: 110 MMHG | WEIGHT: 240 LBS | DIASTOLIC BLOOD PRESSURE: 70 MMHG | RESPIRATION RATE: 16 BRPM | BODY MASS INDEX: 35.55 KG/M2 | HEART RATE: 63 BPM | TEMPERATURE: 98.4 F | HEIGHT: 69 IN | OXYGEN SATURATION: 97 %

## 2024-07-28 DIAGNOSIS — E11.9 TYPE 2 DIABETES MELLITUS WITHOUT COMPLICATION, WITHOUT LONG-TERM CURRENT USE OF INSULIN (H): ICD-10-CM

## 2024-07-28 PROCEDURE — G0463 HOSPITAL OUTPT CLINIC VISIT: HCPCS

## 2024-07-28 PROCEDURE — 99212 OFFICE O/P EST SF 10 MIN: CPT | Performed by: REGISTERED NURSE

## 2024-07-28 RX ORDER — PROCHLORPERAZINE 25 MG/1
SUPPOSITORY RECTAL
Qty: 3 EACH | Refills: 5 | Status: SHIPPED | OUTPATIENT
Start: 2024-07-28 | End: 2024-08-20

## 2024-07-28 ASSESSMENT — ASTHMA QUESTIONNAIRES
QUESTION_5 LAST FOUR WEEKS HOW WOULD YOU RATE YOUR ASTHMA CONTROL: WELL CONTROLLED
ACT_TOTALSCORE: 17
QUESTION_2 LAST FOUR WEEKS HOW OFTEN HAVE YOU HAD SHORTNESS OF BREATH: MORE THAN ONCE A DAY
QUESTION_3 LAST FOUR WEEKS HOW OFTEN DID YOUR ASTHMA SYMPTOMS (WHEEZING, COUGHING, SHORTNESS OF BREATH, CHEST TIGHTNESS OR PAIN) WAKE YOU UP AT NIGHT OR EARLIER THAN USUAL IN THE MORNING: NOT AT ALL
QUESTION_1 LAST FOUR WEEKS HOW MUCH OF THE TIME DID YOUR ASTHMA KEEP YOU FROM GETTING AS MUCH DONE AT WORK, SCHOOL OR AT HOME: SOME OF THE TIME
ACT_TOTALSCORE: 17
QUESTION_4 LAST FOUR WEEKS HOW OFTEN HAVE YOU USED YOUR RESCUE INHALER OR NEBULIZER MEDICATION (SUCH AS ALBUTEROL): ONCE A WEEK OR LESS

## 2024-07-28 ASSESSMENT — PAIN SCALES - GENERAL: PAINLEVEL: SEVERE PAIN (6)

## 2024-07-28 ASSESSMENT — PATIENT HEALTH QUESTIONNAIRE - PHQ9
SUM OF ALL RESPONSES TO PHQ QUESTIONS 1-9: 0
SUM OF ALL RESPONSES TO PHQ QUESTIONS 1-9: 0
10. IF YOU CHECKED OFF ANY PROBLEMS, HOW DIFFICULT HAVE THESE PROBLEMS MADE IT FOR YOU TO DO YOUR WORK, TAKE CARE OF THINGS AT HOME, OR GET ALONG WITH OTHER PEOPLE: NOT DIFFICULT AT ALL

## 2024-07-28 NOTE — PROGRESS NOTES
"Christiano Metcalf  1970    ASSESSMENT/PLAN:     1. Type 2 diabetes mellitus without complication, without long-term current use of insulin (H)    - Continuous Glucose Sensor (DEXCOM G6 SENSOR) MISC; Change every 10 days.  Dispense: 3 each; Refill: 5     Prescription sent over for Dexcom CGM.    Patient agrees with plan of care and verbalizes understating. AVS printed. Patient education provided verbally and written instructions provided as requested.     SUBJECTIVE:   CHIEF COMPLAINT/ REASON FOR VISIT  Patient presents with:  Diabetes: Needs new sensor -      HISTORY OF PRESENT ILLNESS  Christiano Metcalf is a pleasant 54 year old male [facility] of a Dexcom monitor.  Patient presented to pharmacy today and they were unable to fill his current prescription.  He recently switched insurance and is in need of a new prescription despite having refills available.  He uses 3 sensors per month, changes every 10 days.  Blood glucose has been between .  He is symptomatic with glucose values around 60.  Last hemoglobin A1c 6.3.  Patient has no other concerns, is otherwise feeling well today.      I have reviewed the nursing notes.  I have reviewed allergies, medication list, problem list, and past medical history.    REVIEW OF SYSTEMS  See HPI    VITAL SIGNS  Vitals:    07/28/24 1400   BP: 110/70   BP Location: Left arm   Patient Position: Sitting   Cuff Size: Adult Large   Pulse: 63   Resp: 16   Temp: 98.4  F (36.9  C)   TempSrc: Tympanic   SpO2: 97%   Weight: 108.9 kg (240 lb)   Height: 1.753 m (5' 9\")      Body mass index is 35.44 kg/m .    OBJECTIVE:   PHYSICAL EXAM  Physical Exam  Constitutional:       Appearance: He is not ill-appearing.   Neurological:      General: No focal deficit present.      Mental Status: He is alert.   Psychiatric:         Mood and Affect: Mood normal.          DIAGNOSTICS  No results found for any visits on 07/28/24.     RACHEL Perez " [facility]  Answers submitted by the patient for this visit:  Patient Health Questionnaire (Submitted on 7/28/2024)  If you checked off any problems, how difficult have these problems made it for you to do your work, take care of things at home, or get along with other people?: Not difficult at all  PHQ9 TOTAL SCORE: 0

## 2024-07-28 NOTE — NURSING NOTE
"Chief Complaint   Patient presents with    Diabetes     Needs new sensor -      Patient cannot have his rx for dexcom filled at [facility] today due to Medicare insurance.  Patient is requesting refill be sent to [facility] to be refilled. -     Patient sensor ended and needs replacing today.    Initial /70 (BP Location: Left arm, Patient Position: Sitting, Cuff Size: Adult Large)   Pulse 63   Temp 98.4  F (36.9  C) (Tympanic)   Resp 16   Ht 1.753 m (5' 9\")   Wt 108.9 kg (240 lb)   SpO2 97%   BMI 35.44 kg/m   Estimated body mass index is 35.44 kg/m  as calculated from the following:    Height as of this encounter: 1.753 m (5' 9\").    Weight as of this encounter: 108.9 kg (240 lb).     Advance Care Directive on file? yes    FOOD SECURITY SCREENING QUESTIONS:    The next two questions are to help us understand your food security.  If you are feeling you need any assistance in this area, we have resources available to support you today.    Hunger Vital Signs:  Within the past 12 months we worried whether our food would run out before we got money to buy more. Never  Within the past 12 months the food we bought just didn't last and we didn't have money to get more. Never  Virginia Romero LPN,DIOGO on 7/28/2024 at 2:03 PM      Virginia Romero LPN     "

## 2024-07-30 ENCOUNTER — OFFICE VISIT (OUTPATIENT)
Dept: OTOLARYNGOLOGY | Facility: OTHER | Age: 54
End: 2024-07-30
Attending: OTOLARYNGOLOGY
Payer: MEDICARE

## 2024-07-30 DIAGNOSIS — Z09 ENCOUNTER FOR FOLLOW-UP EXAMINATION AFTER COMPLETED TREATMENT FOR CONDITIONS OTHER THAN MALIGNANT NEOPLASM: Primary | ICD-10-CM

## 2024-07-30 PROCEDURE — G0463 HOSPITAL OUTPT CLINIC VISIT: HCPCS

## 2024-08-05 ENCOUNTER — TELEPHONE (OUTPATIENT)
Dept: FAMILY MEDICINE | Facility: OTHER | Age: 54
End: 2024-08-05
Payer: MEDICARE

## 2024-08-05 DIAGNOSIS — E11.9 TYPE 2 DIABETES MELLITUS WITHOUT COMPLICATION, WITHOUT LONG-TERM CURRENT USE OF INSULIN (H): ICD-10-CM

## 2024-08-05 NOTE — TELEPHONE ENCOUNTER
TJP-patient is asking why his DEX Com G6 was canceled     Please call and advise    Thank You    Tiffanie Moran on 8/5/2024 at 8:12 AM

## 2024-08-05 NOTE — TELEPHONE ENCOUNTER
Called and spoke with patient and advised him the prescription for DexCom was still an active prescription with last refill on 07/28/2024 sent to [facility] pharmacy. Instructed patient to reach out to pharmacy and call back with any other questions or concerns.  Marylou Charles LPN

## 2024-08-06 NOTE — PROGRESS NOTES
document embedded image                                   [facility]                                                                                                                                         Patient Name: Christiano Metcalf   Address: [address] [place] [address] [address]    YOB: 1970   [place] [address]   MR Number: [id number]   Phone: [phone]  PCP: Gino Frankel MD           Appointment Date: 07/30/24  Visit Provider: Vladimir Alex MD    cc: ~    ENT Progress Note        Intake  Visit Reasons: Post Op Esophageal Dilation    HPI  History of Present Illness  Chief complaint:  Postop check     History  The patient is a 54-year-old man who recently underwent dilation of a cricopharyngeal bar her general anesthetic.  He had some sore throat for several days post dilation.  He certainly notices improved swallowing following dilation.    Exam   Unchanged    Allergies    peas Allergy (Verified 07/18/24 08:47)  Hives  seasonal allergies Adverse Reaction (Verified 07/18/24 08:27)  Unknown    PFSH  PFSH:   Past Medical History: (Reviewed 10/26/22 @ 12:08 by Reid Neal MD)    Asthma  Chronic headaches  Hypertension      Social History: (Reviewed 10/26/22 @ 12:08 by Reid Neal MD)  Smoking Status:  Former smoker   second hand exposure:  Yes   alcohol intake:  former   substance use type:  does not use     A&P  Assessment & Plan  (1) Postop check:         Status: Acute        Code(s):  Z09 - Encounter for follow-up examination after completed treatment for conditions other than malignant neoplasm  The patient was counseled that he should return symptoms recur.  This can be dilated in the future.  If he eventually gets no long-lasting relief from several dilations we can consider referral for Botox injection.                Vladimir Alex MD    Filed: 07/31/24 1018      <Electronically signed by Vladimir Alex MD> 07/31/24 1022

## 2024-08-07 RX ORDER — PROCHLORPERAZINE 25 MG/1
SUPPOSITORY RECTAL
Qty: 1 EACH | Refills: 0 | Status: SHIPPED | OUTPATIENT
Start: 2024-08-07

## 2024-08-07 NOTE — TELEPHONE ENCOUNTER
[id number] of [place] sent Rx request for the following:      Requested Prescriptions   Pending Prescriptions Disp Refills    Continuous Glucose  (DEXCOM G6 ) JEFFERSON [Pharmacy Med Name: DEXCOM G6   MIS]  0     Sig: USE AS DIRECTED PER  MANUFACTURERS  INSTRUCTIONS  EVERY  90  DAYS       There is no refill protocol information for this order        Last Prescription Date:   6/26/24  Last Fill Qty/Refills:         1, R-0    Last Office Visit:              7/10/24 (DM discussed)   Future Office visit:           11/25/24    Unable to complete prescription refill per RN Medication Refill Policy. Franchesca Abreu RN .............. 8/7/2024  11:16 AM

## 2024-08-08 ENCOUNTER — MEDICAL CORRESPONDENCE (OUTPATIENT)
Dept: HEALTH INFORMATION MANAGEMENT | Facility: OTHER | Age: 54
End: 2024-08-08
Payer: MEDICARE

## 2024-08-20 ENCOUNTER — TELEPHONE (OUTPATIENT)
Dept: EDUCATION SERVICES | Facility: OTHER | Age: 54
End: 2024-08-20
Payer: MEDICARE

## 2024-08-20 DIAGNOSIS — E11.9 TYPE 2 DIABETES MELLITUS WITHOUT COMPLICATION, WITHOUT LONG-TERM CURRENT USE OF INSULIN (H): ICD-10-CM

## 2024-08-20 RX ORDER — PROCHLORPERAZINE 25 MG/1
SUPPOSITORY RECTAL
Qty: 1 EACH | Refills: 1 | Status: SHIPPED | OUTPATIENT
Start: 2024-08-20

## 2024-08-20 RX ORDER — PROCHLORPERAZINE 25 MG/1
SUPPOSITORY RECTAL
Qty: 3 EACH | Refills: 5 | Status: SHIPPED | OUTPATIENT
Start: 2024-08-20

## 2024-08-20 NOTE — TELEPHONE ENCOUNTER
"Patient states, \"I went low when I was wearing the Dexcom so I drank juice.  When I would go high, I would drink water and take my dog on a walk.\"  Patient states he would like to get back on CGM \"as it really helped me keep my sugar more level.\"    Asked patient to bring in his  for download to assess for hypoglycemia.    Patient had multiple episodes of glucose below 55 mg/dL during this 30-day report and meets Medicare guidelines for insurance coverage of CGM (Medicare requires at least 2 readings of glucose below 55 to qualify for coverage of CGM if patient is NOT on insulin.)                    Per report above, recommend patient continue CGM for glucose management.    Patient shares he received a call from [facility] ([employer]) and, if possible, would like prescriptions to be sent to [facility].    Contacted [facility] , [facility], and parker signed orders to [facility] for fill.      Lisbeth Trevizo RN, BSN, [facility]  8/20/2024 5:10 PM   "

## 2024-08-20 NOTE — TELEPHONE ENCOUNTER
"Patient states, \"I went low when I was wearing the Dexcom so I drank juice.  When I would go high, I would drink water and take my dog on a walk.\"  Patient states he would like to get back on CGM \"as it really helped me keep my sugar more level.\"    Asked patient to bring in his  for download to assess for hypoglycemia.    Patient had multiple episodes of glucose below 55 mg/dL during this 30-day report and meets Medicare guidelines for insurance coverage of CGM (Medicare requires at least 2 readings of glucose below 55 to qualify for coverage of CGM if patient is NOT on insulin.)                    Per report above, recommend patient continue CGM for glucose management.    Patient shares he received a call from [facility] ([employer]) and, if possible, would like prescriptions to be sent to [facility].    Contacted [facility] , [facility], and parker signed orders to [facility] for fill.      Lisbeth Trevizo RN, BSN, [facility]  8/20/2024 5:10 PM   "

## 2024-08-26 ENCOUNTER — MEDICAL CORRESPONDENCE (OUTPATIENT)
Dept: HEALTH INFORMATION MANAGEMENT | Facility: OTHER | Age: 54
End: 2024-08-26
Payer: MEDICARE

## 2024-08-28 ENCOUNTER — TELEPHONE (OUTPATIENT)
Dept: EDUCATION SERVICES | Facility: OTHER | Age: 54
End: 2024-08-28

## 2024-08-28 NOTE — TELEPHONE ENCOUNTER
Patient requests a call back regarding a glucose reading. Patient stated it is not urgent.      Okay to leave detailed message.          Jaimee Melendez on 8/28/2024 at 4:25 PM       not examined

## 2024-09-11 ENCOUNTER — TELEPHONE (OUTPATIENT)
Dept: FAMILY MEDICINE | Facility: OTHER | Age: 54
End: 2024-09-11
Payer: MEDICARE

## 2024-09-11 NOTE — TELEPHONE ENCOUNTER
Patient returned call and wants to wait for upcoming appointment on 11/25/2024 to discuss Dexcom.  Marylou Charles LPN

## 2024-09-11 NOTE — TELEPHONE ENCOUNTER
Received fax from [employer] in regards to patient's Dexcom. Patient is unable to receive Dexcom and needs an appointment for approval requirements in OV notes.  Left message for patient to call back to schedule appointment.   Marylou Charles LPN

## 2024-09-19 ENCOUNTER — TRANSFERRED RECORDS (OUTPATIENT)
Dept: HEALTH INFORMATION MANAGEMENT | Facility: OTHER | Age: 54
End: 2024-09-19
Payer: MEDICARE

## 2024-09-19 DIAGNOSIS — L29.9 EAR ITCHING: ICD-10-CM

## 2024-09-20 RX ORDER — CETIRIZINE HYDROCHLORIDE 10 MG/1
10 TABLET ORAL 2 TIMES DAILY
Qty: 180 TABLET | Refills: 1 | Status: SHIPPED | OUTPATIENT
Start: 2024-09-20

## 2024-09-20 NOTE — TELEPHONE ENCOUNTER
[facility] sent Rx request for the following:      Requested Prescriptions   Pending Prescriptions Disp Refills    cetirizine (ZYRTEC) 10 MG tablet [Pharmacy Med Name: Cetirizine HCl 10 MG Oral Tablet] 60 tablet 0     Sig: Take 1 tablet by mouth twice daily       Antihistamines Protocol Passed - 9/19/2024  9:27 PM        Passed - Patient is 3-64 years of age     Apply weight-based dosing for peds patients age 3 - 12 years of age.    Forward request to provider for patients under the age of 3 or over the age of 64.          Passed - Recent (12 mo) or future (30 days) visit within the authorizing provider's specialty     The patient must have completed an in-person or virtual visit within the past 12 months or has a future visit scheduled within the next 90 days with the authorizing provider s specialty.  Urgent care and e-visits do not quality as an office visit for this protocol.          Passed - Medication is active on med list        Passed - Medication indicated for associated diagnosis     The medication is associated with one or more of the following diagnoses:  Allergies  Rhinitis  Upper respiratory tract allergy  Urticaria  Itching               Last Prescription Date:   9/15/23  Last Fill Qty/Refills:         60, R-11    Last Office Visit:              5/23/24 (estefany Kruger)   Future Office visit:             Next 5 appointments (look out 90 days)      Nov 25, 2024 10:45 AM  (Arrive by 10:30 AM)  Adult Preventative Visit with Blu Serna MD  [facility] ([facility] ) [address]  [place] [address]  [phone]         Prescription approved per [facility] Refill Protocol.    Maxime Araujo RN on 9/20/2024 at 4:02 PM

## 2024-09-21 ENCOUNTER — HEALTH MAINTENANCE LETTER (OUTPATIENT)
Age: 54
End: 2024-09-21

## 2024-09-23 DIAGNOSIS — I10 BENIGN ESSENTIAL HYPERTENSION: ICD-10-CM

## 2024-09-23 DIAGNOSIS — K21.9 GASTROESOPHAGEAL REFLUX DISEASE WITHOUT ESOPHAGITIS: ICD-10-CM

## 2024-09-25 RX ORDER — AMLODIPINE BESYLATE 2.5 MG/1
2.5 TABLET ORAL DAILY
Qty: 90 TABLET | Refills: 0 | Status: SHIPPED | OUTPATIENT
Start: 2024-09-25

## 2024-09-25 RX ORDER — PANTOPRAZOLE SODIUM 20 MG/1
40 TABLET, DELAYED RELEASE ORAL DAILY
Qty: 180 TABLET | Refills: 0 | Status: SHIPPED | OUTPATIENT
Start: 2024-09-25

## 2024-09-25 NOTE — TELEPHONE ENCOUNTER
[id number] sent Rx request for the following:      Requested Prescriptions   Pending Prescriptions Disp Refills    amLODIPine (NORVASC) 2.5 MG tablet 90 tablet 0     Sig: Take 1 tablet (2.5 mg) by mouth daily.      Last Prescription Date:   7/16/24  Last Fill Qty/Refills:         90, R-0               pantoprazole (PROTONIX) 20 MG EC tablet 180 tablet 0     Sig: Take 2 tablets (40 mg) by mouth daily.       There is no refill protocol information for this order          Last Prescription Date:   7/18/24  Last Fill Qty/Refills:         180, R-0    Last Office Visit:              7/10/24   Future Office visit:           11/25/24    Vladimir Paulson RN on 9/25/2024 at 1:59 PM

## 2024-10-16 DIAGNOSIS — M1A.00X0 IDIOPATHIC CHRONIC GOUT WITHOUT TOPHUS, UNSPECIFIED SITE: ICD-10-CM

## 2024-10-19 ENCOUNTER — MYC REFILL (OUTPATIENT)
Dept: FAMILY MEDICINE | Facility: OTHER | Age: 54
End: 2024-10-19
Payer: MEDICARE

## 2024-10-19 DIAGNOSIS — M1A.00X0 IDIOPATHIC CHRONIC GOUT WITHOUT TOPHUS, UNSPECIFIED SITE: ICD-10-CM

## 2024-10-21 RX ORDER — ALLOPURINOL 300 MG/1
TABLET ORAL
Qty: 90 TABLET | Refills: 4 | Status: SHIPPED | OUTPATIENT
Start: 2024-10-21

## 2024-10-21 RX ORDER — ALLOPURINOL 300 MG/1
TABLET ORAL
Qty: 90 TABLET | Refills: 3 | OUTPATIENT
Start: 2024-10-21

## 2024-10-21 NOTE — TELEPHONE ENCOUNTER
Patient comment: Need Dr pill to refill this prescription I need it for my goat I've been out for over a week     Requested Prescriptions   Pending Prescriptions Disp Refills    allopurinol (ZYLOPRIM) 300 MG tablet 90 tablet 3     Sig: Take 1 tablet by mouth once daily       Gout Agents Protocol Failed - 10/21/2024  8:53 AM        Failed - CBC on file in past 12 months     Recent Labs   Lab Test 05/23/24  1126 09/21/23  1338   WBC  --  6.3   RBC  --  5.09   HGB 15.7 13.7   HCT  --  40.9   PLT  --  152           Failed - ALT on file in past 12 months     Recent Labs   Lab Test 09/21/23  1338   ALT 58        Last Prescription Date:   10/23/23  Last Fill Qty/Refills:         90, R-3    Last Office Visit:              7/10/23 (preop)   Future Office visit:             Next 5 appointments (look out 90 days)      Nov 25, 2024 10:45 AM  (Arrive by 10:30 AM)  Adult Preventative Visit with Blu Serna MD  [facility] ([facility] ) [address]  [place] [id number] [phone]     Unable to complete prescription refill per RN Medication Refill Policy. Franchesca Abreu RN .............. 10/21/2024  8:53 AM

## 2024-11-20 SDOH — HEALTH STABILITY: PHYSICAL HEALTH: ON AVERAGE, HOW MANY DAYS PER WEEK DO YOU ENGAGE IN MODERATE TO STRENUOUS EXERCISE (LIKE A BRISK WALK)?: 5 DAYS

## 2024-11-20 SDOH — HEALTH STABILITY: PHYSICAL HEALTH: ON AVERAGE, HOW MANY MINUTES DO YOU ENGAGE IN EXERCISE AT THIS LEVEL?: 10 MIN

## 2024-11-20 ASSESSMENT — SOCIAL DETERMINANTS OF HEALTH (SDOH): HOW OFTEN DO YOU GET TOGETHER WITH FRIENDS OR RELATIVES?: PATIENT DECLINED

## 2024-11-25 ENCOUNTER — OFFICE VISIT (OUTPATIENT)
Dept: FAMILY MEDICINE | Facility: OTHER | Age: 54
End: 2024-11-25
Attending: FAMILY MEDICINE
Payer: COMMERCIAL

## 2024-11-25 VITALS
SYSTOLIC BLOOD PRESSURE: 130 MMHG | DIASTOLIC BLOOD PRESSURE: 78 MMHG | TEMPERATURE: 98.2 F | OXYGEN SATURATION: 98 % | BODY MASS INDEX: 35.73 KG/M2 | HEART RATE: 81 BPM | RESPIRATION RATE: 18 BRPM | HEIGHT: 69 IN | WEIGHT: 241.2 LBS

## 2024-11-25 DIAGNOSIS — Z00.00 ENCOUNTER FOR MEDICARE ANNUAL WELLNESS EXAM: Primary | ICD-10-CM

## 2024-11-25 DIAGNOSIS — F33.1 MODERATE EPISODE OF RECURRENT MAJOR DEPRESSIVE DISORDER (H): ICD-10-CM

## 2024-11-25 DIAGNOSIS — K21.9 GASTROESOPHAGEAL REFLUX DISEASE WITHOUT ESOPHAGITIS: ICD-10-CM

## 2024-11-25 DIAGNOSIS — Z87.891 PERSONAL HISTORY OF TOBACCO USE: ICD-10-CM

## 2024-11-25 DIAGNOSIS — I10 BENIGN ESSENTIAL HYPERTENSION: ICD-10-CM

## 2024-11-25 DIAGNOSIS — M51.26 HERNIATION OF LUMBAR INTERVERTEBRAL DISC WITHOUT MYELOPATHY: ICD-10-CM

## 2024-11-25 DIAGNOSIS — M17.11 PRIMARY LOCALIZED OSTEOARTHROSIS OF RIGHT LOWER LEG: ICD-10-CM

## 2024-11-25 DIAGNOSIS — I10 ESSENTIAL HYPERTENSION: ICD-10-CM

## 2024-11-25 DIAGNOSIS — E11.9 TYPE 2 DIABETES MELLITUS WITHOUT COMPLICATION, WITHOUT LONG-TERM CURRENT USE OF INSULIN (H): ICD-10-CM

## 2024-11-25 DIAGNOSIS — M17.12 PRIMARY LOCALIZED OSTEOARTHROSIS OF LEFT LOWER LEG: ICD-10-CM

## 2024-11-25 LAB
CREAT UR-MCNC: 96.3 MG/DL
EST. AVERAGE GLUCOSE BLD GHB EST-MCNC: 134 MG/DL
HBA1C MFR BLD: 6.3 %
MICROALBUMIN UR-MCNC: <12 MG/L
MICROALBUMIN/CREAT UR: NORMAL MG/G{CREAT}

## 2024-11-25 PROCEDURE — 250N000009 HC RX 250: Performed by: FAMILY MEDICINE

## 2024-11-25 PROCEDURE — 36415 COLL VENOUS BLD VENIPUNCTURE: CPT | Mod: ZL | Performed by: FAMILY MEDICINE

## 2024-11-25 PROCEDURE — 82043 UR ALBUMIN QUANTITATIVE: CPT | Mod: ZL | Performed by: FAMILY MEDICINE

## 2024-11-25 PROCEDURE — 250N000011 HC RX IP 250 OP 636: Mod: JZ | Performed by: FAMILY MEDICINE

## 2024-11-25 PROCEDURE — 20610 DRAIN/INJ JOINT/BURSA W/O US: CPT | Performed by: FAMILY MEDICINE

## 2024-11-25 PROCEDURE — G0402 INITIAL PREVENTIVE EXAM: HCPCS | Performed by: FAMILY MEDICINE

## 2024-11-25 PROCEDURE — 83036 HEMOGLOBIN GLYCOSYLATED A1C: CPT | Mod: ZL | Performed by: FAMILY MEDICINE

## 2024-11-25 PROCEDURE — 82570 ASSAY OF URINE CREATININE: CPT | Mod: ZL | Performed by: FAMILY MEDICINE

## 2024-11-25 PROCEDURE — G0463 HOSPITAL OUTPT CLINIC VISIT: HCPCS | Mod: 25

## 2024-11-25 RX ORDER — GABAPENTIN 400 MG/1
400 CAPSULE ORAL 2 TIMES DAILY
Qty: 180 CAPSULE | Refills: 4 | Status: SHIPPED | OUTPATIENT
Start: 2024-11-25

## 2024-11-25 RX ORDER — ATORVASTATIN CALCIUM 40 MG/1
40 TABLET, FILM COATED ORAL DAILY
Qty: 90 TABLET | Refills: 4 | Status: SHIPPED | OUTPATIENT
Start: 2024-11-25

## 2024-11-25 RX ORDER — PANTOPRAZOLE SODIUM 20 MG/1
40 TABLET, DELAYED RELEASE ORAL DAILY
Qty: 180 TABLET | Refills: 4 | Status: SHIPPED | OUTPATIENT
Start: 2024-11-25

## 2024-11-25 RX ORDER — SERTRALINE HYDROCHLORIDE 100 MG/1
100 TABLET, FILM COATED ORAL DAILY
Qty: 90 TABLET | Refills: 4 | Status: SHIPPED | OUTPATIENT
Start: 2024-11-25

## 2024-11-25 RX ORDER — AMLODIPINE BESYLATE 2.5 MG/1
2.5 TABLET ORAL DAILY
Qty: 90 TABLET | Refills: 4 | Status: SHIPPED | OUTPATIENT
Start: 2024-11-25

## 2024-11-25 RX ORDER — METHYLPREDNISOLONE ACETATE 80 MG/ML
80 INJECTION, SUSPENSION INTRA-ARTICULAR; INTRALESIONAL; INTRAMUSCULAR; SOFT TISSUE ONCE
Status: COMPLETED | OUTPATIENT
Start: 2024-11-25 | End: 2024-11-25

## 2024-11-25 RX ORDER — LISINOPRIL 20 MG/1
20 TABLET ORAL DAILY
Qty: 90 TABLET | Refills: 4 | Status: SHIPPED | OUTPATIENT
Start: 2024-11-25

## 2024-11-25 RX ADMIN — METHYLPREDNISOLONE ACETATE 80 MG: 80 INJECTION, SUSPENSION INTRA-ARTICULAR; INTRALESIONAL; INTRAMUSCULAR; SOFT TISSUE at 11:28

## 2024-11-25 RX ADMIN — LIDOCAINE HYDROCHLORIDE 4 ML: 10 INJECTION, SOLUTION INFILTRATION; PERINEURAL at 11:27

## 2024-11-25 RX ADMIN — METHYLPREDNISOLONE ACETATE 80 MG: 80 INJECTION, SUSPENSION INTRA-ARTICULAR; INTRALESIONAL; INTRAMUSCULAR; SOFT TISSUE at 11:27

## 2024-11-25 ASSESSMENT — PAIN SCALES - GENERAL: PAINLEVEL_OUTOF10: EXTREME PAIN (9)

## 2024-11-25 NOTE — PATIENT INSTRUCTIONS
Patient Education   Preventive Care Advice   This is general advice given by our system to help you stay healthy. However, your care team may have specific advice just for you. Please talk to your care team about your preventive care needs.  Nutrition  Eat 5 or more servings of fruits and vegetables each day.  Try wheat bread, brown rice and whole grain pasta (instead of white bread, rice, and pasta).  Get enough calcium and vitamin D. Check the label on foods and aim for 100% of the RDA (recommended daily allowance).  Lifestyle  Exercise at least 150 minutes each week  (30 minutes a day, 5 days a week).  Do muscle strengthening activities 2 days a week. These help control your weight and prevent disease.  No smoking.  Wear sunscreen to prevent skin cancer.  Have a dental exam and cleaning every 6 months.  Yearly exams  See your health care team every year to talk about:  Any changes in your health.  Any medicines your care team has prescribed.  Preventive care, family planning, and ways to prevent chronic diseases.  Shots (vaccines)   HPV shots (up to age 26), if you've never had them before.  Hepatitis B shots (up to age 59), if you've never had them before.  COVID-19 shot: Get this shot when it's due.  Flu shot: Get a flu shot every year.  Tetanus shot: Get a tetanus shot every 10 years.  Pneumococcal, hepatitis A, and RSV shots: Ask your care team if you need these based on your risk.  Shingles shot (for age 50 and up)  General health tests  Diabetes screening:  Starting at age 35, Get screened for diabetes at least every 3 years.  If you are younger than age 35, ask your care team if you should be screened for diabetes.  Cholesterol test: At age 39, start having a cholesterol test every 5 years, or more often if advised.  Bone density scan (DEXA): At age 50, ask your care team if you should have this scan for osteoporosis (brittle bones).  Hepatitis C: Get tested at least once in your life.  STIs (sexually  transmitted infections)  Before age 24: Ask your care team if you should be screened for STIs.  After age 24: Get screened for STIs if you're at risk. You are at risk for STIs (including HIV) if:  You are sexually active with more than one person.  You don't use condoms every time.  You or a partner was diagnosed with a sexually transmitted infection.  If you are at risk for HIV, ask about PrEP medicine to prevent HIV.  Get tested for HIV at least once in your life, whether you are at risk for HIV or not.  Cancer screening tests  Cervical cancer screening: If you have a cervix, begin getting regular cervical cancer screening tests starting at age 21.  Breast cancer scan (mammogram): If you've ever had breasts, begin having regular mammograms starting at age 40. This is a scan to check for breast cancer.  Colon cancer screening: It is important to start screening for colon cancer at age 45.  Have a colonoscopy test every 10 years (or more often if you're at risk) Or, ask your provider about stool tests like a FIT test every year or Cologuard test every 3 years.  To learn more about your testing options, visit:   .  For help making a decision, visit:   https://bit.ly/cv75615.  Prostate cancer screening test: If you have a prostate, ask your care team if a prostate cancer screening test (PSA) at age 55 is right for you.  Lung cancer screening: If you are a current or former smoker ages 50 to 80, ask your care team if ongoing lung cancer screenings are right for you.  For informational purposes only. Not to replace the advice of your health care provider. Copyright   2023 [facility]. All rights reserved. Clinically reviewed by the [facility]. Yi De [id number] - REV 01/24.  Preventing Falls: Care Instructions  Injuries and health problems such as trouble walking or poor eyesight can increase your risk of falling. So can some medicines. But there are things you can do to help  "prevent falls. You can exercise to get stronger. You can also arrange your home to make it safer.    Talk to your doctor about the medicines you take. Ask if any of them increase the risk of falls and whether they can be changed or stopped.   Try to exercise regularly. It can help improve your strength and balance. This can help lower your risk of falling.         Practice fall safety and prevention.   Wear low-heeled shoes that fit well and give your feet good support. Talk to your doctor if you have foot problems that make this hard.  Carry a cellphone or wear a medical alert device that you can use to call for help.  Use stepladders instead of chairs to reach high objects. Don't climb if you're at risk for falls. Ask for help, if needed.  Wear the correct eyeglasses, if you need them.        Make your home safer.   Remove rugs, cords, clutter, and furniture from walkways.  Keep your house well lit. Use night-lights in hallways and bathrooms.  Install and use sturdy handrails on stairways.  Wear nonskid footwear, even inside. Don't walk barefoot or in socks without shoes.        Be safe outside.   Use handrails, curb cuts, and ramps whenever possible.  Keep your hands free by using a shoulder bag or backpack.  Try to walk in well-lit areas. Watch out for uneven ground, changes in pavement, and debris.  Be careful in the winter. Walk on the grass or gravel when sidewalks are slippery. Use de-icer on steps and walkways. Add non-slip devices to shoes.    Put grab bars and nonskid mats in your shower or tub and near the toilet. Try to use a shower chair or bath bench when bathing.   Get into a tub or shower by putting in your weaker leg first. Get out with your strong side first. Have a phone or medical alert device in the bathroom with you.   Where can you learn more?  Go to https://www.[employer].net/patiented  Enter G117 in the search box to learn more about \"Preventing Falls: Care Instructions.\"  Current as of: " July 17, 2023  Content Version: 14.2 2024 [facility] [employer].   Care instructions adapted under license by your healthcare professional. If you have questions about a medical condition or this instruction, always ask your healthcare professional. [employer] disclaims any warranty or liability for your use of this information.

## 2024-11-25 NOTE — NURSING NOTE
"Chief Complaint   Patient presents with    Physical    Diabetes       Initial /78   Pulse 81   Temp 98.2  F (36.8  C) (Temporal)   Resp 18   Ht 1.753 m (5' 9\")   Wt 109.4 kg (241 lb 3.2 oz)   SpO2 98%   BMI 35.62 kg/m   Estimated body mass index is 35.62 kg/m  as calculated from the following:    Height as of this encounter: 1.753 m (5' 9\").    Weight as of this encounter: 109.4 kg (241 lb 3.2 oz).  Medication Reconciliation: complete          "

## 2024-11-25 NOTE — PROGRESS NOTES
"Preventive Care Visit  [facility]  Blu Serna MD, Family Medicine  Nov 25, 2024      Assessment & Plan     (Z00.00) Encounter for Medicare annual wellness exam  (primary encounter diagnosis)  Comment: see below  Plan:      (F33.1) Moderate episode of recurrent major depressive disorder (H)  Comment: appears well treated  Plan: sertraline (ZOLOFT) 100 MG tablet        Refilled without changes     (I10) Essential hypertension  Comment:  is at goal  Plan: lisinopril (ZESTRIL) 20 MG tablet        Refilled without changes    (E11.9) Type 2 diabetes mellitus without complication, without long-term current use of insulin (H)  Comment: great control  Plan: atorvastatin (LIPITOR) 40 MG tablet, HEMOGLOBIN        A1C, Albumin Random Urine Quantitative with         Creat Ratio        Refilled.     (I10) Benign essential hypertension  Comment: is at goal  Plan: amLODIPine (NORVASC) 2.5 MG tablet        Refilled without changes    (K21.9) Gastroesophageal reflux disease without esophagitis  Comment: stable  Plan: pantoprazole (PROTONIX) 20 MG EC tablet        refilled    (Z87.891) Personal history of tobacco use  Comment:    Plan: CT Chest Lung Cancer Screen Low Dose Without             (M51.26) Herniation of lumbar intervertebral disc without myelopathy  Comment: symptoms are stable, no lower extremity weakness. If acute flare lasts more than 6 weeks, then consider PT and likely MRI as well.   Plan: gabapentin (NEURONTIN) 400 MG capsule        Refilled.             BMI  Estimated body mass index is 35.62 kg/m  as calculated from the following:    Height as of this encounter: 1.753 m (5' 9\").    Weight as of this encounter: 109.4 kg (241 lb 3.2 oz).       Counseling  Appropriate preventive services were addressed with this patient via screening, questionnaire, or discussion as appropriate for fall prevention, nutrition, physical activity, Tobacco-use cessation, social engagement, weight loss and " cognition.  Checklist reviewing preventive services available has been given to the patient.  Reviewed patient's diet, addressing concerns and/or questions.           No follow-ups on file.    Zully Merritt is a 54 year old, presenting for the following:  Physical and Diabetes        11/25/2024    10:25 AM   Additional Questions   Roomed by DIOGO Hickman   Accompanied by Self         11/25/2024    10:25 AM   Patient Reported Additional Medications   Patient reports taking the following new medications N/A         Healthy Habits:     Taking medications regularly:  0  History of Present Illness       Reason for visit:  6 month check up   He is taking medications regularly.    He has been on Social Security now for a little over a year.     Needs follow up and refills on his hypertension meds, hid diabetes as well as his PPI. Has a home blood pressure cuff, not checking currently. No side effects from the hypertension meds. Has no gerd symptoms at all.     Had bilateral knee injections 6 months ago. Helped a significant amount for the last 5 months or so. Wants to repeat them.     Says a few seeks ago he was watching TV, upon standing got significant low back pain with spasms. No pain into legs, but some into buttocks. No lower extremity weakness. Had some back issues in 2016, with an MRI then:    IMPRESSION:   1.  3-4 mm left parasagittal caudally directed disc protrusion at L5-S1 demonstrates a peripheral annular tear and contributes to slight mass effect upon the ventral aspect the sac. No evidence of cord compression no evidence of associated nerve root compression.     2.  Mild bilateral foraminal stenosis at L5-S1 secondary to chronic bulge and endplate changes with bilateral L5 ganglionic abutment.     Electronically Signed By: Tejas Walker M.D. on 6/15/2016 2:12 PM     Diabetes Follow-up    How often are you checking your blood sugar? Four or more times daily  Blood sugar testing frequency  justification:  Patient modifying lifestyle changes (diet, exercise) with blood sugars  What time of day are you checking your blood sugars (select all that apply)?  Before and after meals and At bedtime  Have you had any blood sugars above 200?  Yes 211  Have you had any blood sugars below 70?  Yes 56  What symptoms do you notice when your blood sugar is low?  Shaky, Dizzy, and Blurred vision  What concerns do you have today about your diabetes? None   Do you have any of these symptoms? (Select all that apply)  No numbness or tingling in feet.  No redness, sores or blisters on feet.  No complaints of excessive thirst.  No reports of blurry vision.  No significant changes to weight.    He has a CGM for his diabetes. He feels this has really helped with reinforcing his diet changes.       BP Readings from Last 2 Encounters:   11/25/24 130/78   07/28/24 110/70     Hemoglobin A1C (%)   Date Value   05/23/2024 6.3 (H)   02/28/2024 6.4 (H)     LDL Cholesterol Calculated (mg/dL)   Date Value   02/28/2024 28   09/06/2022 100   09/04/2018 126 (H)           Health Care Directive  Patient does not have a Health Care Directive: Discussed advance care planning with patient; information given to patient to review.      11/20/2024   General Health   How would you rate your overall physical health? (!) FAIR   Feel stress (tense, anxious, or unable to sleep) Only a little      (!) STRESS CONCERN      11/20/2024   Nutrition   Diet: I don't know            11/20/2024   Exercise   Days per week of moderate/strenous exercise 5 days   Average minutes spent exercising at this level 10 min            11/20/2024   Social Factors   Frequency of gathering with friends or relatives Patient declined   Worry food won't last until get money to buy more Yes   Food not last or not have enough money for food? Yes   Do you have housing? (Housing is defined as stable permanent housing and does not include staying ouside in a car, in a tent, in an  abandoned building, in an overnight shelter, or couch-surfing.) No   Are you worried about losing your housing? No   Lack of transportation? No   Unable to get utilities (heat,electricity)? No   Want help with housing or utility concern? No      (!) FOOD SECURITY CONCERN PRESENT(!) HOUSING CONCERN PRESENT      2024   Fall Risk   Fallen 2 or more times in the past year? Yes    Trouble with walking or balance? Yes        Patient-reported           2024   Activities of Daily Living- Home Safety   Needs help with the following daily activites None of the above   Safety concerns in the home None of the above            2024   Dental   Dentist two times every year? (!) DECLINE            2024   Hearing Screening   Hearing concerns? None of the above            2024   Driving Risk Screening   Patient/family members have concerns about driving No            2024   General Alertness/Fatigue Screening   Have you been more tired than usual lately? No            2024   Urinary Incontinence Screening   Bothered by leaking urine in past 6 months No               Today's PHQ-2 Score:       2024    12:32 PM   PHQ-2 (  [employer])   Q1: Little interest or pleasure in doing things 2    Q2: Feeling down, depressed or hopeless 0    PHQ-2 Score 2    Q1: Little interest or pleasure in doing things More than half the days   Q2: Feeling down, depressed or hopeless Not at all   PHQ-2 Score 2       Patient-reported           2024   Substance Use   Alcohol more than 3/day or more than 7/wk Not Applicable   Do you have a current opioid prescription? No   How severe/bad is pain from 1 to 10? 5/10   Do you use any other substances recreationally? (!) DECLINE        Social History     Tobacco Use    Smoking status: Former     Current packs/day: 0.00     Types: Cigarettes     Quit date: 2010     Years since quittin.7    Smokeless tobacco: Never   Vaping Use    Vaping status: Never  Used   Substance Use Topics    Alcohol use: Never    Drug use: No       ASCVD Risk   The ASCVD Risk score (Pari DK, et al., 2019) failed to calculate for the following reasons:    The valid total cholesterol range is 130 to 320 mg/dL            Reviewed and updated as needed this visit by Provider                    Past Medical History:   Diagnosis Date    Gout     No Comments Provided    Hypomagnesemia     No Comments Provided    Pain in left knee     No Comments Provided    Personal history of other (healed) physical injury and trauma     2000,repair    Unspecified injury of unspecified wrist, hand and finger(s), initial encounter     1999     Past Surgical History:   Procedure Laterality Date    ARTHROSCOPY KNEE      Right knee meniscal repair, arthroscopic ally    ARTHROSCOPY KNEE      2010,Left knee scope    COLONOSCOPY N/A 09/30/2021    follow up 5 years family history, 9/30/2021    ESOPHAGOSCOPY, GASTROSCOPY, DUODENOSCOPY (EGD), COMBINED N/A 09/30/2021    Procedure: ESOPHAGOGASTRODUODENOSCOPY, WITH BIOPSY;  Surgeon: Jamel Kruger MD;  Location: [facility] OR    FINGER SURGERY      Right thumb ORIF    OTHER SURGICAL HISTORY      7/15/14,,HERNIA REPAIR,Left,LIH with mesh    OTHER SURGICAL HISTORY      5/10/16,,HERNIA REPAIR,Right,RIH with plug/patch    SHOULDER ARTHROSCOPY W/ ROTATOR CUFF REPAIR Left 05/2024     Current Outpatient Medications   Medication Sig Dispense Refill    ACETAMINOPHEN EXTRA STRENGTH 500 MG tablet       albuterol (PROAIR HFA/PROVENTIL HFA/VENTOLIN HFA) 108 (90 Base) MCG/ACT inhaler Inhale 2 puffs into the lungs every 4 hours as needed for shortness of breath, wheezing or cough 54 g 4    allopurinol (ZYLOPRIM) 300 MG tablet Take 1 tablet by mouth once daily 90 tablet 4    amLODIPine (NORVASC) 2.5 MG tablet Take 1 tablet (2.5 mg) by mouth daily. 90 tablet 0    atorvastatin (LIPITOR) 40 MG tablet Take 1 tablet (40 mg) by mouth daily 90 tablet 4    cetirizine (ZYRTEC) 10 MG  tablet Take 1 tablet by mouth twice daily 180 tablet 1    Continuous Glucose  (DEXCOM G6 ) JEFFERSON USE AS DIRECTED PER  MANUFACTURERS  INSTRUCTIONS  EVERY  90  DAYS 1 each 0    Continuous Glucose Sensor (DEXCOM G6 SENSOR) MISC Change every 10 days. 3 each 5    Continuous Glucose Transmitter (DEXCOM G6 TRANSMITTER) MISC Change every 3 months. 1 each 1    fluticasone-salmeterol (ADVAIR) 100-50 MCG/ACT inhaler Inhale 1 puff into the lungs 2 times daily 60 each 11    gabapentin (NEURONTIN) 400 MG capsule TAKE 1 CAPSULE BY MOUTH IN THE MORNING AND 1 CAPSULE IN THE EVENING. MAY TAKE 1 ADDITIONAL CAPSULE AS NEEDED PER DAY FOR ANXIETY OR PAIN.      HUMIRA PEN 40 MG/0.8ML pen kit Inject 40 mg as directed every 14 days      hydrocortisone 2.5 % cream APPLY CREAM TWICE DAILY TO AFFECTED ITCHY AREAS ON THE FACE, GROIN, AND AXILLA UNTIL CLEAR      lisinopril (ZESTRIL) 20 MG tablet Take 1 tablet (20 mg) by mouth daily 90 tablet 4    pantoprazole (PROTONIX) 20 MG EC tablet Take 2 tablets (40 mg) by mouth daily. 180 tablet 0    sertraline (ZOLOFT) 100 MG tablet Take 1 tablet (100 mg) by mouth daily 90 tablet 3    SV MELATONIN 3 MG TBDP TAKE 1 TO 2 TABLETS BY MOUTH AT NIGHT FOR SLEEP FOR INSOMNIA      traZODone (DESYREL) 50 MG tablet TAKE 2 TO 3 TABLETS BY MOUTH AT BEDTIME FOR SLEEP       Allergies   Allergen Reactions    Metformin Diarrhea    Ibuprofen GI Disturbance    Seasonal Allergies Difficulty breathing    Nsaids Other (See Comments)     Avoids d/t hx GIB     Recent Labs   Lab Test 07/10/24  0901 05/23/24  1126 02/28/24  0820 11/30/23  1152 09/21/23  1338 11/21/22  1522 10/31/22  1040 10/25/22  1327 09/06/22  0824 05/13/22  1116 10/25/21  1140 09/04/21  1043 06/24/21  0020 05/07/21  0520   A1C  --  6.3* 6.4* 6.5*  --    < >  --   --   --   --   --   --   --   --    LDL  --   --  28  --   --   --   --   --  100  --  73  --   --   --    HDL  --   --  33*  --   --   --   --   --  26  --  30  --   --   --    TRIG  --    --  181*  --   --   --   --   --  207*  --  337*  --   --   --    ALT  --   --   --   --  58  --  114*  --  93*   < > 63*   < > 80* 123*   CR 1.00 0.93  --  0.92 0.90  --  1.03   < > 1.05   < > 1.04   < > 1.15 0.97   GFRESTIMATED 89 >90  --  >90 >90  --  87   < > 85   < > 83   < > 67 82   GFRESTBLACK  --   --   --   --   --   --   --   --   --   --   --   --  81 >90   POTASSIUM 3.6 4.0  --  3.4 3.3*   < > 3.2*   < > 3.5   < > 3.0*   < > 2.9* 3.3*    < > = values in this interval not displayed.      Current providers sharing in care for this patient include:  Patient Care Team:  Blu Serna MD as PCP - General (Family Medicine)  Lisbeth Trevizo RN as Diabetes Educator (Diabetes Education)  Blu Serna MD as Assigned PCP  Vladimir Alex MD as Assigned Surgical Provider    The following health maintenance items are reviewed in Epic and correct as of today:  Health Maintenance   Topic Date Due    ASTHMA ACTION PLAN  05/10/2023    A1C  08/23/2024    MEDICARE ANNUAL WELLNESS VISIT  11/30/2024    MICROALBUMIN  11/30/2024    DIABETIC FOOT EXAM  11/30/2024    LUNG CANCER SCREENING  12/04/2024    COVID-19 Vaccine (7 - 2024-25 season) 01/19/2025    ASTHMA CONTROL TEST  01/28/2025    PHQ-9  01/28/2025    LIPID  02/28/2025    EYE EXAM  06/03/2025    BMP  07/10/2025    DTAP/TDAP/TD IMMUNIZATION (4 - Td or Tdap) 01/26/2029    ADVANCE CARE PLANNING  07/10/2029    COLORECTAL CANCER SCREENING  09/30/2031    RSV VACCINE (1 - 1-dose 75+ series) 03/03/2045    HEPATITIS C SCREENING  Completed    HIV SCREENING  Completed    DEPRESSION ACTION PLAN  Completed    INFLUENZA VACCINE  Completed    Pneumococcal Vaccine: Pediatrics (0 to 5 Years) and At-Risk Patients (6 to 64 Years)  Completed    ZOSTER IMMUNIZATION  Completed    HEPATITIS B IMMUNIZATION  Completed    HPV IMMUNIZATION  Aged Out    MENINGITIS IMMUNIZATION  Aged Out    RSV MONOCLONAL ANTIBODY  Aged Out            Objective    Exam  /78   Pulse 81   Temp 98.2  F  "(36.8  C) (Temporal)   Resp 18   Ht 1.753 m (5' 9\")   Wt 109.4 kg (241 lb 3.2 oz)   SpO2 98%   BMI 35.62 kg/m     Estimated body mass index is 35.62 kg/m  as calculated from the following:    Height as of this encounter: 1.753 m (5' 9\").    Weight as of this encounter: 109.4 kg (241 lb 3.2 oz).    Physical Exam  GENERAL: alert and no distress  EYES: Eyes grossly normal to inspection, PERRL and conjunctivae and sclerae normal  HENT: ear canals and TM's normal, nose and mouth without ulcers or lesions  NECK: no adenopathy, no asymmetry, masses, or scars  RESP: lungs clear to auscultation - no rales, rhonchi or wheezes  CV: regular rate and rhythm, normal S1 S2, no S3 or S4, no murmur, click or rub, no peripheral edema  ABDOMEN: soft, nontender, no hepatosplenomegaly, no masses and bowel sounds normal  MS: no gross musculoskeletal defects noted, no edema  SKIN: no suspicious lesions or rashes  NEURO: Normal strength and tone, mentation intact and speech normal  PSYCH: mentation appears normal, affect normal/bright  Diabetic foot exam: normal DP and PT pulses, no trophic changes or ulcerative lesions, normal sensory exam, and normal monofilament exam    Knees bilateral without redness of effusions. Discussed with him risks of injection and he consented. Left prepped and infiltrated with 80 milligram depotmedrol and 4 ml 1% lidocaine. Same then done on right side. Tolerated well.     Results for orders placed or performed in visit on 11/25/24   HEMOGLOBIN A1C     Status: Abnormal   Result Value Ref Range    Estimated Average Glucose 134 (H) <117 mg/dL    Hemoglobin A1C 6.3 (H) <5.7 %   Albumin Random Urine Quantitative with Creat Ratio     Status: None   Result Value Ref Range    Creatinine Urine mg/dL 96.3 mg/dL    Albumin Urine mg/L <12.0 mg/L    Albumin Urine mg/g Cr                No data to display                  Vision Screen         Signed Electronically by: Blu Serna MD    "

## 2024-12-02 ENCOUNTER — TRANSFERRED RECORDS (OUTPATIENT)
Dept: HEALTH INFORMATION MANAGEMENT | Facility: OTHER | Age: 54
End: 2024-12-02
Payer: COMMERCIAL

## 2024-12-05 ENCOUNTER — LAB (OUTPATIENT)
Dept: LAB | Facility: OTHER | Age: 54
End: 2024-12-05
Attending: FAMILY MEDICINE
Payer: COMMERCIAL

## 2024-12-05 DIAGNOSIS — Z79.899 DRUG THERAPY: ICD-10-CM

## 2024-12-05 LAB
ALBUMIN SERPL BCG-MCNC: 4.3 G/DL (ref 3.5–5.2)
ALP SERPL-CCNC: 153 U/L (ref 40–150)
ALT SERPL W P-5'-P-CCNC: 85 U/L (ref 0–70)
ANION GAP SERPL CALCULATED.3IONS-SCNC: 11 MMOL/L (ref 7–15)
AST SERPL W P-5'-P-CCNC: 62 U/L (ref 0–45)
BASOPHILS # BLD AUTO: 0 10E3/UL (ref 0–0.2)
BASOPHILS NFR BLD AUTO: 0 %
BILIRUB SERPL-MCNC: 1.2 MG/DL
BUN SERPL-MCNC: 16.9 MG/DL (ref 6–20)
CALCIUM SERPL-MCNC: 10.3 MG/DL (ref 8.8–10.4)
CHLORIDE SERPL-SCNC: 106 MMOL/L (ref 98–107)
CREAT SERPL-MCNC: 1.12 MG/DL (ref 0.67–1.17)
EGFRCR SERPLBLD CKD-EPI 2021: 78 ML/MIN/1.73M2
EOSINOPHIL # BLD AUTO: 0.1 10E3/UL (ref 0–0.7)
EOSINOPHIL NFR BLD AUTO: 2 %
ERYTHROCYTE [DISTWIDTH] IN BLOOD BY AUTOMATED COUNT: 13.8 % (ref 10–15)
GLUCOSE SERPL-MCNC: 117 MG/DL (ref 70–99)
HCO3 SERPL-SCNC: 26 MMOL/L (ref 22–29)
HCT VFR BLD AUTO: 46.7 % (ref 40–53)
HGB BLD-MCNC: 15.6 G/DL (ref 13.3–17.7)
IMM GRANULOCYTES # BLD: 0 10E3/UL
IMM GRANULOCYTES NFR BLD: 0 %
LYMPHOCYTES # BLD AUTO: 1.9 10E3/UL (ref 0.8–5.3)
LYMPHOCYTES NFR BLD AUTO: 28 %
MCH RBC QN AUTO: 29.7 PG (ref 26.5–33)
MCHC RBC AUTO-ENTMCNC: 33.4 G/DL (ref 31.5–36.5)
MCV RBC AUTO: 89 FL (ref 78–100)
MONOCYTES # BLD AUTO: 0.4 10E3/UL (ref 0–1.3)
MONOCYTES NFR BLD AUTO: 6 %
NEUTROPHILS # BLD AUTO: 4.3 10E3/UL (ref 1.6–8.3)
NEUTROPHILS NFR BLD AUTO: 64 %
NRBC # BLD AUTO: 0 10E3/UL
NRBC BLD AUTO-RTO: 0 /100
PLATELET # BLD AUTO: 128 10E3/UL (ref 150–450)
POTASSIUM SERPL-SCNC: 4.1 MMOL/L (ref 3.4–5.3)
PROT SERPL-MCNC: 8.4 G/DL (ref 6.4–8.3)
RBC # BLD AUTO: 5.25 10E6/UL (ref 4.4–5.9)
SODIUM SERPL-SCNC: 143 MMOL/L (ref 135–145)
WBC # BLD AUTO: 6.6 10E3/UL (ref 4–11)

## 2024-12-05 PROCEDURE — 80053 COMPREHEN METABOLIC PANEL: CPT | Mod: ZL

## 2024-12-05 PROCEDURE — 36415 COLL VENOUS BLD VENIPUNCTURE: CPT | Mod: ZL

## 2024-12-05 PROCEDURE — 82040 ASSAY OF SERUM ALBUMIN: CPT | Mod: ZL

## 2024-12-05 PROCEDURE — 85004 AUTOMATED DIFF WBC COUNT: CPT | Mod: ZL

## 2024-12-05 PROCEDURE — 86481 TB AG RESPONSE T-CELL SUSP: CPT | Mod: ZL

## 2024-12-06 LAB
QUANTIFERON MITOGEN: 10 IU/ML
QUANTIFERON NIL TUBE: 0 IU/ML
QUANTIFERON TB1 TUBE: 0 IU/ML
QUANTIFERON TB2 TUBE: 0

## 2024-12-07 LAB
GAMMA INTERFERON BACKGROUND BLD IA-ACNC: 0 IU/ML
M TB IFN-G BLD-IMP: NEGATIVE
M TB IFN-G CD4+ BCKGRND COR BLD-ACNC: 10 IU/ML
MITOGEN IGNF BCKGRD COR BLD-ACNC: 0 IU/ML
MITOGEN IGNF BCKGRD COR BLD-ACNC: 0 IU/ML

## 2024-12-21 ENCOUNTER — HOSPITAL ENCOUNTER (OUTPATIENT)
Dept: GENERAL RADIOLOGY | Facility: OTHER | Age: 54
Discharge: HOME | End: 2024-12-21
Attending: STUDENT IN AN ORGANIZED HEALTH CARE EDUCATION/TRAINING PROGRAM
Payer: COMMERCIAL

## 2024-12-21 ENCOUNTER — OFFICE VISIT (OUTPATIENT)
Dept: FAMILY MEDICINE | Facility: OTHER | Age: 54
End: 2024-12-21
Attending: STUDENT IN AN ORGANIZED HEALTH CARE EDUCATION/TRAINING PROGRAM
Payer: COMMERCIAL

## 2024-12-21 VITALS
RESPIRATION RATE: 16 BRPM | HEART RATE: 69 BPM | BODY MASS INDEX: 35.84 KG/M2 | TEMPERATURE: 100 F | HEIGHT: 69 IN | DIASTOLIC BLOOD PRESSURE: 86 MMHG | SYSTOLIC BLOOD PRESSURE: 130 MMHG | OXYGEN SATURATION: 97 % | WEIGHT: 242 LBS

## 2024-12-21 DIAGNOSIS — R50.9 FEVER IN ADULT: ICD-10-CM

## 2024-12-21 DIAGNOSIS — U07.1 INFECTION DUE TO 2019 NOVEL CORONAVIRUS: Primary | ICD-10-CM

## 2024-12-21 DIAGNOSIS — J06.9 UPPER RESPIRATORY TRACT INFECTION, UNSPECIFIED TYPE: ICD-10-CM

## 2024-12-21 DIAGNOSIS — R52 BODY ACHES: ICD-10-CM

## 2024-12-21 DIAGNOSIS — S49.91XA SHOULDER INJURY, RIGHT, INITIAL ENCOUNTER: ICD-10-CM

## 2024-12-21 DIAGNOSIS — R07.0 THROAT PAIN IN ADULT: ICD-10-CM

## 2024-12-21 LAB
FLUAV RNA SPEC QL NAA+PROBE: NEGATIVE
FLUBV RNA RESP QL NAA+PROBE: NEGATIVE
RSV RNA SPEC NAA+PROBE: NEGATIVE
S PYO DNA THROAT QL NAA+PROBE: NOT DETECTED
SARS-COV-2 RNA RESP QL NAA+PROBE: POSITIVE

## 2024-12-21 PROCEDURE — 87637 SARSCOV2&INF A&B&RSV AMP PRB: CPT | Mod: ZL | Performed by: STUDENT IN AN ORGANIZED HEALTH CARE EDUCATION/TRAINING PROGRAM

## 2024-12-21 PROCEDURE — 87651 STREP A DNA AMP PROBE: CPT | Mod: ZL | Performed by: STUDENT IN AN ORGANIZED HEALTH CARE EDUCATION/TRAINING PROGRAM

## 2024-12-21 PROCEDURE — G0463 HOSPITAL OUTPT CLINIC VISIT: HCPCS | Mod: 25 | Performed by: STUDENT IN AN ORGANIZED HEALTH CARE EDUCATION/TRAINING PROGRAM

## 2024-12-21 PROCEDURE — 73030 X-RAY EXAM OF SHOULDER: CPT | Mod: RT

## 2024-12-21 PROCEDURE — 99214 OFFICE O/P EST MOD 30 MIN: CPT | Performed by: STUDENT IN AN ORGANIZED HEALTH CARE EDUCATION/TRAINING PROGRAM

## 2024-12-21 ASSESSMENT — PAIN SCALES - GENERAL: PAINLEVEL_OUTOF10: EXTREME PAIN (8)

## 2024-12-21 NOTE — NURSING NOTE
"Chief Complaint   Patient presents with    Headache     X 1 week    Throat Problem     X 1 week - intermittent    Chills     With body aches     Patient tx with daily meds.  Patient requesting strep and viral testing      Initial /86 (BP Location: Left arm, Patient Position: Sitting, Cuff Size: Adult Regular)   Pulse 69   Temp 100  F (37.8  C) (Tympanic)   Resp 16   Ht 1.753 m (5' 9\")   Wt 109.8 kg (242 lb)   SpO2 97%   BMI 35.74 kg/m   Estimated body mass index is 35.74 kg/m  as calculated from the following:    Height as of this encounter: 1.753 m (5' 9\").    Weight as of this encounter: 109.8 kg (242 lb).     Advance Care Directive on file? y    FOOD SECURITY SCREENING QUESTIONS:    The next two questions are to help us understand your food security.  If you are feeling you need any assistance in this area, we have resources available to support you today.    Hunger Vital Signs:  Within the past 12 months we worried whether our food would run out before we got money to buy more. Never  Within the past 12 months the food we bought just didn't last and we didn't have money to get more. Never  Virginia Romero LPN,LPN on 12/21/2024 at 5:27 PM      Virginia Romero LPN     "

## 2024-12-22 ENCOUNTER — TELEPHONE (OUTPATIENT)
Dept: NURSING | Facility: CLINIC | Age: 54
End: 2024-12-22
Payer: COMMERCIAL

## 2024-12-22 NOTE — PROGRESS NOTES
Assessment & Plan     (U07.1) Infection due to 2019 novel coronavirus  (primary encounter diagnosis)    Comment: COVID-19.  Symptoms x 3 days.  He does use an inhaler that does interact with Paxlovid.  His vitals are stable.  Does have slightly elevated temperature of 100  F which is consistent with viral illness.    Plan: molnupiravir (LAGEVRIO) 200 MG capsule          Plan to treat with Lagevrio cost allows.  Otherwise continue over-the-counter management.  Follow-up if not improving.  Return to rapid clinic or ER if worsening or changing.  He is comfortable with this plan.    (J06.9) Upper respiratory tract infection, unspecified type  Comment: COVID.  Plan: Influenza A/B, RSV and SARS-CoV2 PCR (COVID-19)        Nose            (R07.0) Throat pain in adult  Comment: COVID.  Plan: Group A Streptococcus PCR Throat Swab            (R50.9) Fever in adult  Comment: COVID.  Plan: Group A Streptococcus PCR Throat Swab            (R52) Body aches  Comment: COVID.  Plan: Group A Streptococcus PCR Throat Swab            (S49.91XA) Shoulder injury, right, initial encounter    Comment: Right shoulder injury.  No evidence of fracture or dislocation on x-ray imaging.  At this time, soft tissue injury.    Plan: XR Shoulder Right G/E 3 Views, Orthopedic          Referral       Continue conservative management.  Follow-up with orthopedics if not improving.  Return to rapid clinic or ER if worsening or changing.  He is comfortable with this plan.      Zully Merritt is a 54 year old, presenting for the following health issues:  Headache (X 1 week), Throat Problem (X 1 week - intermittent), Chills (With body aches), and Shoulder Pain (Pain in R shoulder from falling landed on R arm - fell in yard.)    HPI     Patient presents today with a 3-day history of worsening cough, congestion, fever, aches.  He notes symptoms started somewhat abruptly.  He had had a cough earlier this week.  He notes he has been using Tylenol  "to help with symptoms.  He continues to drink plenty of fluids.    He also notes a couple days ago he fell on his right shoulder.  He notes he was walking the dog and tripped.  He has been using Tylenol.  Ice.  He notes that most of the pain is in the front of the shoulder.  He does note he can continue to move the shoulder though he feels like there is a \"catch\" in his shoulder area.      Review of Systems  Constitutional, HEENT, cardiovascular, pulmonary, gi and gu systems are negative, except as otherwise noted.        Objective    /86 (BP Location: Left arm, Patient Position: Sitting, Cuff Size: Adult Regular)   Pulse 69   Temp 100  F (37.8  C) (Tympanic)   Resp 16   Ht 1.753 m (5' 9\")   Wt 109.8 kg (242 lb)   SpO2 97%   BMI 35.74 kg/m    Body mass index is 35.74 kg/m .    Physical Exam   GENERAL: alert and no distress  EYES: Eyes grossly normal to inspection, PERRL and conjunctivae and sclerae normal  HENT: ear canals and TM's normal, nose and mouth without ulcers or lesions  NECK: no adenopathy, no asymmetry, masses, or scars  RESP: lungs clear to auscultation - no rales, rhonchi or wheezes  CV: regular rate and rhythm, normal S1 S2, no S3 or S4, no murmur, click or rub, no peripheral edema  MS: no gross musculoskeletal defects noted, no edema, erythema of the right shoulder.  Tenderness palpation over the anterior shoulder into the clavicular area.  Decreased range of motion with flexion due to pain.  Bicep strength 5 out of 5, negative Carlton deformity.  Distal pulses intact.  Special test deferred due to pain.    Results for orders placed or performed during the hospital encounter of 12/21/24   XR Shoulder Right G/E 3 Views     Status: None    Narrative    EXAM: XR SHOULDER RIGHT G/E 3 VIEWS  LOCATION: [facility]  DATE: 12/21/2024    INDICATION: pain in right shoulder after injury  COMPARISON: None.      Impression    IMPRESSION: Normal joint spaces and alignment. No " fracture. Postop changes of a lower cervical spinal fusion.   Results for orders placed or performed in visit on 12/21/24   Influenza A/B, RSV and SARS-CoV2 PCR (COVID-19) Nose     Status: Abnormal    Specimen: Nose; Swab   Result Value Ref Range    Influenza A PCR Negative Negative    Influenza B PCR Negative Negative    RSV PCR Negative Negative    SARS CoV2 PCR Positive (A) Negative    Narrative    Testing was performed using the Xpert Xpress CoV2/Flu/RSV Assay on the [employer] Instrument. This test should be ordered for the detection of SARS-CoV2, influenza, and RSV viruses in individuals with signs and symptoms of respiratory tract infection. This test is for in vitro diagnostic use under the [place] FDA for laboratories certified under CLIA to perform high or moderate complexity testing. This test has been [place] FDA cleared. A negative result does not rule out the presence of PCR inhibitors in the specimen or target RNA in concentration below the limit of detection for the assay. If only one viral target is positive but coinfection with multiple targets is suspected, the sample should be re-tested with another FDA cleared, approved, or authorized test, if coninfection would change clinical management. This test was validated by the [facility]. These laboratories are certified under the Clinical Laboratory Improvement Amendments of 1988 (CLIA-88) as qualified to perfom high complexity laboratory testing.   Group A Streptococcus PCR Throat Swab     Status: Normal    Specimen: Throat; Swab   Result Value Ref Range    Group A strep by PCR Not Detected Not Detected    Narrative    The Xpert Xpress Strep A test, performed on the [employer] Systems, is a rapid, qualitative in vitro diagnostic test for the detection of Streptococcus pyogenes (Group A ß-hemolytic Streptococcus, Strep A) in throat swab specimens from patients with signs and symptoms of pharyngitis. The Xpert Xpress Strep A  test can be used as an aid in the diagnosis of Group A Streptococcal pharyngitis. The assay is not intended to monitor treatment for Group A Streptococcus infections. The Xpert Xpress Strep A test utilizes an automated real-time polymerase chain reaction (PCR) to detect Streptococcus pyogenes DNA.         Signed Electronically by: Marylou Tee PA-C

## 2024-12-22 NOTE — PATIENT INSTRUCTIONS
COVID Positive    Lagevrio twice a day for five days, call [facility] before you pick it up to see if it is covered by insurance.      If expensive, you don't need this medicine.    Tylenol.    Cough medications/cough drops.    Fluids.    Light activity, rest.    Follow up if not improving.   Return to rapid clinic/ER in the next 5-7 days if not getting better.    If getting worse, return to rapid clinic/ER if symptoms worsen.

## 2024-12-28 ENCOUNTER — OFFICE VISIT (OUTPATIENT)
Dept: FAMILY MEDICINE | Facility: OTHER | Age: 54
End: 2024-12-28
Attending: NURSE PRACTITIONER
Payer: COMMERCIAL

## 2024-12-28 VITALS
HEART RATE: 62 BPM | SYSTOLIC BLOOD PRESSURE: 104 MMHG | RESPIRATION RATE: 20 BRPM | BODY MASS INDEX: 35.74 KG/M2 | TEMPERATURE: 97.4 F | WEIGHT: 242 LBS | OXYGEN SATURATION: 97 % | DIASTOLIC BLOOD PRESSURE: 78 MMHG

## 2024-12-28 DIAGNOSIS — U07.1 COVID-19 VIRUS INFECTION: Primary | ICD-10-CM

## 2024-12-28 DIAGNOSIS — R07.0 THROAT PAIN: ICD-10-CM

## 2024-12-28 PROCEDURE — G0463 HOSPITAL OUTPT CLINIC VISIT: HCPCS

## 2024-12-28 PROCEDURE — 99213 OFFICE O/P EST LOW 20 MIN: CPT | Performed by: NURSE PRACTITIONER

## 2024-12-28 RX ORDER — PREDNISONE 20 MG/1
20 TABLET ORAL DAILY
Qty: 3 TABLET | Refills: 0 | Status: SHIPPED | OUTPATIENT
Start: 2024-12-28 | End: 2024-12-31

## 2024-12-28 ASSESSMENT — PAIN SCALES - GENERAL: PAINLEVEL_OUTOF10: NO PAIN (0)

## 2024-12-28 NOTE — NURSING NOTE
Pt here for continued cough, sore throat and nasal congestion.  Wax dx with COVID on 12/21/24.  Had sx 3 days prior to that visit.  Barbara Stallings CMA ([facility])......................12/28/2024  2:27 PM       Medication Reconciliation: complete    Barbara Stallings CMA  12/28/2024 2:27 PM

## 2024-12-28 NOTE — PROGRESS NOTES
ASSESSMENT/PLAN:     I have reviewed the nursing notes.  I have reviewed the findings, diagnosis, plan and need for follow up with the patient.        1. Throat pain  - predniSONE (DELTASONE) 20 MG tablet; Take 1 tablet (20 mg) by mouth daily for 3 days.  Dispense: 3 tablet; Refill: 0    Patient with recent Covid infection with positive testing on 12/21/24.  Negative strep testing on 12/21/24.  Throat pain persisting with associated swelling - treat with short course of Prednisone.  No clinical indications for antibiotic treatment at this time.  Symptomatic treatment - Encouraged fluids, salt water gargles, honey, elevation, humidifier, throat spray, lozenges, tea, soup, smoothies, popsicles, etc   May use over-the-counter Tylenol or ibuprofen PRN  Discussed warning signs/symptoms indicative of need to f/u  Follow up if symptoms persist or worsen or concerns    2. COVID-19 virus infection (Primary)  Positive Covid PCR testing on 12/21/24, treated with Molnupiravir.  Symptoms improved/resolved except throat pain.        I explained my diagnostic considerations and recommendations to the patient, who voiced understanding and agreement with the treatment plan. All questions were answered. We discussed potential side effects of any prescribed or recommended therapies, as well as expectations for response to treatments.    Jenna Kothari NP  [facility] AND [facility]      SUBJECTIVE:   Christiano Metcalf is a 54 year old male who presents to clinic today for the following health issues:  Follow up covid    HPI  Patient was seen on 12/21/2024 with positive COVID testing and was treated with Molnupiravir.    Patient with concerns as his symptoms have been persisting now for a total of 10 days with cough, sore throat and nasal congestion.  Cough has become less frequent but is productive.  No pain with deep breathing or coughing.  No shortness of breath.  No fevers. Throat pain persists.  Hx of esophageal dilation  twice in the past due to difficulty swallowing/choking.  Patient is immunocompromise due to Humira.  Patient is also on Advair inhaler.        Past Medical History:   Diagnosis Date    Gout     No Comments Provided    Hypomagnesemia     No Comments Provided    Pain in left knee     No Comments Provided    Personal history of other (healed) physical injury and trauma     ,repair    Unspecified injury of unspecified wrist, hand and finger(s), initial encounter          Past Surgical History:   Procedure Laterality Date    ARTHROSCOPY KNEE      Right knee meniscal repair, arthroscopic ally    ARTHROSCOPY KNEE      ,Left knee scope    COLONOSCOPY N/A 2021    follow up 5 years family history, 2021    ESOPHAGOSCOPY, GASTROSCOPY, DUODENOSCOPY (EGD), COMBINED N/A 2021    Procedure: ESOPHAGOGASTRODUODENOSCOPY, WITH BIOPSY;  Surgeon: Jamel Kruger MD;  Location: [facility] OR    FINGER SURGERY      Right thumb ORIF    OTHER SURGICAL HISTORY      7/15/14,,HERNIA REPAIR,Left,LIH with mesh    OTHER SURGICAL HISTORY      5/10/16,,HERNIA REPAIR,Right,RIH with plug/patch    SHOULDER ARTHROSCOPY W/ ROTATOR CUFF REPAIR Left 2024    tracheal stricture repair  2024     Social History     Tobacco Use    Smoking status: Former     Current packs/day: 0.00     Types: Cigarettes     Quit date: 2010     Years since quittin.8    Smokeless tobacco: Never   Substance Use Topics    Alcohol use: Never     Current Outpatient Medications   Medication Sig Dispense Refill    ACETAMINOPHEN EXTRA STRENGTH 500 MG tablet       albuterol (PROAIR HFA/PROVENTIL HFA/VENTOLIN HFA) 108 (90 Base) MCG/ACT inhaler Inhale 2 puffs into the lungs every 4 hours as needed for shortness of breath, wheezing or cough 54 g 4    allopurinol (ZYLOPRIM) 300 MG tablet Take 1 tablet by mouth once daily 90 tablet 4    amLODIPine (NORVASC) 2.5 MG tablet Take 1 tablet (2.5 mg) by mouth daily. 90 tablet 4    atorvastatin (LIPITOR)  40 MG tablet Take 1 tablet (40 mg) by mouth daily. 90 tablet 4    cetirizine (ZYRTEC) 10 MG tablet Take 1 tablet by mouth twice daily 180 tablet 1    Continuous Glucose  (DEXCOM G6 ) JEFFERSON USE AS DIRECTED PER  MANUFACTURERS  INSTRUCTIONS  EVERY  90  DAYS 1 each 0    Continuous Glucose Sensor (DEXCOM G6 SENSOR) MISC Change every 10 days. 3 each 5    Continuous Glucose Transmitter (DEXCOM G6 TRANSMITTER) MISC Change every 3 months. 1 each 1    fluticasone-salmeterol (ADVAIR) 100-50 MCG/ACT inhaler Inhale 1 puff into the lungs 2 times daily 60 each 11    gabapentin (NEURONTIN) 400 MG capsule Take 1 capsule (400 mg) by mouth 2 times daily. 180 capsule 4    HUMIRA PEN 40 MG/0.8ML pen kit Inject 40 mg as directed every 14 days      hydrocortisone 2.5 % cream APPLY CREAM TWICE DAILY TO AFFECTED ITCHY AREAS ON THE FACE, GROIN, AND AXILLA UNTIL CLEAR      lisinopril (ZESTRIL) 20 MG tablet Take 1 tablet (20 mg) by mouth daily. 90 tablet 4    pantoprazole (PROTONIX) 20 MG EC tablet Take 2 tablets (40 mg) by mouth daily. 180 tablet 4    sertraline (ZOLOFT) 100 MG tablet Take 1 tablet (100 mg) by mouth daily. 90 tablet 4    SV MELATONIN 3 MG TBDP at bedtime.      traZODone (DESYREL) 50 MG tablet TAKE 2 TO 3 TABLETS BY MOUTH AT BEDTIME FOR SLEEP       Allergies   Allergen Reactions    Metformin Diarrhea    Ibuprofen GI Disturbance    Seasonal Allergies Difficulty breathing    Nsaids Other (See Comments)     Avoids d/t hx GIB         Past medical history, past surgical history, current medications and allergies reviewed and accurate to the best of my knowledge.        OBJECTIVE:     /78 (BP Location: Right arm, Patient Position: Sitting, Cuff Size: Adult Large)   Pulse 62   Temp 97.4  F (36.3  C) (Tympanic)   Resp 20   Wt 109.8 kg (242 lb)   SpO2 97%   BMI 35.74 kg/m    Body mass index is 35.74 kg/m .          Physical Exam  General Appearance: Well appearing adult male, non ill appearance, appropriate  appearance for age. No acute distress  Orophayrnx: moist mucous membranes, pharynx with erythema, tonsils with mild hypertrophy, tonsils with erythema, no tonsillar exudates, soft tissue edema, no oral lesions, no palate petechiae, no post nasal drip seen, no trismus, voice clear.    Nose:  No noted drainage or congestion   Neck: Bilateral tonsillar enlargement with tenderness   Respiratory: normal chest wall and respirations.  Normal effort.  Clear to auscultation bilaterally, no wheezing, crackles or rhonchi.  No increased work of breathing.  No cough appreciated.  Cardiac: RRR with no murmurs  Musculoskeletal:  Equal movement of bilateral upper extremities.  Equal movement of bilateral lower extremities.  Normal gait.    Psychological: normal affect, alert, oriented, and pleasant.

## 2024-12-31 ENCOUNTER — TELEPHONE (OUTPATIENT)
Dept: FAMILY MEDICINE | Facility: OTHER | Age: 54
End: 2024-12-31
Payer: COMMERCIAL

## 2024-12-31 NOTE — TELEPHONE ENCOUNTER
[employer] faxed over the order form for Dexcom supplies.    Form started and is currently in provider in box for completion/signature.      Caron Jose LPN 12/31/2024 8:49 AM

## 2025-01-03 ENCOUNTER — MEDICAL CORRESPONDENCE (OUTPATIENT)
Dept: HEALTH INFORMATION MANAGEMENT | Facility: OTHER | Age: 55
End: 2025-01-03
Payer: COMMERCIAL

## 2025-01-12 ENCOUNTER — HEALTH MAINTENANCE LETTER (OUTPATIENT)
Age: 55
End: 2025-01-12

## 2025-01-20 ENCOUNTER — OFFICE VISIT (OUTPATIENT)
Dept: FAMILY MEDICINE | Facility: OTHER | Age: 55
End: 2025-01-20
Attending: STUDENT IN AN ORGANIZED HEALTH CARE EDUCATION/TRAINING PROGRAM
Payer: COMMERCIAL

## 2025-01-20 VITALS
SYSTOLIC BLOOD PRESSURE: 110 MMHG | BODY MASS INDEX: 36.33 KG/M2 | RESPIRATION RATE: 16 BRPM | TEMPERATURE: 98.1 F | HEART RATE: 70 BPM | OXYGEN SATURATION: 95 % | DIASTOLIC BLOOD PRESSURE: 70 MMHG | WEIGHT: 246 LBS

## 2025-01-20 DIAGNOSIS — M75.41 SUBACROMIAL IMPINGEMENT OF RIGHT SHOULDER: Primary | ICD-10-CM

## 2025-01-20 DIAGNOSIS — M67.911 TENDINOPATHY OF RIGHT ROTATOR CUFF: ICD-10-CM

## 2025-01-20 PROCEDURE — G0463 HOSPITAL OUTPT CLINIC VISIT: HCPCS

## 2025-01-20 ASSESSMENT — PAIN SCALES - GENERAL: PAINLEVEL_OUTOF10: MODERATE PAIN (6)

## 2025-01-20 NOTE — PROGRESS NOTES
Sports Medicine Office Note    HPI:  54-year-old male coming in for evaluation of right shoulder pain.  His pain has been present for approximately 1 month.  He had a fall onto the lateral aspect of the right shoulder.  His symptoms have remained steady over this last month.  He currently endorses a 7/10 stabbing pain.  He has tried some over-the-counter medications to help with his symptoms.  He has a history of a left shoulder surgery with [facility] within the last year.  He denies radicular symptoms for his right shoulder pain.      EXAM:  /70 (BP Location: Right arm, Patient Position: Sitting, Cuff Size: Adult Large)   Pulse 70   Temp 98.1  F (36.7  C) (Temporal)   Resp 16   Wt 111.6 kg (246 lb)   SpO2 95%   BMI 36.33 kg/m    MUSCULOSKELETAL EXAM:  RIGHT SHOULDER  Inspection:  -No gross deformity  -No bruising or swelling  -Scars:  None    Tenderness to palpation of the:  -SC joint:  Negative  -AC joint:   Mild pain  -Clavicle:   Negative  -Biceps tendon in bicipital groove:   Mild pain  -Deltoid musculature:   Negative  -Upper trapezius musculature:   Negative    Range of Motion:  -Active flexion:  90 left, 100 right (170 passively)  -Active abduction:  80 left, 90 right    Strength:  -Supraspinatus:  5/5  -Infraspinatus:  5/5  -Subscapularis:  5/5  -Deltoid:  5/5    Special Tests:  -Erica test: Positive pain without weakness  -Hubbard test:   Positive  -Neer test:   Positive  -Mid-arc pain: Present  -Speeds test:   Positive  -Crossarm adduction test:   Weakly positive  -Lag sign:   Negative    Other:  -Intact sensation to light touch distally.  -No signs of cyanosis. Normal skin temperature of the upper extremity.  -Elbow:  No gross deformity. Full range of motion.  -Hand/wrist:  No gross deformity. Full range of motion.  -Left shoulder:  No gross deformity. No palpable tenderness. Normal strength.      IMAGIN2024: 3 view right shoulder x-ray  - No fracture, dislocation, or bony  lesion.  Hypertrophy of the AC joint.      ASSESSMENT/PLAN:  Diagnoses and all orders for this visit:  Subacromial impingement of right shoulder  -     Orthopedic  Referral  -     Orthopedic  Referral; Future  Tendinopathy of right rotator cuff    54-year-old male with subacromial impingement of the right shoulder, this is likely a flare due to his recent fall.  X-rays from 12/21 were personally reviewed in the office with the findings as demonstrated above by my interpretation.  No findings on today's evaluation to suggest instability or significant structural pathology.  He likely has some underlying degenerative changes.  We discussed treatment options to include expectant management, ice, topical medications, oral medications, therapy, and injections.  He has a good relationship with his previous orthopedic surgeon and request an evaluation by this provider.  - Referral placed to orthopedic surgery  - Orthopedics to assume care      Colt Salazar MD  1/20/2025  1:57 PM    Total time spent with this patient was 24 minutes which included chart review, visualization and independent interpretation of images, time spent with the patient, and documentation.    Procedure time:  0 minute(s)

## 2025-02-16 ENCOUNTER — TRANSFERRED RECORDS (OUTPATIENT)
Dept: MULTI SPECIALTY CLINIC | Facility: CLINIC | Age: 55
End: 2025-02-16

## 2025-02-16 LAB — RETINOPATHY: NORMAL

## 2025-03-07 ENCOUNTER — MYC MEDICAL ADVICE (OUTPATIENT)
Dept: FAMILY MEDICINE | Facility: OTHER | Age: 55
End: 2025-03-07
Payer: COMMERCIAL

## 2025-03-07 DIAGNOSIS — J45.20 MILD INTERMITTENT ASTHMA WITHOUT COMPLICATION: Primary | ICD-10-CM

## 2025-03-09 ENCOUNTER — HEALTH MAINTENANCE LETTER (OUTPATIENT)
Age: 55
End: 2025-03-09

## 2025-03-10 NOTE — TELEPHONE ENCOUNTER
Refilled at [facility] on 3/7 (F) by Dr. Serna.      Patient update on [facility].        Gloria Reynoso RN on 3/10/2025 at 8:09 AM

## 2025-03-11 RX ORDER — LEVALBUTEROL TARTRATE 45 UG/1
2 AEROSOL, METERED ORAL EVERY 4 HOURS PRN
Qty: 15 G | Refills: 5 | Status: SHIPPED | OUTPATIENT
Start: 2025-03-11

## 2025-03-11 NOTE — TELEPHONE ENCOUNTER
Emili,    Pt having heart racing Sx with Ventolin inhaler that was just filled.     [facility] recommends trying Xopenex (less tachy side effects).     Elan'd up.   Pehl out until 3/17.      ________________________________________________________      Called [facility] to ask about Albuterol (ProAir) just filled.     Ventolin:  Makes his heart race.     Requesting ProAir.  (Red container with white cap)    Insurance is requiring brand Ventolin.      He got 1 inhaler 3/9.      Requested Brand ProAir with Pharmacy.    Ins requiring brand Ventolin.      Generic ProAir- insurance wont cover.   Other option- if provider will write for Levoalbuterol (Xopenex)- less heart racing.      Gloria Reynoso RN on 3/11/2025 at 2:33 PM

## 2025-03-18 ENCOUNTER — OFFICE VISIT (OUTPATIENT)
Dept: FAMILY MEDICINE | Facility: OTHER | Age: 55
End: 2025-03-18
Payer: COMMERCIAL

## 2025-03-18 VITALS
HEART RATE: 85 BPM | BODY MASS INDEX: 37.18 KG/M2 | DIASTOLIC BLOOD PRESSURE: 78 MMHG | SYSTOLIC BLOOD PRESSURE: 126 MMHG | WEIGHT: 251 LBS | TEMPERATURE: 98.1 F | RESPIRATION RATE: 32 BRPM | OXYGEN SATURATION: 95 % | HEIGHT: 69 IN

## 2025-03-18 DIAGNOSIS — J45.21 MILD INTERMITTENT ASTHMA WITH EXACERBATION: ICD-10-CM

## 2025-03-18 DIAGNOSIS — J06.9 VIRAL URI WITH COUGH: Primary | ICD-10-CM

## 2025-03-18 PROCEDURE — G0463 HOSPITAL OUTPT CLINIC VISIT: HCPCS

## 2025-03-18 RX ORDER — BENZONATATE 200 MG/1
200 CAPSULE ORAL 3 TIMES DAILY PRN
Qty: 30 CAPSULE | Refills: 0 | Status: SHIPPED | OUTPATIENT
Start: 2025-03-18

## 2025-03-18 RX ORDER — PREDNISONE 20 MG/1
40 TABLET ORAL DAILY
Qty: 10 TABLET | Refills: 0 | Status: SHIPPED | OUTPATIENT
Start: 2025-03-18 | End: 2025-03-23

## 2025-03-18 ASSESSMENT — PAIN SCALES - GENERAL: PAINLEVEL_OUTOF10: SEVERE PAIN (9)

## 2025-03-18 NOTE — NURSING NOTE
"Patient presents to clinic for cough, sore throat, diarrhea and headache x1 wk.     Patient just completed PHQ-9 last wk with Marie Dennison.    Chief Complaint   Patient presents with    Cough    Pharyngitis    Headache       FOOD SECURITY SCREENING QUESTIONS  Hunger Vital Signs:  Within the past 12 months we worried whether our food would run out before we got money to buy more. Never  Within the past 12 months the food we bought just didn't last and we didn't have money to get more. Never  Franchescadelano Hamptonon 3/18/2025 4:41 PM      Initial /78 (BP Location: Left arm, Patient Position: Sitting, Cuff Size: Adult Large)   Pulse 85   Temp 98.1  F (36.7  C) (Tympanic)   Resp (!) 32   Ht 1.753 m (5' 9\")   Wt 113.9 kg (251 lb)   SpO2 95%   BMI 37.07 kg/m   Estimated body mass index is 37.07 kg/m  as calculated from the following:    Height as of this encounter: 1.753 m (5' 9\").    Weight as of this encounter: 113.9 kg (251 lb).  Medication Reconciliation: complete    Franchesca Romero  "

## 2025-03-18 NOTE — PROGRESS NOTES
ASSESSMENT/PLAN:    I have reviewed the nursing notes.  I have reviewed the findings, diagnosis, plan and need for follow up with the patient.    1. Viral URI with cough (Primary)  - benzonatate (TESSALON) 200 MG capsule; Take 1 capsule (200 mg) by mouth 3 times daily as needed for cough.  Dispense: 30 capsule; Refill: 0    Patient presents with an acute illness with systemic symptoms including body aches.  Patient's vitals are stable and he appears nontoxic. Discussed with patient that symptoms and exam are consistent with viral illness.  Discussed that symptomatic treatment of cough is appropriate but not with antibiotics.  Will treat patient's cough with Tessalon Perles as needed. Discussed symptomatic treatment - Encouraged fluids, salt water gargles, honey (only if greater than 1 year in age due to risk of botulism), elevation, humidifier, sinus rinse/netti pot, lozenges, tea, topical vapor rub, popsicles, rest, etc. May use over-the-counter Tylenol PRN.    2. Mild intermittent asthma with exacerbation  - predniSONE (DELTASONE) 20 MG tablet; Take 2 tablets (40 mg) by mouth daily for 5 days.  Dispense: 10 tablet; Refill: 0    Patient has a history of mild intermittent asthma.  Discussed with patient that his URI appears to be triggering an exacerbation of his mild intermittent asthma.  Will treat as asthma exacerbation with a short course of prednisone.  Advised patient to take this medication in the morning with food.  Continue using inhaler as prescribed.    Discussed warning signs/symptoms indicative of need to f/u    Follow up if symptoms persist or worsen or concerns    I explained my diagnostic considerations and recommendations to the patient, who voiced understanding and agreement with the treatment plan. All questions were answered. We discussed potential side effects of any prescribed or recommended therapies, as well as expectations for response to treatments.    Eddie Braun, APRN  CNP  3/18/2025  4:48 PM    HPI:    Christiano Metcalf is a 55 year old male  who presents to [facility] today for concerns of URI symptoms    URI, x 1 week    Symptoms:  No fevers or chills.  YES: +  sore throat/pharyngitis/tonsillitis.   YES: +  allergy/URI Symptoms  No muffled sounds/change in hearing  No sensation of fullness in ear(s)  No ringing in ears/tinnitus  YES: +  dizziness  YES: +  congestion (head/nasal/chest)  YES: +  cough/productive cough  YES: +  post nasal drip   YES: +  headache  YES: +  sinus pain/pressure  YES: +  myalgias  No otalgia  No rash  Activity Level Changes: Yes: fatigue  Appetite/Liquid Intake Changes: No  Changes to Bowel Habits: Yes: diarrhea  Changes to Bladder Habits: No  Additional Symptoms to Report: Yes: shortness of breath, wheezing  Prior workup: No    Treatments tried: Inhaler, Fluids, and Rest    Site of exposure: not known.  Type of exposure: not known    Other Pertinent History: asthma and diabetes    Allergies: reviewed    PCP: Kareem    Past Medical History:   Diagnosis Date    Gout     No Comments Provided    Hypomagnesemia     No Comments Provided    Pain in left knee     No Comments Provided    Personal history of other (healed) physical injury and trauma     2000,repair    Unspecified injury of unspecified wrist, hand and finger(s), initial encounter     1999     Past Surgical History:   Procedure Laterality Date    ARTHROSCOPY KNEE      Right knee meniscal repair, arthroscopic ally    ARTHROSCOPY KNEE      2010,Left knee scope    COLONOSCOPY N/A 09/30/2021    follow up 5 years family history, 9/30/2021    ESOPHAGOSCOPY, GASTROSCOPY, DUODENOSCOPY (EGD), COMBINED N/A 09/30/2021    Procedure: ESOPHAGOGASTRODUODENOSCOPY, WITH BIOPSY;  Surgeon: Jamel Kruger MD;  Location: [facility] OR    FINGER SURGERY      Right thumb ORIF    OTHER SURGICAL HISTORY      7/15/14,,HERNIA REPAIR,Left,LIH with mesh    OTHER SURGICAL HISTORY      5/10/16,,HERNIA REPAIR,Right,RIH  with plug/patch    SHOULDER ARTHROSCOPY W/ ROTATOR CUFF REPAIR Left 05/2024    tracheal stricture repair  07/2024     Social History     Tobacco Use    Smoking status: Former     Current packs/day: 0.00     Types: Cigarettes     Quit date: 2/17/2010     Years since quitting: 15.0    Smokeless tobacco: Never   Substance Use Topics    Alcohol use: Never     Current Outpatient Medications   Medication Sig Dispense Refill    acetaminophen (TYLENOL) 650 MG CR tablet Take 1 tablet (650 mg) by mouth 2 times daily as needed for mild pain or fever.      allopurinol (ZYLOPRIM) 300 MG tablet Take 1 tablet by mouth once daily 90 tablet 4    amLODIPine (NORVASC) 2.5 MG tablet Take 1 tablet (2.5 mg) by mouth daily. 90 tablet 4    atorvastatin (LIPITOR) 40 MG tablet Take 1 tablet (40 mg) by mouth daily. 90 tablet 4    cetirizine (ZYRTEC) 10 MG tablet Take 1 tablet (10 mg) by mouth 2 times daily. 180 tablet 4    Continuous Glucose  (DEXCOM G7 ) JEFFERSON 1 each daily. - use as directed with sensors 1 each 3    Continuous Glucose Sensor (DEXCOM G7 SENSOR) MISC 1 each every 10 days. Replace / Apply as directed 3 each 11    gabapentin (NEURONTIN) 400 MG capsule Take 1 capsule (400 mg) by mouth 2 times daily. 180 capsule 4    HADLIMA PUSHTOUCH 40 MG/0.4ML auto-injector kit Inject 40 mg subcutaneously every 14 days.      hydrocortisone 2.5 % cream APPLY CREAM TWICE DAILY TO AFFECTED ITCHY AREAS ON THE FACE, GROIN, AND AXILLA UNTIL CLEAR      levalbuterol (XOPENEX HFA) 45 MCG/ACT inhaler Inhale 2 puffs into the lungs every 4 hours as needed for shortness of breath or wheezing. 15 g 5    lisinopril (ZESTRIL) 20 MG tablet Take 1 tablet (20 mg) by mouth daily. 90 tablet 4    pantoprazole (PROTONIX) 20 MG EC tablet Take 2 tablets (40 mg) by mouth daily. 180 tablet 4    sertraline (ZOLOFT) 100 MG tablet Take 1 tablet (100 mg) by mouth daily. 90 tablet 4    SV MELATONIN 3 MG TBDP at bedtime.      traZODone (DESYREL) 50 MG tablet  "TAKE 2 TO 3 TABLETS BY MOUTH AT BEDTIME FOR SLEEP      triamcinolone (NASACORT) 55 MCG/ACT nasal aerosol Spray 2 sprays into both nostrils daily. (Patient not taking: Reported on 3/18/2025) 16.9 mL 3     Allergies   Allergen Reactions    Food Hives and Swelling     Peas and Spam    Metformin Diarrhea    Nsaids Other (See Comments)     Avoids d/t hx GIB    Ibuprofen GI Disturbance    Seasonal Allergies Difficulty breathing    Ventolin [Albuterol] Palpitations     Does OK with ProAir or Proventil, but NOT Ventolin     Past medical history, past surgical history, current medications and allergies reviewed and accurate to the best of my knowledge.      ROS:  Refer to HPI    /78 (BP Location: Left arm, Patient Position: Sitting, Cuff Size: Adult Large)   Pulse 85   Temp 98.1  F (36.7  C) (Tympanic)   Resp (!) 32   Ht 1.753 m (5' 9\")   Wt 113.9 kg (251 lb)   SpO2 95%   BMI 37.07 kg/m      EXAM:  General Appearance: Well appearing 55 year old male, appropriate appearance for age. No acute distress   Ears: Left TM intact, translucent with bony landmarks appreciated, no erythema, no effusion, no bulging, no purulence.  Right TM intact, translucent with bony landmarks appreciated, no erythema, no effusion, no bulging, no purulence.  Left auditory canal clear.  Right auditory canal clear.  Normal external ears, non tender.  Eyes: conjunctivae normal without erythema or irritation, corneas clear, no drainage or crusting, no eyelid swelling, pupils equal   Oropharynx: moist mucous membranes, posterior pharynx without erythema, tonsils symmetric and 1+, no erythema, no exudates or petechiae, no post nasal drip seen, no trismus, voice clear.    Nose:  Bilateral nares: no erythema, no edema, no drainage or congestion   Neck: supple without adenopathy  Respiratory: normal chest wall and respirations.  Normal effort.  Mild wheezing in lower lobes, no crackles or rhonchi.  No increased work of breathing.  Moderate cough " appreciated.  Cardiac: RRR with no murmurs  Musculoskeletal:  Equal movement of bilateral upper extremities.  Equal movement of bilateral lower extremities.  Normal gait.    Dermatological: no rashes noted of exposed skin  Neuro: Alert and oriented to person, place, and time.    Psychological: normal affect, alert, oriented, and pleasant.

## 2025-03-26 ASSESSMENT — ASTHMA QUESTIONNAIRES
QUESTION_5 LAST FOUR WEEKS HOW WOULD YOU RATE YOUR ASTHMA CONTROL: SOMEWHAT CONTROLLED
QUESTION_3 LAST FOUR WEEKS HOW OFTEN DID YOUR ASTHMA SYMPTOMS (WHEEZING, COUGHING, SHORTNESS OF BREATH, CHEST TIGHTNESS OR PAIN) WAKE YOU UP AT NIGHT OR EARLIER THAN USUAL IN THE MORNING: ONCE OR TWICE
QUESTION_2 LAST FOUR WEEKS HOW OFTEN HAVE YOU HAD SHORTNESS OF BREATH: MORE THAN ONCE A DAY
QUESTION_4 LAST FOUR WEEKS HOW OFTEN HAVE YOU USED YOUR RESCUE INHALER OR NEBULIZER MEDICATION (SUCH AS ALBUTEROL): ONE OR TWO TIMES PER DAY
QUESTION_1 LAST FOUR WEEKS HOW MUCH OF THE TIME DID YOUR ASTHMA KEEP YOU FROM GETTING AS MUCH DONE AT WORK, SCHOOL OR AT HOME: SOME OF THE TIME
ACT_TOTALSCORE: 13

## 2025-03-30 ASSESSMENT — PATIENT HEALTH QUESTIONNAIRE - PHQ9
SUM OF ALL RESPONSES TO PHQ QUESTIONS 1-9: 4
10. IF YOU CHECKED OFF ANY PROBLEMS, HOW DIFFICULT HAVE THESE PROBLEMS MADE IT FOR YOU TO DO YOUR WORK, TAKE CARE OF THINGS AT HOME, OR GET ALONG WITH OTHER PEOPLE: SOMEWHAT DIFFICULT
SUM OF ALL RESPONSES TO PHQ QUESTIONS 1-9: 4

## 2025-03-31 ENCOUNTER — OFFICE VISIT (OUTPATIENT)
Dept: FAMILY MEDICINE | Facility: OTHER | Age: 55
End: 2025-03-31
Attending: FAMILY MEDICINE
Payer: COMMERCIAL

## 2025-03-31 VITALS
HEART RATE: 56 BPM | RESPIRATION RATE: 18 BRPM | OXYGEN SATURATION: 99 % | WEIGHT: 244.8 LBS | SYSTOLIC BLOOD PRESSURE: 124 MMHG | TEMPERATURE: 98.2 F | DIASTOLIC BLOOD PRESSURE: 80 MMHG | BODY MASS INDEX: 36.15 KG/M2

## 2025-03-31 DIAGNOSIS — M17.12 PRIMARY LOCALIZED OSTEOARTHROSIS OF LEFT LOWER LEG: Primary | ICD-10-CM

## 2025-03-31 DIAGNOSIS — M17.11 PRIMARY LOCALIZED OSTEOARTHROSIS OF RIGHT LOWER LEG: ICD-10-CM

## 2025-03-31 DIAGNOSIS — M1A.00X0 IDIOPATHIC CHRONIC GOUT WITHOUT TOPHUS, UNSPECIFIED SITE: ICD-10-CM

## 2025-03-31 DIAGNOSIS — J45.20 MILD INTERMITTENT ASTHMA WITHOUT COMPLICATION: ICD-10-CM

## 2025-03-31 DIAGNOSIS — E11.9 TYPE 2 DIABETES MELLITUS WITHOUT COMPLICATION, WITHOUT LONG-TERM CURRENT USE OF INSULIN (H): ICD-10-CM

## 2025-03-31 LAB
CHOLEST SERPL-MCNC: 94 MG/DL
EST. AVERAGE GLUCOSE BLD GHB EST-MCNC: 151 MG/DL
FASTING STATUS PATIENT QL REPORTED: YES
HBA1C MFR BLD: 6.9 %
HDLC SERPL-MCNC: 32 MG/DL
LDLC SERPL CALC-MCNC: 42 MG/DL
NONHDLC SERPL-MCNC: 62 MG/DL
TRIGL SERPL-MCNC: 98 MG/DL
URATE SERPL-MCNC: 6.7 MG/DL (ref 3.4–7)

## 2025-03-31 PROCEDURE — 250N000011 HC RX IP 250 OP 636: Mod: JZ | Performed by: FAMILY MEDICINE

## 2025-03-31 PROCEDURE — G0463 HOSPITAL OUTPT CLINIC VISIT: HCPCS | Mod: 25

## 2025-03-31 PROCEDURE — 1125F AMNT PAIN NOTED PAIN PRSNT: CPT | Performed by: FAMILY MEDICINE

## 2025-03-31 PROCEDURE — 3074F SYST BP LT 130 MM HG: CPT | Performed by: FAMILY MEDICINE

## 2025-03-31 PROCEDURE — 83036 HEMOGLOBIN GLYCOSYLATED A1C: CPT | Mod: ZL | Performed by: FAMILY MEDICINE

## 2025-03-31 PROCEDURE — 80061 LIPID PANEL: CPT | Mod: ZL | Performed by: FAMILY MEDICINE

## 2025-03-31 PROCEDURE — 20610 DRAIN/INJ JOINT/BURSA W/O US: CPT | Performed by: FAMILY MEDICINE

## 2025-03-31 PROCEDURE — 84550 ASSAY OF BLOOD/URIC ACID: CPT | Mod: ZL | Performed by: FAMILY MEDICINE

## 2025-03-31 PROCEDURE — 3079F DIAST BP 80-89 MM HG: CPT | Performed by: FAMILY MEDICINE

## 2025-03-31 PROCEDURE — 99213 OFFICE O/P EST LOW 20 MIN: CPT | Mod: 25 | Performed by: FAMILY MEDICINE

## 2025-03-31 PROCEDURE — 250N000009 HC RX 250: Performed by: FAMILY MEDICINE

## 2025-03-31 PROCEDURE — 36415 COLL VENOUS BLD VENIPUNCTURE: CPT | Mod: ZL | Performed by: FAMILY MEDICINE

## 2025-03-31 RX ORDER — ALBUTEROL SULFATE 90 UG/1
2 INHALANT RESPIRATORY (INHALATION) EVERY 6 HOURS PRN
Qty: 18 G | Refills: 4 | Status: SHIPPED | OUTPATIENT
Start: 2025-03-31

## 2025-03-31 RX ORDER — METHYLPREDNISOLONE ACETATE 80 MG/ML
80 INJECTION, SUSPENSION INTRA-ARTICULAR; INTRALESIONAL; INTRAMUSCULAR; SOFT TISSUE ONCE
Status: COMPLETED | OUTPATIENT
Start: 2025-03-31 | End: 2025-03-31

## 2025-03-31 RX ORDER — FLUOCINONIDE TOPICAL SOLUTION USP, 0.05% 0.5 MG/ML
SOLUTION TOPICAL
COMMUNITY
Start: 2025-03-25

## 2025-03-31 RX ADMIN — METHYLPREDNISOLONE ACETATE 80 MG: 80 INJECTION, SUSPENSION INTRA-ARTICULAR; INTRALESIONAL; INTRAMUSCULAR; SOFT TISSUE at 12:00

## 2025-03-31 RX ADMIN — LIDOCAINE HYDROCHLORIDE 4 ML: 10 INJECTION, SOLUTION INFILTRATION; PERINEURAL at 12:00

## 2025-03-31 RX ADMIN — METHYLPREDNISOLONE ACETATE 80 MG: 80 INJECTION, SUSPENSION INTRA-ARTICULAR; INTRALESIONAL; INTRAMUSCULAR; SOFT TISSUE at 11:59

## 2025-03-31 ASSESSMENT — PAIN SCALES - GENERAL: PAINLEVEL_OUTOF10: SEVERE PAIN (9)

## 2025-03-31 NOTE — PROGRESS NOTES
Assessment & Plan     (M17.12) Primary localized osteoarthrosis of left lower leg  (primary encounter diagnosis)  Comment: consider imaging in the future,as this might be more than just pure DJD.   Plan: methylPREDNISolone (DEPO-Medrol) injection 80         mg, lidocaine 1 % 4 mL, Large Joint/Bursa         injection and/or drainage (Shoulder, Knee)             (M17.11) Primary localized osteoarthrosis of right lower leg  Comment:    Plan: methylPREDNISolone (DEPO-Medrol) injection 80         mg             (M1A.00X0) Idiopathic chronic gout without tophus, unspecified site  Comment: not active clinically  Plan: Uric acid        Normal uric acid, not likely contributing to knee issues    (E11.9) Type 2 diabetes mellitus without complication, without long-term current use of insulin (H)  Comment:  A1c is within target range.   Plan: Hemoglobin A1c, Lipid Panel        No med changes    (J45.20) Mild intermittent asthma without complication  Comment:    Plan: albuterol (PROAIR HFA/PROVENTIL HFA/VENTOLIN         HFA) 108 (90 Base) MCG/ACT inhaler        Refilled per his request.                    No follow-ups on file.    The longitudinal plan of care for the diagnosis(es)/condition(s) as documented were addressed during this visit. Due to the added complexity in care, I will continue to support René in the subsequent management and with ongoing continuity of care.    Zully Merritt is a 55 year old, presenting for the following health issues:  Imm/Inj (Bilateral cortisone Injection)      3/31/2025    11:07 AM   Additional Questions   Roomed by DIOGO Hickman   Accompanied by Self         3/31/2025    11:07 AM   Patient Reported Additional Medications   Patient reports taking the following new medications N/A     Imm/Inj    History of Present Illness       Reason for visit:  Injection shots in my knees   He is taking medications regularly.        Here for bilateral knee injections. These seem to be lasting now for  about 4 months or so, which is getting shorter over the years. Pain is more diffuse and bilateral. Last x-ray was 2020, with advanced chondrocalcinosis noted. Had normal PTH and calcium labs at that time.    Also due for follow up labs on his gout and diabetes.    Recent Labs   Lab Test 03/31/25  1201 12/05/24  1217 11/25/24  1127 07/10/24  0901 05/23/24  1126 02/28/24  0820 11/30/23  1152 09/21/23  1338 11/21/22  1522 10/31/22  1040 10/25/22  1327 09/06/22  0824 09/04/21  1043 06/24/21  0020 05/07/21  0520   A1C 6.9*  --  6.3*  --  6.3* 6.4*   < >  --    < >  --   --   --   --   --   --    LDL 42  --   --   --   --  28  --   --   --   --   --  100   < >  --   --    HDL 32*  --   --   --   --  33*  --   --   --   --   --  26   < >  --   --    TRIG 98  --   --   --   --  181*  --   --   --   --   --  207*   < >  --   --    ALT  --  85*  --   --   --   --   --  58  --  114*  --  93*   < > 80* 123*   CR  --  1.12  --  1.00 0.93  --    < > 0.90  --  1.03   < > 1.05   < > 1.15 0.97   GFRESTIMATED  --  78  --  89 >90  --    < > >90  --  87   < > 85   < > 67 82   GFRESTBLACK  --   --   --   --   --   --   --   --   --   --   --   --   --  81 >90   POTASSIUM  --  4.1  --  3.6 4.0  --    < > 3.3*  --  3.2*   < > 3.5   < > 2.9* 3.3*    < > = values in this interval not displayed.        Current Outpatient Medications   Medication Sig Dispense Refill    acetaminophen (TYLENOL) 650 MG CR tablet Take 1 tablet (650 mg) by mouth 2 times daily as needed for mild pain or fever.      albuterol (PROAIR HFA/PROVENTIL HFA/VENTOLIN HFA) 108 (90 Base) MCG/ACT inhaler Inhale 2 puffs into the lungs every 6 hours as needed for shortness of breath, wheezing or cough. 18 g 4    allopurinol (ZYLOPRIM) 300 MG tablet Take 1 tablet by mouth once daily 90 tablet 4    amLODIPine (NORVASC) 2.5 MG tablet Take 1 tablet (2.5 mg) by mouth daily. 90 tablet 4    atorvastatin (LIPITOR) 40 MG tablet Take 1 tablet (40 mg) by mouth daily. 90 tablet 4     benzonatate (TESSALON) 200 MG capsule Take 1 capsule (200 mg) by mouth 3 times daily as needed for cough. 30 capsule 0    cetirizine (ZYRTEC) 10 MG tablet Take 1 tablet (10 mg) by mouth 2 times daily. 180 tablet 4    Continuous Glucose  (DEXCOM G7 ) JEFFERSON 1 each daily. - use as directed with sensors 1 each 3    Continuous Glucose Sensor (DEXCOM G7 SENSOR) MISC 1 each every 10 days. Replace / Apply as directed 3 each 11    fluocinonide (LIDEX) 0.05 % external solution APPLY TO SCALP TWICE A DAY AS NEEDED FOR FLARES      gabapentin (NEURONTIN) 400 MG capsule Take 1 capsule (400 mg) by mouth 2 times daily. 180 capsule 4    hydrocortisone 2.5 % cream APPLY CREAM TWICE DAILY TO AFFECTED ITCHY AREAS ON THE FACE, GROIN, AND AXILLA UNTIL CLEAR      lisinopril (ZESTRIL) 20 MG tablet Take 1 tablet (20 mg) by mouth daily. 90 tablet 4    pantoprazole (PROTONIX) 20 MG EC tablet Take 2 tablets (40 mg) by mouth daily. 180 tablet 4    sertraline (ZOLOFT) 100 MG tablet Take 1 tablet (100 mg) by mouth daily. 90 tablet 4    SV MELATONIN 3 MG TBDP at bedtime.      traZODone (DESYREL) 50 MG tablet TAKE 2 TO 3 TABLETS BY MOUTH AT BEDTIME FOR SLEEP      triamcinolone (NASACORT) 55 MCG/ACT nasal aerosol Spray 2 sprays into both nostrils daily. 16.9 mL 3    HADLIMA PUSHTOUCH 40 MG/0.4ML auto-injector kit Inject 40 mg subcutaneously every 14 days. (Patient not taking: Reported on 3/31/2025)      levalbuterol (XOPENEX HFA) 45 MCG/ACT inhaler Inhale 2 puffs into the lungs every 4 hours as needed for shortness of breath or wheezing. (Patient not taking: Reported on 3/31/2025) 15 g 5     No current facility-administered medications for this visit.                      Objective    /80   Pulse 56   Temp 98.2  F (36.8  C) (Temporal)   Resp 18   Wt 111 kg (244 lb 12.8 oz)   SpO2 99%   BMI 36.15 kg/m    Body mass index is 36.15 kg/m .  Physical Exam   GENERAL: alert and no distress  MS: no gross musculoskeletal defects  noted, no edema. Discussed with him risks of injections, and consented. Prepped right knee, infiltrated with 80 milligram depotmedrol and 4 ml 1% lidocaine. Same then done on left side.   SKIN: no suspicious lesions or rashes  PSYCH: mentation appears normal, affect normal/bright    Results for orders placed or performed in visit on 03/31/25   Uric acid     Status: Normal   Result Value Ref Range    Uric Acid 6.7 3.4 - 7.0 mg/dL   Hemoglobin A1c     Status: Abnormal   Result Value Ref Range    Estimated Average Glucose 151 (H) <117 mg/dL    Hemoglobin A1C 6.9 (H) <5.7 %   Lipid Panel     Status: Abnormal   Result Value Ref Range    Cholesterol 94 <200 mg/dL    Triglycerides 98 <150 mg/dL    Direct Measure HDL 32 (L) >=40 mg/dL    LDL Cholesterol Calculated 42 <100 mg/dL    Non HDL Cholesterol 62 <130 mg/dL    Patient Fasting > 8hrs? Yes     Narrative    Cholesterol  Desirable: < 200 mg/dL  Borderline High: 200 - 239 mg/dL  High: >= 240 mg/dL    Triglycerides  Normal: < 150 mg/dL  Borderline High: 150 - 199 mg/dL  High: 200-499 mg/dL  Very High: >= 500 mg/dL    Direct Measure HDL  Female: >= 50 mg/dL   Male: >= 40 mg/dL    LDL Cholesterol  Desirable: < 100 mg/dL  Above Desirable: 100 - 129 mg/dL   Borderline High: 130 - 159 mg/dL   High:  160 - 189 mg/dL   Very High: >= 190 mg/dL    Non HDL Cholesterol  Desirable: < 130 mg/dL  Above Desirable: 130 - 159 mg/dL  Borderline High: 160 - 189 mg/dL  High: 190 - 219 mg/dL  Very High: >= 220 mg/dL           Signed Electronically by: Blu Serna MD

## 2025-03-31 NOTE — NURSING NOTE
"Chief Complaint   Patient presents with    Imm/Inj     Bilateral cortisone Injection       Initial /80   Pulse 56   Temp 98.2  F (36.8  C) (Temporal)   Resp 18   Wt 111 kg (244 lb 12.8 oz)   SpO2 99%   BMI 36.15 kg/m   Estimated body mass index is 36.15 kg/m  as calculated from the following:    Height as of 3/18/25: 1.753 m (5' 9\").    Weight as of this encounter: 111 kg (244 lb 12.8 oz).  Medication Reconciliation: complete        "

## 2025-04-06 ENCOUNTER — MYC REFILL (OUTPATIENT)
Dept: FAMILY MEDICINE | Facility: OTHER | Age: 55
End: 2025-04-06
Payer: COMMERCIAL

## 2025-04-06 DIAGNOSIS — L29.9 EAR ITCHING: ICD-10-CM

## 2025-04-06 RX ORDER — CETIRIZINE HYDROCHLORIDE 10 MG/1
10 TABLET ORAL 2 TIMES DAILY
Qty: 180 TABLET | Refills: 4 | Status: CANCELLED | OUTPATIENT
Start: 2025-04-06

## 2025-04-07 NOTE — TELEPHONE ENCOUNTER
Denied.  Filled for a year on 3/14/25 by Marie Dennison.         Requested Prescriptions   Pending Prescriptions Disp Refills    cetirizine (ZYRTEC) 10 MG tablet 180 tablet 4     Sig: Take 1 tablet (10 mg) by mouth 2 times daily.       Antihistamines Protocol Passed - 4/7/2025  2:22 PM        Passed - Patient is 3-64 years of age     Apply weight-based dosing for peds patients age 3 - 12 years of age.    Forward request to provider for patients under the age of 3 or over the age of 64.          Passed - Medication is active on med list and the sig matches. RN to manually verify dose and sig if red X/fail.     If the protocol passes (green check), you do not need to verify med dose and sig.    A prescription matches if they are the same clinical intention.    For Example: once daily and every morning are the same.    The protocol can not identify upper and lower case letters as matching and will fail.     For Example: Take 1 tablet (50 mg) by mouth daily     TAKE 1 TABLET (50 MG) BY MOUTH DAILY    For all fails (red x), verify dose and sig.    If the refill does match what is on file, the RN can still proceed to approve the refill request.       If they do not match, route to the appropriate provider.             Passed - Recent (12 month) or future (90 days) visit with authorizing provider s specialty     The patient must have completed an in-person or virtual visit within the past 12 months or has a future visit scheduled within the next 90 days with the authorizing provider s specialty.  Urgent care and e-visits do not qualify as an office visit for this protocol.          Passed - Medication indicated for associated diagnosis     The medication is associated with one or more of the following diagnoses:  Allergies  Rhinitis  Upper respiratory tract allergy  Urticaria  Itching  Cystic Fibrosis  Bronchiectasis               Last Prescription Date:   3/14/2025  Last Fill Qty/Refills:         180, R-4    Future Office  visit:           5/28/2025    Next 5 appointments (look out 90 days)      May 28, 2025 10:00 AM  (Arrive by 9:45 AM)  Provider Visit with Blu Serna MD  [facility] ([facility] [address]  [place] [address]  [phone]           Gloria Reynoso RN on 4/7/2025 at 2:22 PM

## 2025-04-24 ENCOUNTER — OFFICE VISIT (OUTPATIENT)
Dept: FAMILY MEDICINE | Facility: OTHER | Age: 55
End: 2025-04-24
Attending: PHYSICIAN ASSISTANT
Payer: COMMERCIAL

## 2025-04-24 VITALS
HEART RATE: 60 BPM | BODY MASS INDEX: 35.59 KG/M2 | RESPIRATION RATE: 20 BRPM | TEMPERATURE: 98 F | WEIGHT: 241 LBS | SYSTOLIC BLOOD PRESSURE: 118 MMHG | DIASTOLIC BLOOD PRESSURE: 60 MMHG

## 2025-04-24 DIAGNOSIS — J45.20 MILD INTERMITTENT ASTHMA WITHOUT COMPLICATION: ICD-10-CM

## 2025-04-24 DIAGNOSIS — G43.711 INTRACTABLE CHRONIC MIGRAINE WITHOUT AURA AND WITH STATUS MIGRAINOSUS: Primary | ICD-10-CM

## 2025-04-24 DIAGNOSIS — E11.9 TYPE 2 DIABETES MELLITUS WITHOUT COMPLICATION, WITHOUT LONG-TERM CURRENT USE OF INSULIN (H): ICD-10-CM

## 2025-04-24 PROCEDURE — G0463 HOSPITAL OUTPT CLINIC VISIT: HCPCS

## 2025-04-24 RX ORDER — TOPIRAMATE 25 MG/1
TABLET, FILM COATED ORAL
Qty: 21 TABLET | Refills: 0 | Status: SHIPPED | OUTPATIENT
Start: 2025-04-24

## 2025-04-24 RX ORDER — GABAPENTIN 600 MG/1
1 TABLET ORAL
COMMUNITY
Start: 2025-04-16

## 2025-04-24 ASSESSMENT — ASTHMA QUESTIONNAIRES
QUESTION_1 LAST FOUR WEEKS HOW MUCH OF THE TIME DID YOUR ASTHMA KEEP YOU FROM GETTING AS MUCH DONE AT WORK, SCHOOL OR AT HOME: A LITTLE OF THE TIME
ACT_TOTALSCORE: 19
QUESTION_3 LAST FOUR WEEKS HOW OFTEN DID YOUR ASTHMA SYMPTOMS (WHEEZING, COUGHING, SHORTNESS OF BREATH, CHEST TIGHTNESS OR PAIN) WAKE YOU UP AT NIGHT OR EARLIER THAN USUAL IN THE MORNING: ONCE OR TWICE
QUESTION_5 LAST FOUR WEEKS HOW WOULD YOU RATE YOUR ASTHMA CONTROL: WELL CONTROLLED
QUESTION_2 LAST FOUR WEEKS HOW OFTEN HAVE YOU HAD SHORTNESS OF BREATH: ONCE OR TWICE A WEEK
QUESTION_4 LAST FOUR WEEKS HOW OFTEN HAVE YOU USED YOUR RESCUE INHALER OR NEBULIZER MEDICATION (SUCH AS ALBUTEROL): TWO OR THREE TIMES PER WEEK

## 2025-04-24 ASSESSMENT — ENCOUNTER SYMPTOMS: HEADACHES: 1

## 2025-04-24 ASSESSMENT — PAIN SCALES - GENERAL: PAINLEVEL_OUTOF10: SEVERE PAIN (8)

## 2025-04-24 NOTE — PROGRESS NOTES
Assessment & Plan       ICD-10-CM    1. Intractable chronic migraine without aura and with status migrainosus  G43.711 topiramate (TOPAMAX) 25 MG tablet      2. Type 2 diabetes mellitus without complication, without long-term current use of insulin (H)  E11.9       3. Mild intermittent asthma without complication  J45.20         Chronic, daily migraines, based off of this, I would recommend preventive therapy (versus abortive therapy). We discussed Propranolol, Amitriptyline and Topiramate, reviewing the risks, benefits and potential side effects of each treatment options. We opted to start a taper up on Topiramate. We will taper up gradually once weekly - Topiramate 25 mg - Take 1 tablet (25 mg) by mouth at dinner for 7 days, then increase to 1 tablet (25 mg) by mouth twice daily with meals for 7 days, then increase to 1 tablet (25 mg) by mouth in the morning and 2 tablet (50 mg) by mouth at supper for 7 days, then increase to 2 tablets (50 mg) by mouth twice daily with meals. Reviewed with René that he if notices improvement/relief of migraines at a lower dose that he does not need to taper up to 100 mg daily. He has a physical scheduled with Dr. Serna on 5/28/25 and will follow up at this time. He is aware of return precautions.   Type II diabetes - at goal. Recent A1c on 3/31/25 with A1c returning at 6.9%. Continue to stay up to date on diabetic eye examination. No changes to treatment plan.   Mild intermittent asthma - stable. No signs of exacerbation. Up to date on Albuterol refills. Asthma action plan updated today.     Follow-up  Return in about 4 weeks (around 5/22/2025) for Medication follow up.    Zully Merritt is a 55 year old, presenting for the following health issues:  Headache        4/24/2025    11:02 AM   Additional Questions   Roomed by donnell arguelles lpn     History of Present Illness       Reason for visit:  Haedache  Symptom onset:  More than a month   He is taking medications regularly.    "René presents to the clinic with concerns of migraines.     Onset: 2 month(s) ago  Description:  Type: Migraine headache  Location (unilateral/bilateral): unilateral in the left occipital area   Character: throbbing pain, dull pain, sharp pain, squeezing pain  Frequency/Intensity:  waxing and waning  Pain scale ratin/10  Are headaches getting more intense or more frequent: No  Presence of aura: No  Precipitating and/or Alleviating factors:        How much caffeine do you use daily: 1-2 soda/pop's per day and 1 cappuccino   Does movement, bending over, increase pain: No  Does light/sound make it worse: YES - bright lights and high pitched sounds  Does sleep help: No  Do you wake up with a headache or does it wake you from sleep: No  Are you able to do daily activities: No  Accompanying Signs & Symptoms:   Stiff neck: No  Fever: No  Confusion: No  Sinus pressure: No  Nausea or vomiting: No  Dizziness: No  Numbness: No  Weakness: No  Visual changes: Chronic in nature - corneal pathology  History:    Any head trauma: No  Any previously history of headaches: YES  Any family history of migraines: YES - mother and maternal grandmother  Any previous tests for headaches: no  Have you seen a neurologist before: No  When was vision last checked: 4-5 months ago, follows with Dr. Samson at [facility] as well as eye clinic in [place] and corneal specialist in [place]  Do you wear glasses or contacts: glasses  Do you snore or grind your teeth:  No  Do you chew gum:  no  Do you wear headbands, pepe, or tight pony tails:  No  How often do you eat nitrate containing foods: ( hot dogs, processed meat or cheese, brantley etc):  \"rarely\"  Does bright sunshine or light bring on headaches: yes  How much water/fluids do you drink: 64 oz plus per day  Do you have eczema/sensitive skin/ allergies or asthma: no  What kind of fragrances are you around: \"Normal stuff\"  (body sprays, lotions, perfume, scented shampoo/conditioner, " candles, incense, room deodorizers, strong smelling cleansers)  Medications tried and outcome:  Naproxyn (Aleve) and Excedrin with minor relief  ANY OTHER CHANGE in MEDICATIONS: none    Review of Systems  Constitutional, HEENT, cardiovascular, pulmonary, GI, , musculoskeletal, neuro, skin, endocrine and psych systems are negative, except as otherwise noted.    Objective    /60 (BP Location: Right arm, Patient Position: Sitting, Cuff Size: Adult Large)   Pulse 60   Temp 98  F (36.7  C) (Tympanic)   Resp 20   Wt 109.3 kg (241 lb)   BMI 35.59 kg/m    Body mass index is 35.59 kg/m .  Physical Exam   GENERAL: alert and no distress  EYES: Eyes grossly normal to inspection, PERRL, EOMI, visual fields normal, conjunctivae and sclerae normal, and fundi benign-no diabetic or hypertensive changes seen  HENT: normal cephalic/atraumatic, nose and mouth without ulcers or lesions, oropharynx clear, and oral mucous membranes moist  NECK: no adenopathy, no asymmetry, masses, or scars  RESP: lungs clear to auscultation - no rales, rhonchi or wheezes  CV: regular rate and rhythm, normal S1 S2, no S3 or S4, no murmur, click or rub, no peripheral edema  MS: extremities normal- no gross deformities noted  SKIN: no suspicious lesions or rashes  NEURO: Normal strength and tone, mentation intact and speech normal  NEURO: Normal strength and tone, sensory exam grossly normal, mentation intact, cranial nerves 2-12 intact, and normal gait  PSYCH: mentation appears normal, affect normal/bright    Signed Electronically by: Marie Dennison PA-C

## 2025-04-24 NOTE — PATIENT INSTRUCTIONS
Take 1 tablet (25 mg) by mouth at dinner for 7 days, then increase to 1 tablet (25 mg) by mouth twice daily with meals for 7 days, then increase to 1 tablet (25 mg) by mouth in the morning and 2 tablet (50 mg) by mouth at supper for 7 days, then increase to 2 tablets (50 mg) by mouth twice daily with meals    If you notice that your headache is improved at one of the lower doses of the medication you do NOT have to keep increasing

## 2025-04-24 NOTE — LETTER
My Asthma Action Plan    Name: Christiano Metcalf   YOB: 1970  Date: 4/24/2025   My doctor: Marie Dennison PA-C   My clinic: [facility]        My Rescue Medicine: Albuterol (Proair/Ventolin/Proventil HFA) 2-4 puffs EVERY 4 HOURS as needed. Use a spacer if recommended by your provider.   My Asthma Severity:   Intermittent / Exercise Induced  Know your asthma triggers: upper respiratory infections, dust mites, and pollens             GREEN ZONE   Good Control  I feel good  No cough or wheeze  Can work, sleep and play without asthma symptoms       Take your asthma control medicine every day.     If exercise triggers your asthma, take your rescue medication  15 minutes before exercise or sports, and  During exercise if you have asthma symptoms  Spacer to use with inhaler: If you have a spacer, make sure to use it with your inhaler             YELLOW ZONE Getting Worse  I have ANY of these:  I do not feel good  Cough or wheeze  Chest feels tight  Wake up at night   Keep taking your Green Zone medications  Start taking your rescue medicine:  every 20 minutes for up to 1 hour. Then every 4 hours for 24-48 hours.  If you stay in the Yellow Zone for more than 12-24 hours, contact your doctor.  If you do not return to the Green Zone in 12-24 hours or you get worse, start taking your oral steroid medicine if prescribed by your provider.           RED ZONE Medical Alert - Get Help  I have ANY of these:  I feel awful  Medicine is not helping  Breathing getting harder  Trouble walking or talking  Nose opens wide to breathe       Take your rescue medicine NOW  If your provider has prescribed an oral steroid medicine, start taking it NOW  Call your doctor NOW  If you are still in the Red Zone after 20 minutes and you have not reached your doctor:  Take your rescue medicine again and  Call 911 or go to the emergency room right away    See your regular doctor within 2 weeks of an Emergency Room  or Urgent Care visit for follow-up treatment.          Annual Reminders:  Meet with Asthma Educator,  Flu Shot in the Fall, consider Pneumonia Vaccination for patients with asthma (aged 19 and older).    Pharmacy:    [facility] - [facility] [address] DIABETES SUPPLY [address]    Electronically signed by Marie Dennison PA-C   Date: 04/24/25                    Asthma Triggers  How To Control Things That Make Your Asthma Worse    Triggers are things that make your asthma worse.  Look at the list below to help you find your triggers and   what you can do about them. You can help prevent asthma flare-ups by staying away from your triggers.      Trigger                                                          What you can do   Cigarette Smoke  Tobacco smoke can make asthma worse. Do not allow smoking in your home, car or around you.  Be sure no one smokes at a child s day care or school.  If you smoke, ask your health care provider for ways to help you quit.  Ask family members to quit too.  Ask your health care provider for a referral to Quit Plan to help you quit smoking, or call 2-959-433-PLAN.     Colds, Flu, Bronchitis  These are common triggers of asthma. Wash your hands often.  Don t touch your eyes, nose or mouth.  Get a flu shot every year.     Dust Mites  These are tiny bugs that live in cloth or carpet. They are too small to see. Wash sheets and blankets in hot water every week.   Encase pillows and mattress in dust mite proof covers.  Avoid having carpet if you can. If you have carpet, vacuum weekly.   Use a dust mask and HEPA vacuum.   Pollen and Outdoor Mold  Some people are allergic to trees, grass, or weed pollen, or molds. Try to keep your windows closed.  Limit time out doors when pollen count is high.   Ask you health care provider about taking medicine during allergy season.     Animal Dander  Some people are allergic to skin flakes,  urine or saliva from pets with fur or feathers. Keep pets with fur or feathers out of your home.    If you can t keep the pet outdoors, then keep the pet out of your bedroom.  Keep the bedroom door closed.  Keep pets off cloth furniture and away from stuffed toys.     Mice, Rats, and Cockroaches  Some people are allergic to the waste from these pests.   Cover food and garbage.  Clean up spills and food crumbs.  Store grease in the refrigerator.   Keep food out of the bedroom.   Indoor Mold  This can be a trigger if your home has high moisture. Fix leaking faucets, pipes, or other sources of water.   Clean moldy surfaces.  Dehumidify basement if it is damp and smelly.   Smoke, Strong Odors, and Sprays  These can reduce air quality. Stay away from strong odors and sprays, such as perfume, powder, hair spray, paints, smoke incense, paint, cleaning products, candles and new carpet.   Exercise or Sports  Some people with asthma have this trigger. Be active!  Ask your doctor about taking medicine before sports or exercise to prevent symptoms.    Warm up for 5-10 minutes before and after sports or exercise.     Other Triggers of Asthma  Cold air:  Cover your nose and mouth with a scarf.  Sometimes laughing or crying can be a trigger.  Some medicines and food can trigger asthma.

## 2025-04-24 NOTE — NURSING NOTE
"Patient presents to the clinic for ongoing headache with left eye pain for the past 2 months.  Declines a Care Coordination referral, \"I have a [facility] coordinator to help me.\"    FOOD SECURITY SCREENING QUESTIONS:    The next two questions are to help us understand your food security.  If you are feeling you need any assistance in this area, we have resources available to support you today.    Hunger Vital Signs:  Within the past 12 months we worried whether our food would run out before we got money to buy more. Sometimes  Within the past 12 months the food we bought just didn't last and we didn't have money to get more. Sometimes    Advance Care Directive on file? no  Advance Care Directive provided to patient? yes      Chief Complaint   Patient presents with    Headache       Initial /60 (BP Location: Right arm, Patient Position: Sitting, Cuff Size: Adult Large)   Pulse 60   Temp 98  F (36.7  C) (Tympanic)   Resp 20   Wt 109.3 kg (241 lb)   BMI 35.59 kg/m   Estimated body mass index is 35.59 kg/m  as calculated from the following:    Height as of 3/18/25: 1.753 m (5' 9\").    Weight as of this encounter: 109.3 kg (241 lb).  Medication Reconciliation: complete        Caron Jose LPN     "

## 2025-04-25 DIAGNOSIS — G43.711 INTRACTABLE CHRONIC MIGRAINE WITHOUT AURA AND WITH STATUS MIGRAINOSUS: ICD-10-CM

## 2025-04-25 NOTE — TELEPHONE ENCOUNTER
Original prescription:  topiramate (TOPAMAX) 25 MG tablet 21 tablet 0 4/24/2025 -- No   Sig: Take 1 tablet (25 mg) by mouth at dinner for 7 days, then increase to 1 tablet (25 mg) by mouth twice daily with meals for 7 days, then increase to 1 tablet (25 mg) by mouth in the morning and 2 tablet (50 mg) by mouth at supper for 7 days, then increase to 2 tablets (50 mg) by mouth twice daily with meals     PLEASE CLARIFY: 21 tabs will only get patient through 1st 2 weeks, then patient will need 21 more tabs for week 3, and 4 tabs/day for last part of directions (2 BID). Please clarify. Thanks!    Per yesterday's OV note:  Chronic, daily migraines, based off of this, I would recommend preventive therapy (versus abortive therapy). We discussed Propranolol, Amitriptyline and Topiramate, reviewing the risks, benefits and potential side effects of each treatment options. We opted to start a taper up on Topiramate. We will taper up gradually once weekly - Topiramate 25 mg - Take 1 tablet (25 mg) by mouth at dinner for 7 days, then increase to 1 tablet (25 mg) by mouth twice daily with meals for 7 days, then increase to 1 tablet (25 mg) by mouth in the morning and 2 tablet (50 mg) by mouth at supper for 7 days, then increase to 2 tablets (50 mg) by mouth twice daily with meals. Reviewed with René that he if notices improvement/relief of migraines at a lower dose that he does not need to taper up to 100 mg daily. He has a physical scheduled with Dr. Serna on 5/28/25 and will follow up at this time. He is aware of return precautions.     Deb Patel RN .............. 4/25/2025  4:38 PM     No - the patient is unable to be screened due to medical condition

## 2025-04-27 RX ORDER — TOPIRAMATE 25 MG/1
TABLET, FILM COATED ORAL
Qty: 80 TABLET | Refills: 0 | Status: SHIPPED | OUTPATIENT
Start: 2025-04-27

## 2025-05-17 ENCOUNTER — HEALTH MAINTENANCE LETTER (OUTPATIENT)
Age: 55
End: 2025-05-17

## 2025-05-22 ENCOUNTER — DOCUMENTATION ONLY (OUTPATIENT)
Dept: OTHER | Facility: CLINIC | Age: 55
End: 2025-05-22
Payer: COMMERCIAL

## 2025-05-23 ASSESSMENT — ASTHMA QUESTIONNAIRES
QUESTION_4 LAST FOUR WEEKS HOW OFTEN HAVE YOU USED YOUR RESCUE INHALER OR NEBULIZER MEDICATION (SUCH AS ALBUTEROL): ONE OR TWO TIMES PER DAY
QUESTION_2 LAST FOUR WEEKS HOW OFTEN HAVE YOU HAD SHORTNESS OF BREATH: MORE THAN ONCE A DAY
QUESTION_3 LAST FOUR WEEKS HOW OFTEN DID YOUR ASTHMA SYMPTOMS (WHEEZING, COUGHING, SHORTNESS OF BREATH, CHEST TIGHTNESS OR PAIN) WAKE YOU UP AT NIGHT OR EARLIER THAN USUAL IN THE MORNING: TWO OR THREE NIGHTS A WEEK
QUESTION_1 LAST FOUR WEEKS HOW MUCH OF THE TIME DID YOUR ASTHMA KEEP YOU FROM GETTING AS MUCH DONE AT WORK, SCHOOL OR AT HOME: SOME OF THE TIME
ACT_TOTALSCORE: 11
QUESTION_5 LAST FOUR WEEKS HOW WOULD YOU RATE YOUR ASTHMA CONTROL: SOMEWHAT CONTROLLED

## 2025-05-28 ENCOUNTER — OFFICE VISIT (OUTPATIENT)
Dept: FAMILY MEDICINE | Facility: OTHER | Age: 55
End: 2025-05-28
Attending: FAMILY MEDICINE
Payer: COMMERCIAL

## 2025-05-28 VITALS
WEIGHT: 243.6 LBS | OXYGEN SATURATION: 99 % | HEART RATE: 72 BPM | RESPIRATION RATE: 16 BRPM | BODY MASS INDEX: 35.97 KG/M2 | SYSTOLIC BLOOD PRESSURE: 114 MMHG | TEMPERATURE: 97.1 F | DIASTOLIC BLOOD PRESSURE: 72 MMHG

## 2025-05-28 DIAGNOSIS — K21.9 GASTROESOPHAGEAL REFLUX DISEASE WITHOUT ESOPHAGITIS: ICD-10-CM

## 2025-05-28 DIAGNOSIS — I10 ESSENTIAL HYPERTENSION: ICD-10-CM

## 2025-05-28 DIAGNOSIS — E11.9 TYPE 2 DIABETES MELLITUS WITHOUT COMPLICATION, WITHOUT LONG-TERM CURRENT USE OF INSULIN (H): Primary | ICD-10-CM

## 2025-05-28 DIAGNOSIS — F33.1 MODERATE EPISODE OF RECURRENT MAJOR DEPRESSIVE DISORDER (H): ICD-10-CM

## 2025-05-28 DIAGNOSIS — G43.711 INTRACTABLE CHRONIC MIGRAINE WITHOUT AURA AND WITH STATUS MIGRAINOSUS: ICD-10-CM

## 2025-05-28 DIAGNOSIS — M1A.00X0 IDIOPATHIC CHRONIC GOUT WITHOUT TOPHUS, UNSPECIFIED SITE: ICD-10-CM

## 2025-05-28 PROCEDURE — G0463 HOSPITAL OUTPT CLINIC VISIT: HCPCS

## 2025-05-28 RX ORDER — ATORVASTATIN CALCIUM 40 MG/1
40 TABLET, FILM COATED ORAL DAILY
Qty: 90 TABLET | Refills: 4 | Status: SHIPPED | OUTPATIENT
Start: 2025-05-28

## 2025-05-28 RX ORDER — TOPIRAMATE 25 MG/1
TABLET, FILM COATED ORAL
Qty: 80 TABLET | Refills: 0 | Status: SHIPPED | OUTPATIENT
Start: 2025-05-28

## 2025-05-28 RX ORDER — SERTRALINE HYDROCHLORIDE 100 MG/1
100 TABLET, FILM COATED ORAL DAILY
Qty: 90 TABLET | Refills: 4 | Status: SHIPPED | OUTPATIENT
Start: 2025-05-28

## 2025-05-28 RX ORDER — AMLODIPINE BESYLATE 2.5 MG/1
2.5 TABLET ORAL DAILY
Qty: 90 TABLET | Refills: 4 | Status: SHIPPED | OUTPATIENT
Start: 2025-05-28

## 2025-05-28 RX ORDER — LISINOPRIL 20 MG/1
20 TABLET ORAL DAILY
Qty: 90 TABLET | Refills: 4 | Status: SHIPPED | OUTPATIENT
Start: 2025-05-28

## 2025-05-28 RX ORDER — PANTOPRAZOLE SODIUM 20 MG/1
40 TABLET, DELAYED RELEASE ORAL DAILY
Qty: 180 TABLET | Refills: 4 | Status: SHIPPED | OUTPATIENT
Start: 2025-05-28

## 2025-05-28 RX ORDER — ALLOPURINOL 300 MG/1
TABLET ORAL
Qty: 90 TABLET | Refills: 4 | Status: SHIPPED | OUTPATIENT
Start: 2025-05-28

## 2025-05-28 ASSESSMENT — PATIENT HEALTH QUESTIONNAIRE - PHQ9
SUM OF ALL RESPONSES TO PHQ QUESTIONS 1-9: 4
10. IF YOU CHECKED OFF ANY PROBLEMS, HOW DIFFICULT HAVE THESE PROBLEMS MADE IT FOR YOU TO DO YOUR WORK, TAKE CARE OF THINGS AT HOME, OR GET ALONG WITH OTHER PEOPLE: NOT DIFFICULT AT ALL
SUM OF ALL RESPONSES TO PHQ QUESTIONS 1-9: 4

## 2025-05-28 ASSESSMENT — PAIN SCALES - GENERAL: PAINLEVEL_OUTOF10: NO PAIN (0)

## 2025-05-28 NOTE — NURSING NOTE
"Chief Complaint   Patient presents with    Diabetes       Initial /72   Pulse 72   Temp 97.1  F (36.2  C) (Temporal)   Resp 16   Wt 110.5 kg (243 lb 9.6 oz)   SpO2 99%   BMI 35.97 kg/m   Estimated body mass index is 35.97 kg/m  as calculated from the following:    Height as of 3/18/25: 1.753 m (5' 9\").    Weight as of this encounter: 110.5 kg (243 lb 9.6 oz).  Medication Reconciliation: complete      Marylou Charles LPN      "

## 2025-05-28 NOTE — PROGRESS NOTES
Assessment & Plan     (E11.9) Type 2 diabetes mellitus without complication, without long-term current use of insulin (H)  (primary encounter diagnosis)  Comment: adequate control, too early for a repeat A1c  Plan: atorvastatin (LIPITOR) 40 MG tablet        No med changes. Follow up in 3 months.     (I10) Essential hypertension  Comment: is at goal  Plan: lisinopril (ZESTRIL) 20 MG tablet, amLODIPine         (NORVASC) 2.5 MG tablet        Refilled without changes    (K21.9) Gastroesophageal reflux disease without esophagitis  Comment: stable/mild  Plan: pantoprazole (PROTONIX) 20 MG EC tablet        refilled    (F33.1) Moderate episode of recurrent major depressive disorder (H)  Comment: well controlled  Plan: sertraline (ZOLOFT) 100 MG tablet        Refilled without changes    (G43.711) Intractable chronic migraine without aura and with status migrainosus  Comment: much improved now with this med  Plan: topiramate (TOPAMAX) 25 MG tablet        refilled    (M1A.00X0) Idiopathic chronic gout without tophus, unspecified site  Comment: we talked about diet changes that might help  Plan: allopurinol (ZYLOPRIM) 300 MG tablet        Refilled without changes.                 Zully Merritt is a 55 year old, presenting for the following health issues:  Diabetes      5/28/2025     9:43 AM   Additional Questions   Roomed by DIOGO Hickman   Accompanied by Self         5/28/2025     9:43 AM   Patient Reported Additional Medications   Patient reports taking the following new medications N/A     History of Present Illness       Reason for visit:  6 month check up   He is taking medications regularly.          Diabetes Follow-up    How often are you checking your blood sugar? Continuous glucose monitor  What time of day are you checking your blood sugars (select all that apply)?  Not applicable  Have you had any blood sugars above 200?  Yes 220  Have you had any blood sugars below 70?  Yes 60  What symptoms do you notice  when your blood sugar is low?  Shaky, Dizzy, and Weak  What concerns do you have today about your diabetes? None   Do you have any of these symptoms? (Select all that apply)  Numbness in feet, Excessive thirst, and Blurry vision  Have you had a diabetic eye exam in the last 12 months? Yes- Date of last eye exam: 2/16/25,  Location: [facility]        BP Readings from Last 2 Encounters:   05/28/25 114/72   04/24/25 118/60     Hemoglobin A1C (%)   Date Value   03/31/2025 6.9 (H)   11/25/2024 6.3 (H)     LDL Cholesterol Calculated (mg/dL)   Date Value   03/31/2025 42   02/28/2024 28   09/04/2018 126 (H)         How many servings of fruits and vegetables do you eat daily?  0-1  On average, how many sweetened beverages do you drink each day (Examples: soda, juice, sweet tea, etc.  Do NOT count diet or artificially sweetened beverages)?   1  How many days per week do you exercise enough to make your heart beat faster? 5  How many minutes a day do you exercise enough to make your heart beat faster? 30 - 60  How many days per week do you miss taking your medication? 0    He also wants refills on his migraine meds. Has not had any since started topirimate last month. Tolerating this well.      Has hypertension. Checking this once in a while about once a month. Last was 118/60 here in the clinic.     Has gout, last attack was the day before yesterday, mild in toes, for most of the day. Toes were swollen and hot. It resolved by the end of the day without any NSAIDS. Used tylenol. Uric acid was checked 3/31/25 and was 6.7. No EtOH in over 14 years now. He cannot link flares to diet changes.     Wants a refill on his zoloft. He feels this is working well, no side effects.         3/14/2025    10:27 AM 3/30/2025    12:34 PM 5/28/2025     9:43 AM   PHQ   PHQ-9 Total Score 5  4  4    Q9: Thoughts of better off dead/self-harm past 2 weeks Not at all Not at all Not at all       Patient-reported                 Objective    /72    Pulse 72   Temp 97.1  F (36.2  C) (Temporal)   Resp 16   Wt 110.5 kg (243 lb 9.6 oz)   SpO2 99%   BMI 35.97 kg/m    Body mass index is 35.97 kg/m .  Physical Exam   GENERAL: alert and no distress  RESP: lungs clear to auscultation - no rales, rhonchi or wheezes  CV: regular rate and rhythm, normal S1 S2, no S3 or S4, no murmur, click or rub, no peripheral edema   PSYCH: mentation appears normal, affect normal/bright  Diabetic foot exam: normal DP and PT pulses, no trophic changes or ulcerative lesions, normal sensory exam, and normal monofilament exam  Feet without redness or edema, no pain on palpation.           Signed Electronically by: Blu Serna MD

## 2025-05-31 ENCOUNTER — MYC MEDICAL ADVICE (OUTPATIENT)
Dept: FAMILY MEDICINE | Facility: OTHER | Age: 55
End: 2025-05-31
Payer: COMMERCIAL

## 2025-05-31 DIAGNOSIS — G43.711 INTRACTABLE CHRONIC MIGRAINE WITHOUT AURA AND WITH STATUS MIGRAINOSUS: ICD-10-CM

## 2025-06-02 RX ORDER — TOPIRAMATE 25 MG/1
25 TABLET, FILM COATED ORAL 2 TIMES DAILY
Qty: 180 TABLET | Refills: 3 | Status: SHIPPED | OUTPATIENT
Start: 2025-06-02

## 2025-06-02 NOTE — TELEPHONE ENCOUNTER
Requesting that his Topamax Sig be updated.     Last order had a taper up plan and when refill got sent- same instructions.     Elan'd up as 25mg BID- which is how he is currently using it.      Gloria Reynoso RN on 6/2/2025 at 11:36 AM

## 2025-06-09 ENCOUNTER — TRANSFERRED RECORDS (OUTPATIENT)
Dept: HEALTH INFORMATION MANAGEMENT | Facility: OTHER | Age: 55
End: 2025-06-09
Payer: COMMERCIAL

## 2025-06-09 LAB — RETINOPATHY: NEGATIVE

## 2025-07-01 ENCOUNTER — APPOINTMENT (OUTPATIENT)
Dept: CARDIOLOGY | Facility: OTHER | Age: 55
End: 2025-07-01
Payer: COMMERCIAL

## 2025-07-01 ENCOUNTER — APPOINTMENT (OUTPATIENT)
Dept: CT IMAGING | Facility: OTHER | Age: 55
End: 2025-07-01
Payer: COMMERCIAL

## 2025-07-01 ENCOUNTER — ALLIED HEALTH/NURSE VISIT (OUTPATIENT)
Dept: FAMILY MEDICINE | Facility: OTHER | Age: 55
End: 2025-07-01
Payer: COMMERCIAL

## 2025-07-01 ENCOUNTER — HOSPITAL ENCOUNTER (EMERGENCY)
Facility: OTHER | Age: 55
Discharge: HOME | End: 2025-07-01
Attending: STUDENT IN AN ORGANIZED HEALTH CARE EDUCATION/TRAINING PROGRAM
Payer: COMMERCIAL

## 2025-07-01 ENCOUNTER — APPOINTMENT (OUTPATIENT)
Dept: GENERAL RADIOLOGY | Facility: OTHER | Age: 55
End: 2025-07-01
Payer: COMMERCIAL

## 2025-07-01 VITALS
RESPIRATION RATE: 17 BRPM | HEIGHT: 69 IN | BODY MASS INDEX: 35.4 KG/M2 | OXYGEN SATURATION: 94 % | TEMPERATURE: 98.3 F | SYSTOLIC BLOOD PRESSURE: 138 MMHG | HEART RATE: 64 BPM | DIASTOLIC BLOOD PRESSURE: 78 MMHG | WEIGHT: 239 LBS

## 2025-07-01 DIAGNOSIS — R55 SYNCOPE: ICD-10-CM

## 2025-07-01 DIAGNOSIS — I95.1 ORTHOSTATIC HYPOTENSION: ICD-10-CM

## 2025-07-01 DIAGNOSIS — N17.9 AKI (ACUTE KIDNEY INJURY): ICD-10-CM

## 2025-07-01 LAB
ALBUMIN SERPL BCG-MCNC: 4 G/DL (ref 3.5–5.2)
ALBUMIN UR-MCNC: 30 MG/DL
ALP SERPL-CCNC: 104 U/L (ref 40–150)
ALT SERPL W P-5'-P-CCNC: 52 U/L (ref 0–70)
ANION GAP SERPL CALCULATED.3IONS-SCNC: 13 MMOL/L (ref 7–15)
APPEARANCE UR: CLEAR
AST SERPL W P-5'-P-CCNC: 53 U/L (ref 0–45)
ATRIAL RATE - MUSE: 72 BPM
BACTERIA #/AREA URNS HPF: ABNORMAL /HPF
BASOPHILS # BLD AUTO: 0 10E3/UL (ref 0–0.2)
BASOPHILS NFR BLD AUTO: 0 %
BILIRUB SERPL-MCNC: 1.1 MG/DL
BILIRUB UR QL STRIP: NEGATIVE
BUN SERPL-MCNC: 20.4 MG/DL (ref 6–20)
CALCIUM SERPL-MCNC: 9.1 MG/DL (ref 8.8–10.4)
CHLORIDE SERPL-SCNC: 106 MMOL/L (ref 98–107)
COLOR UR AUTO: YELLOW
CREAT SERPL-MCNC: 1.95 MG/DL (ref 0.67–1.17)
D DIMER PPP FEU-MCNC: <0.27 UG/ML FEU (ref 0–0.5)
DIASTOLIC BLOOD PRESSURE - MUSE: NORMAL MMHG
EGFRCR SERPLBLD CKD-EPI 2021: 40 ML/MIN/1.73M2
EOSINOPHIL # BLD AUTO: 0.1 10E3/UL (ref 0–0.7)
EOSINOPHIL NFR BLD AUTO: 1 %
ERYTHROCYTE [DISTWIDTH] IN BLOOD BY AUTOMATED COUNT: 15.3 % (ref 10–15)
GLUCOSE SERPL-MCNC: 93 MG/DL (ref 70–99)
GLUCOSE UR STRIP-MCNC: NEGATIVE MG/DL
HCO3 SERPL-SCNC: 20 MMOL/L (ref 22–29)
HCT VFR BLD AUTO: 43.3 % (ref 40–53)
HGB BLD-MCNC: 14.7 G/DL (ref 13.3–17.7)
HGB UR QL STRIP: NEGATIVE
HYALINE CASTS: 57 /LPF
IMM GRANULOCYTES # BLD: 0 10E3/UL
IMM GRANULOCYTES NFR BLD: 0 %
INTERPRETATION ECG - MUSE: NORMAL
KETONES UR STRIP-MCNC: NEGATIVE MG/DL
LACTATE SERPL-SCNC: 1.2 MMOL/L (ref 0.7–2)
LEUKOCYTE ESTERASE UR QL STRIP: NEGATIVE
LVEF ECHO: NORMAL
LYMPHOCYTES # BLD AUTO: 1.8 10E3/UL (ref 0.8–5.3)
LYMPHOCYTES NFR BLD AUTO: 25 %
MAGNESIUM SERPL-MCNC: 1.3 MG/DL (ref 1.7–2.3)
MCH RBC QN AUTO: 29.3 PG (ref 26.5–33)
MCHC RBC AUTO-ENTMCNC: 33.9 G/DL (ref 31.5–36.5)
MCV RBC AUTO: 86 FL (ref 78–100)
MONOCYTES # BLD AUTO: 0.6 10E3/UL (ref 0–1.3)
MONOCYTES NFR BLD AUTO: 8 %
MUCOUS THREADS #/AREA URNS LPF: PRESENT /LPF
NEUTROPHILS # BLD AUTO: 4.9 10E3/UL (ref 1.6–8.3)
NEUTROPHILS NFR BLD AUTO: 66 %
NITRATE UR QL: NEGATIVE
NRBC # BLD AUTO: 0 10E3/UL
NRBC BLD AUTO-RTO: 0 /100
P AXIS - MUSE: 38 DEGREES
PH UR STRIP: 5 [PH] (ref 5–9)
PLATELET # BLD AUTO: 134 10E3/UL (ref 150–450)
POTASSIUM SERPL-SCNC: 3.5 MMOL/L (ref 3.4–5.3)
PR INTERVAL - MUSE: 178 MS
PROT SERPL-MCNC: 7.6 G/DL (ref 6.4–8.3)
QRS DURATION - MUSE: 108 MS
QT - MUSE: 396 MS
QTC - MUSE: 433 MS
R AXIS - MUSE: -10 DEGREES
RBC # BLD AUTO: 5.01 10E6/UL (ref 4.4–5.9)
RBC URINE: 1 /HPF
SODIUM SERPL-SCNC: 139 MMOL/L (ref 135–145)
SP GR UR STRIP: 1.02 (ref 1–1.03)
SYSTOLIC BLOOD PRESSURE - MUSE: NORMAL MMHG
T AXIS - MUSE: 38 DEGREES
TROPONIN T SERPL HS-MCNC: 32 NG/L
TROPONIN T SERPL HS-MCNC: 39 NG/L
UROBILINOGEN UR STRIP-MCNC: NORMAL MG/DL
VENTRICULAR RATE- MUSE: 72 BPM
WBC # BLD AUTO: 7.4 10E3/UL (ref 4–11)
WBC URINE: 1 /HPF

## 2025-07-01 PROCEDURE — 82947 ASSAY GLUCOSE BLOOD QUANT: CPT

## 2025-07-01 PROCEDURE — 85379 FIBRIN DEGRADATION QUANT: CPT

## 2025-07-01 PROCEDURE — 258N000003 HC RX IP 258 OP 636

## 2025-07-01 PROCEDURE — 96366 THER/PROPH/DIAG IV INF ADDON: CPT

## 2025-07-01 PROCEDURE — 71045 X-RAY EXAM CHEST 1 VIEW: CPT | Mod: 26 | Performed by: RADIOLOGY

## 2025-07-01 PROCEDURE — 93010 ELECTROCARDIOGRAM REPORT: CPT | Performed by: INTERNAL MEDICINE

## 2025-07-01 PROCEDURE — 72125 CT NECK SPINE W/O DYE: CPT | Mod: 26 | Performed by: RADIOLOGY

## 2025-07-01 PROCEDURE — 81003 URINALYSIS AUTO W/O SCOPE: CPT

## 2025-07-01 PROCEDURE — 84484 ASSAY OF TROPONIN QUANT: CPT

## 2025-07-01 PROCEDURE — 71045 X-RAY EXAM CHEST 1 VIEW: CPT

## 2025-07-01 PROCEDURE — 93306 TTE W/DOPPLER COMPLETE: CPT

## 2025-07-01 PROCEDURE — 93306 TTE W/DOPPLER COMPLETE: CPT | Mod: 26 | Performed by: INTERNAL MEDICINE

## 2025-07-01 PROCEDURE — 99284 EMERGENCY DEPT VISIT MOD MDM: CPT

## 2025-07-01 PROCEDURE — 70450 CT HEAD/BRAIN W/O DYE: CPT

## 2025-07-01 PROCEDURE — 96365 THER/PROPH/DIAG IV INF INIT: CPT | Mod: XU

## 2025-07-01 PROCEDURE — 85004 AUTOMATED DIFF WBC COUNT: CPT

## 2025-07-01 PROCEDURE — 93246 EXT ECG>7D<15D RECORDING: CPT

## 2025-07-01 PROCEDURE — 36415 COLL VENOUS BLD VENIPUNCTURE: CPT

## 2025-07-01 PROCEDURE — 83735 ASSAY OF MAGNESIUM: CPT

## 2025-07-01 PROCEDURE — 250N000011 HC RX IP 250 OP 636

## 2025-07-01 PROCEDURE — 83605 ASSAY OF LACTIC ACID: CPT

## 2025-07-01 PROCEDURE — 99285 EMERGENCY DEPT VISIT HI MDM: CPT | Mod: 25

## 2025-07-01 PROCEDURE — 72125 CT NECK SPINE W/O DYE: CPT

## 2025-07-01 PROCEDURE — 70450 CT HEAD/BRAIN W/O DYE: CPT | Mod: 26 | Performed by: RADIOLOGY

## 2025-07-01 PROCEDURE — 96361 HYDRATE IV INFUSION ADD-ON: CPT

## 2025-07-01 PROCEDURE — 81001 URINALYSIS AUTO W/SCOPE: CPT

## 2025-07-01 PROCEDURE — 93005 ELECTROCARDIOGRAM TRACING: CPT

## 2025-07-01 RX ORDER — MAGNESIUM SULFATE HEPTAHYDRATE 40 MG/ML
4 INJECTION, SOLUTION INTRAVENOUS ONCE
Status: COMPLETED | OUTPATIENT
Start: 2025-07-01 | End: 2025-07-01

## 2025-07-01 RX ADMIN — SODIUM CHLORIDE 1000 ML: 0.9 INJECTION, SOLUTION INTRAVENOUS at 14:05

## 2025-07-01 RX ADMIN — MAGNESIUM SULFATE HEPTAHYDRATE 4 G: 4 INJECTION, SOLUTION INTRAVENOUS at 15:09

## 2025-07-01 RX ADMIN — SODIUM CHLORIDE 1000 ML: 0.9 INJECTION, SOLUTION INTRAVENOUS at 16:04

## 2025-07-01 RX ADMIN — SODIUM CHLORIDE 1000 ML: 0.9 INJECTION, SOLUTION INTRAVENOUS at 18:39

## 2025-07-01 ASSESSMENT — COLUMBIA-SUICIDE SEVERITY RATING SCALE - C-SSRS
6. HAVE YOU EVER DONE ANYTHING, STARTED TO DO ANYTHING, OR PREPARED TO DO ANYTHING TO END YOUR LIFE?: NO
2. HAVE YOU ACTUALLY HAD ANY THOUGHTS OF KILLING YOURSELF IN THE PAST MONTH?: NO
1. IN THE PAST MONTH, HAVE YOU WISHED YOU WERE DEAD OR WISHED YOU COULD GO TO SLEEP AND NOT WAKE UP?: NO

## 2025-07-01 ASSESSMENT — ACTIVITIES OF DAILY LIVING (ADL)
ADLS_ACUITY_SCORE: 43

## 2025-07-01 ASSESSMENT — ENCOUNTER SYMPTOMS: DIAPHORESIS: 1

## 2025-07-01 NOTE — Clinical Note
July 1, 2025      To Whom It May Concern:      Christiano Metcalf was seen in our Emergency Department today, 07/01/25.  I expect his condition to improve over the next *** days.  He may return to work/school when improved.    Sincerely,        Nicole Singleton RN  Electronically signed

## 2025-07-01 NOTE — ED TRIAGE NOTES
"Pt presents to ED via EMS from home. Pt was carrying in his groceries when a neighbor watched him collapse to the ground. Per EMS Pt was unresponsive for 2-3 minutes. Pt states he remembers being dizzy and then woke up to an officer. Pt was hypotensive and diaphoretic. Pt received approx 300mL NS via EMS.     Provider at bedside. C collar in place. BP (!) 84/57   Pulse 81   Temp 98.3  F (36.8  C) (Tympanic)   Resp 18   Ht 1.753 m (5' 9\")   Wt 108.4 kg (239 lb)   SpO2 (!) 89%   BMI 35.29 kg/m         Triage Assessment (Adult)       Row Name 07/01/25 1321          Triage Assessment    Airway WDL WDL        Respiratory WDL    Respiratory WDL WDL        Skin Circulation/Temperature WDL    Skin Circulation/Temperature WDL X  clammy        Cardiac WDL    Cardiac WDL WDL        Peripheral/Neurovascular WDL    Peripheral Neurovascular WDL WDL        Cognitive/Neuro/Behavioral WDL    Cognitive/Neuro/Behavioral WDL WDL                     "

## 2025-07-01 NOTE — ED NOTES
07/01/25 1559   Lying Orthostatic BP   Lying Orthostatic BP 91/56   Lying Orthostatic Pulse 62 bpm   Sitting Orthostatic BP   Sitting Orthostatic BP 81/58   Sitting Orthostatic Pulse 66 bpm   Standing Orthostatic BP   Standing Orthostatic BP 79/50   Standing Orthostatic Pulse 80 bpm     Pt denies any dizziness while standing.

## 2025-07-01 NOTE — LETTER
July 1, 2025      To Whom It May Concern:      Christiano Metcalf was seen in our Emergency Department today, 07/01/25.  Carlos may stay home with Christiano on 7/2 to monitor symptoms. Carlos may return to work on 7/3.    Sincerely,        Nicole Singleton RN  Electronically signed

## 2025-07-02 NOTE — DISCHARGE INSTRUCTIONS
Stop taking your lisinopril.   Make sure you are drinking plenty of fluids: water, juice, gadorade  If you are feeling lightheaded, sit down before you fall down.   Return to be seen if new, worsening concerns: Chest pain shortness of breath severe headache, nausea vomiting dizziness difficulty with speech, weakness, numbness or tingling  Follow-up in the clinic for recheck  Zio patch to monitor your heart

## 2025-07-02 NOTE — PROGRESS NOTES
Christiano Metcalf arrived here on 7/1/2025 11:25 PM for 8-14 Days  Zio monitor placement per ordering provider SHANE Martel NP for the diagnosis Syncope.  Patient s skin was prepped per protocol.  Zio monitor was placed.  Instructions were reviewed with and given to the patient.  Patient verbalized understanding of wear, troubleshooting and monitor return instructions.

## 2025-07-05 ASSESSMENT — ASTHMA QUESTIONNAIRES
QUESTION_5 LAST FOUR WEEKS HOW WOULD YOU RATE YOUR ASTHMA CONTROL: SOMEWHAT CONTROLLED
QUESTION_3 LAST FOUR WEEKS HOW OFTEN DID YOUR ASTHMA SYMPTOMS (WHEEZING, COUGHING, SHORTNESS OF BREATH, CHEST TIGHTNESS OR PAIN) WAKE YOU UP AT NIGHT OR EARLIER THAN USUAL IN THE MORNING: TWO OR THREE NIGHTS A WEEK
QUESTION_2 LAST FOUR WEEKS HOW OFTEN HAVE YOU HAD SHORTNESS OF BREATH: MORE THAN ONCE A DAY
ACT_TOTALSCORE: 13
QUESTION_1 LAST FOUR WEEKS HOW MUCH OF THE TIME DID YOUR ASTHMA KEEP YOU FROM GETTING AS MUCH DONE AT WORK, SCHOOL OR AT HOME: A LITTLE OF THE TIME
QUESTION_4 LAST FOUR WEEKS HOW OFTEN HAVE YOU USED YOUR RESCUE INHALER OR NEBULIZER MEDICATION (SUCH AS ALBUTEROL): TWO OR THREE TIMES PER WEEK

## 2025-07-10 ENCOUNTER — OFFICE VISIT (OUTPATIENT)
Dept: FAMILY MEDICINE | Facility: OTHER | Age: 55
End: 2025-07-10
Attending: FAMILY MEDICINE
Payer: COMMERCIAL

## 2025-07-10 VITALS
OXYGEN SATURATION: 95 % | WEIGHT: 238.4 LBS | TEMPERATURE: 98.1 F | DIASTOLIC BLOOD PRESSURE: 76 MMHG | RESPIRATION RATE: 16 BRPM | SYSTOLIC BLOOD PRESSURE: 128 MMHG | BODY MASS INDEX: 35.21 KG/M2 | HEART RATE: 63 BPM

## 2025-07-10 DIAGNOSIS — N17.9 AKI (ACUTE KIDNEY INJURY): Primary | ICD-10-CM

## 2025-07-10 DIAGNOSIS — R55 SYNCOPE, UNSPECIFIED SYNCOPE TYPE: ICD-10-CM

## 2025-07-10 DIAGNOSIS — E11.9 TYPE 2 DIABETES MELLITUS WITHOUT COMPLICATION, WITHOUT LONG-TERM CURRENT USE OF INSULIN (H): ICD-10-CM

## 2025-07-10 LAB
ANION GAP SERPL CALCULATED.3IONS-SCNC: 14 MMOL/L (ref 7–15)
BUN SERPL-MCNC: 16.5 MG/DL (ref 6–20)
CALCIUM SERPL-MCNC: 9.4 MG/DL (ref 8.8–10.4)
CHLORIDE SERPL-SCNC: 106 MMOL/L (ref 98–107)
CREAT SERPL-MCNC: 0.99 MG/DL (ref 0.67–1.17)
EGFRCR SERPLBLD CKD-EPI 2021: 90 ML/MIN/1.73M2
EST. AVERAGE GLUCOSE BLD GHB EST-MCNC: 134 MG/DL
GLUCOSE SERPL-MCNC: 92 MG/DL (ref 70–99)
HBA1C MFR BLD: 6.3 %
HCO3 SERPL-SCNC: 20 MMOL/L (ref 22–29)
POTASSIUM SERPL-SCNC: 3.6 MMOL/L (ref 3.4–5.3)
SODIUM SERPL-SCNC: 140 MMOL/L (ref 135–145)

## 2025-07-10 PROCEDURE — 36415 COLL VENOUS BLD VENIPUNCTURE: CPT | Mod: ZL | Performed by: FAMILY MEDICINE

## 2025-07-10 PROCEDURE — 83036 HEMOGLOBIN GLYCOSYLATED A1C: CPT | Mod: ZL | Performed by: FAMILY MEDICINE

## 2025-07-10 PROCEDURE — 82947 ASSAY GLUCOSE BLOOD QUANT: CPT | Mod: ZL | Performed by: FAMILY MEDICINE

## 2025-07-10 ASSESSMENT — PAIN SCALES - GENERAL: PAINLEVEL_OUTOF10: SEVERE PAIN (7)

## 2025-07-10 NOTE — NURSING NOTE
"Chief Complaint   Patient presents with    ER F/U     Syncope       Initial /76   Pulse 63   Temp 98.1  F (36.7  C) (Temporal)   Resp 16   Wt 108.1 kg (238 lb 6.4 oz)   SpO2 95%   BMI 35.21 kg/m   Estimated body mass index is 35.21 kg/m  as calculated from the following:    Height as of 7/1/25: 1.753 m (5' 9\").    Weight as of this encounter: 108.1 kg (238 lb 6.4 oz).  Medication Reconciliation: complete        Marylou Charles LPN      "

## 2025-07-10 NOTE — PROGRESS NOTES
Assessment & Plan     (N17.9) RINKU (acute kidney injury)  (primary encounter diagnosis)  Comment: ***  Plan: Basic Metabolic Panel        ***    (R55) Syncope, unspecified syncope type  Comment: this resolved in the emergency department after getting 3 liters of IV fluids. Has not had any symptoms since. And has a Zio patch in place. Is most consistent with heat related dehydration, and as long as the patch is normal, then can safely just follow up if this returns.   Plan:  zio patch and follow up as needed         (E11.9) Type 2 diabetes mellitus without complication, without long-term current use of insulin (H)  Comment: ***  Plan: Hemoglobin A1c        ***      The longitudinal plan of care for the diagnosis(es)/condition(s) as documented were addressed during this visit. Due to the added complexity in care, I will continue to support René in the subsequent management and with ongoing continuity of care.    MED REC REQUIRED{TIP  Click the link below to document or use med rec list, use list to pull in response :214989}  Post Medication Reconciliation Status:  Discharge medications reconciled, continue medications without change  {Follow-up (Optional):591122}    Zully Merritt is a 55 year old, presenting for the following health issues:  ER F/U (Syncope)      7/10/2025     2:04 PM   Additional Questions   Roomed by DIOGO Hickman   Accompanied by Self         7/10/2025     2:04 PM   Patient Reported Additional Medications   Patient reports taking the following new medications N/A     History of Present Illness       Heart Failure:  He presents for follow up of heart failure. He is experiencing shortness of breath with activity only and at night or when lying flat, which is slightly worse. He is not experiencing any lower extremity edema.   He denies orthopenea and coughs at night. Patient is not checking weight daily. He has recently had a None.  He has no side effects from medications.  He has has a  medical visit for heart failure 1 time since the last visit.    Reason for visit:  Nothing to see    He eats 0-1 servings of fruits and vegetables daily.He consumes 3 sweetened beverage(s) daily.He exercises with enough effort to increase his heart rate 10 to 19 minutes per day.  He exercises with enough effort to increase his heart rate 4 days per week.   He is taking medications regularly.      Last Echo:   Echo result w/o MOPS: Interpretation SummaryLeft ventricular size, wall motion and function are normal. The ejectionfraction is 60-65%.Left ventricular diastolic function is normal.Right ventricular function, chamber size, wall motion, and thickness arenormal.No significant valvular abnormalities present. This study was compared with the study from 7/2021 .No significant changesnoted.    {Provider  Link to Heart Failure SmartSet :384132}    ED/UC Followup:  Above is not accurate. He does not have HF. On 7/1/25 he was very active, in a very hot day, over 90 degrees a with higher heat index. Had brief loss of consciousness and was found to have hypotension when medics arrived. In the emergency department he had an echo, which was normal. He had RINKU in the emergency department. He has a current Zio patch on. Since the emergency department visit has felt completely normal.     Facility:  [facility]  Date of visit: 7/1/25  Reason for visit: Syncope  Current Status: Stable    Recent Labs   Lab Test 07/01/25  1403 03/31/25  1201 12/05/24  1217 11/25/24  1127 07/10/24  0901 05/23/24  1126 02/28/24  0820 11/30/23  1152 09/21/23  1338 10/25/22  1327 09/06/22  0824 09/04/21  1043 06/24/21  0020 05/07/21  0520   A1C  --  6.9*  --  6.3*  --  6.3* 6.4*   < >  --    < >  --   --   --   --    LDL  --  42  --   --   --   --  28  --   --   --  100   < >  --   --    HDL  --  32*  --   --   --   --  33*  --   --   --  26   < >  --   --    TRIG  --  98  --   --   --   --  181*  --   --   --  207*   < >  --   --    ALT 52  --  85*  --    --   --   --   --  58   < > 93*   < > 80* 123*   CR 1.95*  --  1.12  --    < > 0.93  --    < > 0.90   < > 1.05   < > 1.15 0.97   GFRESTIMATED 40*  --  78  --    < > >90  --    < > >90   < > 85   < > 67 82   GFRESTBLACK  --   --   --   --   --   --   --   --   --   --   --   --  81 >90   POTASSIUM 3.5  --  4.1  --    < > 4.0  --    < > 3.3*   < > 3.5   < > 2.9* 3.3*    < > = values in this interval not displayed.            {additonal problems for provider to add (Optional):770562}    {ROS Picklists (Optional):440437}      Objective    /76   Pulse 63   Temp 98.1  F (36.7  C) (Temporal)   Resp 16   Wt 108.1 kg (238 lb 6.4 oz)   SpO2 95%   BMI 35.21 kg/m    Body mass index is 35.21 kg/m .  Physical Exam   GENERAL: alert and no distress  EYES: Eyes grossly normal to inspection, PERRL and conjunctivae and sclerae normal  NECK: no adenopathy, no asymmetry, masses, or scars  RESP: lungs clear to auscultation - no rales, rhonchi or wheezes  CV: regular rate and rhythm, normal S1 S2, no S3 or S4, no murmur, click or rub, no peripheral edema   ABDOMEN: soft, nontender, no hepatosplenomegaly, no masses and bowel sounds normal  PSYCH: mentation appears normal, affect normal/bright    ***        Signed Electronically by: Blu Serna MD  {Email feedback regarding this note to [email or web address]   :[id number]}

## 2025-07-22 ENCOUNTER — APPOINTMENT (OUTPATIENT)
Dept: OTOLARYNGOLOGY | Facility: OTHER | Age: 55
End: 2025-07-22
Attending: FAMILY MEDICINE
Payer: COMMERCIAL

## 2025-07-23 ENCOUNTER — RESULTS FOLLOW-UP (OUTPATIENT)
Dept: NURSING | Facility: CLINIC | Age: 55
End: 2025-07-23
Payer: COMMERCIAL

## 2025-07-23 LAB — CV ZIO PRELIM RESULTS: NORMAL

## 2025-07-23 NOTE — TELEPHONE ENCOUNTER
Call patient with Zio patch results.  Essentially no concerning ventricular arrhythmias.  Triggered events were sinus.  No other concerning arrhythmia findings.  Patient has seen his primary care doctor.  His blood pressure medications have been adjusted to lower doses and some have been discontinued.  He is been checking his blood pressure at home which has been in the normal range and he has not had any recurrent symptoms.  He had no further questions for me and is feeling improved at this time.

## 2025-07-24 ENCOUNTER — OFFICE VISIT (OUTPATIENT)
Dept: FAMILY MEDICINE | Facility: OTHER | Age: 55
End: 2025-07-24
Payer: COMMERCIAL

## 2025-07-24 VITALS
OXYGEN SATURATION: 98 % | RESPIRATION RATE: 20 BRPM | HEART RATE: 62 BPM | TEMPERATURE: 99.1 F | WEIGHT: 239 LBS | DIASTOLIC BLOOD PRESSURE: 72 MMHG | HEIGHT: 69 IN | SYSTOLIC BLOOD PRESSURE: 128 MMHG | BODY MASS INDEX: 35.4 KG/M2

## 2025-07-24 DIAGNOSIS — J02.9 SORE THROAT: Primary | ICD-10-CM

## 2025-07-24 LAB — S PYO DNA THROAT QL NAA+PROBE: NOT DETECTED

## 2025-07-24 PROCEDURE — 87070 CULTURE OTHR SPECIMN AEROBIC: CPT | Mod: ZL

## 2025-07-24 PROCEDURE — 87651 STREP A DNA AMP PROBE: CPT | Mod: ZL

## 2025-07-24 ASSESSMENT — PAIN SCALES - GENERAL: PAINLEVEL_OUTOF10: SEVERE PAIN (9)

## 2025-07-24 NOTE — PROGRESS NOTES
ASSESSMENT/PLAN:    I have reviewed the nursing notes.  I have reviewed the findings, diagnosis, plan and need for follow up with the patient.    {[place] Picklist:375368}    ***   Discussed with patient viral vs bacterial respiratory illness, and evidence based practice and guidelines for cough without fever or infiltrate on xray are not indicative of pneumonia and should not be treated with antibiotics.    *** Discussed with patient that symptoms and exam are consistent with viral illness.  Discussed that symptomatic treatment of cough is appropriate but not with antibiotics.      *** Symptomatic treatment - Encouraged fluids, salt water gargles, honey (only if greater than 1 year in age due to risk of botulism), elevation, humidifier, sinus rinse/netti pot, lozenges, tea, topical vapor rub, popsicles, rest, etc     *** May use over-the-counter Tylenol or ibuprofen PRN    Discussed warning signs/symptoms indicative of need to f/u    Follow up if symptoms persist or worsen or concerns    I explained my diagnostic considerations and recommendations to the patient, who voiced understanding and agreement with the treatment plan. All questions were answered. We discussed potential side effects of any prescribed or recommended therapies, as well as expectations for response to treatments.    Eddie Braun, RACHEL CNP  7/24/2025  6:33 PM    HPI:    Christiano Metcalf is a 55 year old male  who presents to [facility] today for concerns of sore throat    sore throat, x 2 days duration.    YES: + Difficulty swallowing, breathing or handling own secretions.   No fevers or chills.   YES: + allergy/URI Symptoms.   No Otalgia  YES: + Headache.   No Congestion (head/nasal/chest).   YES: + Cough/Productive Cough.   No Post Nasal Drip  No Sinus Pain/Pressure.   No Myalgias.   No nausea, vomiting and/or diarrhea.   No rash.     No change to bowel or bladder habits   YES: + fatigue/energy level changes  YES: + change to  appetite/fluid intak    Any prior HEENT surgery for removal of tonsils or adenoids: No  Recent antibiotic use: No  Exposure to sick contacts including: strep, influenza, COVID, other bacterial or viral illnesses - unknown  Exposure site: unknown  Treatments tried: none    Additional symptoms to report: none      Past Medical History:   Diagnosis Date    Gout     No Comments Provided    Hypomagnesemia     No Comments Provided    Pain in left knee     No Comments Provided    Personal history of other (healed) physical injury and trauma     2000,repair    Unspecified injury of unspecified wrist, hand and finger(s), initial encounter     1999     Past Surgical History:   Procedure Laterality Date    ARTHROSCOPY KNEE      Right knee meniscal repair, arthroscopic ally    ARTHROSCOPY KNEE      2010,Left knee scope    COLONOSCOPY N/A 09/30/2021    follow up 5 years family history, 9/30/2021    ESOPHAGOSCOPY, GASTROSCOPY, DUODENOSCOPY (EGD), COMBINED N/A 09/30/2021    Procedure: ESOPHAGOGASTRODUODENOSCOPY, WITH BIOPSY;  Surgeon: Jamel Kruger MD;  Location: [facility] OR    FINGER SURGERY      Right thumb ORIF    OTHER SURGICAL HISTORY      7/15/14,,HERNIA REPAIR,Left,LIH with mesh    OTHER SURGICAL HISTORY      5/10/16,,HERNIA REPAIR,Right,RIH with plug/patch    SHOULDER ARTHROSCOPY W/ ROTATOR CUFF REPAIR Left 05/2024    tracheal stricture repair  07/2024     Social History     Tobacco Use    Smoking status: Former     Current packs/day: 0.00     Types: Cigarettes     Quit date: 2/17/2010     Years since quitting: 15.4    Smokeless tobacco: Never   Substance Use Topics    Alcohol use: Never     Current Outpatient Medications   Medication Sig Dispense Refill    acetaminophen (TYLENOL) 650 MG CR tablet Take 1 tablet (650 mg) by mouth 2 times daily as needed for mild pain or fever.      albuterol (PROAIR HFA/PROVENTIL HFA/VENTOLIN HFA) 108 (90 Base) MCG/ACT inhaler Inhale 2 puffs into the lungs every 6 hours as needed for  shortness of breath, wheezing or cough. 18 g 4    allopurinol (ZYLOPRIM) 300 MG tablet Take 1 tablet by mouth once daily 90 tablet 4    amLODIPine (NORVASC) 2.5 MG tablet Take 1 tablet (2.5 mg) by mouth daily. 90 tablet 4    atorvastatin (LIPITOR) 40 MG tablet Take 1 tablet (40 mg) by mouth daily. 90 tablet 4    cetirizine (ZYRTEC) 10 MG tablet Take 1 tablet (10 mg) by mouth 2 times daily. 180 tablet 4    Continuous Glucose  (DEXCOM G7 ) JEFFERSON 1 each daily. - use as directed with sensors 1 each 3    Continuous Glucose Sensor (DEXCOM G7 SENSOR) MISC 1 each every 10 days. Replace / Apply as directed 3 each 11    fluocinonide (LIDEX) 0.05 % external solution APPLY TO SCALP TWICE A DAY AS NEEDED FOR FLARES      gabapentin (NEURONTIN) 600 MG tablet Take 1 tablet by mouth 2 times daily.      hydrocortisone 2.5 % cream APPLY CREAM TWICE DAILY TO AFFECTED ITCHY AREAS ON THE FACE, GROIN, AND AXILLA UNTIL CLEAR      pantoprazole (PROTONIX) 20 MG EC tablet Take 2 tablets (40 mg) by mouth daily. 180 tablet 4    sertraline (ZOLOFT) 100 MG tablet Take 1 tablet (100 mg) by mouth daily. 90 tablet 4    SV MELATONIN 3 MG TBDP at bedtime.      topiramate (TOPAMAX) 25 MG tablet Take 1 tablet (25 mg) by mouth 2 times daily. Take 1 tablet (25 mg) by mouth at dinner for 7 days, then increase to 1 tablet (25 mg) by mouth twice daily with meals for 7 days, then increase to 1 tablet (25 mg) by mouth in the morning and 2 tablet (50 mg) by mouth at supper for 7 days, then increase to 2 tablets (50 mg) by mouth twice daily with meals 180 tablet 3    traZODone (DESYREL) 50 MG tablet TAKE 2 TO 3 TABLETS BY MOUTH AT BEDTIME FOR SLEEP      benzonatate (TESSALON) 200 MG capsule Take 1 capsule (200 mg) by mouth 3 times daily as needed for cough. (Patient not taking: Reported on 7/24/2025) 30 capsule 0    HADLIMA PUSHTOUCH 40 MG/0.4ML auto-injector kit Inject 40 mg subcutaneously every 14 days. (Patient not taking: Reported on  "7/24/2025)      lisinopril (ZESTRIL) 20 MG tablet Take 1 tablet (20 mg) by mouth daily. (Patient not taking: Reported on 7/24/2025) 90 tablet 4     Allergies   Allergen Reactions    Food Hives and Swelling     Peas and Spam    Metformin Diarrhea    Nsaids Other (See Comments)     Avoids d/t hx GIB    Ibuprofen GI Disturbance    Seasonal Allergies Difficulty breathing    Ventolin [Albuterol] Palpitations     Does OK with ProAir or Proventil, but NOT Ventolin     Past medical history, past surgical history, current medications and allergies reviewed and accurate to the best of my knowledge.      ROS:  Refer to HPI    /72 (BP Location: Left arm, Patient Position: Sitting, Cuff Size: Adult Regular)   Pulse 62   Temp 99.1  F (37.3  C) (Temporal)   Resp 20   Ht 1.753 m (5' 9\")   Wt 108.4 kg (239 lb)   SpO2 98%   BMI 35.29 kg/m      EXAM:  General Appearance: Well appearing 55 year old male, appropriate appearance for age. No acute distress   Ears: Left TM intact, translucent with bony landmarks appreciated, no erythema, no effusion, no bulging, no purulence.  Right TM intact, translucent with bony landmarks appreciated, no erythema, no effusion, no bulging, no purulence.  Left auditory canal clear.  Right auditory canal clear.  Normal external ears, non tender.  Eyes: conjunctivae normal without erythema or irritation, corneas clear, no drainage or crusting, no eyelid swelling, pupils equal   Oropharynx: moist mucous membranes, posterior pharynx without erythema, tonsils symmetric and 1+, no erythema, no exudates or petechiae, no post nasal drip seen, no trismus, voice clear.    Nose:  Bilateral nares: no erythema, no edema, no drainage or congestion   Neck: supple without adenopathy  Neuro: Alert and oriented to person, place, and time.    Psychological: normal affect, alert, oriented, and pleasant.     Labs:  ***      "

## 2025-07-24 NOTE — PROGRESS NOTES
"Chief Complaint   Patient presents with    Pharyngitis   Patient is here for a sore throat.    FOOD SECURITY SCREENING QUESTIONS  Hunger Vital Signs:  Within the past 12 months we worried whether our food would run out before we got money to buy more. Never  Within the past 12 months the food we bought just didn't last and we didn't have money to get more. Never  Marjorie Lundberg 7/24/2025 6:30 PM      Initial /72 (BP Location: Left arm, Patient Position: Sitting, Cuff Size: Adult Regular)   Pulse 62   Temp 99.1  F (37.3  C) (Temporal)   Resp 20   Ht 1.753 m (5' 9\")   Wt 108.4 kg (239 lb)   SpO2 98%   BMI 35.29 kg/m   Estimated body mass index is 35.29 kg/m  as calculated from the following:    Height as of this encounter: 1.753 m (5' 9\").    Weight as of this encounter: 108.4 kg (239 lb).  Medication Reconciliation: complete    Marjorie Lundberg   "

## 2025-07-26 LAB — BACTERIA SPEC CULT: NORMAL

## (undated) DEVICE — TUBING SUCTION 10'X3/16" N510

## (undated) DEVICE — SOL WATER 1500ML

## (undated) DEVICE — ENDO KIT COMPLIANCE DYKENDOCMPLY

## (undated) DEVICE — ENDO BITE BLOCK 60 MAXI LF 00712804

## (undated) DEVICE — Device

## (undated) DEVICE — SYR 50ML LL W/O NDL 309653

## (undated) DEVICE — ENDO FORCEP ENDOJAW BIOPSY 2.8MMX230CM FB-220U

## (undated) DEVICE — ENDO BRUSH CHANNEL MASTER CLEANING 2-4.2MM BW-412T

## (undated) DEVICE — SUCTION MANIFOLD NEPTUNE 2 SYS 4 PORT 0702-020-000

## (undated) RX ORDER — LIDOCAINE HYDROCHLORIDE 10 MG/ML
INJECTION, SOLUTION EPIDURAL; INFILTRATION; INTRACAUDAL; PERINEURAL
Status: DISPENSED
Start: 2022-07-09

## (undated) RX ORDER — FENTANYL CITRATE 50 UG/ML
INJECTION, SOLUTION INTRAMUSCULAR; INTRAVENOUS
Status: DISPENSED
Start: 2021-06-24

## (undated) RX ORDER — ONDANSETRON 2 MG/ML
INJECTION INTRAMUSCULAR; INTRAVENOUS
Status: DISPENSED
Start: 2021-05-07

## (undated) RX ORDER — METHYLPREDNISOLONE ACETATE 80 MG/ML
INJECTION, SUSPENSION INTRA-ARTICULAR; INTRALESIONAL; INTRAMUSCULAR; SOFT TISSUE
Status: DISPENSED
Start: 2024-11-25

## (undated) RX ORDER — HYDROMORPHONE HYDROCHLORIDE 1 MG/ML
INJECTION, SOLUTION INTRAMUSCULAR; INTRAVENOUS; SUBCUTANEOUS
Status: DISPENSED
Start: 2021-09-04

## (undated) RX ORDER — MAGNESIUM SULFATE HEPTAHYDRATE 40 MG/ML
INJECTION, SOLUTION INTRAVENOUS
Status: DISPENSED
Start: 2025-07-01

## (undated) RX ORDER — MAGNESIUM SULFATE HEPTAHYDRATE 40 MG/ML
INJECTION, SOLUTION INTRAVENOUS
Status: DISPENSED
Start: 2022-06-20

## (undated) RX ORDER — ASPIRIN 81 MG/1
TABLET, CHEWABLE ORAL
Status: DISPENSED
Start: 2021-06-24

## (undated) RX ORDER — LIDOCAINE HYDROCHLORIDE 10 MG/ML
INJECTION, SOLUTION INFILTRATION; PERINEURAL
Status: DISPENSED
Start: 2024-11-25

## (undated) RX ORDER — PREDNISONE 20 MG/1
TABLET ORAL
Status: DISPENSED
Start: 2018-02-21

## (undated) RX ORDER — ONDANSETRON 2 MG/ML
INJECTION INTRAMUSCULAR; INTRAVENOUS
Status: DISPENSED
Start: 2019-09-15

## (undated) RX ORDER — NITROGLYCERIN 0.4 MG/1
TABLET SUBLINGUAL
Status: DISPENSED
Start: 2021-06-24

## (undated) RX ORDER — PANTOPRAZOLE SODIUM 40 MG/10ML
INJECTION, POWDER, LYOPHILIZED, FOR SOLUTION INTRAVENOUS
Status: DISPENSED
Start: 2021-09-04

## (undated) RX ORDER — PROPOFOL 10 MG/ML
INJECTION, EMULSION INTRAVENOUS
Status: DISPENSED
Start: 2021-09-30

## (undated) RX ORDER — ACETAMINOPHEN 325 MG/1
TABLET ORAL
Status: DISPENSED
Start: 2024-06-13

## (undated) RX ORDER — LIDOCAINE HYDROCHLORIDE 20 MG/ML
INJECTION, SOLUTION EPIDURAL; INFILTRATION; INTRACAUDAL; PERINEURAL
Status: DISPENSED
Start: 2021-09-30

## (undated) RX ORDER — SODIUM CHLORIDE 9 MG/ML
INJECTION, SOLUTION INTRAVENOUS
Status: DISPENSED
Start: 2021-09-04

## (undated) RX ORDER — ACETAMINOPHEN 500 MG
TABLET ORAL
Status: DISPENSED
Start: 2018-02-21

## (undated) RX ORDER — MORPHINE SULFATE 4 MG/ML
INJECTION, SOLUTION INTRAMUSCULAR; INTRAVENOUS
Status: DISPENSED
Start: 2019-09-15

## (undated) RX ORDER — METHYLPREDNISOLONE ACETATE 80 MG/ML
INJECTION, SUSPENSION INTRA-ARTICULAR; INTRALESIONAL; INTRAMUSCULAR; SOFT TISSUE
Status: DISPENSED
Start: 2024-03-22

## (undated) RX ORDER — POTASSIUM CHLORIDE 7.45 MG/ML
INJECTION INTRAVENOUS
Status: DISPENSED
Start: 2021-06-24

## (undated) RX ORDER — BUDESONIDE 0.5 MG/2ML
INHALANT ORAL
Status: DISPENSED
Start: 2018-02-21

## (undated) RX ORDER — LIDOCAINE HYDROCHLORIDE 10 MG/ML
INJECTION, SOLUTION INFILTRATION; PERINEURAL
Status: DISPENSED
Start: 2021-03-11

## (undated) RX ORDER — HYDROMORPHONE HYDROCHLORIDE 1 MG/ML
INJECTION, SOLUTION INTRAMUSCULAR; INTRAVENOUS; SUBCUTANEOUS
Status: DISPENSED
Start: 2018-07-18

## (undated) RX ORDER — OXYCODONE HYDROCHLORIDE 5 MG/1
TABLET ORAL
Status: DISPENSED
Start: 2024-06-13

## (undated) RX ORDER — CEFTRIAXONE SODIUM 1 G
VIAL (EA) INJECTION
Status: DISPENSED
Start: 2022-07-09

## (undated) RX ORDER — NITROGLYCERIN 0.4 MG/1
TABLET SUBLINGUAL
Status: DISPENSED
Start: 2019-09-15

## (undated) RX ORDER — ASPIRIN 325 MG
TABLET ORAL
Status: DISPENSED
Start: 2019-09-15

## (undated) RX ORDER — MAGNESIUM HYDROXIDE/ALUMINUM HYDROXICE/SIMETHICONE 120; 1200; 1200 MG/30ML; MG/30ML; MG/30ML
SUSPENSION ORAL
Status: DISPENSED
Start: 2021-06-24

## (undated) RX ORDER — IPRATROPIUM BROMIDE AND ALBUTEROL SULFATE 2.5; .5 MG/3ML; MG/3ML
SOLUTION RESPIRATORY (INHALATION)
Status: DISPENSED
Start: 2018-02-21

## (undated) RX ORDER — LIDOCAINE HYDROCHLORIDE 20 MG/ML
SOLUTION OROPHARYNGEAL
Status: DISPENSED
Start: 2021-06-24

## (undated) RX ORDER — FAMOTIDINE 20 MG/1
TABLET, FILM COATED ORAL
Status: DISPENSED
Start: 2021-06-24

## (undated) RX ORDER — LIDOCAINE HYDROCHLORIDE 10 MG/ML
INJECTION, SOLUTION INFILTRATION; PERINEURAL
Status: DISPENSED
Start: 2024-03-22

## (undated) RX ORDER — LIDOCAINE HYDROCHLORIDE 10 MG/ML
INJECTION, SOLUTION INFILTRATION; PERINEURAL
Status: DISPENSED
Start: 2025-03-31

## (undated) RX ORDER — ONDANSETRON 2 MG/ML
INJECTION INTRAMUSCULAR; INTRAVENOUS
Status: DISPENSED
Start: 2021-09-04

## (undated) RX ORDER — KETOROLAC TROMETHAMINE 30 MG/ML
INJECTION, SOLUTION INTRAMUSCULAR; INTRAVENOUS
Status: DISPENSED
Start: 2019-09-15

## (undated) RX ORDER — SODIUM CHLORIDE 9 MG/ML
INJECTION, SOLUTION INTRAVENOUS
Status: DISPENSED
Start: 2021-05-07

## (undated) RX ORDER — SODIUM CHLORIDE 9 MG/ML
INJECTION, SOLUTION INTRAVENOUS
Status: DISPENSED
Start: 2021-06-24

## (undated) RX ORDER — METHYLPREDNISOLONE ACETATE 80 MG/ML
INJECTION, SUSPENSION INTRA-ARTICULAR; INTRALESIONAL; INTRAMUSCULAR; SOFT TISSUE
Status: DISPENSED
Start: 2021-03-11

## (undated) RX ORDER — METHYLPREDNISOLONE ACETATE 80 MG/ML
INJECTION, SUSPENSION INTRA-ARTICULAR; INTRALESIONAL; INTRAMUSCULAR; SOFT TISSUE
Status: DISPENSED
Start: 2025-03-31

## (undated) RX ORDER — ASPIRIN 81 MG/1
TABLET, CHEWABLE ORAL
Status: DISPENSED
Start: 2019-09-15

## (undated) RX ORDER — ONDANSETRON 2 MG/ML
INJECTION INTRAMUSCULAR; INTRAVENOUS
Status: DISPENSED
Start: 2021-06-24

## (undated) RX ORDER — POTASSIUM CHLORIDE 1500 MG/1
TABLET, EXTENDED RELEASE ORAL
Status: DISPENSED
Start: 2022-06-20